# Patient Record
Sex: MALE | Race: WHITE | NOT HISPANIC OR LATINO | Employment: OTHER | RURAL
[De-identification: names, ages, dates, MRNs, and addresses within clinical notes are randomized per-mention and may not be internally consistent; named-entity substitution may affect disease eponyms.]

---

## 2020-09-03 ENCOUNTER — HISTORICAL (OUTPATIENT)
Dept: ADMINISTRATIVE | Facility: HOSPITAL | Age: 70
End: 2020-09-03

## 2020-09-03 LAB
ERYTHROCYTE [SEDIMENTATION RATE] IN BLOOD BY WESTERGREN METHOD: 6 MM/H
PREALB SERPL NEPH-MCNC: 20 MG/DL (ref 20–40)

## 2020-09-06 LAB
REPORT: 38
REPORT: NORMAL

## 2020-11-04 ENCOUNTER — HISTORICAL (OUTPATIENT)
Dept: ADMINISTRATIVE | Facility: HOSPITAL | Age: 70
End: 2020-11-04

## 2020-12-03 ENCOUNTER — HISTORICAL (OUTPATIENT)
Dept: ADMINISTRATIVE | Facility: HOSPITAL | Age: 70
End: 2020-12-03

## 2020-12-06 LAB
REPORT: 25
REPORT: 38                 38
REPORT: NORMAL

## 2021-02-04 ENCOUNTER — HISTORICAL (OUTPATIENT)
Dept: ADMINISTRATIVE | Facility: HOSPITAL | Age: 71
End: 2021-02-04

## 2021-02-06 LAB
REPORT: 38
REPORT: NORMAL

## 2021-03-02 LAB
A1C: 6.9
CHOLESTEROL, TOTAL: 120
HDLC SERPL-MCNC: 35 MG/DL
LDLC SERPL CALC-MCNC: 28 MG/DL
TRIGLYCERIDE (LIPID PAN): 286

## 2021-03-04 ENCOUNTER — HISTORICAL (OUTPATIENT)
Dept: ADMINISTRATIVE | Facility: HOSPITAL | Age: 71
End: 2021-03-04

## 2021-03-08 LAB
REPORT: NORMAL

## 2021-05-20 ENCOUNTER — OFFICE VISIT (OUTPATIENT)
Dept: WOUND CARE | Facility: HOSPITAL | Age: 71
End: 2021-05-20
Attending: NURSE PRACTITIONER
Payer: MEDICARE

## 2021-05-20 VITALS
HEIGHT: 75 IN | SYSTOLIC BLOOD PRESSURE: 146 MMHG | HEART RATE: 71 BPM | TEMPERATURE: 98 F | DIASTOLIC BLOOD PRESSURE: 79 MMHG | WEIGHT: 300 LBS | RESPIRATION RATE: 20 BRPM | BODY MASS INDEX: 37.3 KG/M2

## 2021-05-20 DIAGNOSIS — I25.10 ATHEROSCLEROSIS OF NATIVE CORONARY ARTERY WITHOUT ANGINA PECTORIS, UNSPECIFIED WHETHER NATIVE OR TRANSPLANTED HEART: Primary | ICD-10-CM

## 2021-05-20 DIAGNOSIS — L97.529 TYPE 2 DIABETES MELLITUS WITH LEFT DIABETIC FOOT ULCER: ICD-10-CM

## 2021-05-20 DIAGNOSIS — E11.621 TYPE 2 DIABETES MELLITUS WITH LEFT DIABETIC FOOT ULCER: ICD-10-CM

## 2021-05-20 DIAGNOSIS — E11.42 DIABETIC POLYNEUROPATHY ASSOCIATED WITH TYPE 2 DIABETES MELLITUS: ICD-10-CM

## 2021-05-20 PROBLEM — E11.9 DIABETES MELLITUS WITHOUT COMPLICATION: Status: ACTIVE | Noted: 2021-05-20

## 2021-05-20 PROBLEM — J44.89 OBSTRUCTIVE CHRONIC BRONCHITIS WITHOUT EXACERBATION: Status: ACTIVE | Noted: 2021-05-20

## 2021-05-20 PROBLEM — I10 ESSENTIAL HYPERTENSION, BENIGN: Status: ACTIVE | Noted: 2021-05-20

## 2021-05-20 PROCEDURE — 99213 OFFICE O/P EST LOW 20 MIN: CPT | Performed by: NURSE PRACTITIONER

## 2021-05-20 PROCEDURE — 11042 DBRDMT SUBQ TIS 1ST 20SQCM/<: CPT

## 2021-05-20 PROCEDURE — 11042 DBRDMT SUBQ TIS 1ST 20SQCM/<: CPT | Performed by: NURSE PRACTITIONER

## 2021-05-20 RX ORDER — HYDROCHLOROTHIAZIDE 12.5 MG/1
12.5 CAPSULE ORAL DAILY
COMMUNITY
End: 2021-10-04

## 2021-05-20 RX ORDER — ALBUTEROL SULFATE 0.63 MG/3ML
0.63 SOLUTION RESPIRATORY (INHALATION) EVERY 6 HOURS PRN
COMMUNITY

## 2021-05-20 RX ORDER — NAPROXEN SODIUM 220 MG/1
81 TABLET, FILM COATED ORAL DAILY
COMMUNITY

## 2021-05-20 RX ORDER — LORATADINE 10 MG/1
10 TABLET ORAL DAILY
COMMUNITY
End: 2022-05-26

## 2021-05-20 RX ORDER — HYDROCODONE BITARTRATE AND ACETAMINOPHEN 10; 325 MG/1; MG/1
1 TABLET ORAL EVERY 8 HOURS
COMMUNITY
End: 2022-01-20 | Stop reason: SDUPTHER

## 2021-05-25 ENCOUNTER — OFFICE VISIT (OUTPATIENT)
Dept: PAIN MEDICINE | Facility: CLINIC | Age: 71
End: 2021-05-25
Payer: MEDICARE

## 2021-05-25 VITALS
HEART RATE: 78 BPM | BODY MASS INDEX: 38.05 KG/M2 | HEIGHT: 75 IN | DIASTOLIC BLOOD PRESSURE: 73 MMHG | RESPIRATION RATE: 18 BRPM | WEIGHT: 306 LBS | SYSTOLIC BLOOD PRESSURE: 146 MMHG

## 2021-05-25 DIAGNOSIS — M47.817 LUMBOSACRAL SPONDYLOSIS WITHOUT MYELOPATHY: Chronic | ICD-10-CM

## 2021-05-25 DIAGNOSIS — G62.9 NEUROPATHY: Chronic | ICD-10-CM

## 2021-05-25 DIAGNOSIS — Z79.899 ENCOUNTER FOR LONG-TERM (CURRENT) USE OF OTHER MEDICATIONS: ICD-10-CM

## 2021-05-25 DIAGNOSIS — M79.672 FOOT PAIN, LEFT: Primary | Chronic | ICD-10-CM

## 2021-05-25 LAB
CTP QC/QA: YES
POC (AMP) AMPHETAMINE: NEGATIVE
POC (BAR) BARBITURATES: NEGATIVE
POC (BUP) BUPRENORPHINE: NEGATIVE
POC (BZO) BENZODIAZEPINES: NEGATIVE
POC (COC) COCAINE: NEGATIVE
POC (MDMA) METHYLENEDIOXYMETHAMPHETAMINE 3,4: NEGATIVE
POC (MET) METHAMPHETAMINE: NEGATIVE
POC (MOP) OPIATES: ABNORMAL
POC (MTD) METHADONE: NEGATIVE
POC (OXY) OXYCODONE: NEGATIVE
POC (PCP) PHENCYCLIDINE: NEGATIVE
POC (TCA) TRICYCLIC ANTIDEPRESSANTS: NEGATIVE
POC TEMPERATURE (URINE): 92
POC THC: NEGATIVE

## 2021-05-25 PROCEDURE — 80305 DRUG TEST PRSMV DIR OPT OBS: CPT | Mod: PBBFAC | Performed by: PHYSICIAN ASSISTANT

## 2021-05-25 PROCEDURE — 99214 PR OFFICE/OUTPT VISIT, EST, LEVL IV, 30-39 MIN: ICD-10-PCS | Mod: S$PBB,,, | Performed by: PHYSICIAN ASSISTANT

## 2021-05-25 PROCEDURE — 99214 OFFICE O/P EST MOD 30 MIN: CPT | Mod: PBBFAC | Performed by: PHYSICIAN ASSISTANT

## 2021-05-25 PROCEDURE — 99214 OFFICE O/P EST MOD 30 MIN: CPT | Mod: S$PBB,,, | Performed by: PHYSICIAN ASSISTANT

## 2021-05-25 RX ORDER — HYDROCODONE BITARTRATE AND ACETAMINOPHEN 10; 325 MG/1; MG/1
1 TABLET ORAL EVERY 8 HOURS
Qty: 90 TABLET | Refills: 0 | Status: ON HOLD | OUTPATIENT
Start: 2021-06-26 | End: 2021-06-10 | Stop reason: HOSPADM

## 2021-05-25 RX ORDER — HYDROCODONE BITARTRATE AND ACETAMINOPHEN 10; 325 MG/1; MG/1
1 TABLET ORAL EVERY 8 HOURS
Qty: 90 TABLET | Refills: 0 | Status: ON HOLD | OUTPATIENT
Start: 2021-05-29 | End: 2021-06-10 | Stop reason: HOSPADM

## 2021-05-25 RX ORDER — HYDROCODONE BITARTRATE AND ACETAMINOPHEN 10; 325 MG/1; MG/1
1 TABLET ORAL EVERY 8 HOURS
Qty: 90 TABLET | Refills: 0 | Status: ON HOLD | OUTPATIENT
Start: 2021-07-28 | End: 2021-06-10 | Stop reason: HOSPADM

## 2021-05-27 ENCOUNTER — CLINICAL SUPPORT (OUTPATIENT)
Dept: FAMILY MEDICINE | Facility: CLINIC | Age: 71
End: 2021-05-27
Payer: MEDICARE

## 2021-05-27 ENCOUNTER — CLINICAL SUPPORT (OUTPATIENT)
Dept: WOUND CARE | Facility: HOSPITAL | Age: 71
End: 2021-05-27
Attending: NURSE PRACTITIONER
Payer: MEDICARE

## 2021-05-27 VITALS
SYSTOLIC BLOOD PRESSURE: 145 MMHG | RESPIRATION RATE: 20 BRPM | DIASTOLIC BLOOD PRESSURE: 89 MMHG | TEMPERATURE: 98 F | HEART RATE: 84 BPM

## 2021-05-27 DIAGNOSIS — E11.42 DIABETIC POLYNEUROPATHY ASSOCIATED WITH TYPE 2 DIABETES MELLITUS: ICD-10-CM

## 2021-05-27 DIAGNOSIS — E11.621 TYPE 2 DIABETES MELLITUS WITH LEFT DIABETIC FOOT ULCER: Primary | ICD-10-CM

## 2021-05-27 DIAGNOSIS — L97.529 TYPE 2 DIABETES MELLITUS WITH LEFT DIABETIC FOOT ULCER: Primary | ICD-10-CM

## 2021-05-27 DIAGNOSIS — S91.332A PUNCTURE WOUND OF LEFT FOOT, INITIAL ENCOUNTER: ICD-10-CM

## 2021-05-27 DIAGNOSIS — Z23 IMMUNIZATION DUE: Primary | ICD-10-CM

## 2021-05-27 PROCEDURE — 90471 TDAP VACCINE GREATER THAN OR EQUAL TO 7YO IM: ICD-10-PCS | Mod: ,,, | Performed by: NURSE PRACTITIONER

## 2021-05-27 PROCEDURE — 11042 DBRDMT SUBQ TIS 1ST 20SQCM/<: CPT

## 2021-05-27 PROCEDURE — 90715 TDAP VACCINE GREATER THAN OR EQUAL TO 7YO IM: ICD-10-PCS | Mod: ,,, | Performed by: NURSE PRACTITIONER

## 2021-05-27 PROCEDURE — 90715 TDAP VACCINE 7 YRS/> IM: CPT | Mod: ,,, | Performed by: NURSE PRACTITIONER

## 2021-05-27 PROCEDURE — 90471 IMMUNIZATION ADMIN: CPT | Mod: ,,, | Performed by: NURSE PRACTITIONER

## 2021-05-28 PROBLEM — E11.42 DIABETIC POLYNEUROPATHY ASSOCIATED WITH TYPE 2 DIABETES MELLITUS: Status: ACTIVE | Noted: 2021-05-28

## 2021-05-28 RX ORDER — MEMANTINE HYDROCHLORIDE 10 MG/1
10 TABLET ORAL 2 TIMES DAILY
COMMUNITY
Start: 2021-05-12 | End: 2021-09-08

## 2021-05-28 RX ORDER — INSULIN DETEMIR 100 [IU]/ML
INJECTION, SOLUTION SUBCUTANEOUS
Status: ON HOLD | COMMUNITY
Start: 2021-05-19 | End: 2021-06-09

## 2021-05-28 RX ORDER — SIMVASTATIN 40 MG/1
40 TABLET, FILM COATED ORAL NIGHTLY
Status: ON HOLD | COMMUNITY
Start: 2021-05-12 | End: 2021-06-09

## 2021-05-28 RX ORDER — GEMFIBROZIL 600 MG/1
600 TABLET, FILM COATED ORAL 2 TIMES DAILY
COMMUNITY
Start: 2021-05-12 | End: 2021-07-07

## 2021-05-28 RX ORDER — ISOSORBIDE MONONITRATE 30 MG/1
30 TABLET, EXTENDED RELEASE ORAL DAILY
COMMUNITY
Start: 2021-05-12 | End: 2021-08-20

## 2021-05-28 RX ORDER — ALBUTEROL SULFATE 90 UG/1
AEROSOL, METERED RESPIRATORY (INHALATION)
COMMUNITY
Start: 2021-05-12 | End: 2021-10-29

## 2021-05-28 RX ORDER — TIZANIDINE 4 MG/1
TABLET ORAL
COMMUNITY
Start: 2021-05-12 | End: 2021-08-09

## 2021-05-28 RX ORDER — GABAPENTIN 600 MG/1
600 TABLET ORAL 2 TIMES DAILY
COMMUNITY
Start: 2021-05-19 | End: 2021-08-20

## 2021-05-28 RX ORDER — LISINOPRIL 5 MG/1
5 TABLET ORAL DAILY
COMMUNITY
Start: 2021-05-12 | End: 2021-08-20

## 2021-05-28 RX ORDER — INSULIN ASPART 100 [IU]/ML
INJECTION, SUSPENSION SUBCUTANEOUS
COMMUNITY
Start: 2021-05-19 | End: 2021-06-16

## 2021-05-28 RX ORDER — EMPAGLIFLOZIN, METFORMIN HYDROCHLORIDE 12.5; 1 MG/1; MG/1
2 TABLET, EXTENDED RELEASE ORAL EVERY MORNING
COMMUNITY
Start: 2021-05-12 | End: 2021-08-09

## 2021-05-28 RX ORDER — CHLORPHENIRAMINE MALEATE AND PHENYLEPHRINE HYDROCHLORIDE 4; 10 MG/1; MG/1
1 TABLET, COATED ORAL 2 TIMES DAILY PRN
COMMUNITY
Start: 2021-05-12 | End: 2021-09-08

## 2021-05-28 RX ORDER — OMEPRAZOLE 20 MG/1
20 CAPSULE, DELAYED RELEASE ORAL DAILY
COMMUNITY
Start: 2021-05-12 | End: 2021-07-07

## 2021-06-01 ENCOUNTER — OFFICE VISIT (OUTPATIENT)
Dept: FAMILY MEDICINE | Facility: CLINIC | Age: 71
End: 2021-06-01
Payer: MEDICARE

## 2021-06-01 VITALS
TEMPERATURE: 99 F | WEIGHT: 302.38 LBS | HEIGHT: 73 IN | BODY MASS INDEX: 40.08 KG/M2 | SYSTOLIC BLOOD PRESSURE: 130 MMHG | RESPIRATION RATE: 18 BRPM | DIASTOLIC BLOOD PRESSURE: 78 MMHG | OXYGEN SATURATION: 93 % | HEART RATE: 88 BPM

## 2021-06-01 DIAGNOSIS — L03.116 CELLULITIS OF LEFT FOOT: ICD-10-CM

## 2021-06-01 DIAGNOSIS — L03.119 CELLULITIS OF LOWER LEG: Primary | ICD-10-CM

## 2021-06-01 LAB
BASOPHILS # BLD AUTO: 0.02 K/UL (ref 0–0.2)
BASOPHILS NFR BLD AUTO: 0.2 % (ref 0–1)
EOSINOPHIL # BLD AUTO: 0.05 K/UL (ref 0–0.5)
EOSINOPHIL NFR BLD AUTO: 0.4 % (ref 1–4)
ERYTHROCYTE [DISTWIDTH] IN BLOOD BY AUTOMATED COUNT: 14.4 % (ref 11.5–14.5)
HCT VFR BLD AUTO: 53.6 % (ref 40–54)
HGB BLD-MCNC: 17.1 G/DL (ref 13.5–18)
LYMPHOCYTES # BLD AUTO: 1.46 K/UL (ref 1–4.8)
LYMPHOCYTES NFR BLD AUTO: 11.5 % (ref 27–41)
MCH RBC QN AUTO: 31.3 PG (ref 27–31)
MCHC RBC AUTO-ENTMCNC: 31.9 G/DL (ref 32–36)
MCV RBC AUTO: 98 FL (ref 80–96)
MONOCYTES # BLD AUTO: 1.44 K/UL (ref 0–0.8)
MONOCYTES NFR BLD AUTO: 11.3 % (ref 2–6)
MPC BLD CALC-MCNC: 10.2 FL (ref 9.4–12.4)
NEUTROPHILS # BLD AUTO: 9.74 K/UL (ref 1.8–7.7)
NEUTROPHILS NFR BLD AUTO: 76.6 % (ref 53–65)
PLATELET # BLD AUTO: 219 K/UL (ref 150–400)
RBC # BLD AUTO: 5.47 M/UL (ref 4.6–6.2)
WBC # BLD AUTO: 12.71 K/UL (ref 4.5–11)

## 2021-06-01 PROCEDURE — 99214 OFFICE O/P EST MOD 30 MIN: CPT | Mod: 25,,, | Performed by: NURSE PRACTITIONER

## 2021-06-01 PROCEDURE — 99214 PR OFFICE/OUTPT VISIT, EST, LEVL IV, 30-39 MIN: ICD-10-PCS | Mod: 25,,, | Performed by: NURSE PRACTITIONER

## 2021-06-01 PROCEDURE — 85025 COMPLETE CBC W/AUTO DIFF WBC: CPT | Mod: QW | Performed by: NURSE PRACTITIONER

## 2021-06-01 PROCEDURE — 96372 PR INJECTION,THERAP/PROPH/DIAG2ST, IM OR SUBCUT: ICD-10-PCS | Mod: ,,, | Performed by: NURSE PRACTITIONER

## 2021-06-01 PROCEDURE — 96372 THER/PROPH/DIAG INJ SC/IM: CPT | Mod: ,,, | Performed by: NURSE PRACTITIONER

## 2021-06-01 RX ORDER — CLINDAMYCIN PHOSPHATE 150 MG/ML
600 INJECTION, SOLUTION INTRAVENOUS
Status: COMPLETED | OUTPATIENT
Start: 2021-06-01 | End: 2021-06-01

## 2021-06-01 RX ORDER — LEVOFLOXACIN 500 MG/1
500 TABLET, FILM COATED ORAL DAILY
Qty: 10 TABLET | Refills: 0 | Status: SHIPPED | OUTPATIENT
Start: 2021-06-01 | End: 2021-11-08 | Stop reason: ALTCHOICE

## 2021-06-01 RX ADMIN — CLINDAMYCIN PHOSPHATE 600 MG: 150 INJECTION, SOLUTION INTRAVENOUS at 09:06

## 2021-06-02 ENCOUNTER — OFFICE VISIT (OUTPATIENT)
Dept: FAMILY MEDICINE | Facility: CLINIC | Age: 71
End: 2021-06-02
Payer: MEDICARE

## 2021-06-02 VITALS
HEIGHT: 73 IN | SYSTOLIC BLOOD PRESSURE: 140 MMHG | TEMPERATURE: 98 F | OXYGEN SATURATION: 96 % | BODY MASS INDEX: 40 KG/M2 | WEIGHT: 301.81 LBS | RESPIRATION RATE: 18 BRPM | DIASTOLIC BLOOD PRESSURE: 72 MMHG | HEART RATE: 91 BPM

## 2021-06-02 DIAGNOSIS — L03.116 CELLULITIS OF LEFT FOOT: Primary | ICD-10-CM

## 2021-06-02 DIAGNOSIS — L03.119 CELLULITIS OF LOWER LEG: ICD-10-CM

## 2021-06-02 LAB
BASOPHILS # BLD AUTO: 0.3 K/UL (ref 0–0.2)
BASOPHILS NFR BLD AUTO: 0.3 % (ref 0–1)
EOSINOPHIL # BLD AUTO: 0.05 K/UL (ref 0–0.5)
EOSINOPHIL NFR BLD AUTO: 0.4 % (ref 1–4)
ERYTHROCYTE [DISTWIDTH] IN BLOOD BY AUTOMATED COUNT: 14.4 % (ref 11.5–14.5)
HCT VFR BLD AUTO: 52.2 % (ref 40–54)
HGB BLD-MCNC: 16.7 G/DL (ref 13.5–18)
LYMPHOCYTES # BLD AUTO: 1.47 K/UL (ref 1–4.8)
LYMPHOCYTES NFR BLD AUTO: 12.6 % (ref 27–41)
MCH RBC QN AUTO: 31.4 PG (ref 27–31)
MCHC RBC AUTO-ENTMCNC: 32 G/DL (ref 32–36)
MCV RBC AUTO: 98.1 FL (ref 80–96)
MONOCYTES # BLD AUTO: 0.88 K/UL (ref 0–0.8)
MONOCYTES NFR BLD AUTO: 7.6 % (ref 2–6)
MPC BLD CALC-MCNC: 10.3 FL (ref 9.4–12.4)
NEUTROPHILS # BLD AUTO: 9.2 K/UL (ref 1.8–7.7)
NEUTROPHILS NFR BLD AUTO: 79.1 % (ref 53–65)
PLATELET # BLD AUTO: 220 K/UL (ref 150–400)
RBC # BLD AUTO: 5.32 M/UL (ref 4.6–6.2)
WBC # BLD AUTO: 11.63 K/UL (ref 4.5–11)

## 2021-06-02 PROCEDURE — 85025 COMPLETE CBC W/AUTO DIFF WBC: CPT | Mod: QW | Performed by: NURSE PRACTITIONER

## 2021-06-02 PROCEDURE — 99214 OFFICE O/P EST MOD 30 MIN: CPT | Mod: 25,,, | Performed by: NURSE PRACTITIONER

## 2021-06-02 PROCEDURE — 96372 PR INJECTION,THERAP/PROPH/DIAG2ST, IM OR SUBCUT: ICD-10-PCS | Mod: ,,, | Performed by: NURSE PRACTITIONER

## 2021-06-02 PROCEDURE — 87186 SC STD MICRODIL/AGAR DIL: CPT | Mod: 91,,, | Performed by: CLINICAL MEDICAL LABORATORY

## 2021-06-02 PROCEDURE — 87077 CULTURE AEROBIC IDENTIFY: CPT | Mod: ,,, | Performed by: CLINICAL MEDICAL LABORATORY

## 2021-06-02 PROCEDURE — 87186 SC STD MICRODIL/AGAR DIL: CPT | Mod: ,,, | Performed by: CLINICAL MEDICAL LABORATORY

## 2021-06-02 PROCEDURE — 87077 CULTURE, WOUND: ICD-10-PCS | Mod: ,,, | Performed by: CLINICAL MEDICAL LABORATORY

## 2021-06-02 PROCEDURE — 87186 CULTURE, WOUND: ICD-10-PCS | Mod: 91,,, | Performed by: CLINICAL MEDICAL LABORATORY

## 2021-06-02 PROCEDURE — 87070 CULTURE, WOUND: ICD-10-PCS | Mod: ,,, | Performed by: CLINICAL MEDICAL LABORATORY

## 2021-06-02 PROCEDURE — 96372 THER/PROPH/DIAG INJ SC/IM: CPT | Mod: ,,, | Performed by: NURSE PRACTITIONER

## 2021-06-02 PROCEDURE — 99214 PR OFFICE/OUTPT VISIT, EST, LEVL IV, 30-39 MIN: ICD-10-PCS | Mod: 25,,, | Performed by: NURSE PRACTITIONER

## 2021-06-02 PROCEDURE — 87070 CULTURE OTHR SPECIMN AEROBIC: CPT | Mod: ,,, | Performed by: CLINICAL MEDICAL LABORATORY

## 2021-06-02 RX ORDER — DOXYCYCLINE 100 MG/1
100 CAPSULE ORAL EVERY 12 HOURS
Qty: 20 CAPSULE | Refills: 0 | Status: ON HOLD | OUTPATIENT
Start: 2021-06-02 | End: 2021-06-10 | Stop reason: HOSPADM

## 2021-06-02 RX ORDER — CEFTRIAXONE 1 G/1
1 INJECTION, POWDER, FOR SOLUTION INTRAMUSCULAR; INTRAVENOUS
Status: COMPLETED | OUTPATIENT
Start: 2021-06-02 | End: 2021-06-02

## 2021-06-02 RX ADMIN — CEFTRIAXONE 1 G: 1 INJECTION, POWDER, FOR SOLUTION INTRAMUSCULAR; INTRAVENOUS at 02:06

## 2021-06-03 ENCOUNTER — CLINICAL SUPPORT (OUTPATIENT)
Dept: WOUND CARE | Facility: HOSPITAL | Age: 71
End: 2021-06-03
Attending: NURSE PRACTITIONER
Payer: MEDICARE

## 2021-06-03 ENCOUNTER — HOSPITAL ENCOUNTER (OUTPATIENT)
Dept: RADIOLOGY | Facility: HOSPITAL | Age: 71
Discharge: HOME | End: 2021-06-03
Attending: NURSE PRACTITIONER
Payer: MEDICARE

## 2021-06-03 VITALS
RESPIRATION RATE: 20 BRPM | TEMPERATURE: 98 F | SYSTOLIC BLOOD PRESSURE: 130 MMHG | DIASTOLIC BLOOD PRESSURE: 82 MMHG | HEART RATE: 80 BPM

## 2021-06-03 DIAGNOSIS — S91.332A PUNCTURE WOUND OF LEFT FOOT, INITIAL ENCOUNTER: ICD-10-CM

## 2021-06-03 DIAGNOSIS — L97.529 TYPE 2 DIABETES MELLITUS WITH LEFT DIABETIC FOOT ULCER: Primary | ICD-10-CM

## 2021-06-03 DIAGNOSIS — E11.621 TYPE 2 DIABETES MELLITUS WITH LEFT DIABETIC FOOT ULCER: Primary | ICD-10-CM

## 2021-06-03 PROCEDURE — 11042 DBRDMT SUBQ TIS 1ST 20SQCM/<: CPT

## 2021-06-03 PROCEDURE — 99213 OFFICE O/P EST LOW 20 MIN: CPT | Performed by: NURSE PRACTITIONER

## 2021-06-03 PROCEDURE — 73630 X-RAY EXAM OF FOOT: CPT | Mod: TC,LT

## 2021-06-03 PROCEDURE — 11042 DBRDMT SUBQ TIS 1ST 20SQCM/<: CPT | Performed by: NURSE PRACTITIONER

## 2021-06-07 ENCOUNTER — HOSPITAL ENCOUNTER (INPATIENT)
Facility: HOSPITAL | Age: 71
LOS: 3 days | Discharge: HOME | DRG: 603 | End: 2021-06-10
Attending: SURGERY | Admitting: SURGERY
Payer: MEDICARE

## 2021-06-07 ENCOUNTER — OFFICE VISIT (OUTPATIENT)
Dept: FAMILY MEDICINE | Facility: CLINIC | Age: 71
End: 2021-06-07
Payer: MEDICARE

## 2021-06-07 ENCOUNTER — OFFICE VISIT (OUTPATIENT)
Dept: SURGERY | Facility: CLINIC | Age: 71
DRG: 603 | End: 2021-06-07
Attending: SURGERY
Payer: MEDICARE

## 2021-06-07 VITALS
OXYGEN SATURATION: 95 % | WEIGHT: 296.63 LBS | SYSTOLIC BLOOD PRESSURE: 138 MMHG | TEMPERATURE: 98 F | BODY MASS INDEX: 39.31 KG/M2 | RESPIRATION RATE: 18 BRPM | HEIGHT: 73 IN | DIASTOLIC BLOOD PRESSURE: 82 MMHG | HEART RATE: 84 BPM

## 2021-06-07 DIAGNOSIS — L97.529 TYPE 2 DIABETES MELLITUS WITH LEFT DIABETIC FOOT ULCER: Primary | ICD-10-CM

## 2021-06-07 DIAGNOSIS — J44.89 OBSTRUCTIVE CHRONIC BRONCHITIS WITHOUT EXACERBATION: ICD-10-CM

## 2021-06-07 DIAGNOSIS — L03.116 CELLULITIS OF LEFT FOOT: ICD-10-CM

## 2021-06-07 DIAGNOSIS — L97.529 TYPE 2 DIABETES MELLITUS WITH LEFT DIABETIC FOOT ULCER: ICD-10-CM

## 2021-06-07 DIAGNOSIS — L03.119 CELLULITIS OF LOWER LEG: ICD-10-CM

## 2021-06-07 DIAGNOSIS — E11.621 TYPE 2 DIABETES MELLITUS WITH LEFT DIABETIC FOOT ULCER: ICD-10-CM

## 2021-06-07 DIAGNOSIS — E11.621 TYPE 2 DIABETES MELLITUS WITH LEFT DIABETIC FOOT ULCER: Primary | ICD-10-CM

## 2021-06-07 DIAGNOSIS — L03.116 CELLULITIS OF LEFT FOOT: Primary | ICD-10-CM

## 2021-06-07 LAB
ANION GAP SERPL CALCULATED.3IONS-SCNC: 15 MMOL/L (ref 7–16)
BASOPHILS # BLD AUTO: 0.02 K/UL (ref 0–0.2)
BASOPHILS # BLD AUTO: 0.04 K/UL (ref 0–0.2)
BASOPHILS NFR BLD AUTO: 0.2 % (ref 0–1)
BASOPHILS NFR BLD AUTO: 0.4 % (ref 0–1)
BUN SERPL-MCNC: 14 MG/DL (ref 7–18)
BUN SERPL-MCNC: 14 MG/DL (ref 7–18)
BUN/CREAT SERPL: 13 (ref 6–20)
BUN/CREAT SERPL: 13 (ref 6–20)
CALCIUM SERPL-MCNC: 9 MG/DL (ref 8.5–10.1)
CHLORIDE SERPL-SCNC: 106 MMOL/L (ref 98–107)
CO2 SERPL-SCNC: 30 MMOL/L (ref 21–32)
CREAT SERPL-MCNC: 1.07 MG/DL (ref 0.7–1.3)
CREAT SERPL-MCNC: 1.08 MG/DL (ref 0.7–1.3)
DIFFERENTIAL METHOD BLD: ABNORMAL
DIFFERENTIAL METHOD BLD: ABNORMAL
EOSINOPHIL # BLD AUTO: 0.11 K/UL (ref 0–0.5)
EOSINOPHIL # BLD AUTO: 0.13 K/UL (ref 0–0.5)
EOSINOPHIL NFR BLD AUTO: 1.1 % (ref 1–4)
EOSINOPHIL NFR BLD AUTO: 1.4 % (ref 1–4)
ERYTHROCYTE [DISTWIDTH] IN BLOOD BY AUTOMATED COUNT: 13.6 % (ref 11.5–14.5)
ERYTHROCYTE [DISTWIDTH] IN BLOOD BY AUTOMATED COUNT: 14.4 % (ref 11.5–14.5)
GLUCOSE SERPL-MCNC: 157 MG/DL (ref 74–106)
GLUCOSE SERPL-MCNC: 185 MG/DL (ref 70–105)
HCT VFR BLD AUTO: 53.8 % (ref 40–54)
HCT VFR BLD AUTO: 54.1 % (ref 40–54)
HGB BLD-MCNC: 16.8 G/DL (ref 13.5–18)
HGB BLD-MCNC: 17 G/DL (ref 13.5–18)
IMM GRANULOCYTES # BLD AUTO: 0.05 K/UL (ref 0–0.04)
IMM GRANULOCYTES NFR BLD: 0.5 % (ref 0–0.4)
LYMPHOCYTES # BLD AUTO: 1.77 K/UL (ref 1–4.8)
LYMPHOCYTES # BLD AUTO: 1.79 K/UL (ref 1–4.8)
LYMPHOCYTES NFR BLD AUTO: 17.7 % (ref 27–41)
LYMPHOCYTES NFR BLD AUTO: 18.8 % (ref 27–41)
MCH RBC QN AUTO: 30.8 PG (ref 27–31)
MCH RBC QN AUTO: 30.9 PG (ref 27–31)
MCHC RBC AUTO-ENTMCNC: 31.2 G/DL (ref 32–36)
MCHC RBC AUTO-ENTMCNC: 31.4 G/DL (ref 32–36)
MCV RBC AUTO: 98.4 FL (ref 80–96)
MCV RBC AUTO: 98.7 FL (ref 80–96)
MICROORGANISM SPEC CULT: ABNORMAL
MICROORGANISM SPEC CULT: ABNORMAL
MONOCYTES # BLD AUTO: 0.7 K/UL (ref 0–0.8)
MONOCYTES # BLD AUTO: 0.74 K/UL (ref 0–0.8)
MONOCYTES NFR BLD AUTO: 6.9 % (ref 2–6)
MONOCYTES NFR BLD AUTO: 7.9 % (ref 2–6)
MPC BLD CALC-MCNC: 9.4 FL (ref 9.4–12.4)
MPC BLD CALC-MCNC: 9.8 FL (ref 9.4–12.4)
NEUTROPHILS # BLD AUTO: 6.76 K/UL (ref 1.8–7.7)
NEUTROPHILS # BLD AUTO: 7.42 K/UL (ref 1.8–7.7)
NEUTROPHILS NFR BLD AUTO: 71.7 % (ref 53–65)
NEUTROPHILS NFR BLD AUTO: 73.4 % (ref 53–65)
NRBC # BLD AUTO: 0 X10E3/UL
NRBC, AUTO (.00): 0 %
PLATELET # BLD AUTO: 259 K/UL (ref 150–400)
PLATELET # BLD AUTO: 274 K/UL (ref 150–400)
POTASSIUM SERPL-SCNC: 4.7 MMOL/L (ref 3.5–5.1)
RBC # BLD AUTO: 5.45 M/UL (ref 4.6–6.2)
RBC # BLD AUTO: 5.5 M/UL (ref 4.6–6.2)
SODIUM SERPL-SCNC: 146 MMOL/L (ref 136–145)
WBC # BLD AUTO: 10.11 K/UL (ref 4.5–11)
WBC # BLD AUTO: 9.42 K/UL (ref 4.5–11)

## 2021-06-07 PROCEDURE — 25000003 PHARM REV CODE 250: Performed by: SURGERY

## 2021-06-07 PROCEDURE — 11000001 HC ACUTE MED/SURG PRIVATE ROOM

## 2021-06-07 PROCEDURE — 94761 N-INVAS EAR/PLS OXIMETRY MLT: CPT

## 2021-06-07 PROCEDURE — 84520 ASSAY OF UREA NITROGEN: CPT | Performed by: SURGERY

## 2021-06-07 PROCEDURE — 85025 COMPLETE CBC W/AUTO DIFF WBC: CPT | Performed by: SURGERY

## 2021-06-07 PROCEDURE — 99214 PR OFFICE/OUTPT VISIT, EST, LEVL IV, 30-39 MIN: ICD-10-PCS | Mod: ,,, | Performed by: NURSE PRACTITIONER

## 2021-06-07 PROCEDURE — 36415 COLL VENOUS BLD VENIPUNCTURE: CPT | Performed by: SURGERY

## 2021-06-07 PROCEDURE — 99204 OFFICE O/P NEW MOD 45 MIN: CPT | Mod: S$PBB,,, | Performed by: SURGERY

## 2021-06-07 PROCEDURE — 82962 GLUCOSE BLOOD TEST: CPT

## 2021-06-07 PROCEDURE — 99214 OFFICE O/P EST MOD 30 MIN: CPT | Mod: ,,, | Performed by: NURSE PRACTITIONER

## 2021-06-07 PROCEDURE — 99204 PR OFFICE/OUTPT VISIT, NEW, LEVL IV, 45-59 MIN: ICD-10-PCS | Mod: S$PBB,,, | Performed by: SURGERY

## 2021-06-07 PROCEDURE — 82565 ASSAY OF CREATININE: CPT | Performed by: SURGERY

## 2021-06-07 PROCEDURE — 85025 COMPLETE CBC W/AUTO DIFF WBC: CPT | Mod: QW | Performed by: NURSE PRACTITIONER

## 2021-06-07 PROCEDURE — 63600175 PHARM REV CODE 636 W HCPCS: Performed by: SURGERY

## 2021-06-07 PROCEDURE — 99214 OFFICE O/P EST MOD 30 MIN: CPT | Mod: PBBFAC,25 | Performed by: SURGERY

## 2021-06-07 RX ORDER — INSULIN ASPART 100 [IU]/ML
1-10 INJECTION, SOLUTION INTRAVENOUS; SUBCUTANEOUS
Status: CANCELLED | OUTPATIENT
Start: 2021-06-07

## 2021-06-07 RX ORDER — ONDANSETRON 4 MG/1
8 TABLET, ORALLY DISINTEGRATING ORAL EVERY 8 HOURS PRN
Status: DISCONTINUED | OUTPATIENT
Start: 2021-06-07 | End: 2021-06-10 | Stop reason: HOSPADM

## 2021-06-07 RX ORDER — ACETAMINOPHEN 325 MG/1
650 TABLET ORAL EVERY 8 HOURS PRN
Status: CANCELLED | OUTPATIENT
Start: 2021-06-07

## 2021-06-07 RX ORDER — TALC
6 POWDER (GRAM) TOPICAL NIGHTLY PRN
Status: CANCELLED | OUTPATIENT
Start: 2021-06-07

## 2021-06-07 RX ORDER — IBUPROFEN 200 MG
24 TABLET ORAL
Status: CANCELLED | OUTPATIENT
Start: 2021-06-07

## 2021-06-07 RX ORDER — ONDANSETRON 4 MG/1
8 TABLET, ORALLY DISINTEGRATING ORAL EVERY 8 HOURS PRN
Status: CANCELLED | OUTPATIENT
Start: 2021-06-07

## 2021-06-07 RX ORDER — IBUPROFEN 200 MG
16 TABLET ORAL
Status: DISCONTINUED | OUTPATIENT
Start: 2021-06-07 | End: 2021-06-10 | Stop reason: HOSPADM

## 2021-06-07 RX ORDER — LIDOCAINE HYDROCHLORIDE 10 MG/ML
1 INJECTION, SOLUTION EPIDURAL; INFILTRATION; INTRACAUDAL; PERINEURAL ONCE
Status: DISCONTINUED | OUTPATIENT
Start: 2021-06-08 | End: 2021-06-10 | Stop reason: HOSPADM

## 2021-06-07 RX ORDER — GLUCAGON 1 MG
1 KIT INJECTION
Status: DISCONTINUED | OUTPATIENT
Start: 2021-06-07 | End: 2021-06-10 | Stop reason: HOSPADM

## 2021-06-07 RX ORDER — LIDOCAINE HYDROCHLORIDE 10 MG/ML
1 INJECTION, SOLUTION EPIDURAL; INFILTRATION; INTRACAUDAL; PERINEURAL ONCE
Status: CANCELLED | OUTPATIENT
Start: 2021-06-07 | End: 2021-06-07

## 2021-06-07 RX ORDER — SODIUM CHLORIDE 0.9 % (FLUSH) 0.9 %
10 SYRINGE (ML) INJECTION
Status: DISCONTINUED | OUTPATIENT
Start: 2021-06-07 | End: 2021-06-07

## 2021-06-07 RX ORDER — SODIUM CHLORIDE 0.9 % (FLUSH) 0.9 %
10 SYRINGE (ML) INJECTION
Status: DISCONTINUED | OUTPATIENT
Start: 2021-06-07 | End: 2021-06-10 | Stop reason: HOSPADM

## 2021-06-07 RX ORDER — INSULIN ASPART 100 [IU]/ML
1-10 INJECTION, SOLUTION INTRAVENOUS; SUBCUTANEOUS
Status: DISCONTINUED | OUTPATIENT
Start: 2021-06-07 | End: 2021-06-10 | Stop reason: HOSPADM

## 2021-06-07 RX ORDER — IBUPROFEN 200 MG
16 TABLET ORAL
Status: CANCELLED | OUTPATIENT
Start: 2021-06-07

## 2021-06-07 RX ORDER — TALC
6 POWDER (GRAM) TOPICAL NIGHTLY PRN
Status: DISCONTINUED | OUTPATIENT
Start: 2021-06-07 | End: 2021-06-10 | Stop reason: HOSPADM

## 2021-06-07 RX ORDER — GLUCAGON 1 MG
1 KIT INJECTION
Status: CANCELLED | OUTPATIENT
Start: 2021-06-07

## 2021-06-07 RX ORDER — IBUPROFEN 200 MG
24 TABLET ORAL
Status: DISCONTINUED | OUTPATIENT
Start: 2021-06-07 | End: 2021-06-10 | Stop reason: HOSPADM

## 2021-06-07 RX ORDER — ACETAMINOPHEN 325 MG/1
650 TABLET ORAL EVERY 8 HOURS PRN
Status: DISCONTINUED | OUTPATIENT
Start: 2021-06-07 | End: 2021-06-10 | Stop reason: HOSPADM

## 2021-06-07 RX ORDER — SODIUM CHLORIDE 0.9 % (FLUSH) 0.9 %
10 SYRINGE (ML) INJECTION
Status: CANCELLED | OUTPATIENT
Start: 2021-06-07

## 2021-06-07 RX ADMIN — VANCOMYCIN HYDROCHLORIDE 2500 MG: 5 INJECTION, POWDER, LYOPHILIZED, FOR SOLUTION INTRAVENOUS at 08:06

## 2021-06-07 RX ADMIN — INSULIN ASPART 1 UNITS: 100 INJECTION, SOLUTION INTRAVENOUS; SUBCUTANEOUS at 08:06

## 2021-06-08 LAB
GLUCOSE SERPL-MCNC: 126 MG/DL (ref 70–105)
GLUCOSE SERPL-MCNC: 181 MG/DL (ref 70–105)
GLUCOSE SERPL-MCNC: 186 MG/DL (ref 70–105)
GLUCOSE SERPL-MCNC: 272 MG/DL (ref 70–105)

## 2021-06-08 PROCEDURE — 82962 GLUCOSE BLOOD TEST: CPT

## 2021-06-08 PROCEDURE — 25000003 PHARM REV CODE 250: Performed by: SURGERY

## 2021-06-08 PROCEDURE — 99233 PR SUBSEQUENT HOSPITAL CARE,LEVL III: ICD-10-PCS | Mod: ,,, | Performed by: SURGERY

## 2021-06-08 PROCEDURE — 99233 SBSQ HOSP IP/OBS HIGH 50: CPT | Mod: ,,, | Performed by: SURGERY

## 2021-06-08 PROCEDURE — 94761 N-INVAS EAR/PLS OXIMETRY MLT: CPT

## 2021-06-08 PROCEDURE — 63600175 PHARM REV CODE 636 W HCPCS: Performed by: SURGERY

## 2021-06-08 PROCEDURE — 11000001 HC ACUTE MED/SURG PRIVATE ROOM

## 2021-06-08 RX ORDER — MEMANTINE HYDROCHLORIDE 10 MG/1
10 TABLET ORAL 2 TIMES DAILY
Status: DISCONTINUED | OUTPATIENT
Start: 2021-06-08 | End: 2021-06-10 | Stop reason: HOSPADM

## 2021-06-08 RX ORDER — LISINOPRIL 5 MG/1
5 TABLET ORAL DAILY
Status: DISCONTINUED | OUTPATIENT
Start: 2021-06-09 | End: 2021-06-10 | Stop reason: HOSPADM

## 2021-06-08 RX ORDER — ALBUTEROL SULFATE 0.63 MG/3ML
0.63 SOLUTION RESPIRATORY (INHALATION) EVERY 6 HOURS PRN
Status: DISCONTINUED | OUTPATIENT
Start: 2021-06-08 | End: 2021-06-10 | Stop reason: HOSPADM

## 2021-06-08 RX ORDER — HYDROCHLOROTHIAZIDE 12.5 MG/1
12.5 TABLET ORAL DAILY
Status: DISCONTINUED | OUTPATIENT
Start: 2021-06-09 | End: 2021-06-10 | Stop reason: HOSPADM

## 2021-06-08 RX ORDER — CIPROFLOXACIN 2 MG/ML
400 INJECTION, SOLUTION INTRAVENOUS
Status: DISCONTINUED | OUTPATIENT
Start: 2021-06-08 | End: 2021-06-10 | Stop reason: HOSPADM

## 2021-06-08 RX ORDER — IPRATROPIUM BROMIDE AND ALBUTEROL SULFATE 2.5; .5 MG/3ML; MG/3ML
3 SOLUTION RESPIRATORY (INHALATION) EVERY 6 HOURS PRN
Status: DISCONTINUED | OUTPATIENT
Start: 2021-06-08 | End: 2021-06-10 | Stop reason: HOSPADM

## 2021-06-08 RX ORDER — GABAPENTIN 300 MG/1
300 CAPSULE ORAL 2 TIMES DAILY
Status: DISCONTINUED | OUTPATIENT
Start: 2021-06-08 | End: 2021-06-10 | Stop reason: HOSPADM

## 2021-06-08 RX ADMIN — CIPROFLOXACIN 400 MG: 2 INJECTION, SOLUTION INTRAVENOUS at 01:06

## 2021-06-08 RX ADMIN — MELATONIN 6 MG: at 12:06

## 2021-06-08 RX ADMIN — INSULIN ASPART 3 UNITS: 100 INJECTION, SOLUTION INTRAVENOUS; SUBCUTANEOUS at 09:06

## 2021-06-08 RX ADMIN — MELATONIN 6 MG: at 09:06

## 2021-06-08 RX ADMIN — MEMANTINE 10 MG: 10 TABLET ORAL at 09:06

## 2021-06-08 RX ADMIN — INSULIN ASPART 2 UNITS: 100 INJECTION, SOLUTION INTRAVENOUS; SUBCUTANEOUS at 06:06

## 2021-06-08 RX ADMIN — GABAPENTIN 300 MG: 300 CAPSULE ORAL at 09:06

## 2021-06-09 LAB
GLUCOSE SERPL-MCNC: 164 MG/DL (ref 70–105)
GLUCOSE SERPL-MCNC: 231 MG/DL (ref 70–105)
GLUCOSE SERPL-MCNC: 251 MG/DL (ref 70–105)
GLUCOSE SERPL-MCNC: 313 MG/DL (ref 70–105)
VANCOMYCIN TROUGH SERPL-MCNC: 2.9 ΜG/ML (ref 10–20)

## 2021-06-09 PROCEDURE — 82962 GLUCOSE BLOOD TEST: CPT

## 2021-06-09 PROCEDURE — 11000001 HC ACUTE MED/SURG PRIVATE ROOM

## 2021-06-09 PROCEDURE — 63600175 PHARM REV CODE 636 W HCPCS: Performed by: SURGERY

## 2021-06-09 PROCEDURE — 25000003 PHARM REV CODE 250: Performed by: SURGERY

## 2021-06-09 PROCEDURE — 25000003 PHARM REV CODE 250: Performed by: NURSE PRACTITIONER

## 2021-06-09 PROCEDURE — 80202 ASSAY OF VANCOMYCIN: CPT | Performed by: SURGERY

## 2021-06-09 RX ORDER — DOCUSATE SODIUM 100 MG/1
100 CAPSULE, LIQUID FILLED ORAL DAILY
Status: DISCONTINUED | OUTPATIENT
Start: 2021-06-09 | End: 2021-06-10 | Stop reason: HOSPADM

## 2021-06-09 RX ADMIN — INSULIN ASPART 4 UNITS: 100 INJECTION, SOLUTION INTRAVENOUS; SUBCUTANEOUS at 10:06

## 2021-06-09 RX ADMIN — LISINOPRIL 5 MG: 5 TABLET ORAL at 08:06

## 2021-06-09 RX ADMIN — MEMANTINE 10 MG: 10 TABLET ORAL at 08:06

## 2021-06-09 RX ADMIN — HYDROCHLOROTHIAZIDE 12.5 MG: 12.5 TABLET ORAL at 08:06

## 2021-06-09 RX ADMIN — INSULIN ASPART 4 UNITS: 100 INJECTION, SOLUTION INTRAVENOUS; SUBCUTANEOUS at 12:06

## 2021-06-09 RX ADMIN — GABAPENTIN 300 MG: 300 CAPSULE ORAL at 08:06

## 2021-06-09 RX ADMIN — CIPROFLOXACIN 400 MG: 2 INJECTION, SOLUTION INTRAVENOUS at 12:06

## 2021-06-09 RX ADMIN — MEMANTINE 10 MG: 10 TABLET ORAL at 09:06

## 2021-06-09 RX ADMIN — GABAPENTIN 300 MG: 300 CAPSULE ORAL at 09:06

## 2021-06-09 RX ADMIN — INSULIN ASPART 6 UNITS: 100 INJECTION, SOLUTION INTRAVENOUS; SUBCUTANEOUS at 05:06

## 2021-06-09 RX ADMIN — DOCUSATE SODIUM 100 MG: 100 CAPSULE, LIQUID FILLED ORAL at 01:06

## 2021-06-09 RX ADMIN — INSULIN ASPART 2 UNITS: 100 INJECTION, SOLUTION INTRAVENOUS; SUBCUTANEOUS at 06:06

## 2021-06-09 RX ADMIN — MELATONIN 6 MG: at 09:06

## 2021-06-10 VITALS
TEMPERATURE: 98 F | HEIGHT: 73 IN | WEIGHT: 296.5 LBS | BODY MASS INDEX: 39.3 KG/M2 | HEART RATE: 80 BPM | SYSTOLIC BLOOD PRESSURE: 147 MMHG | OXYGEN SATURATION: 94 % | RESPIRATION RATE: 18 BRPM | DIASTOLIC BLOOD PRESSURE: 76 MMHG

## 2021-06-10 LAB — GLUCOSE SERPL-MCNC: 168 MG/DL (ref 70–105)

## 2021-06-10 PROCEDURE — 25000003 PHARM REV CODE 250: Performed by: SURGERY

## 2021-06-10 PROCEDURE — 63600175 PHARM REV CODE 636 W HCPCS: Performed by: SURGERY

## 2021-06-10 PROCEDURE — 25000003 PHARM REV CODE 250: Performed by: NURSE PRACTITIONER

## 2021-06-10 PROCEDURE — 82962 GLUCOSE BLOOD TEST: CPT

## 2021-06-10 RX ORDER — SULFAMETHOXAZOLE AND TRIMETHOPRIM 800; 160 MG/1; MG/1
1 TABLET ORAL 2 TIMES DAILY
Qty: 10 TABLET | Refills: 0 | Status: SHIPPED | OUTPATIENT
Start: 2021-06-10 | End: 2021-07-30

## 2021-06-10 RX ADMIN — CIPROFLOXACIN 400 MG: 2 INJECTION, SOLUTION INTRAVENOUS at 01:06

## 2021-06-10 RX ADMIN — DOCUSATE SODIUM 100 MG: 100 CAPSULE, LIQUID FILLED ORAL at 09:06

## 2021-06-10 RX ADMIN — HYDROCHLOROTHIAZIDE 12.5 MG: 12.5 TABLET ORAL at 09:06

## 2021-06-10 RX ADMIN — GABAPENTIN 300 MG: 300 CAPSULE ORAL at 09:06

## 2021-06-10 RX ADMIN — INSULIN ASPART 2 UNITS: 100 INJECTION, SOLUTION INTRAVENOUS; SUBCUTANEOUS at 06:06

## 2021-06-10 RX ADMIN — MEMANTINE 10 MG: 10 TABLET ORAL at 09:06

## 2021-06-10 RX ADMIN — LISINOPRIL 5 MG: 5 TABLET ORAL at 09:06

## 2021-06-15 ENCOUNTER — OFFICE VISIT (OUTPATIENT)
Dept: FAMILY MEDICINE | Facility: CLINIC | Age: 71
End: 2021-06-15
Payer: MEDICARE

## 2021-06-15 VITALS
HEART RATE: 93 BPM | WEIGHT: 290.38 LBS | SYSTOLIC BLOOD PRESSURE: 118 MMHG | HEIGHT: 73 IN | TEMPERATURE: 98 F | DIASTOLIC BLOOD PRESSURE: 70 MMHG | OXYGEN SATURATION: 98 % | BODY MASS INDEX: 38.48 KG/M2 | RESPIRATION RATE: 18 BRPM

## 2021-06-15 DIAGNOSIS — L03.116 CELLULITIS OF LEFT LOWER LEG: ICD-10-CM

## 2021-06-15 DIAGNOSIS — L03.116 CELLULITIS OF LEFT FOOT: Primary | ICD-10-CM

## 2021-06-15 PROCEDURE — 99214 PR OFFICE/OUTPT VISIT, EST, LEVL IV, 30-39 MIN: ICD-10-PCS | Mod: ,,, | Performed by: NURSE PRACTITIONER

## 2021-06-15 PROCEDURE — 99214 OFFICE O/P EST MOD 30 MIN: CPT | Mod: ,,, | Performed by: NURSE PRACTITIONER

## 2021-06-15 RX ORDER — SULFAMETHOXAZOLE AND TRIMETHOPRIM 800; 160 MG/1; MG/1
1 TABLET ORAL 2 TIMES DAILY
Qty: 20 TABLET | Refills: 0 | Status: SHIPPED | OUTPATIENT
Start: 2021-06-15 | End: 2021-07-30

## 2021-06-17 ENCOUNTER — CLINICAL SUPPORT (OUTPATIENT)
Dept: WOUND CARE | Facility: HOSPITAL | Age: 71
End: 2021-06-17
Attending: NURSE PRACTITIONER
Payer: MEDICARE

## 2021-06-17 VITALS
DIASTOLIC BLOOD PRESSURE: 84 MMHG | TEMPERATURE: 98 F | RESPIRATION RATE: 19 BRPM | HEART RATE: 89 BPM | SYSTOLIC BLOOD PRESSURE: 145 MMHG

## 2021-06-17 DIAGNOSIS — L03.116 CELLULITIS OF LEFT FOOT: Chronic | ICD-10-CM

## 2021-06-17 DIAGNOSIS — E11.621 TYPE 2 DIABETES MELLITUS WITH LEFT DIABETIC FOOT ULCER: Primary | ICD-10-CM

## 2021-06-17 DIAGNOSIS — L97.529 TYPE 2 DIABETES MELLITUS WITH LEFT DIABETIC FOOT ULCER: Primary | ICD-10-CM

## 2021-06-17 PROCEDURE — 11042 DBRDMT SUBQ TIS 1ST 20SQCM/<: CPT | Performed by: NURSE PRACTITIONER

## 2021-06-17 PROCEDURE — 99213 OFFICE O/P EST LOW 20 MIN: CPT | Performed by: NURSE PRACTITIONER

## 2021-06-17 PROCEDURE — 11042 DBRDMT SUBQ TIS 1ST 20SQCM/<: CPT

## 2021-06-24 ENCOUNTER — CLINICAL SUPPORT (OUTPATIENT)
Dept: WOUND CARE | Facility: HOSPITAL | Age: 71
End: 2021-06-24
Attending: NURSE PRACTITIONER
Payer: MEDICARE

## 2021-06-24 VITALS
SYSTOLIC BLOOD PRESSURE: 145 MMHG | TEMPERATURE: 98 F | RESPIRATION RATE: 19 BRPM | DIASTOLIC BLOOD PRESSURE: 87 MMHG | HEART RATE: 79 BPM

## 2021-06-24 DIAGNOSIS — L97.811 NON-PRESSURE CHRONIC ULCER OF OTHER PART OF RIGHT LOWER LEG LIMITED TO BREAKDOWN OF SKIN: ICD-10-CM

## 2021-06-24 DIAGNOSIS — L97.529 TYPE 2 DIABETES MELLITUS WITH LEFT DIABETIC FOOT ULCER: Primary | ICD-10-CM

## 2021-06-24 DIAGNOSIS — E11.621 TYPE 2 DIABETES MELLITUS WITH LEFT DIABETIC FOOT ULCER: Primary | ICD-10-CM

## 2021-06-24 DIAGNOSIS — L03.116 CELLULITIS OF LEFT FOOT: ICD-10-CM

## 2021-06-24 PROCEDURE — 97597 DBRDMT OPN WND 1ST 20 CM/<: CPT

## 2021-06-29 ENCOUNTER — OFFICE VISIT (OUTPATIENT)
Dept: FAMILY MEDICINE | Facility: CLINIC | Age: 71
End: 2021-06-29
Payer: MEDICARE

## 2021-06-29 VITALS
OXYGEN SATURATION: 94 % | RESPIRATION RATE: 18 BRPM | SYSTOLIC BLOOD PRESSURE: 114 MMHG | TEMPERATURE: 98 F | WEIGHT: 295.19 LBS | HEIGHT: 73 IN | DIASTOLIC BLOOD PRESSURE: 70 MMHG | HEART RATE: 84 BPM | BODY MASS INDEX: 39.12 KG/M2

## 2021-06-29 DIAGNOSIS — L97.521 ULCER OF LEFT FOOT, LIMITED TO BREAKDOWN OF SKIN: Primary | ICD-10-CM

## 2021-06-29 PROCEDURE — 99214 OFFICE O/P EST MOD 30 MIN: CPT | Mod: ,,, | Performed by: NURSE PRACTITIONER

## 2021-06-29 PROCEDURE — 99214 PR OFFICE/OUTPT VISIT, EST, LEVL IV, 30-39 MIN: ICD-10-PCS | Mod: ,,, | Performed by: NURSE PRACTITIONER

## 2021-06-29 RX ORDER — EXENATIDE 2 MG/.85ML
INJECTION, SUSPENSION, EXTENDED RELEASE SUBCUTANEOUS
COMMUNITY
Start: 2021-06-10 | End: 2021-08-09

## 2021-06-29 RX ORDER — CALCIUM CITRATE/VITAMIN D3 200MG-6.25
TABLET ORAL
COMMUNITY
Start: 2021-06-10 | End: 2021-11-03

## 2021-07-01 ENCOUNTER — CLINICAL SUPPORT (OUTPATIENT)
Dept: WOUND CARE | Facility: HOSPITAL | Age: 71
End: 2021-07-01
Attending: NURSE PRACTITIONER
Payer: MEDICARE

## 2021-07-01 VITALS
TEMPERATURE: 98 F | HEART RATE: 87 BPM | RESPIRATION RATE: 20 BRPM | SYSTOLIC BLOOD PRESSURE: 129 MMHG | DIASTOLIC BLOOD PRESSURE: 89 MMHG

## 2021-07-01 DIAGNOSIS — E11.621 TYPE 2 DIABETES MELLITUS WITH LEFT DIABETIC FOOT ULCER: Primary | ICD-10-CM

## 2021-07-01 DIAGNOSIS — L97.529 TYPE 2 DIABETES MELLITUS WITH LEFT DIABETIC FOOT ULCER: Primary | ICD-10-CM

## 2021-07-01 PROCEDURE — 99212 OFFICE O/P EST SF 10 MIN: CPT

## 2021-07-01 PROCEDURE — 99212 OFFICE O/P EST SF 10 MIN: CPT | Performed by: NURSE PRACTITIONER

## 2021-07-30 ENCOUNTER — DOCUMENTATION ONLY (OUTPATIENT)
Dept: FAMILY MEDICINE | Facility: CLINIC | Age: 71
End: 2021-07-30

## 2021-07-30 ENCOUNTER — OFFICE VISIT (OUTPATIENT)
Dept: FAMILY MEDICINE | Facility: CLINIC | Age: 71
End: 2021-07-30
Payer: MEDICARE

## 2021-07-30 VITALS
WEIGHT: 292 LBS | DIASTOLIC BLOOD PRESSURE: 74 MMHG | TEMPERATURE: 98 F | RESPIRATION RATE: 18 BRPM | HEIGHT: 73 IN | BODY MASS INDEX: 38.7 KG/M2 | HEART RATE: 74 BPM | OXYGEN SATURATION: 95 % | SYSTOLIC BLOOD PRESSURE: 116 MMHG

## 2021-07-30 DIAGNOSIS — E11.40 TYPE 2 DIABETES MELLITUS WITH DIABETIC NEUROPATHY, WITH LONG-TERM CURRENT USE OF INSULIN: ICD-10-CM

## 2021-07-30 DIAGNOSIS — Z00.00 ENCOUNTER FOR SUBSEQUENT ANNUAL WELLNESS VISIT IN MEDICARE PATIENT: Primary | ICD-10-CM

## 2021-07-30 DIAGNOSIS — Z79.4 TYPE 2 DIABETES MELLITUS WITH DIABETIC NEUROPATHY, WITH LONG-TERM CURRENT USE OF INSULIN: ICD-10-CM

## 2021-07-30 DIAGNOSIS — L89.899 PRESSURE INJURY OF SKIN OF LEFT FOOT, UNSPECIFIED INJURY STAGE: ICD-10-CM

## 2021-07-30 DIAGNOSIS — Z12.5 ENCOUNTER FOR SCREENING FOR MALIGNANT NEOPLASM OF PROSTATE: ICD-10-CM

## 2021-07-30 PROCEDURE — 87653 CULTURE, WOUND: ICD-10-PCS | Mod: ,,, | Performed by: CLINICAL MEDICAL LABORATORY

## 2021-07-30 PROCEDURE — G0444 PR DEPRESSION SCREENING: ICD-10-PCS | Mod: ,,, | Performed by: NURSE PRACTITIONER

## 2021-07-30 PROCEDURE — 87653 STREP B DNA AMP PROBE: CPT | Mod: ,,, | Performed by: CLINICAL MEDICAL LABORATORY

## 2021-07-30 PROCEDURE — 3048F LDL-C <100 MG/DL: CPT | Mod: ,,, | Performed by: NURSE PRACTITIONER

## 2021-07-30 PROCEDURE — G0439 PPPS, SUBSEQ VISIT: HCPCS | Mod: ,,, | Performed by: NURSE PRACTITIONER

## 2021-07-30 PROCEDURE — 87186 CULTURE, WOUND: ICD-10-PCS | Mod: ,,, | Performed by: CLINICAL MEDICAL LABORATORY

## 2021-07-30 PROCEDURE — 87077 CULTURE, WOUND: ICD-10-PCS | Mod: ,,, | Performed by: CLINICAL MEDICAL LABORATORY

## 2021-07-30 PROCEDURE — G0444 DEPRESSION SCREEN ANNUAL: HCPCS | Mod: ,,, | Performed by: NURSE PRACTITIONER

## 2021-07-30 PROCEDURE — 1035F SMOKELESS TOBACCO USER: CPT | Mod: ,,, | Performed by: NURSE PRACTITIONER

## 2021-07-30 PROCEDURE — 87186 SC STD MICRODIL/AGAR DIL: CPT | Mod: ,,, | Performed by: CLINICAL MEDICAL LABORATORY

## 2021-07-30 PROCEDURE — G0439 PR MEDICARE ANNUAL WELLNESS SUBSEQUENT VISIT: ICD-10-PCS | Mod: ,,, | Performed by: NURSE PRACTITIONER

## 2021-07-30 PROCEDURE — 87077 CULTURE AEROBIC IDENTIFY: CPT | Mod: ,,, | Performed by: CLINICAL MEDICAL LABORATORY

## 2021-07-30 PROCEDURE — 3048F PR MOST RECENT LDL-C < 100 MG/DL: ICD-10-PCS | Mod: ,,, | Performed by: NURSE PRACTITIONER

## 2021-07-30 PROCEDURE — 1035F PR CURRENT SMOKELESS TOBACCO USER: ICD-10-PCS | Mod: ,,, | Performed by: NURSE PRACTITIONER

## 2021-07-30 RX ORDER — SULFAMETHOXAZOLE AND TRIMETHOPRIM 800; 160 MG/1; MG/1
1 TABLET ORAL 2 TIMES DAILY
Qty: 20 TABLET | Refills: 0 | Status: SHIPPED | OUTPATIENT
Start: 2021-07-30 | End: 2021-11-08 | Stop reason: ALTCHOICE

## 2021-07-30 RX ORDER — SULFAMETHOXAZOLE AND TRIMETHOPRIM 800; 160 MG/1; MG/1
1 TABLET ORAL 2 TIMES DAILY
Qty: 20 TABLET | Refills: 0 | Status: CANCELLED | OUTPATIENT
Start: 2021-07-30

## 2021-08-01 LAB — MICROORGANISM SPEC CULT: ABNORMAL

## 2021-08-02 ENCOUNTER — OFFICE VISIT (OUTPATIENT)
Dept: FAMILY MEDICINE | Facility: CLINIC | Age: 71
End: 2021-08-02
Payer: MEDICARE

## 2021-08-02 VITALS
OXYGEN SATURATION: 93 % | RESPIRATION RATE: 20 BRPM | DIASTOLIC BLOOD PRESSURE: 70 MMHG | BODY MASS INDEX: 38.39 KG/M2 | HEART RATE: 89 BPM | SYSTOLIC BLOOD PRESSURE: 124 MMHG | WEIGHT: 291 LBS | TEMPERATURE: 98 F

## 2021-08-02 DIAGNOSIS — Z22.322 MRSA (METHICILLIN RESISTANT STAPH AUREUS) CULTURE POSITIVE: ICD-10-CM

## 2021-08-02 DIAGNOSIS — L89.892 PRESSURE INJURY OF LEFT FOOT, STAGE 2: Primary | ICD-10-CM

## 2021-08-02 PROCEDURE — 99214 OFFICE O/P EST MOD 30 MIN: CPT | Mod: ,,, | Performed by: NURSE PRACTITIONER

## 2021-08-02 PROCEDURE — 99214 PR OFFICE/OUTPT VISIT, EST, LEVL IV, 30-39 MIN: ICD-10-PCS | Mod: ,,, | Performed by: NURSE PRACTITIONER

## 2021-08-05 ENCOUNTER — CLINICAL SUPPORT (OUTPATIENT)
Dept: WOUND CARE | Facility: HOSPITAL | Age: 71
End: 2021-08-05
Attending: NURSE PRACTITIONER
Payer: MEDICARE

## 2021-08-05 VITALS
DIASTOLIC BLOOD PRESSURE: 71 MMHG | TEMPERATURE: 97 F | SYSTOLIC BLOOD PRESSURE: 115 MMHG | RESPIRATION RATE: 18 BRPM | HEART RATE: 73 BPM

## 2021-08-05 DIAGNOSIS — E11.621 TYPE 2 DIABETES MELLITUS WITH LEFT DIABETIC FOOT ULCER: Primary | ICD-10-CM

## 2021-08-05 DIAGNOSIS — L97.529 TYPE 2 DIABETES MELLITUS WITH LEFT DIABETIC FOOT ULCER: Primary | ICD-10-CM

## 2021-08-05 PROCEDURE — 11042 DBRDMT SUBQ TIS 1ST 20SQCM/<: CPT

## 2021-08-05 PROCEDURE — 11042 DBRDMT SUBQ TIS 1ST 20SQCM/<: CPT | Performed by: NURSE PRACTITIONER

## 2021-08-05 PROCEDURE — 99213 OFFICE O/P EST LOW 20 MIN: CPT | Mod: 25

## 2021-08-09 ENCOUNTER — OFFICE VISIT (OUTPATIENT)
Dept: FAMILY MEDICINE | Facility: CLINIC | Age: 71
End: 2021-08-09
Payer: MEDICARE

## 2021-08-09 VITALS
TEMPERATURE: 98 F | BODY MASS INDEX: 39.13 KG/M2 | OXYGEN SATURATION: 90 % | WEIGHT: 296.63 LBS | HEART RATE: 78 BPM | DIASTOLIC BLOOD PRESSURE: 72 MMHG | SYSTOLIC BLOOD PRESSURE: 126 MMHG | RESPIRATION RATE: 20 BRPM

## 2021-08-09 DIAGNOSIS — L89.892 PRESSURE INJURY OF LEFT FOOT, STAGE 2: Primary | ICD-10-CM

## 2021-08-09 PROCEDURE — 99214 OFFICE O/P EST MOD 30 MIN: CPT | Mod: ,,, | Performed by: NURSE PRACTITIONER

## 2021-08-09 PROCEDURE — 99214 PR OFFICE/OUTPT VISIT, EST, LEVL IV, 30-39 MIN: ICD-10-PCS | Mod: ,,, | Performed by: NURSE PRACTITIONER

## 2021-08-12 ENCOUNTER — CLINICAL SUPPORT (OUTPATIENT)
Dept: WOUND CARE | Facility: HOSPITAL | Age: 71
End: 2021-08-12
Attending: NURSE PRACTITIONER
Payer: MEDICARE

## 2021-08-12 VITALS
SYSTOLIC BLOOD PRESSURE: 122 MMHG | TEMPERATURE: 97 F | RESPIRATION RATE: 18 BRPM | DIASTOLIC BLOOD PRESSURE: 74 MMHG | HEART RATE: 72 BPM

## 2021-08-12 DIAGNOSIS — L97.529 TYPE 2 DIABETES MELLITUS WITH LEFT DIABETIC FOOT ULCER: Primary | ICD-10-CM

## 2021-08-12 DIAGNOSIS — E11.621 TYPE 2 DIABETES MELLITUS WITH LEFT DIABETIC FOOT ULCER: Primary | ICD-10-CM

## 2021-08-12 PROCEDURE — 11042 DBRDMT SUBQ TIS 1ST 20SQCM/<: CPT

## 2021-08-12 PROCEDURE — 11042 DBRDMT SUBQ TIS 1ST 20SQCM/<: CPT | Performed by: NURSE PRACTITIONER

## 2021-08-16 ENCOUNTER — OFFICE VISIT (OUTPATIENT)
Dept: FAMILY MEDICINE | Facility: CLINIC | Age: 71
End: 2021-08-16
Payer: MEDICARE

## 2021-08-16 VITALS
RESPIRATION RATE: 18 BRPM | DIASTOLIC BLOOD PRESSURE: 72 MMHG | SYSTOLIC BLOOD PRESSURE: 138 MMHG | HEART RATE: 83 BPM | OXYGEN SATURATION: 96 % | TEMPERATURE: 99 F

## 2021-08-16 DIAGNOSIS — R05.9 COUGH: ICD-10-CM

## 2021-08-16 DIAGNOSIS — Z20.822 EXPOSURE TO COVID-19 VIRUS: Primary | ICD-10-CM

## 2021-08-16 DIAGNOSIS — J01.00 ACUTE MAXILLARY SINUSITIS, RECURRENCE NOT SPECIFIED: ICD-10-CM

## 2021-08-16 LAB
CTP QC/QA: YES
SARS-COV-2 AG RESP QL IA.RAPID: NEGATIVE

## 2021-08-16 PROCEDURE — 87426 SARSCOV CORONAVIRUS AG IA: CPT | Mod: RHCUB | Performed by: NURSE PRACTITIONER

## 2021-08-16 PROCEDURE — 87635 SARS-COV-2 (COVID-19) QUALITATIVE PCR: ICD-10-PCS | Mod: ,,, | Performed by: CLINICAL MEDICAL LABORATORY

## 2021-08-16 PROCEDURE — 99214 PR OFFICE/OUTPT VISIT, EST, LEVL IV, 30-39 MIN: ICD-10-PCS | Mod: ,,, | Performed by: NURSE PRACTITIONER

## 2021-08-16 PROCEDURE — 87635 SARS-COV-2 COVID-19 AMP PRB: CPT | Mod: ,,, | Performed by: CLINICAL MEDICAL LABORATORY

## 2021-08-16 PROCEDURE — 99214 OFFICE O/P EST MOD 30 MIN: CPT | Mod: ,,, | Performed by: NURSE PRACTITIONER

## 2021-08-16 RX ORDER — AZITHROMYCIN 250 MG/1
TABLET, FILM COATED ORAL
Qty: 6 TABLET | Refills: 0 | Status: SHIPPED | OUTPATIENT
Start: 2021-08-16 | End: 2021-11-08 | Stop reason: ALTCHOICE

## 2021-08-17 LAB — SARS-COV-2 RNA RESP QL NAA+PROBE: NEGATIVE

## 2021-08-19 ENCOUNTER — CLINICAL SUPPORT (OUTPATIENT)
Dept: WOUND CARE | Facility: HOSPITAL | Age: 71
End: 2021-08-19
Attending: NURSE PRACTITIONER
Payer: MEDICARE

## 2021-08-19 VITALS
DIASTOLIC BLOOD PRESSURE: 85 MMHG | HEART RATE: 91 BPM | TEMPERATURE: 98 F | RESPIRATION RATE: 20 BRPM | SYSTOLIC BLOOD PRESSURE: 135 MMHG

## 2021-08-19 DIAGNOSIS — E11.621 TYPE 2 DIABETES MELLITUS WITH LEFT DIABETIC FOOT ULCER: Primary | ICD-10-CM

## 2021-08-19 DIAGNOSIS — L97.529 TYPE 2 DIABETES MELLITUS WITH LEFT DIABETIC FOOT ULCER: Primary | ICD-10-CM

## 2021-08-19 PROCEDURE — 11042 DBRDMT SUBQ TIS 1ST 20SQCM/<: CPT

## 2021-08-19 PROCEDURE — 11042 DBRDMT SUBQ TIS 1ST 20SQCM/<: CPT | Performed by: NURSE PRACTITIONER

## 2021-08-25 ENCOUNTER — OFFICE VISIT (OUTPATIENT)
Dept: FAMILY MEDICINE | Facility: CLINIC | Age: 71
End: 2021-08-25
Payer: MEDICARE

## 2021-08-25 VITALS
OXYGEN SATURATION: 90 % | SYSTOLIC BLOOD PRESSURE: 140 MMHG | TEMPERATURE: 99 F | RESPIRATION RATE: 20 BRPM | DIASTOLIC BLOOD PRESSURE: 70 MMHG | WEIGHT: 296 LBS | HEIGHT: 75 IN | HEART RATE: 96 BPM | BODY MASS INDEX: 36.8 KG/M2

## 2021-08-25 DIAGNOSIS — R50.9 COUGH WITH FEVER: ICD-10-CM

## 2021-08-25 DIAGNOSIS — R05.9 COUGH WITH FEVER: ICD-10-CM

## 2021-08-25 DIAGNOSIS — J01.00 ACUTE MAXILLARY SINUSITIS, RECURRENCE NOT SPECIFIED: Primary | ICD-10-CM

## 2021-08-25 DIAGNOSIS — Z20.822 EXPOSURE TO COVID-19 VIRUS: ICD-10-CM

## 2021-08-25 DIAGNOSIS — R09.81 NASAL CONGESTION: ICD-10-CM

## 2021-08-25 LAB
CTP QC/QA: YES
SARS-COV-2 AG RESP QL IA.RAPID: NEGATIVE

## 2021-08-25 PROCEDURE — 99213 OFFICE O/P EST LOW 20 MIN: CPT | Mod: 25,,, | Performed by: NURSE PRACTITIONER

## 2021-08-25 PROCEDURE — 96372 THER/PROPH/DIAG INJ SC/IM: CPT | Mod: ,,, | Performed by: NURSE PRACTITIONER

## 2021-08-25 PROCEDURE — 99213 PR OFFICE/OUTPT VISIT, EST, LEVL III, 20-29 MIN: ICD-10-PCS | Mod: 25,,, | Performed by: NURSE PRACTITIONER

## 2021-08-25 PROCEDURE — 96372 PR INJECTION,THERAP/PROPH/DIAG2ST, IM OR SUBCUT: ICD-10-PCS | Mod: ,,, | Performed by: NURSE PRACTITIONER

## 2021-08-25 PROCEDURE — 87426 SARSCOV CORONAVIRUS AG IA: CPT | Mod: RHCUB | Performed by: NURSE PRACTITIONER

## 2021-08-25 RX ORDER — CEFTRIAXONE 1 G/1
1 INJECTION, POWDER, FOR SOLUTION INTRAMUSCULAR; INTRAVENOUS
Status: COMPLETED | OUTPATIENT
Start: 2021-08-25 | End: 2021-08-25

## 2021-08-25 RX ORDER — DEXAMETHASONE SODIUM PHOSPHATE 4 MG/ML
4 INJECTION, SOLUTION INTRA-ARTICULAR; INTRALESIONAL; INTRAMUSCULAR; INTRAVENOUS; SOFT TISSUE
Status: COMPLETED | OUTPATIENT
Start: 2021-08-25 | End: 2021-08-25

## 2021-08-25 RX ORDER — IPRATROPIUM BROMIDE 42 UG/1
2 SPRAY, METERED NASAL 3 TIMES DAILY
Qty: 15 ML | Refills: 2 | Status: SHIPPED | OUTPATIENT
Start: 2021-08-25

## 2021-08-25 RX ADMIN — CEFTRIAXONE 1 G: 1 INJECTION, POWDER, FOR SOLUTION INTRAMUSCULAR; INTRAVENOUS at 02:08

## 2021-08-25 RX ADMIN — DEXAMETHASONE SODIUM PHOSPHATE 4 MG: 4 INJECTION, SOLUTION INTRA-ARTICULAR; INTRALESIONAL; INTRAMUSCULAR; INTRAVENOUS; SOFT TISSUE at 02:08

## 2021-08-26 ENCOUNTER — CLINICAL SUPPORT (OUTPATIENT)
Dept: WOUND CARE | Facility: HOSPITAL | Age: 71
End: 2021-08-26
Attending: NURSE PRACTITIONER
Payer: MEDICARE

## 2021-08-26 VITALS
HEART RATE: 90 BPM | TEMPERATURE: 98 F | SYSTOLIC BLOOD PRESSURE: 145 MMHG | DIASTOLIC BLOOD PRESSURE: 87 MMHG | RESPIRATION RATE: 20 BRPM

## 2021-08-26 DIAGNOSIS — E11.621 TYPE 2 DIABETES MELLITUS WITH LEFT DIABETIC FOOT ULCER: Primary | ICD-10-CM

## 2021-08-26 DIAGNOSIS — L97.529 TYPE 2 DIABETES MELLITUS WITH LEFT DIABETIC FOOT ULCER: Primary | ICD-10-CM

## 2021-08-26 PROCEDURE — 11042 DBRDMT SUBQ TIS 1ST 20SQCM/<: CPT

## 2021-08-26 PROCEDURE — 11042 DBRDMT SUBQ TIS 1ST 20SQCM/<: CPT | Performed by: NURSE PRACTITIONER

## 2021-09-01 ENCOUNTER — LAB REQUISITION (OUTPATIENT)
Dept: LAB | Facility: HOSPITAL | Age: 71
End: 2021-09-01
Attending: FAMILY MEDICINE
Payer: MEDICARE

## 2021-09-01 DIAGNOSIS — E11.621 TYPE 2 DIABETES MELLITUS WITH FOOT ULCER (CODE): ICD-10-CM

## 2021-09-01 LAB
EST. AVERAGE GLUCOSE BLD GHB EST-MCNC: 157 MG/DL
HBA1C MFR BLD HPLC: 7.3 % (ref 4.5–6.6)

## 2021-09-01 PROCEDURE — 83036 HEMOGLOBIN GLYCOSYLATED A1C: CPT | Performed by: FAMILY MEDICINE

## 2021-09-02 ENCOUNTER — CLINICAL SUPPORT (OUTPATIENT)
Dept: WOUND CARE | Facility: HOSPITAL | Age: 71
End: 2021-09-02
Attending: NURSE PRACTITIONER
Payer: MEDICARE

## 2021-09-02 VITALS — RESPIRATION RATE: 18 BRPM | SYSTOLIC BLOOD PRESSURE: 150 MMHG | DIASTOLIC BLOOD PRESSURE: 87 MMHG | HEART RATE: 76 BPM

## 2021-09-02 DIAGNOSIS — L97.529 TYPE 2 DIABETES MELLITUS WITH LEFT DIABETIC FOOT ULCER: Primary | ICD-10-CM

## 2021-09-02 DIAGNOSIS — E11.621 TYPE 2 DIABETES MELLITUS WITH LEFT DIABETIC FOOT ULCER: Primary | ICD-10-CM

## 2021-09-02 PROCEDURE — 11042 DBRDMT SUBQ TIS 1ST 20SQCM/<: CPT | Performed by: NURSE PRACTITIONER

## 2021-09-02 PROCEDURE — 11042 DBRDMT SUBQ TIS 1ST 20SQCM/<: CPT

## 2021-09-08 ENCOUNTER — OFFICE VISIT (OUTPATIENT)
Dept: FAMILY MEDICINE | Facility: CLINIC | Age: 71
End: 2021-09-08
Payer: MEDICARE

## 2021-09-08 VITALS
RESPIRATION RATE: 18 BRPM | TEMPERATURE: 99 F | HEIGHT: 75 IN | BODY MASS INDEX: 35.56 KG/M2 | SYSTOLIC BLOOD PRESSURE: 142 MMHG | OXYGEN SATURATION: 98 % | WEIGHT: 286 LBS | DIASTOLIC BLOOD PRESSURE: 82 MMHG | HEART RATE: 82 BPM

## 2021-09-08 DIAGNOSIS — E11.621 TYPE 2 DIABETES MELLITUS WITH FOOT ULCER, WITHOUT LONG-TERM CURRENT USE OF INSULIN: Primary | ICD-10-CM

## 2021-09-08 DIAGNOSIS — J01.00 ACUTE NON-RECURRENT MAXILLARY SINUSITIS: ICD-10-CM

## 2021-09-08 DIAGNOSIS — L97.509 TYPE 2 DIABETES MELLITUS WITH FOOT ULCER, WITHOUT LONG-TERM CURRENT USE OF INSULIN: Primary | ICD-10-CM

## 2021-09-08 PROCEDURE — 96372 THER/PROPH/DIAG INJ SC/IM: CPT | Mod: ,,, | Performed by: NURSE PRACTITIONER

## 2021-09-08 PROCEDURE — 96372 PR INJECTION,THERAP/PROPH/DIAG2ST, IM OR SUBCUT: ICD-10-PCS | Mod: ,,, | Performed by: NURSE PRACTITIONER

## 2021-09-08 PROCEDURE — 99214 PR OFFICE/OUTPT VISIT, EST, LEVL IV, 30-39 MIN: ICD-10-PCS | Mod: 25,,, | Performed by: NURSE PRACTITIONER

## 2021-09-08 PROCEDURE — 99214 OFFICE O/P EST MOD 30 MIN: CPT | Mod: 25,,, | Performed by: NURSE PRACTITIONER

## 2021-09-08 RX ORDER — CETIRIZINE HYDROCHLORIDE 10 MG/1
10 TABLET ORAL DAILY
Qty: 30 TABLET | Refills: 0 | Status: SHIPPED | OUTPATIENT
Start: 2021-09-08 | End: 2021-11-22

## 2021-09-08 RX ORDER — AMOXICILLIN 500 MG/1
500 TABLET, FILM COATED ORAL EVERY 12 HOURS
Qty: 20 TABLET | Refills: 0 | Status: SHIPPED | OUTPATIENT
Start: 2021-09-08 | End: 2021-09-18

## 2021-09-08 RX ORDER — CEFTRIAXONE 1 G/1
1 INJECTION, POWDER, FOR SOLUTION INTRAMUSCULAR; INTRAVENOUS
Status: COMPLETED | OUTPATIENT
Start: 2021-09-08 | End: 2021-09-08

## 2021-09-08 RX ADMIN — CEFTRIAXONE 1 G: 1 INJECTION, POWDER, FOR SOLUTION INTRAMUSCULAR; INTRAVENOUS at 09:09

## 2021-09-09 ENCOUNTER — CLINICAL SUPPORT (OUTPATIENT)
Dept: WOUND CARE | Facility: HOSPITAL | Age: 71
End: 2021-09-09
Attending: NURSE PRACTITIONER
Payer: MEDICARE

## 2021-09-09 VITALS
SYSTOLIC BLOOD PRESSURE: 119 MMHG | HEART RATE: 87 BPM | DIASTOLIC BLOOD PRESSURE: 74 MMHG | RESPIRATION RATE: 18 BRPM | TEMPERATURE: 97 F

## 2021-09-09 DIAGNOSIS — E11.621 TYPE 2 DIABETES MELLITUS WITH LEFT DIABETIC FOOT ULCER: Primary | ICD-10-CM

## 2021-09-09 DIAGNOSIS — L97.529 TYPE 2 DIABETES MELLITUS WITH LEFT DIABETIC FOOT ULCER: Primary | ICD-10-CM

## 2021-09-09 PROCEDURE — 11042 DBRDMT SUBQ TIS 1ST 20SQCM/<: CPT | Performed by: NURSE PRACTITIONER

## 2021-09-09 PROCEDURE — 11042 DBRDMT SUBQ TIS 1ST 20SQCM/<: CPT

## 2021-09-16 ENCOUNTER — CLINICAL SUPPORT (OUTPATIENT)
Dept: WOUND CARE | Facility: HOSPITAL | Age: 71
End: 2021-09-16
Attending: NURSE PRACTITIONER
Payer: MEDICARE

## 2021-09-16 VITALS
HEART RATE: 80 BPM | TEMPERATURE: 98 F | DIASTOLIC BLOOD PRESSURE: 72 MMHG | RESPIRATION RATE: 20 BRPM | SYSTOLIC BLOOD PRESSURE: 128 MMHG

## 2021-09-16 DIAGNOSIS — L97.529 TYPE 2 DIABETES MELLITUS WITH LEFT DIABETIC FOOT ULCER: Primary | ICD-10-CM

## 2021-09-16 DIAGNOSIS — E11.621 TYPE 2 DIABETES MELLITUS WITH LEFT DIABETIC FOOT ULCER: Primary | ICD-10-CM

## 2021-09-16 PROCEDURE — 11042 DBRDMT SUBQ TIS 1ST 20SQCM/<: CPT

## 2021-09-16 PROCEDURE — 11042 DBRDMT SUBQ TIS 1ST 20SQCM/<: CPT | Performed by: NURSE PRACTITIONER

## 2021-09-23 ENCOUNTER — CLINICAL SUPPORT (OUTPATIENT)
Dept: WOUND CARE | Facility: HOSPITAL | Age: 71
End: 2021-09-23
Attending: NURSE PRACTITIONER
Payer: MEDICARE

## 2021-09-23 VITALS
HEART RATE: 70 BPM | RESPIRATION RATE: 20 BRPM | SYSTOLIC BLOOD PRESSURE: 129 MMHG | TEMPERATURE: 100 F | DIASTOLIC BLOOD PRESSURE: 80 MMHG

## 2021-09-23 DIAGNOSIS — L97.529 TYPE 2 DIABETES MELLITUS WITH LEFT DIABETIC FOOT ULCER: Primary | ICD-10-CM

## 2021-09-23 DIAGNOSIS — E11.621 TYPE 2 DIABETES MELLITUS WITH LEFT DIABETIC FOOT ULCER: Primary | ICD-10-CM

## 2021-09-23 PROCEDURE — 11042 DBRDMT SUBQ TIS 1ST 20SQCM/<: CPT

## 2021-09-23 PROCEDURE — 11042 DBRDMT SUBQ TIS 1ST 20SQCM/<: CPT | Performed by: NURSE PRACTITIONER

## 2021-09-30 ENCOUNTER — CLINICAL SUPPORT (OUTPATIENT)
Dept: WOUND CARE | Facility: HOSPITAL | Age: 71
End: 2021-09-30
Attending: NURSE PRACTITIONER
Payer: MEDICARE

## 2021-09-30 VITALS — DIASTOLIC BLOOD PRESSURE: 81 MMHG | SYSTOLIC BLOOD PRESSURE: 135 MMHG | HEART RATE: 67 BPM | TEMPERATURE: 98 F

## 2021-09-30 DIAGNOSIS — L97.529 TYPE 2 DIABETES MELLITUS WITH LEFT DIABETIC FOOT ULCER: Primary | ICD-10-CM

## 2021-09-30 DIAGNOSIS — E11.621 TYPE 2 DIABETES MELLITUS WITH LEFT DIABETIC FOOT ULCER: Primary | ICD-10-CM

## 2021-09-30 PROCEDURE — 11042 DBRDMT SUBQ TIS 1ST 20SQCM/<: CPT

## 2021-09-30 PROCEDURE — 11042 DBRDMT SUBQ TIS 1ST 20SQCM/<: CPT | Performed by: NURSE PRACTITIONER

## 2021-10-07 ENCOUNTER — OFFICE VISIT (OUTPATIENT)
Dept: FAMILY MEDICINE | Facility: CLINIC | Age: 71
End: 2021-10-07
Payer: MEDICARE

## 2021-10-07 ENCOUNTER — CLINICAL SUPPORT (OUTPATIENT)
Dept: WOUND CARE | Facility: HOSPITAL | Age: 71
End: 2021-10-07
Attending: NURSE PRACTITIONER
Payer: MEDICARE

## 2021-10-07 VITALS
WEIGHT: 280 LBS | HEIGHT: 75 IN | BODY MASS INDEX: 34.82 KG/M2 | HEART RATE: 68 BPM | RESPIRATION RATE: 18 BRPM | SYSTOLIC BLOOD PRESSURE: 142 MMHG | TEMPERATURE: 99 F | DIASTOLIC BLOOD PRESSURE: 80 MMHG | OXYGEN SATURATION: 96 %

## 2021-10-07 VITALS
TEMPERATURE: 98 F | DIASTOLIC BLOOD PRESSURE: 88 MMHG | RESPIRATION RATE: 20 BRPM | HEART RATE: 80 BPM | SYSTOLIC BLOOD PRESSURE: 130 MMHG

## 2021-10-07 DIAGNOSIS — Z79.4 TYPE 2 DIABETES MELLITUS WITH FOOT ULCER, WITH LONG-TERM CURRENT USE OF INSULIN: Primary | ICD-10-CM

## 2021-10-07 DIAGNOSIS — E11.621 TYPE 2 DIABETES MELLITUS WITH FOOT ULCER, WITH LONG-TERM CURRENT USE OF INSULIN: Primary | ICD-10-CM

## 2021-10-07 DIAGNOSIS — L97.529 TYPE 2 DIABETES MELLITUS WITH LEFT DIABETIC FOOT ULCER: Primary | ICD-10-CM

## 2021-10-07 DIAGNOSIS — L97.521 ULCER OF LEFT FOOT, LIMITED TO BREAKDOWN OF SKIN: ICD-10-CM

## 2021-10-07 DIAGNOSIS — L97.509 TYPE 2 DIABETES MELLITUS WITH FOOT ULCER, WITH LONG-TERM CURRENT USE OF INSULIN: Primary | ICD-10-CM

## 2021-10-07 DIAGNOSIS — E11.621 TYPE 2 DIABETES MELLITUS WITH LEFT DIABETIC FOOT ULCER: Primary | ICD-10-CM

## 2021-10-07 LAB — GLUCOSE SERPL-MCNC: 86 MG/DL (ref 70–110)

## 2021-10-07 PROCEDURE — 99214 PR OFFICE/OUTPT VISIT, EST, LEVL IV, 30-39 MIN: ICD-10-PCS | Mod: ,,, | Performed by: NURSE PRACTITIONER

## 2021-10-07 PROCEDURE — 11042 DBRDMT SUBQ TIS 1ST 20SQCM/<: CPT | Performed by: NURSE PRACTITIONER

## 2021-10-07 PROCEDURE — 11042 DBRDMT SUBQ TIS 1ST 20SQCM/<: CPT

## 2021-10-07 PROCEDURE — 99214 OFFICE O/P EST MOD 30 MIN: CPT | Mod: ,,, | Performed by: NURSE PRACTITIONER

## 2021-10-07 PROCEDURE — 82962 GLUCOSE BLOOD TEST: CPT | Mod: RHCUB | Performed by: NURSE PRACTITIONER

## 2021-10-14 ENCOUNTER — CLINICAL SUPPORT (OUTPATIENT)
Dept: WOUND CARE | Facility: HOSPITAL | Age: 71
End: 2021-10-14
Attending: NURSE PRACTITIONER
Payer: MEDICARE

## 2021-10-14 VITALS
TEMPERATURE: 98 F | SYSTOLIC BLOOD PRESSURE: 135 MMHG | RESPIRATION RATE: 15 BRPM | DIASTOLIC BLOOD PRESSURE: 69 MMHG | HEART RATE: 76 BPM

## 2021-10-14 DIAGNOSIS — L97.529 TYPE 2 DIABETES MELLITUS WITH LEFT DIABETIC FOOT ULCER: Primary | ICD-10-CM

## 2021-10-14 DIAGNOSIS — E11.621 TYPE 2 DIABETES MELLITUS WITH LEFT DIABETIC FOOT ULCER: Primary | ICD-10-CM

## 2021-10-14 PROCEDURE — 11042 DBRDMT SUBQ TIS 1ST 20SQCM/<: CPT | Performed by: NURSE PRACTITIONER

## 2021-10-14 PROCEDURE — 11042 DBRDMT SUBQ TIS 1ST 20SQCM/<: CPT

## 2021-10-21 ENCOUNTER — CLINICAL SUPPORT (OUTPATIENT)
Dept: WOUND CARE | Facility: HOSPITAL | Age: 71
End: 2021-10-21
Attending: NURSE PRACTITIONER
Payer: MEDICARE

## 2021-10-21 VITALS
SYSTOLIC BLOOD PRESSURE: 150 MMHG | DIASTOLIC BLOOD PRESSURE: 89 MMHG | TEMPERATURE: 98 F | RESPIRATION RATE: 17 BRPM | HEART RATE: 89 BPM

## 2021-10-21 DIAGNOSIS — L97.529 TYPE 2 DIABETES MELLITUS WITH LEFT DIABETIC FOOT ULCER: Primary | ICD-10-CM

## 2021-10-21 DIAGNOSIS — E11.621 TYPE 2 DIABETES MELLITUS WITH LEFT DIABETIC FOOT ULCER: Primary | ICD-10-CM

## 2021-10-21 PROCEDURE — 11042 DBRDMT SUBQ TIS 1ST 20SQCM/<: CPT | Performed by: NURSE PRACTITIONER

## 2021-10-21 PROCEDURE — 11042 DBRDMT SUBQ TIS 1ST 20SQCM/<: CPT

## 2021-10-25 ENCOUNTER — OFFICE VISIT (OUTPATIENT)
Dept: PAIN MEDICINE | Facility: CLINIC | Age: 71
End: 2021-10-25
Payer: MEDICARE

## 2021-10-25 VITALS
HEIGHT: 75 IN | WEIGHT: 296 LBS | SYSTOLIC BLOOD PRESSURE: 147 MMHG | BODY MASS INDEX: 36.8 KG/M2 | HEART RATE: 97 BPM | DIASTOLIC BLOOD PRESSURE: 87 MMHG | RESPIRATION RATE: 20 BRPM

## 2021-10-25 DIAGNOSIS — G62.9 NEUROPATHY: Chronic | ICD-10-CM

## 2021-10-25 DIAGNOSIS — M79.672 FOOT PAIN, LEFT: Chronic | ICD-10-CM

## 2021-10-25 DIAGNOSIS — M47.817 LUMBOSACRAL SPONDYLOSIS WITHOUT MYELOPATHY: Chronic | ICD-10-CM

## 2021-10-25 DIAGNOSIS — Z79.899 ENCOUNTER FOR LONG-TERM (CURRENT) USE OF OTHER MEDICATIONS: Primary | ICD-10-CM

## 2021-10-25 LAB

## 2021-10-25 PROCEDURE — 99214 PR OFFICE/OUTPT VISIT, EST, LEVL IV, 30-39 MIN: ICD-10-PCS | Mod: S$PBB,,, | Performed by: PAIN MEDICINE

## 2021-10-25 PROCEDURE — 99214 OFFICE O/P EST MOD 30 MIN: CPT | Mod: S$PBB,,, | Performed by: PAIN MEDICINE

## 2021-10-25 PROCEDURE — 99215 OFFICE O/P EST HI 40 MIN: CPT | Mod: PBBFAC | Performed by: PAIN MEDICINE

## 2021-10-25 PROCEDURE — 80305 DRUG TEST PRSMV DIR OPT OBS: CPT | Mod: PBBFAC | Performed by: PAIN MEDICINE

## 2021-10-25 RX ORDER — HYDROCODONE BITARTRATE AND ACETAMINOPHEN 10; 325 MG/1; MG/1
1 TABLET ORAL EVERY 12 HOURS PRN
Qty: 60 TABLET | Refills: 0 | Status: SHIPPED | OUTPATIENT
Start: 2021-12-23 | End: 2021-11-22

## 2021-10-25 RX ORDER — HYDROCODONE BITARTRATE AND ACETAMINOPHEN 10; 325 MG/1; MG/1
1 TABLET ORAL EVERY 12 HOURS PRN
Qty: 60 TABLET | Refills: 0 | Status: SHIPPED | OUTPATIENT
Start: 2021-10-25 | End: 2021-11-22

## 2021-10-25 RX ORDER — HYDROCODONE BITARTRATE AND ACETAMINOPHEN 10; 325 MG/1; MG/1
1 TABLET ORAL EVERY 12 HOURS PRN
Qty: 60 TABLET | Refills: 0 | Status: SHIPPED | OUTPATIENT
Start: 2021-11-24 | End: 2021-11-22

## 2021-10-25 RX ORDER — FLUTICASONE PROPIONATE 50 MCG
SPRAY, SUSPENSION (ML) NASAL
Qty: 16 G | Refills: 0 | Status: SHIPPED | OUTPATIENT
Start: 2021-10-25 | End: 2022-01-20 | Stop reason: SDUPTHER

## 2021-10-28 ENCOUNTER — CLINICAL SUPPORT (OUTPATIENT)
Dept: WOUND CARE | Facility: HOSPITAL | Age: 71
End: 2021-10-28
Attending: NURSE PRACTITIONER
Payer: MEDICARE

## 2021-10-28 VITALS
HEART RATE: 84 BPM | DIASTOLIC BLOOD PRESSURE: 74 MMHG | SYSTOLIC BLOOD PRESSURE: 130 MMHG | RESPIRATION RATE: 17 BRPM | TEMPERATURE: 98 F

## 2021-10-28 DIAGNOSIS — L97.529 TYPE 2 DIABETES MELLITUS WITH LEFT DIABETIC FOOT ULCER: Primary | ICD-10-CM

## 2021-10-28 DIAGNOSIS — E11.621 TYPE 2 DIABETES MELLITUS WITH LEFT DIABETIC FOOT ULCER: Primary | ICD-10-CM

## 2021-10-28 PROCEDURE — 11042 DBRDMT SUBQ TIS 1ST 20SQCM/<: CPT | Performed by: NURSE PRACTITIONER

## 2021-10-28 PROCEDURE — 11042 DBRDMT SUBQ TIS 1ST 20SQCM/<: CPT

## 2021-11-04 ENCOUNTER — CLINICAL SUPPORT (OUTPATIENT)
Dept: WOUND CARE | Facility: HOSPITAL | Age: 71
End: 2021-11-04
Attending: NURSE PRACTITIONER
Payer: MEDICARE

## 2021-11-04 VITALS
HEART RATE: 97 BPM | TEMPERATURE: 98 F | DIASTOLIC BLOOD PRESSURE: 79 MMHG | RESPIRATION RATE: 17 BRPM | SYSTOLIC BLOOD PRESSURE: 143 MMHG

## 2021-11-04 DIAGNOSIS — L97.529 TYPE 2 DIABETES MELLITUS WITH LEFT DIABETIC FOOT ULCER: Primary | ICD-10-CM

## 2021-11-04 DIAGNOSIS — E11.621 TYPE 2 DIABETES MELLITUS WITH LEFT DIABETIC FOOT ULCER: Primary | ICD-10-CM

## 2021-11-04 PROCEDURE — 11042 DBRDMT SUBQ TIS 1ST 20SQCM/<: CPT

## 2021-11-04 PROCEDURE — 99499 UNLISTED E&M SERVICE: CPT | Performed by: NURSE PRACTITIONER

## 2021-11-04 PROCEDURE — 11042 DBRDMT SUBQ TIS 1ST 20SQCM/<: CPT | Performed by: NURSE PRACTITIONER

## 2021-11-08 ENCOUNTER — OFFICE VISIT (OUTPATIENT)
Dept: FAMILY MEDICINE | Facility: CLINIC | Age: 71
End: 2021-11-08
Payer: MEDICARE

## 2021-11-08 VITALS
SYSTOLIC BLOOD PRESSURE: 136 MMHG | DIASTOLIC BLOOD PRESSURE: 80 MMHG | HEART RATE: 84 BPM | BODY MASS INDEX: 37.52 KG/M2 | OXYGEN SATURATION: 94 % | RESPIRATION RATE: 20 BRPM | WEIGHT: 300.19 LBS | TEMPERATURE: 98 F

## 2021-11-08 DIAGNOSIS — E11.621 TYPE 2 DIABETES MELLITUS WITH LEFT DIABETIC FOOT ULCER: ICD-10-CM

## 2021-11-08 DIAGNOSIS — J32.0 CHRONIC MAXILLARY SINUSITIS: ICD-10-CM

## 2021-11-08 DIAGNOSIS — L97.529 TYPE 2 DIABETES MELLITUS WITH LEFT DIABETIC FOOT ULCER: ICD-10-CM

## 2021-11-08 DIAGNOSIS — L97.521 ULCER OF LEFT FOOT, LIMITED TO BREAKDOWN OF SKIN: Primary | ICD-10-CM

## 2021-11-08 DIAGNOSIS — R73.9 HYPERGLYCEMIA: ICD-10-CM

## 2021-11-08 PROCEDURE — 99214 PR OFFICE/OUTPT VISIT, EST, LEVL IV, 30-39 MIN: ICD-10-PCS | Mod: ,,, | Performed by: NURSE PRACTITIONER

## 2021-11-08 PROCEDURE — 99214 OFFICE O/P EST MOD 30 MIN: CPT | Mod: ,,, | Performed by: NURSE PRACTITIONER

## 2021-11-08 RX ORDER — DEXBROMPHENIRAMINE MALEATE AND PHENYLEPHRINE HYDROCHLORIDE 2; 10 MG/1; MG/1
1 TABLET ORAL EVERY 8 HOURS PRN
Qty: 32 TABLET | Refills: 0 | Status: SHIPPED | OUTPATIENT
Start: 2021-11-08 | End: 2021-11-18

## 2021-11-08 RX ORDER — DOXYCYCLINE 100 MG/1
100 CAPSULE ORAL 2 TIMES DAILY
Qty: 20 CAPSULE | Refills: 0 | Status: SHIPPED | OUTPATIENT
Start: 2021-11-08 | End: 2021-12-20 | Stop reason: ALTCHOICE

## 2021-11-11 ENCOUNTER — CLINICAL SUPPORT (OUTPATIENT)
Dept: WOUND CARE | Facility: HOSPITAL | Age: 71
End: 2021-11-11
Attending: NURSE PRACTITIONER
Payer: MEDICARE

## 2021-11-11 VITALS — TEMPERATURE: 97 F | SYSTOLIC BLOOD PRESSURE: 140 MMHG | HEART RATE: 73 BPM | DIASTOLIC BLOOD PRESSURE: 81 MMHG

## 2021-11-11 DIAGNOSIS — L97.529 TYPE 2 DIABETES MELLITUS WITH LEFT DIABETIC FOOT ULCER: Primary | ICD-10-CM

## 2021-11-11 DIAGNOSIS — E11.621 TYPE 2 DIABETES MELLITUS WITH LEFT DIABETIC FOOT ULCER: Primary | ICD-10-CM

## 2021-11-11 PROCEDURE — 11042 DBRDMT SUBQ TIS 1ST 20SQCM/<: CPT | Performed by: NURSE PRACTITIONER

## 2021-11-11 PROCEDURE — 11042 DBRDMT SUBQ TIS 1ST 20SQCM/<: CPT

## 2021-11-15 PROBLEM — J01.00 ACUTE MAXILLARY SINUSITIS: Status: RESOLVED | Noted: 2021-08-16 | Resolved: 2021-11-15

## 2021-11-18 ENCOUNTER — CLINICAL SUPPORT (OUTPATIENT)
Dept: WOUND CARE | Facility: HOSPITAL | Age: 71
End: 2021-11-18
Attending: NURSE PRACTITIONER
Payer: MEDICARE

## 2021-11-18 VITALS
SYSTOLIC BLOOD PRESSURE: 139 MMHG | TEMPERATURE: 97 F | HEART RATE: 79 BPM | DIASTOLIC BLOOD PRESSURE: 83 MMHG | RESPIRATION RATE: 17 BRPM

## 2021-11-18 DIAGNOSIS — E11.621 TYPE 2 DIABETES MELLITUS WITH LEFT DIABETIC FOOT ULCER: Primary | ICD-10-CM

## 2021-11-18 DIAGNOSIS — L97.529 TYPE 2 DIABETES MELLITUS WITH LEFT DIABETIC FOOT ULCER: Primary | ICD-10-CM

## 2021-11-18 PROCEDURE — 11042 DBRDMT SUBQ TIS 1ST 20SQCM/<: CPT

## 2021-11-18 PROCEDURE — 87070 CULTURE OTHR SPECIMN AEROBIC: CPT

## 2021-11-18 PROCEDURE — 11042 DBRDMT SUBQ TIS 1ST 20SQCM/<: CPT | Performed by: NURSE PRACTITIONER

## 2021-11-20 LAB
BACTERIA TISS AEROBE CULT: ABNORMAL
BACTERIA TISS AEROBE CULT: ABNORMAL

## 2021-11-22 ENCOUNTER — OFFICE VISIT (OUTPATIENT)
Dept: FAMILY MEDICINE | Facility: CLINIC | Age: 71
End: 2021-11-22
Payer: MEDICARE

## 2021-11-22 VITALS
RESPIRATION RATE: 18 BRPM | WEIGHT: 307.5 LBS | OXYGEN SATURATION: 96 % | DIASTOLIC BLOOD PRESSURE: 60 MMHG | SYSTOLIC BLOOD PRESSURE: 130 MMHG | TEMPERATURE: 99 F | HEART RATE: 86 BPM | BODY MASS INDEX: 38.23 KG/M2 | HEIGHT: 75 IN

## 2021-11-22 DIAGNOSIS — E11.621 TYPE 2 DIABETES MELLITUS WITH LEFT DIABETIC FOOT ULCER: Primary | ICD-10-CM

## 2021-11-22 DIAGNOSIS — L97.529 TYPE 2 DIABETES MELLITUS WITH LEFT DIABETIC FOOT ULCER: Primary | ICD-10-CM

## 2021-11-22 LAB — GLUCOSE SERPL-MCNC: 169 MG/DL (ref 70–110)

## 2021-11-22 PROCEDURE — 99214 PR OFFICE/OUTPT VISIT, EST, LEVL IV, 30-39 MIN: ICD-10-PCS | Mod: ,,, | Performed by: NURSE PRACTITIONER

## 2021-11-22 PROCEDURE — 99214 OFFICE O/P EST MOD 30 MIN: CPT | Mod: ,,, | Performed by: NURSE PRACTITIONER

## 2021-11-22 PROCEDURE — 82962 GLUCOSE BLOOD TEST: CPT | Mod: RHCUB | Performed by: NURSE PRACTITIONER

## 2021-11-22 RX ORDER — ORAL SEMAGLUTIDE 3 MG/1
3 TABLET ORAL DAILY
COMMUNITY
End: 2021-12-20

## 2021-11-22 RX ORDER — CIPROFLOXACIN 500 MG/1
500 TABLET ORAL 2 TIMES DAILY
Qty: 14 TABLET | Refills: 0 | Status: SHIPPED | OUTPATIENT
Start: 2021-11-22 | End: 2021-11-22

## 2021-11-23 DIAGNOSIS — R11.0 NAUSEA: Primary | ICD-10-CM

## 2021-11-23 RX ORDER — ONDANSETRON 4 MG/1
4 TABLET, FILM COATED ORAL EVERY 6 HOURS PRN
COMMUNITY
End: 2021-11-23 | Stop reason: SDUPTHER

## 2021-11-23 RX ORDER — ONDANSETRON 4 MG/1
4 TABLET, FILM COATED ORAL EVERY 6 HOURS PRN
Qty: 30 TABLET | Refills: 0 | Status: SHIPPED | OUTPATIENT
Start: 2021-11-23 | End: 2022-08-11

## 2021-12-02 ENCOUNTER — CLINICAL SUPPORT (OUTPATIENT)
Dept: WOUND CARE | Facility: HOSPITAL | Age: 71
End: 2021-12-02
Attending: NURSE PRACTITIONER
Payer: MEDICARE

## 2021-12-02 VITALS
DIASTOLIC BLOOD PRESSURE: 77 MMHG | HEART RATE: 78 BPM | TEMPERATURE: 97 F | SYSTOLIC BLOOD PRESSURE: 125 MMHG | RESPIRATION RATE: 20 BRPM

## 2021-12-02 DIAGNOSIS — L97.529 TYPE 2 DIABETES MELLITUS WITH LEFT DIABETIC FOOT ULCER: Primary | ICD-10-CM

## 2021-12-02 DIAGNOSIS — E11.621 TYPE 2 DIABETES MELLITUS WITH LEFT DIABETIC FOOT ULCER: Primary | ICD-10-CM

## 2021-12-02 PROCEDURE — 11042 DBRDMT SUBQ TIS 1ST 20SQCM/<: CPT

## 2021-12-02 PROCEDURE — 11042 DBRDMT SUBQ TIS 1ST 20SQCM/<: CPT | Performed by: NURSE PRACTITIONER

## 2021-12-06 RX ORDER — GEMFIBROZIL 600 MG/1
TABLET, FILM COATED ORAL
Qty: 180 TABLET | Refills: 1 | Status: SHIPPED | OUTPATIENT
Start: 2021-12-06 | End: 2022-05-25

## 2021-12-09 ENCOUNTER — CLINICAL SUPPORT (OUTPATIENT)
Dept: WOUND CARE | Facility: HOSPITAL | Age: 71
End: 2021-12-09
Attending: NURSE PRACTITIONER
Payer: MEDICARE

## 2021-12-09 VITALS
RESPIRATION RATE: 20 BRPM | HEART RATE: 77 BPM | TEMPERATURE: 97 F | DIASTOLIC BLOOD PRESSURE: 74 MMHG | SYSTOLIC BLOOD PRESSURE: 134 MMHG

## 2021-12-09 DIAGNOSIS — E11.621 TYPE 2 DIABETES MELLITUS WITH LEFT DIABETIC FOOT ULCER: Primary | ICD-10-CM

## 2021-12-09 DIAGNOSIS — L97.529 TYPE 2 DIABETES MELLITUS WITH LEFT DIABETIC FOOT ULCER: Primary | ICD-10-CM

## 2021-12-09 PROCEDURE — 11042 DBRDMT SUBQ TIS 1ST 20SQCM/<: CPT

## 2021-12-09 PROCEDURE — 11042 DBRDMT SUBQ TIS 1ST 20SQCM/<: CPT | Performed by: NURSE PRACTITIONER

## 2021-12-16 ENCOUNTER — CLINICAL SUPPORT (OUTPATIENT)
Dept: WOUND CARE | Facility: HOSPITAL | Age: 71
End: 2021-12-16
Attending: NURSE PRACTITIONER
Payer: MEDICARE

## 2021-12-16 VITALS
TEMPERATURE: 98 F | RESPIRATION RATE: 20 BRPM | HEART RATE: 79 BPM | SYSTOLIC BLOOD PRESSURE: 133 MMHG | DIASTOLIC BLOOD PRESSURE: 79 MMHG

## 2021-12-16 DIAGNOSIS — E11.621 TYPE 2 DIABETES MELLITUS WITH LEFT DIABETIC FOOT ULCER: Primary | ICD-10-CM

## 2021-12-16 DIAGNOSIS — L97.529 TYPE 2 DIABETES MELLITUS WITH LEFT DIABETIC FOOT ULCER: Primary | ICD-10-CM

## 2021-12-16 PROCEDURE — 11042 DBRDMT SUBQ TIS 1ST 20SQCM/<: CPT | Performed by: NURSE PRACTITIONER

## 2021-12-16 PROCEDURE — 11042 DBRDMT SUBQ TIS 1ST 20SQCM/<: CPT

## 2021-12-20 ENCOUNTER — OFFICE VISIT (OUTPATIENT)
Dept: FAMILY MEDICINE | Facility: CLINIC | Age: 71
End: 2021-12-20
Payer: MEDICARE

## 2021-12-20 VITALS
RESPIRATION RATE: 22 BRPM | OXYGEN SATURATION: 93 % | TEMPERATURE: 99 F | WEIGHT: 301 LBS | BODY MASS INDEX: 37.62 KG/M2 | SYSTOLIC BLOOD PRESSURE: 118 MMHG | HEART RATE: 84 BPM | DIASTOLIC BLOOD PRESSURE: 80 MMHG

## 2021-12-20 DIAGNOSIS — E11.621 TYPE 2 DIABETES MELLITUS WITH LEFT DIABETIC FOOT ULCER: Primary | ICD-10-CM

## 2021-12-20 DIAGNOSIS — L97.529 TYPE 2 DIABETES MELLITUS WITH LEFT DIABETIC FOOT ULCER: Primary | ICD-10-CM

## 2021-12-20 DIAGNOSIS — J32.0 CHRONIC MAXILLARY SINUSITIS: ICD-10-CM

## 2021-12-20 LAB — GLUCOSE SERPL-MCNC: 187 MG/DL (ref 70–110)

## 2021-12-20 PROCEDURE — 99214 PR OFFICE/OUTPT VISIT, EST, LEVL IV, 30-39 MIN: ICD-10-PCS | Mod: 25,,, | Performed by: NURSE PRACTITIONER

## 2021-12-20 PROCEDURE — 99214 OFFICE O/P EST MOD 30 MIN: CPT | Mod: 25,,, | Performed by: NURSE PRACTITIONER

## 2021-12-20 PROCEDURE — 96372 PR INJECTION,THERAP/PROPH/DIAG2ST, IM OR SUBCUT: ICD-10-PCS | Mod: ,,, | Performed by: NURSE PRACTITIONER

## 2021-12-20 PROCEDURE — 82962 GLUCOSE BLOOD TEST: CPT | Mod: RHCUB | Performed by: NURSE PRACTITIONER

## 2021-12-20 PROCEDURE — 96372 THER/PROPH/DIAG INJ SC/IM: CPT | Mod: ,,, | Performed by: NURSE PRACTITIONER

## 2021-12-20 RX ORDER — CEFTRIAXONE 1 G/1
1 INJECTION, POWDER, FOR SOLUTION INTRAMUSCULAR; INTRAVENOUS
Status: COMPLETED | OUTPATIENT
Start: 2021-12-20 | End: 2021-12-20

## 2021-12-20 RX ORDER — CIPROFLOXACIN 500 MG/1
500 TABLET ORAL 2 TIMES DAILY
COMMUNITY
Start: 2021-11-22 | End: 2021-12-20 | Stop reason: ALTCHOICE

## 2021-12-20 RX ORDER — ORAL SEMAGLUTIDE 3 MG/1
3 TABLET ORAL DAILY
Qty: 90 TABLET | Refills: 0 | Status: CANCELLED | OUTPATIENT
Start: 2021-12-20

## 2021-12-20 RX ORDER — CHLORPHENIRAMINE MALEATE AND PHENYLEPHRINE HYDROCHLORIDE 4; 10 MG/1; MG/1
1 TABLET, COATED ORAL EVERY 6 HOURS PRN
Qty: 30 TABLET | Refills: 0 | Status: SHIPPED | OUTPATIENT
Start: 2021-12-20 | End: 2021-12-30

## 2021-12-20 RX ORDER — DOXYCYCLINE 100 MG/1
100 CAPSULE ORAL EVERY 12 HOURS
Qty: 20 CAPSULE | Refills: 0 | Status: SHIPPED | OUTPATIENT
Start: 2021-12-20 | End: 2022-01-21 | Stop reason: ALTCHOICE

## 2021-12-20 RX ADMIN — CEFTRIAXONE 1 G: 1 INJECTION, POWDER, FOR SOLUTION INTRAMUSCULAR; INTRAVENOUS at 09:12

## 2021-12-23 ENCOUNTER — CLINICAL SUPPORT (OUTPATIENT)
Dept: WOUND CARE | Facility: HOSPITAL | Age: 71
End: 2021-12-23
Attending: NURSE PRACTITIONER
Payer: MEDICARE

## 2021-12-23 VITALS
DIASTOLIC BLOOD PRESSURE: 79 MMHG | SYSTOLIC BLOOD PRESSURE: 134 MMHG | HEART RATE: 83 BPM | RESPIRATION RATE: 18 BRPM | TEMPERATURE: 98 F

## 2021-12-23 DIAGNOSIS — E11.621 TYPE 2 DIABETES MELLITUS WITH LEFT DIABETIC FOOT ULCER: Primary | ICD-10-CM

## 2021-12-23 DIAGNOSIS — L97.529 TYPE 2 DIABETES MELLITUS WITH LEFT DIABETIC FOOT ULCER: Primary | ICD-10-CM

## 2021-12-23 PROCEDURE — 99213 OFFICE O/P EST LOW 20 MIN: CPT

## 2021-12-23 PROCEDURE — 99213 OFFICE O/P EST LOW 20 MIN: CPT | Performed by: NURSE PRACTITIONER

## 2021-12-29 LAB
LEFT EYE DM RETINOPATHY: POSITIVE
RIGHT EYE DM RETINOPATHY: NEGATIVE

## 2022-01-01 ENCOUNTER — CLINICAL SUPPORT (OUTPATIENT)
Dept: WOUND CARE | Facility: HOSPITAL | Age: 72
End: 2022-01-01
Attending: NURSE PRACTITIONER
Payer: MEDICARE

## 2022-01-01 VITALS
TEMPERATURE: 97 F | DIASTOLIC BLOOD PRESSURE: 75 MMHG | TEMPERATURE: 98 F | HEART RATE: 78 BPM | SYSTOLIC BLOOD PRESSURE: 125 MMHG | RESPIRATION RATE: 18 BRPM | RESPIRATION RATE: 18 BRPM | SYSTOLIC BLOOD PRESSURE: 120 MMHG | HEART RATE: 83 BPM | DIASTOLIC BLOOD PRESSURE: 77 MMHG

## 2022-01-01 DIAGNOSIS — L97.322 VENOUS STASIS ULCER OF LEFT ANKLE WITH FAT LAYER EXPOSED WITH VARICOSE VEINS: ICD-10-CM

## 2022-01-01 DIAGNOSIS — S90.821A BLISTER OF RIGHT HEEL, INITIAL ENCOUNTER: ICD-10-CM

## 2022-01-01 DIAGNOSIS — L97.529 TYPE 2 DIABETES MELLITUS WITH LEFT DIABETIC FOOT ULCER: Primary | ICD-10-CM

## 2022-01-01 DIAGNOSIS — E11.621 TYPE 2 DIABETES MELLITUS WITH LEFT DIABETIC FOOT ULCER: Primary | ICD-10-CM

## 2022-01-01 DIAGNOSIS — E11.621 TYPE 2 DIABETES MELLITUS WITH FOOT ULCER, WITH LONG-TERM CURRENT USE OF INSULIN: ICD-10-CM

## 2022-01-01 DIAGNOSIS — L97.811 NON-PRESSURE CHRONIC ULCER OF OTHER PART OF RIGHT LOWER LEG LIMITED TO BREAKDOWN OF SKIN: ICD-10-CM

## 2022-01-01 DIAGNOSIS — Z79.4 TYPE 2 DIABETES MELLITUS WITH FOOT ULCER, WITH LONG-TERM CURRENT USE OF INSULIN: ICD-10-CM

## 2022-01-01 DIAGNOSIS — I87.332 IDIOPATHIC CHRONIC VENOUS HYPERTENSION OF LEFT LOWER EXTREMITY WITH ULCER AND INFLAMMATION: ICD-10-CM

## 2022-01-01 DIAGNOSIS — L97.509 TYPE 2 DIABETES MELLITUS WITH FOOT ULCER, WITH LONG-TERM CURRENT USE OF INSULIN: ICD-10-CM

## 2022-01-01 DIAGNOSIS — I83.023 VENOUS STASIS ULCER OF LEFT ANKLE WITH FAT LAYER EXPOSED WITH VARICOSE VEINS: ICD-10-CM

## 2022-01-01 PROCEDURE — 87077 CULTURE AEROBIC IDENTIFY: CPT | Mod: ,,, | Performed by: CLINICAL MEDICAL LABORATORY

## 2022-01-01 PROCEDURE — 11042 DBRDMT SUBQ TIS 1ST 20SQCM/<: CPT | Performed by: NURSE PRACTITIONER

## 2022-01-01 PROCEDURE — 87186 SC STD MICRODIL/AGAR DIL: CPT | Mod: ,,, | Performed by: CLINICAL MEDICAL LABORATORY

## 2022-01-01 PROCEDURE — 87070 CULTURE OTHR SPECIMN AEROBIC: CPT

## 2022-01-01 PROCEDURE — 11042 DBRDMT SUBQ TIS 1ST 20SQCM/<: CPT

## 2022-01-01 PROCEDURE — 87186 CULTURE, TISSUE: ICD-10-PCS | Mod: XU,,, | Performed by: CLINICAL MEDICAL LABORATORY

## 2022-01-01 PROCEDURE — 87070 CULTURE OTHR SPECIMN AEROBIC: CPT | Mod: 91,,, | Performed by: CLINICAL MEDICAL LABORATORY

## 2022-01-01 PROCEDURE — 87176 CULTURE, TISSUE: ICD-10-PCS | Mod: ,,, | Performed by: CLINICAL MEDICAL LABORATORY

## 2022-01-01 PROCEDURE — 87070 CULTURE, TISSUE: ICD-10-PCS | Mod: 91,,, | Performed by: CLINICAL MEDICAL LABORATORY

## 2022-01-01 PROCEDURE — 87176 TISSUE HOMOGENIZATION CULTR: CPT | Mod: ,,, | Performed by: CLINICAL MEDICAL LABORATORY

## 2022-01-01 PROCEDURE — 87077 CULTURE, TISSUE: ICD-10-PCS | Mod: ,,, | Performed by: CLINICAL MEDICAL LABORATORY

## 2022-01-01 RX ORDER — CIPROFLOXACIN 500 MG/1
500 TABLET ORAL 2 TIMES DAILY
Qty: 14 TABLET | Refills: 0 | Status: SHIPPED | OUTPATIENT
Start: 2022-01-01 | End: 2023-01-01

## 2022-01-06 ENCOUNTER — CLINICAL SUPPORT (OUTPATIENT)
Dept: WOUND CARE | Facility: HOSPITAL | Age: 72
End: 2022-01-06
Attending: NURSE PRACTITIONER
Payer: MEDICARE

## 2022-01-06 VITALS
RESPIRATION RATE: 20 BRPM | TEMPERATURE: 97 F | SYSTOLIC BLOOD PRESSURE: 132 MMHG | DIASTOLIC BLOOD PRESSURE: 75 MMHG | HEART RATE: 89 BPM

## 2022-01-06 DIAGNOSIS — E11.621 TYPE 2 DIABETES MELLITUS WITH LEFT DIABETIC FOOT ULCER: Primary | ICD-10-CM

## 2022-01-06 DIAGNOSIS — L97.529 TYPE 2 DIABETES MELLITUS WITH LEFT DIABETIC FOOT ULCER: Primary | ICD-10-CM

## 2022-01-06 PROCEDURE — 11042 DBRDMT SUBQ TIS 1ST 20SQCM/<: CPT

## 2022-01-06 PROCEDURE — 11042 DBRDMT SUBQ TIS 1ST 20SQCM/<: CPT | Performed by: NURSE PRACTITIONER

## 2022-01-06 NOTE — PROGRESS NOTES
WOUND CARE, [facility]  [address]  [place] [address]     PATIENT NAME: René Moscoso  : 1950  DATE: 22  MRN: [id number]      Billing Provider: WOUND CARE, [facility]  Level of Service:   Patient PCP Information     Provider PCP Type    Olga Monterroso NP General          Reason for Visit / Chief Complaint: No chief complaint on file.       Update PCP  Update Chief Complaint         History of Present Illness / Problem Focused Workflow     René Moscoso presents to the clinic for follow up on wound    Location: Wound left fifth metatarsal head and blister left medial ankle  Severity: Insensate  Duration: 5th metatarsal head > 1 yr; left ankle 3 weeks  Context: uncertain  Modifying Factors: Hx of DM not well controlled and HTN  Associated Signs and Symptom: Small amt of serous drainage.        Review of Systems     Review of Systems   Constitutional: Negative.  Negative for chills and fever.   Respiratory: Negative for shortness of breath.    Cardiovascular: Negative for chest pain.   Gastrointestinal: Negative for nausea and vomiting.   Integumentary:  Positive for wound (left foot).   Neurological: Negative for weakness and headaches.        Medical / Social / Family History     Past Medical History:   Diagnosis Date    Arthritis     Chronic pain 2021    COPD (chronic obstructive pulmonary disease)     Diabetes type 2, controlled     Hyperlipidemia     Hypertension     Neuropathy     Pressure ulcer of left foot 2020       Past Surgical History:   Procedure Laterality Date    CARDIAC CATHETERIZATION      ESOPHAGOGASTRODUODENOSCOPY  10/22/2014    FLEXIBLE SIGMOIDOSCOPY  10/23/2014    INJECTION OF FACET JOINT Bilateral 2014    Bilateral L3-S1 Facet Injection - 14 and 3/18/14    SINUS SURGERY         Social History    reports that he quit smoking about 10 years ago. His smoking use included cigarettes. He started smoking about 31 years  ago. He has a 40.00 pack-year smoking history. His smokeless tobacco use includes snuff. He reports previous alcohol use. He reports current drug use. Drug: Hydrocodone.    Family History  's family history includes Arthritis in his mother; COPD in his brother; Cancer in his father, mother, and sister; Diabetes in his mother; Heart disease in his mother; Hypertension in his mother.    Medications and Allergies     Medications  No outpatient medications have been marked as taking for the 1/6/22 encounter (Clinical Support) with WOUND CARE, [facility].       Allergies  Review of patient's allergies indicates:  No Known Allergies    Physical Examination   There were no vitals filed for this visit.   Physical Exam  Constitutional:       General: He is not in acute distress.     Appearance: Normal appearance.   Cardiovascular:      Rate and Rhythm: Normal rate and regular rhythm.      Pulses:           Dorsalis pedis pulses are 3+ on the left side.        Posterior tibial pulses are 3+ on the left side.   Pulmonary:      Effort: Pulmonary effort is normal.      Breath sounds: Normal breath sounds.   Musculoskeletal:      Right lower leg: No edema.      Left lower leg: No edema.        Feet:    Feet:      Left foot:      Skin integrity: Callus present. No erythema or warmth.      Comments: See wound docs note for assessment, pictures and measurements.   Skin:     General: Skin is warm and dry.      Capillary Refill: Capillary refill takes 2 to 3 seconds.      Coloration: Skin is not pale.      Findings: Wound present.   Neurological:      Sensory: Sensory deficit present.          Assessment and Plan (including Health Maintenance)      Problem List  Smart Sets  Document Outside HM   :    Plan: See wound docs for treatment and plan. HH to continue to follow.          Health Maintenance Due   Topic Date Due    Hepatitis C Screening  Never done    Diabetes Urine Screening  Never done    Eye Exam  Never done    Shingles  Vaccine (1 of 2) Never done    Colorectal Cancer Screening  10/03/2017    Abdominal Aortic Aneurysm Screening  Never done    COVID-19 Vaccine (3 - Booster) 08/10/2021       Problem List Items Addressed This Visit        Endocrine    Type 2 diabetes mellitus with left diabetic foot ulcer - Primary        Type 2 diabetes mellitus with left diabetic foot ulcer       Health Maintenance Topics with due status: Not Due       Topic Last Completion Date    Pneumococcal Vaccines (Age 65+) 12/27/2018    Lipid Panel 03/02/2021    TETANUS VACCINE 05/27/2021    Foot Exam 08/12/2021    Hemoglobin A1c 09/01/2021           Future Appointments   Date Time Provider Department Center   1/20/2022  9:00 AM [facility] [place]   1/25/2022  9:00 AM EVARISTO [facility]   8/1/2022  9:30 AM MERYL NURSEJAMIE [facility]        Follow up in about 1 week (around 1/13/2022).     Signature:  HALIE Collado    Date of encounter: 1/6/22

## 2022-01-13 ENCOUNTER — CLINICAL SUPPORT (OUTPATIENT)
Dept: WOUND CARE | Facility: HOSPITAL | Age: 72
End: 2022-01-13
Attending: NURSE PRACTITIONER
Payer: MEDICARE

## 2022-01-13 VITALS
RESPIRATION RATE: 18 BRPM | DIASTOLIC BLOOD PRESSURE: 81 MMHG | HEART RATE: 91 BPM | SYSTOLIC BLOOD PRESSURE: 135 MMHG | TEMPERATURE: 97 F

## 2022-01-13 DIAGNOSIS — E11.621 TYPE 2 DIABETES MELLITUS WITH LEFT DIABETIC FOOT ULCER: Primary | ICD-10-CM

## 2022-01-13 DIAGNOSIS — L97.529 TYPE 2 DIABETES MELLITUS WITH LEFT DIABETIC FOOT ULCER: Primary | ICD-10-CM

## 2022-01-13 PROCEDURE — 11042 DBRDMT SUBQ TIS 1ST 20SQCM/<: CPT

## 2022-01-13 PROCEDURE — 11042 DBRDMT SUBQ TIS 1ST 20SQCM/<: CPT | Performed by: NURSE PRACTITIONER

## 2022-01-13 PROCEDURE — 87070 CULTURE OTHR SPECIMN AEROBIC: CPT

## 2022-01-13 NOTE — PROGRESS NOTES
WOUND CARE, [facility]  [address]  [place] [address]     PATIENT NAME: René Moscoso  : 1950  DATE: 22  MRN: [id number]      Billing Provider: WOUND CARE, [facility]  Level of Service:   Patient PCP Information     Provider PCP Type    Olga Monterroso NP General          Reason for Visit / Chief Complaint: No chief complaint on file.       Update PCP  Update Chief Complaint         History of Present Illness / Problem Focused Workflow     René Moscoso presents to the clinic for follow up on wound    Location: Wound left fifth metatarsal head and blister left medial ankle  Severity: Insensate  Duration: 5th metatarsal head > 1 yr; left ankle 3 weeks  Context: uncertain  Modifying Factors: Hx of DM not well controlled and HTN  Associated Signs and Symptom: Small amt of serous drainage.        Review of Systems     Review of Systems   Constitutional: Negative.  Negative for chills and fever.   Respiratory: Negative for shortness of breath.    Cardiovascular: Negative for chest pain.   Gastrointestinal: Negative for nausea and vomiting.   Integumentary:  Positive for wound (left foot).   Neurological: Negative for weakness and headaches.        Medical / Social / Family History     Past Medical History:   Diagnosis Date    Arthritis     Chronic pain 2021    COPD (chronic obstructive pulmonary disease)     Diabetes type 2, controlled     Hyperlipidemia     Hypertension     Neuropathy     Pressure ulcer of left foot 2020       Past Surgical History:   Procedure Laterality Date    CARDIAC CATHETERIZATION      ESOPHAGOGASTRODUODENOSCOPY  10/22/2014    FLEXIBLE SIGMOIDOSCOPY  10/23/2014    INJECTION OF FACET JOINT Bilateral 2014    Bilateral L3-S1 Facet Injection - 14 and 3/18/14    SINUS SURGERY         Social History    reports that he quit smoking about 10 years ago. His smoking use included cigarettes. He started smoking about 31 years  ago. He has a 40.00 pack-year smoking history. His smokeless tobacco use includes snuff. He reports previous alcohol use. He reports current drug use. Drug: Hydrocodone.    Family History  's family history includes Arthritis in his mother; COPD in his brother; Cancer in his father, mother, and sister; Diabetes in his mother; Heart disease in his mother; Hypertension in his mother.    Medications and Allergies     Medications  No outpatient medications have been marked as taking for the 1/13/22 encounter (Clinical Support) with WOUND CARE, [facility].       Allergies  Review of patient's allergies indicates:  No Known Allergies    Physical Examination   There were no vitals filed for this visit.   Physical Exam  Constitutional:       General: He is not in acute distress.     Appearance: Normal appearance.   Cardiovascular:      Rate and Rhythm: Normal rate and regular rhythm.      Pulses:           Dorsalis pedis pulses are 3+ on the left side.        Posterior tibial pulses are 3+ on the left side.   Pulmonary:      Effort: Pulmonary effort is normal.      Breath sounds: Normal breath sounds.   Musculoskeletal:      Right lower leg: No edema.      Left lower leg: No edema.        Feet:    Feet:      Left foot:      Skin integrity: Callus present. No erythema or warmth.      Comments: See wound docs note for assessment, pictures and measurements.   Skin:     General: Skin is warm and dry.      Capillary Refill: Capillary refill takes 2 to 3 seconds.      Coloration: Skin is not pale.      Findings: Wound present.   Neurological:      Sensory: Sensory deficit present.          Assessment and Plan (including Health Maintenance)      Problem List  Smart Sets  Document Outside HM   :    Plan: See wound docs for treatment and plan. HH to continue to follow.  Tissue culture obtained        Health Maintenance Due   Topic Date Due    Hepatitis C Screening  Never done    Diabetes Urine Screening  Never done    Eye Exam   Never done    Shingles Vaccine (1 of 2) Never done    Colorectal Cancer Screening  10/03/2017    Abdominal Aortic Aneurysm Screening  Never done    COVID-19 Vaccine (3 - Booster) 08/10/2021       Problem List Items Addressed This Visit        Endocrine    Type 2 diabetes mellitus with left diabetic foot ulcer - Primary    Relevant Orders    Culture, Tissue        Type 2 diabetes mellitus with left diabetic foot ulcer  -     Culture, Tissue       Health Maintenance Topics with due status: Not Due       Topic Last Completion Date    Pneumococcal Vaccines (Age 65+) 12/27/2018    Lipid Panel 03/02/2021    TETANUS VACCINE 05/27/2021    Foot Exam 08/12/2021    Hemoglobin A1c 09/01/2021           Future Appointments   Date Time Provider Department Center   1/20/2022  9:00 AM Olga Monterroso NP [facility] [place]   1/25/2022  9:00 AM EVARISTO [facility]   8/1/2022  9:30 AM AWCIELO NURSEJAMIE [facility] MIKAELA [place]        Follow up in about 1 week (around 1/20/2022).     Signature:  HALIE Collado    Date of encounter: 1/13/22

## 2022-01-17 LAB
BACTERIA TISS AEROBE CULT: ABNORMAL

## 2022-01-17 RX ORDER — SULFAMETHOXAZOLE AND TRIMETHOPRIM 800; 160 MG/1; MG/1
1 TABLET ORAL 2 TIMES DAILY
Qty: 20 TABLET | Refills: 0 | Status: SHIPPED | OUTPATIENT
Start: 2022-01-17 | End: 2022-03-16

## 2022-01-20 ENCOUNTER — OFFICE VISIT (OUTPATIENT)
Dept: FAMILY MEDICINE | Facility: CLINIC | Age: 72
End: 2022-01-20
Payer: MEDICARE

## 2022-01-20 ENCOUNTER — CLINICAL SUPPORT (OUTPATIENT)
Dept: WOUND CARE | Facility: HOSPITAL | Age: 72
End: 2022-01-20
Attending: NURSE PRACTITIONER
Payer: MEDICARE

## 2022-01-20 VITALS
WEIGHT: 296.38 LBS | DIASTOLIC BLOOD PRESSURE: 86 MMHG | TEMPERATURE: 99 F | SYSTOLIC BLOOD PRESSURE: 128 MMHG | OXYGEN SATURATION: 95 % | RESPIRATION RATE: 20 BRPM | HEART RATE: 84 BPM | BODY MASS INDEX: 37.05 KG/M2

## 2022-01-20 VITALS
RESPIRATION RATE: 20 BRPM | DIASTOLIC BLOOD PRESSURE: 90 MMHG | HEART RATE: 95 BPM | TEMPERATURE: 97 F | SYSTOLIC BLOOD PRESSURE: 143 MMHG

## 2022-01-20 DIAGNOSIS — J32.0 CHRONIC MAXILLARY SINUSITIS: ICD-10-CM

## 2022-01-20 DIAGNOSIS — I10 ESSENTIAL HYPERTENSION, BENIGN: ICD-10-CM

## 2022-01-20 DIAGNOSIS — L97.529 TYPE 2 DIABETES MELLITUS WITH LEFT DIABETIC FOOT ULCER: Primary | ICD-10-CM

## 2022-01-20 DIAGNOSIS — J44.89 OBSTRUCTIVE CHRONIC BRONCHITIS WITHOUT EXACERBATION: ICD-10-CM

## 2022-01-20 DIAGNOSIS — E66.01 SEVERE OBESITY (BMI 35.0-39.9) WITH COMORBIDITY: ICD-10-CM

## 2022-01-20 DIAGNOSIS — E11.621 TYPE 2 DIABETES MELLITUS WITH LEFT DIABETIC FOOT ULCER: Primary | ICD-10-CM

## 2022-01-20 PROBLEM — E11.42 DIABETIC POLYNEUROPATHY ASSOCIATED WITH TYPE 2 DIABETES MELLITUS: Chronic | Status: ACTIVE | Noted: 2021-05-28

## 2022-01-20 LAB
ALBUMIN SERPL BCP-MCNC: 3.5 G/DL (ref 3.5–5)
ALBUMIN/GLOB SERPL: 0.9 {RATIO}
ALP SERPL-CCNC: 91 U/L (ref 45–115)
ALT SERPL W P-5'-P-CCNC: 30 U/L (ref 16–61)
ANION GAP SERPL CALCULATED.3IONS-SCNC: 8 MMOL/L (ref 7–16)
AST SERPL W P-5'-P-CCNC: 24 U/L (ref 15–37)
BILIRUB SERPL-MCNC: 0.8 MG/DL (ref 0–1.2)
BUN SERPL-MCNC: 14 MG/DL (ref 7–18)
BUN/CREAT SERPL: 12 (ref 6–20)
CALCIUM SERPL-MCNC: 8.8 MG/DL (ref 8.5–10.1)
CHLORIDE SERPL-SCNC: 99 MMOL/L (ref 98–107)
CHOLEST SERPL-MCNC: 157 MG/DL (ref 0–200)
CHOLEST/HDLC SERPL: 4.5 {RATIO}
CK SERPL-CCNC: 176 U/L (ref 39–308)
CO2 SERPL-SCNC: 30 MMOL/L (ref 21–32)
CREAT SERPL-MCNC: 1.2 MG/DL (ref 0.7–1.3)
CREAT UR-MCNC: 57 MG/DL (ref 39–259)
EST. AVERAGE GLUCOSE BLD GHB EST-MCNC: 167 MG/DL
GLOBULIN SER-MCNC: 3.9 G/DL (ref 2–4)
GLUCOSE SERPL-MCNC: 246 MG/DL (ref 74–106)
GLUCOSE SERPL-MCNC: 264 MG/DL (ref 70–110)
HBA1C MFR BLD HPLC: 7.6 % (ref 4.5–6.6)
HDLC SERPL-MCNC: 35 MG/DL (ref 40–60)
LDLC SERPL CALC-MCNC: 50 MG/DL
LDLC/HDLC SERPL: 1.4 {RATIO}
MICROALBUMIN UR-MCNC: 2.3 MG/DL (ref 0–2.8)
MICROALBUMIN/CREAT RATIO PNL UR: 40.4 MG/G (ref 0–30)
NONHDLC SERPL-MCNC: 122 MG/DL
POTASSIUM SERPL-SCNC: 4.3 MMOL/L (ref 3.5–5.1)
PROT SERPL-MCNC: 7.4 G/DL (ref 6.4–8.2)
SODIUM SERPL-SCNC: 133 MMOL/L (ref 136–145)
TRIGL SERPL-MCNC: 362 MG/DL (ref 35–150)
VLDLC SERPL-MCNC: 72 MG/DL

## 2022-01-20 PROCEDURE — 82962 POCT GLUCOSE, HAND-HELD DEVICE: ICD-10-PCS | Mod: QW,,, | Performed by: NURSE PRACTITIONER

## 2022-01-20 PROCEDURE — 3074F PR MOST RECENT SYSTOLIC BLOOD PRESSURE < 130 MM HG: ICD-10-PCS | Mod: ,,, | Performed by: NURSE PRACTITIONER

## 2022-01-20 PROCEDURE — 3079F PR MOST RECENT DIASTOLIC BLOOD PRESSURE 80-89 MM HG: ICD-10-PCS | Mod: ,,, | Performed by: NURSE PRACTITIONER

## 2022-01-20 PROCEDURE — 1125F AMNT PAIN NOTED PAIN PRSNT: CPT | Mod: ,,, | Performed by: NURSE PRACTITIONER

## 2022-01-20 PROCEDURE — 80061 LIPID PANEL: ICD-10-PCS | Mod: ,,, | Performed by: CLINICAL MEDICAL LABORATORY

## 2022-01-20 PROCEDURE — 99214 OFFICE O/P EST MOD 30 MIN: CPT | Mod: ,,, | Performed by: NURSE PRACTITIONER

## 2022-01-20 PROCEDURE — 3051F PR MOST RECENT HEMOGLOBIN A1C LEVEL 7.0 - < 8.0%: ICD-10-PCS | Mod: ,,, | Performed by: NURSE PRACTITIONER

## 2022-01-20 PROCEDURE — 3051F HG A1C>EQUAL 7.0%<8.0%: CPT | Mod: CPTII | Performed by: NURSE PRACTITIONER

## 2022-01-20 PROCEDURE — 80053 COMPREHENSIVE METABOLIC PANEL: ICD-10-PCS | Mod: ,,, | Performed by: CLINICAL MEDICAL LABORATORY

## 2022-01-20 PROCEDURE — 3288F FALL RISK ASSESSMENT DOCD: CPT | Mod: ,,, | Performed by: NURSE PRACTITIONER

## 2022-01-20 PROCEDURE — 3066F PR DOCUMENTATION OF TREATMENT FOR NEPHROPATHY: ICD-10-PCS | Mod: ,,, | Performed by: NURSE PRACTITIONER

## 2022-01-20 PROCEDURE — 82570 MICROALBUMIN / CREATININE RATIO URINE: ICD-10-PCS | Mod: ,,, | Performed by: CLINICAL MEDICAL LABORATORY

## 2022-01-20 PROCEDURE — 3074F SYST BP LT 130 MM HG: CPT | Mod: ,,, | Performed by: NURSE PRACTITIONER

## 2022-01-20 PROCEDURE — 3060F POS MICROALBUMINURIA REV: CPT | Mod: ,,, | Performed by: NURSE PRACTITIONER

## 2022-01-20 PROCEDURE — 1101F PR PT FALLS ASSESS DOC 0-1 FALLS W/OUT INJ PAST YR: ICD-10-PCS | Mod: ,,, | Performed by: NURSE PRACTITIONER

## 2022-01-20 PROCEDURE — 80053 COMPREHEN METABOLIC PANEL: CPT | Mod: ,,, | Performed by: CLINICAL MEDICAL LABORATORY

## 2022-01-20 PROCEDURE — 3051F HG A1C>EQUAL 7.0%<8.0%: CPT | Mod: ,,, | Performed by: NURSE PRACTITIONER

## 2022-01-20 PROCEDURE — 82550 ASSAY OF CK (CPK): CPT | Mod: ,,, | Performed by: CLINICAL MEDICAL LABORATORY

## 2022-01-20 PROCEDURE — 3008F BODY MASS INDEX DOCD: CPT | Mod: ,,, | Performed by: NURSE PRACTITIONER

## 2022-01-20 PROCEDURE — 99214 PR OFFICE/OUTPT VISIT, EST, LEVL IV, 30-39 MIN: ICD-10-PCS | Mod: ,,, | Performed by: NURSE PRACTITIONER

## 2022-01-20 PROCEDURE — 3288F PR FALLS RISK ASSESSMENT DOCUMENTED: ICD-10-PCS | Mod: ,,, | Performed by: NURSE PRACTITIONER

## 2022-01-20 PROCEDURE — 1159F PR MEDICATION LIST DOCUMENTED IN MEDICAL RECORD: ICD-10-PCS | Mod: ,,, | Performed by: NURSE PRACTITIONER

## 2022-01-20 PROCEDURE — 82570 ASSAY OF URINE CREATININE: CPT | Mod: ,,, | Performed by: CLINICAL MEDICAL LABORATORY

## 2022-01-20 PROCEDURE — 1159F MED LIST DOCD IN RCRD: CPT | Mod: ,,, | Performed by: NURSE PRACTITIONER

## 2022-01-20 PROCEDURE — 83036 HEMOGLOBIN A1C: ICD-10-PCS | Mod: ,,, | Performed by: CLINICAL MEDICAL LABORATORY

## 2022-01-20 PROCEDURE — 82550 CK: ICD-10-PCS | Mod: ,,, | Performed by: CLINICAL MEDICAL LABORATORY

## 2022-01-20 PROCEDURE — 82043 MICROALBUMIN / CREATININE RATIO URINE: ICD-10-PCS | Mod: ,,, | Performed by: CLINICAL MEDICAL LABORATORY

## 2022-01-20 PROCEDURE — 83036 HEMOGLOBIN GLYCOSYLATED A1C: CPT | Mod: ,,, | Performed by: CLINICAL MEDICAL LABORATORY

## 2022-01-20 PROCEDURE — 82043 UR ALBUMIN QUANTITATIVE: CPT | Mod: ,,, | Performed by: CLINICAL MEDICAL LABORATORY

## 2022-01-20 PROCEDURE — 82962 GLUCOSE BLOOD TEST: CPT | Mod: QW,,, | Performed by: NURSE PRACTITIONER

## 2022-01-20 PROCEDURE — 99213 OFFICE O/P EST LOW 20 MIN: CPT

## 2022-01-20 PROCEDURE — 80061 LIPID PANEL: CPT | Mod: ,,, | Performed by: CLINICAL MEDICAL LABORATORY

## 2022-01-20 PROCEDURE — 3079F DIAST BP 80-89 MM HG: CPT | Mod: ,,, | Performed by: NURSE PRACTITIONER

## 2022-01-20 PROCEDURE — 3060F PR POS MICROALBUMINURIA RESULT DOCUMENTED/REVIEW: ICD-10-PCS | Mod: ,,, | Performed by: NURSE PRACTITIONER

## 2022-01-20 PROCEDURE — 3066F NEPHROPATHY DOC TX: CPT | Mod: ,,, | Performed by: NURSE PRACTITIONER

## 2022-01-20 PROCEDURE — 99213 OFFICE O/P EST LOW 20 MIN: CPT | Performed by: NURSE PRACTITIONER

## 2022-01-20 PROCEDURE — 1125F PR PAIN SEVERITY QUANTIFIED, PAIN PRESENT: ICD-10-PCS | Mod: ,,, | Performed by: NURSE PRACTITIONER

## 2022-01-20 PROCEDURE — 1101F PT FALLS ASSESS-DOCD LE1/YR: CPT | Mod: ,,, | Performed by: NURSE PRACTITIONER

## 2022-01-20 PROCEDURE — 3008F PR BODY MASS INDEX (BMI) DOCUMENTED: ICD-10-PCS | Mod: ,,, | Performed by: NURSE PRACTITIONER

## 2022-01-20 RX ORDER — FLUTICASONE PROPIONATE 50 MCG
SPRAY, SUSPENSION (ML) NASAL
Qty: 16 G | Refills: 3 | Status: SHIPPED | OUTPATIENT
Start: 2022-01-20 | End: 2022-05-26

## 2022-01-20 RX ORDER — ORAL SEMAGLUTIDE 14 MG/1
14 TABLET ORAL DAILY
Qty: 30 TABLET | Refills: 5 | Status: SHIPPED | OUTPATIENT
Start: 2022-01-20 | End: 2022-02-15

## 2022-01-20 NOTE — PATIENT INSTRUCTIONS
"Patient Education       Diabetes and Diet   The Basics   Written by the doctors and editors at [facility]   Why is diet important in diabetes? -- Diet is important because it is part of diabetes treatment. Many people need to change what they eat and how much they eat to help treat their diabetes. It is important for people to treat their diabetes so that they:  · Keep their blood sugar at or near a normal level  · Prevent long-term problems, such as heart or kidney problems, that can happen in people with diabetes  Changing your diet can also help treat obesity, high blood pressure, and high cholesterol. These conditions can affect people with diabetes and can lead to future problems, such as heart attacks or strokes.  Who will work with me to change my diet? -- Your doctor or nurse will work with you to make a food plan to change your diet. They might also recommend that you work with a "dietitian." A dietitian is an expert on food and eating.  Do I need to eat at the same times every day? -- When and how often you should eat depends, in part, on the diabetes medicines you take. For example:  · People who take about the same amount of insulin at the same time each day (called a "fixed regimen") should eat meals at the same times. This is also true for people who take pills that increase insulin levels, such as sulfonylureas. Eating meals at the same time every day helps prevent low blood sugar.  · People who adjust the dose and timing of their insulin each day (called a "flexible regimen") do not always have to eat meals at the same time. That's because they can time their insulin dose for before they plan to eat, and also adjust the dose for how much they plan to eat.  · People who take medicines that don't usually cause low blood sugar, such as metformin, don't have to eat meals at the same time every day.  What do I need to think about when planning what to eat? -- Our bodies break down the food we eat into small " "pieces called carbohydrates, proteins, and fats.  When planning what to eat, people with diabetes need to think about:  · Carbohydrates (or "carbs") - Carbohydrates, which are sugars that our bodies use for energy, can raise a person's blood sugar level. Your doctor, nurse, or dietitian will tell you how many carbohydrates you should eat at each meal or snack. Foods that have carbohydrates include:  ? Bread, pasta, and rice  ? Vegetables and fruits  ? Dairy foods  ? Foods and drinks with added sugar  It is best to get your carbohydrates from fruits, vegetables, whole grains, and low-fat milk. It is best to avoid drinks with added sugar, like soda, juices, and sports drinks.   · Protein - Your doctor, nurse, or dietitian will tell you how much protein you should eat each day. It is best to eat lean meats, fish, eggs, beans, peas, soy products, nuts, and seeds.  · Fats - The type of fat you eat is more important than the amount of fat. "Saturated" and "trans" fats can increase your risk for heart problems, like a heart attack.  ? Foods that have saturated fats include meat, butter, cheese, and ice cream.  ? Foods that have trans fats include processed food with "partially hydrogenated oils" on the ingredient list. This may include fried foods, store bought cookies, muffins, pies, and cakes.  "Monounsaturated" and "polyunsaturated" fats are better for you. Foods with these types of fat include fish, avocado, olive oil, and nuts.  · Calories - People need to eat a certain amount of calories each day to keep their weight the same. People who are overweight and want to lose weight need to eat fewer calories each day.  · Fiber - Eating foods with a lot of fiber can help control a person's blood sugar level. Foods that have a lot of fiber include apples, green beans, peas, beans, lentils, nuts, oatmeal, and whole grains.  · Salt - People who have high blood pressure should not eat foods that contain a lot of salt (also " called sodium). People with high blood pressure should also eat healthy foods, such as fruits, vegetables, and low-fat dairy foods.  · Alcohol - Having more than 1 drink (for women) or 2 drinks (for men) a day can raise blood sugar levels. Also, drinks that have fruit juice or soda in them can raise blood sugar levels.  What can I do if I need to lose weight? -- If you need to lose weight, you can:  · Exercise - Try to get at least 30 minutes of physical activity a day, most days of the week. Even gentle exercise, like walking, is good for your health. Some people with diabetes need to change their medicine dose before they exercise. They might also need to check their blood sugar levels before and after exercising.  · Eat fewer calories - Your doctor, nurse, or dietitian can tell you how many calories you should eat each day in order to lose weight.  If you are worried about your weight, size, or shape, talk with your doctor, nurse, or dietitian. They can help you make changes to improve your health.  Can I eat the same foods as my family? -- Yes. You do not need to eat special foods if you have diabetes. You and your family can eat the same foods. Changing your diet is mostly about eating healthy foods and not eating too much.  What are the other parts of diabetes treatment? -- Besides changing your diet, the other parts of diabetes treatment are:  · Exercise  · Medicines  Some people with diabetes need to learn how to match their diet and exercise with their medicine dose. For example, people who use insulin might need to choose the dose of insulin they give themselves. To choose their dose, they need to think about:  · What they plan to eat at the next meal  · How much exercise they plan to do  · What their blood sugar level is  If the diet and exercise do not match the medicine dose, a person's blood sugar level can get too low or too high. Blood sugar levels that are too low or too high can cause  problems.  All topics are updated as new evidence becomes available and our peer review process is complete.  This topic retrieved from [employer] on: Sep 21, 2021.  Topic 12475 Version 7.0  Release: 29.4.2 - C29.263  © 2021 UpToDate, Inc. and/or its affiliates. All rights reserved.  Consumer Information Use and Disclaimer   This information is not specific medical advice and does not replace information you receive from your health care provider. This is only a brief summary of general information. It does NOT include all information about conditions, illnesses, injuries, tests, procedures, treatments, therapies, discharge instructions or life-style choices that may apply to you. You must talk with your health care provider for complete information about your health and treatment options. This information should not be used to decide whether or not to accept your health care provider's advice, instructions or recommendations. Only your health care provider has the knowledge and training to provide advice that is right for you. The use of this information is governed by the [employer] End User License Agreement, available at [email or web address] use of [employer] content is governed by the [employer] Terms of Use. ©2021 UpToDate, Inc. All rights reserved.  Copyright   © 2021 UpToDate, Inc. and/or its affiliates. All rights reserved.

## 2022-01-20 NOTE — PROGRESS NOTES
Olga Monterroso NP   [facility]  [address]  [facility]     PATIENT NAME: René Moscoso  : 1950  DATE: 22  MRN: [id number]      Billing Provider: Olga Monterroso NP  Level of Service:   Patient PCP Information     Provider PCP Type    Olga Monterroso NP General          Reason for Visit / Chief Complaint: Follow-up and Diabetes (Was seen for diabetic wound care @ 8 this am for wound to left foot)       Update PCP  Update Chief Complaint         History of Present Illness / Problem Focused Workflow     René Moscoso presents to the clinic   Here for dm eval. Also stated he cant get rid of sinus infection, blood [id number] , 264 after eating biscuit and not taking shot tis am    Review of Systems     Review of Systems   Constitutional: Negative for chills, fatigue and fever.   HENT: Positive for rhinorrhea. Negative for nasal congestion, ear pain, facial swelling, hearing loss, mouth dryness, mouth sores, postnasal drip, sinus pressure/congestion and goiter.    Eyes: Negative for discharge and itching.   Respiratory: Negative for cough, shortness of breath and wheezing.    Cardiovascular: Negative for chest pain and leg swelling.   Gastrointestinal: Negative for abdominal pain, change in bowel habit and change in bowel habit.   Genitourinary: Negative for difficulty urinating, dysuria, enuresis, frequency, hematuria and urgency.   Neurological: Negative for dizziness, vertigo, syncope, weakness and headaches.   Psychiatric/Behavioral: Negative for decreased concentration.   All other systems reviewed and are negative.       Medical / Social / Family History     Past Medical History:   Diagnosis Date    Arthritis     Chronic pain 2021    COPD (chronic obstructive pulmonary disease)     Diabetes type 2, controlled     Hyperlipidemia     Hypertension     Neuropathy     Pressure ulcer of left foot 2020       Past Surgical History:   Procedure Laterality Date    CARDIAC  CATHETERIZATION      ESOPHAGOGASTRODUODENOSCOPY  10/22/2014    FLEXIBLE SIGMOIDOSCOPY  10/23/2014    INJECTION OF FACET JOINT Bilateral 07/01/2014    Bilateral L3-S1 Facet Injection - 7/1/14 and 3/18/14    SINUS SURGERY         Social History    reports that he quit smoking about 11 years ago. His smoking use included cigarettes. He started smoking about 31 years ago. He has a 40.00 pack-year smoking history. His smokeless tobacco use includes snuff. He reports previous alcohol use. He reports current drug use. Drug: Hydrocodone.    Family History  's family history includes Arthritis in his mother; COPD in his brother; Cancer in his father, mother, and sister; Diabetes in his mother; Heart disease in his mother; Hypertension in his mother.    Medications and Allergies     Medications  Outpatient Medications Marked as Taking for the 1/20/22 encounter (Office Visit) with Olga Monterroso NP   Medication Sig Dispense Refill    albuterol (ACCUNEB) 0.63 mg/3 mL Nebu Take 0.63 mg by nebulization every 6 (six) hours as needed. Rescue      aspirin 81 MG Chew Take 81 mg by mouth once daily.      gabapentin (NEURONTIN) 600 MG tablet TAKE ONE TABLET BY MOUTH TWICE DAILY 180 tablet 1    gemfibroziL (LOPID) 600 MG tablet TAKE ONE TABLET BY MOUTH TWICE DAILY 180 tablet 1    hydroCHLOROthiazide (MICROZIDE) 12.5 mg capsule TAKE ONE CAPSULE BY MOUTH EVERY DAY 90 capsule 1    HYDROcodone-acetaminophen (NORCO)  mg per tablet Take 1 tablet by mouth every 8 (eight) hours.       ipratropium (ATROVENT) 42 mcg (0.06 %) nasal spray 2 sprays by Nasal route 3 (three) times daily. 15 mL 2    ipratropium/albuterol sulfate (COMBIVENT INHL) Inhale into the lungs.      isosorbide mononitrate (IMDUR) 30 MG 24 hr tablet TAKE ONE TABLET BY MOUTH ONCE DAILY 90 tablet 1    LEVEMIR FLEXTOUCH U-100 INSULN 100 unit/mL (3 mL) InPn pen INJECT 92 units SUBCUTANEOUSLY AT BEDTIME 30 mL 5    linaGLIPtin (TRADJENTA) 5 mg Tab tablet  Take 5 mg by mouth once daily.      lisinopriL (PRINIVIL,ZESTRIL) 5 MG tablet TAKE ONE TABLET BY MOUTH DAILY 90 tablet 1    loratadine (CLARITIN) 10 mg tablet Take 10 mg by mouth once daily.      memantine (NAMENDA) 10 MG Tab TAKE ONE TABLET BY MOUTH TWICE DAILY 180 tablet 1    NOVOLOG MIX 70-30FLEXPEN U-100 100 unit/mL (70-30) InPn pen INJECT 90 UNITS SUBCUTANEOUSLY IN THE MORNING AND 88 UNITS SUBCUTANEOUSLY IN THE EVENING 30 mL 11    omeprazole (PRILOSEC) 20 MG capsule TAKE ONE CAPSULE BY MOUTH EVERY DAY 28 capsule 1    ondansetron (ZOFRAN) 4 MG tablet Take 1 tablet (4 mg total) by mouth every 6 (six) hours as needed for Nausea. 30 tablet 0    simvastatin (ZOCOR) 40 MG tablet TAKE ONE TABLET BY MOUTH AT BEDTIME 90 tablet 0    sulfamethoxazole-trimethoprim 800-160mg (BACTRIM DS) 800-160 mg Tab Take 1 tablet by mouth 2 (two) times daily. 20 tablet 0    SYNJARDY XR 12.5-1,000 mg TBph TAKE TWO TABLETS BY MOUTH EVERY MORNING 180 tablet 1    tiZANidine (ZANAFLEX) 4 MG tablet TAKE 1 OR 2 TABLETS BY MOUTH AT BEDTIME AS NEEDED 90 tablet 1    TRUE METRIX GLUCOSE TEST STRIP Strp USE TO USE TO CHECK BLOOD SUGAR BLOOD GLUCOSE TWICE DAILY 50 each 11    VENTOLIN HFA 90 mcg/actuation inhaler INHALE TWO PUFFS BY MOUTH TWICE DAILY AS NEEDED 18 g 5    [DISCONTINUED] fluticasone propionate (FLONASE) 50 mcg/actuation nasal spray instill ONE SPRAY in each nostril daily 16 g 0    [DISCONTINUED] semaglutide (RYBELSUS) 7 mg tablet Take 1 tablet (7 mg total) by mouth once daily. 30 tablet 0       Allergies  Review of patient's allergies indicates:  No Known Allergies    Physical Examination     Vitals:    01/20/22 0905   BP: 128/86   Pulse: 84   Resp: 20   Temp: 98.5 °F (36.9 °C)   SpO2: 95%   Weight: 134.4 kg (296 lb 6.4 oz)      Physical Exam  Vitals and nursing note reviewed.   Constitutional:       Appearance: Normal appearance.   HENT:      Head: Normocephalic.      Right Ear: Tympanic membrane, ear canal and external  ear normal.      Left Ear: Tympanic membrane, ear canal and external ear normal.      Nose: Nose normal.      Mouth/Throat:      Mouth: Mucous membranes are moist.      Pharynx: Oropharynx is clear.   Eyes:      Extraocular Movements: Extraocular movements intact.      Conjunctiva/sclera: Conjunctivae normal.      Pupils: Pupils are equal, round, and reactive to light.   Cardiovascular:      Rate and Rhythm: Normal rate and regular rhythm.      Pulses: Normal pulses.      Heart sounds: Normal heart sounds.   Pulmonary:      Effort: Pulmonary effort is normal.      Breath sounds: Normal breath sounds.   Abdominal:      General: Bowel sounds are normal.      Palpations: Abdomen is soft.   Musculoskeletal:         General: Normal range of motion.   Skin:     General: Skin is warm and dry.      Capillary Refill: Capillary refill takes less than 2 seconds.   Neurological:      General: No focal deficit present.      Mental Status: He is alert and oriented to person, place, and time.   Psychiatric:         Mood and Affect: Mood normal.         Behavior: Behavior normal.          Assessment and Plan (including Health Maintenance)      Problem List  Smart Perfecto Mobile  Document Outside HM   :    Plan:     Type 2 diabetes mellitus with left diabetic foot ulcer  -     Hemoglobin A1C; Future; Expected date: 01/20/2022  -     Microalbumin/Creatinine Ratio, Urine; Future; Expected date: 01/20/2022  -     POCT Glucose, Hand-Held Device  -     semaglutide (RYBELSUS) 14 mg tablet; Take 1 tablet (14 mg total) by mouth once daily.  Dispense: 30 tablet; Refill: 5    Chronic maxillary sinusitis  -     fluticasone propionate (FLONASE) 50 mcg/actuation nasal spray; instill ONE SPRAY in each nostril daily  Dispense: 16 g; Refill: 3    Essential hypertension, benign  -     CK; Future; Expected date: 01/20/2022  -     Lipid Panel; Future; Expected date: 01/20/2022  -     Comprehensive Metabolic Panel; Future; Expected date: 01/20/2022    Severe  obesity (BMI 35.0-39.9) with comorbidity    Obstructive chronic bronchitis without exacerbation            Health Maintenance Due   Topic Date Due    Hepatitis C Screening  Never done    Eye Exam  Never done    Shingles Vaccine (1 of 2) Never done    Colorectal Cancer Screening  10/03/2017    Abdominal Aortic Aneurysm Screening  Never done    COVID-19 Vaccine (3 - Booster) 08/10/2021       Problem List Items Addressed This Visit        ENT    Chronic maxillary sinusitis    Relevant Medications    fluticasone propionate (FLONASE) 50 mcg/actuation nasal spray       Pulmonary    Obstructive chronic bronchitis without exacerbation       Cardiac/Vascular    Essential hypertension, benign    Relevant Orders    CK (Completed)    Lipid Panel (Completed)    Comprehensive Metabolic Panel (Completed)       Endocrine    Type 2 diabetes mellitus with left diabetic foot ulcer - Primary    Relevant Medications    semaglutide (RYBELSUS) 14 mg tablet    Other Relevant Orders    Hemoglobin A1C (Completed)    Microalbumin/Creatinine Ratio, Urine (Completed)    POCT Glucose, Hand-Held Device (Completed)    Severe obesity (BMI 35.0-39.9) with comorbidity            Health Maintenance Topics with due status: Not Due       Topic Last Completion Date    Pneumococcal Vaccines (Age 65+) 12/27/2018    TETANUS VACCINE 05/27/2021    Foot Exam 08/12/2021    Diabetes Urine Screening 01/20/2022    Lipid Panel 01/20/2022    Hemoglobin A1c 01/20/2022       Procedures     Future Appointments   Date Time Provider Department Center   1/25/2022  9:00 AM [facility]   2/22/2022 10:00 AM [place]ga Monterro[facility] [place] [facility]   8/1/2022  9:30 AM AWCIELO NURSE [facility]        Follow up in about 1 month (around 2/20/2022) for f\u.       Signature:  Olga Monterroso NP    Date of encounter: 1/20/22

## 2022-01-20 NOTE — PROGRESS NOTES
WOUND CARE, [facility]  [address]  [place] [address]     PATIENT NAME: René Moscoso  : 1950  DATE: 22  MRN: [id number]      Billing Provider: WOUND CARE, [facility]  Level of Service:   Patient PCP Information     Provider PCP Type    Olga Monterroso NP General          Reason for Visit / Chief Complaint: No chief complaint on file.       Update PCP  Update Chief Complaint         History of Present Illness / Problem Focused Workflow     René Moscoso presents to the clinic for follow up on wound    Location: Wound left fifth metatarsal head and blister left medial ankle  Severity: Insensate  Duration: 5th metatarsal head > 1 yr; left ankle 3 weeks  Context: uncertain  Modifying Factors: Hx of DM not well controlled and HTN  Associated Signs and Symptom: Small amt of serous drainage.        Review of Systems     Review of Systems   Constitutional: Negative.  Negative for chills and fever.   Respiratory: Negative for shortness of breath.    Cardiovascular: Negative for chest pain.   Gastrointestinal: Negative for nausea and vomiting.   Integumentary:  Positive for wound (left foot).   Neurological: Negative for weakness and headaches.        Medical / Social / Family History     Past Medical History:   Diagnosis Date    Arthritis     Chronic pain 2021    COPD (chronic obstructive pulmonary disease)     Diabetes type 2, controlled     Hyperlipidemia     Hypertension     Neuropathy     Pressure ulcer of left foot 2020       Past Surgical History:   Procedure Laterality Date    CARDIAC CATHETERIZATION      ESOPHAGOGASTRODUODENOSCOPY  10/22/2014    FLEXIBLE SIGMOIDOSCOPY  10/23/2014    INJECTION OF FACET JOINT Bilateral 2014    Bilateral L3-S1 Facet Injection - 14 and 3/18/14    SINUS SURGERY         Social History    reports that he quit smoking about 11 years ago. His smoking use included cigarettes. He started smoking about 31 years  ago. He has a 40.00 pack-year smoking history. His smokeless tobacco use includes snuff. He reports previous alcohol use. He reports current drug use. Drug: Hydrocodone.    Family History  's family history includes Arthritis in his mother; COPD in his brother; Cancer in his father, mother, and sister; Diabetes in his mother; Heart disease in his mother; Hypertension in his mother.    Medications and Allergies     Medications  No outpatient medications have been marked as taking for the 1/20/22 encounter (Clinical Support) with WOUND CARE, [facility].       Allergies  Review of patient's allergies indicates:  No Known Allergies    Physical Examination   There were no vitals filed for this visit.   Physical Exam  Constitutional:       General: He is not in acute distress.     Appearance: Normal appearance.   Cardiovascular:      Rate and Rhythm: Normal rate and regular rhythm.      Pulses:           Dorsalis pedis pulses are 3+ on the left side.        Posterior tibial pulses are 3+ on the left side.   Pulmonary:      Effort: Pulmonary effort is normal.      Breath sounds: Normal breath sounds.   Musculoskeletal:      Right lower leg: No edema.      Left lower leg: No edema.        Feet:    Feet:      Left foot:      Skin integrity: Callus present. No erythema or warmth.      Comments: See wound docs note for assessment, pictures and measurements. Improved today  Skin:     General: Skin is warm and dry.      Capillary Refill: Capillary refill takes 2 to 3 seconds.      Coloration: Skin is not pale.      Findings: Wound present.   Neurological:      Sensory: Sensory deficit present.          Assessment and Plan (including Health Maintenance)      Problem List  Smart Sets  Document Outside HM   :    Plan: See wound docs for treatment and plan. HH to continue to follow.  Complete antibiotics.        Health Maintenance Due   Topic Date Due    Hepatitis C Screening  Never done    Diabetes Urine Screening  Never done     Eye Exam  Never done    Shingles Vaccine (1 of 2) Never done    Colorectal Cancer Screening  10/03/2017    Abdominal Aortic Aneurysm Screening  Never done    COVID-19 Vaccine (3 - Booster) 08/10/2021       Problem List Items Addressed This Visit        Endocrine    Type 2 diabetes mellitus with left diabetic foot ulcer - Primary        Type 2 diabetes mellitus with left diabetic foot ulcer       Health Maintenance Topics with due status: Not Due       Topic Last Completion Date    Pneumococcal Vaccines (Age 65+) 12/27/2018    Lipid Panel 03/02/2021    TETANUS VACCINE 05/27/2021    Foot Exam 08/12/2021    Hemoglobin A1c 09/01/2021           Future Appointments   Date Time Provider Department Center   1/20/2022  8:30 AM [facility]    1/20/2022  9:00 AM [place] [facility] [place]   1/25/2022  9:00 AM EVARISTO [facility]   8/1/2022  9:30 AM AWV NURSE, [facility] [place]        Follow up in about 1 week (around 1/27/2022).     Signature:  HALIE Collado    Date of encounter: 1/20/22

## 2022-01-21 NOTE — PROGRESS NOTES
Stress to adhere to diet, due to lipids elevated, a1c elevated, meds as ordered, increased Rebelsus 14mg daily, increase salt on food due to na being sl low, recheck a1v in 3 monhts and all other in 6 monhts

## 2022-01-25 ENCOUNTER — OFFICE VISIT (OUTPATIENT)
Dept: PAIN MEDICINE | Facility: CLINIC | Age: 72
End: 2022-01-25
Payer: MEDICARE

## 2022-01-25 VITALS
RESPIRATION RATE: 17 BRPM | BODY MASS INDEX: 36.93 KG/M2 | SYSTOLIC BLOOD PRESSURE: 146 MMHG | HEART RATE: 89 BPM | WEIGHT: 297 LBS | HEIGHT: 75 IN | DIASTOLIC BLOOD PRESSURE: 75 MMHG

## 2022-01-25 DIAGNOSIS — E11.42 DIABETIC POLYNEUROPATHY ASSOCIATED WITH TYPE 2 DIABETES MELLITUS: Chronic | ICD-10-CM

## 2022-01-25 DIAGNOSIS — M79.672 FOOT PAIN, LEFT: Chronic | ICD-10-CM

## 2022-01-25 DIAGNOSIS — Z79.899 ENCOUNTER FOR LONG-TERM (CURRENT) USE OF OTHER MEDICATIONS: ICD-10-CM

## 2022-01-25 DIAGNOSIS — M47.817 LUMBOSACRAL SPONDYLOSIS WITHOUT MYELOPATHY: Primary | Chronic | ICD-10-CM

## 2022-01-25 LAB
CTP QC/QA: YES
POC (AMP) AMPHETAMINE: NEGATIVE
POC (BAR) BARBITURATES: NEGATIVE
POC (BUP) BUPRENORPHINE: NEGATIVE
POC (BZO) BENZODIAZEPINES: NEGATIVE
POC (COC) COCAINE: NEGATIVE
POC (MDMA) METHYLENEDIOXYMETHAMPHETAMINE 3,4: NEGATIVE
POC (MET) METHAMPHETAMINE: NEGATIVE
POC (MOP) OPIATES: ABNORMAL
POC (MTD) METHADONE: NEGATIVE
POC (OXY) OXYCODONE: NEGATIVE
POC (PCP) PHENCYCLIDINE: NEGATIVE
POC (TCA) TRICYCLIC ANTIDEPRESSANTS: NEGATIVE
POC TEMPERATURE (URINE): 94
POC THC: NEGATIVE

## 2022-01-25 PROCEDURE — 3078F PR MOST RECENT DIASTOLIC BLOOD PRESSURE < 80 MM HG: ICD-10-PCS | Mod: CPTII,,, | Performed by: PHYSICIAN ASSISTANT

## 2022-01-25 PROCEDURE — 3288F PR FALLS RISK ASSESSMENT DOCUMENTED: ICD-10-PCS | Mod: CPTII,,, | Performed by: PHYSICIAN ASSISTANT

## 2022-01-25 PROCEDURE — 3060F POS MICROALBUMINURIA REV: CPT | Mod: CPTII,,, | Performed by: PHYSICIAN ASSISTANT

## 2022-01-25 PROCEDURE — 99214 OFFICE O/P EST MOD 30 MIN: CPT | Mod: S$PBB,,, | Performed by: PHYSICIAN ASSISTANT

## 2022-01-25 PROCEDURE — 3066F NEPHROPATHY DOC TX: CPT | Mod: CPTII,,, | Performed by: PHYSICIAN ASSISTANT

## 2022-01-25 PROCEDURE — 1159F PR MEDICATION LIST DOCUMENTED IN MEDICAL RECORD: ICD-10-PCS | Mod: CPTII,,, | Performed by: PHYSICIAN ASSISTANT

## 2022-01-25 PROCEDURE — 3051F HG A1C>EQUAL 7.0%<8.0%: CPT | Mod: CPTII,,, | Performed by: PHYSICIAN ASSISTANT

## 2022-01-25 PROCEDURE — 99215 OFFICE O/P EST HI 40 MIN: CPT | Mod: PBBFAC | Performed by: PHYSICIAN ASSISTANT

## 2022-01-25 PROCEDURE — 1125F AMNT PAIN NOTED PAIN PRSNT: CPT | Mod: CPTII,,, | Performed by: PHYSICIAN ASSISTANT

## 2022-01-25 PROCEDURE — 3066F PR DOCUMENTATION OF TREATMENT FOR NEPHROPATHY: ICD-10-PCS | Mod: CPTII,,, | Performed by: PHYSICIAN ASSISTANT

## 2022-01-25 PROCEDURE — 3077F PR MOST RECENT SYSTOLIC BLOOD PRESSURE >= 140 MM HG: ICD-10-PCS | Mod: CPTII,,, | Performed by: PHYSICIAN ASSISTANT

## 2022-01-25 PROCEDURE — 3008F PR BODY MASS INDEX (BMI) DOCUMENTED: ICD-10-PCS | Mod: CPTII,,, | Performed by: PHYSICIAN ASSISTANT

## 2022-01-25 PROCEDURE — 80305 DRUG TEST PRSMV DIR OPT OBS: CPT | Mod: PBBFAC | Performed by: PHYSICIAN ASSISTANT

## 2022-01-25 PROCEDURE — 3051F PR MOST RECENT HEMOGLOBIN A1C LEVEL 7.0 - < 8.0%: ICD-10-PCS | Mod: CPTII,,, | Performed by: PHYSICIAN ASSISTANT

## 2022-01-25 PROCEDURE — 3288F FALL RISK ASSESSMENT DOCD: CPT | Mod: CPTII,,, | Performed by: PHYSICIAN ASSISTANT

## 2022-01-25 PROCEDURE — 3078F DIAST BP <80 MM HG: CPT | Mod: CPTII,,, | Performed by: PHYSICIAN ASSISTANT

## 2022-01-25 PROCEDURE — 1101F PR PT FALLS ASSESS DOC 0-1 FALLS W/OUT INJ PAST YR: ICD-10-PCS | Mod: CPTII,,, | Performed by: PHYSICIAN ASSISTANT

## 2022-01-25 PROCEDURE — 3008F BODY MASS INDEX DOCD: CPT | Mod: CPTII,,, | Performed by: PHYSICIAN ASSISTANT

## 2022-01-25 PROCEDURE — 3077F SYST BP >= 140 MM HG: CPT | Mod: CPTII,,, | Performed by: PHYSICIAN ASSISTANT

## 2022-01-25 PROCEDURE — 1101F PT FALLS ASSESS-DOCD LE1/YR: CPT | Mod: CPTII,,, | Performed by: PHYSICIAN ASSISTANT

## 2022-01-25 PROCEDURE — 1159F MED LIST DOCD IN RCRD: CPT | Mod: CPTII,,, | Performed by: PHYSICIAN ASSISTANT

## 2022-01-25 PROCEDURE — 3060F PR POS MICROALBUMINURIA RESULT DOCUMENTED/REVIEW: ICD-10-PCS | Mod: CPTII,,, | Performed by: PHYSICIAN ASSISTANT

## 2022-01-25 PROCEDURE — 99214 PR OFFICE/OUTPT VISIT, EST, LEVL IV, 30-39 MIN: ICD-10-PCS | Mod: S$PBB,,, | Performed by: PHYSICIAN ASSISTANT

## 2022-01-25 PROCEDURE — 1125F PR PAIN SEVERITY QUANTIFIED, PAIN PRESENT: ICD-10-PCS | Mod: CPTII,,, | Performed by: PHYSICIAN ASSISTANT

## 2022-01-25 RX ORDER — HYDROCODONE BITARTRATE AND ACETAMINOPHEN 10; 325 MG/1; MG/1
1 TABLET ORAL EVERY 8 HOURS
Qty: 90 TABLET | Refills: 0 | Status: SHIPPED | OUTPATIENT
Start: 2022-04-05 | End: 2022-04-20 | Stop reason: SDUPTHER

## 2022-01-25 RX ORDER — HYDROCODONE BITARTRATE AND ACETAMINOPHEN 10; 325 MG/1; MG/1
1 TABLET ORAL EVERY 8 HOURS
Qty: 90 TABLET | Refills: 0 | Status: SHIPPED | OUTPATIENT
Start: 2022-02-04 | End: 2022-03-06

## 2022-01-25 RX ORDER — HYDROCODONE BITARTRATE AND ACETAMINOPHEN 10; 325 MG/1; MG/1
1 TABLET ORAL EVERY 8 HOURS
Qty: 90 TABLET | Refills: 0 | Status: SHIPPED | OUTPATIENT
Start: 2022-03-04 | End: 2022-04-03

## 2022-01-25 NOTE — PATIENT INSTRUCTIONS
Patient Education       Spondylolysis   About this topic   The spine is made up of bones called vertebrae. These bones are lined up on top of each other. The spinal bones have a large solid part called the body. There is also a xiomy arch that goes around your spinal cord. Spondylolysis is a tiny crack, or stress fracture, in this xiomy arch. This problem almost always happens in the lower back or the lumbar spine. This arch can crack on one or both sides. If it happens on both sides and it is not treated, it can lead to a more serious problem.     What are the causes?   · Too much stress on the bones in the spine. This often comes from playing sports like gymnastics, football, or weight-lifting.  · Having thin bones. This may be from birth.  · Trauma  · Degenerative problem  · Being overweight  What can make this more likely to happen?   · More common in children and teenagers  · Growth spurts  · Using poor techniques or improper equipment for sports or exercise  What are the main signs?   · Low back pain that is worse with activity and better with rest  · Pain in the buttocks, back of the thighs that may shoot down the leg  · Muscle spasms or tightness in the back or back of the thigh  · Some people have no signs  How does the doctor diagnose this health problem?   The doctor will do an exam and feel around your back. The doctor may have you move and push and pull on your legs to test your motion and strength. Your doctor may have you raise one leg straight up to check if the muscles in the back of your thigh are tight. Your doctor may check the feeling and reflexes in your legs to check for nerve problems. The doctor may order:  · X-ray  · CT or MRI scan  How does the doctor treat this health problem?   · Rest  · Back brace  · Exercises for strengthening and stretching  · Physical therapy (PT) for treatments to lessen pain and for instruction in exercises to help the problem  · Weight loss program if you are  overweight  · Surgery may be needed if pain does not get better or if other problems happen  What drugs may be needed?   The doctor may order drugs to:  · Help with pain and swelling  The doctor may give you a shot to help with pain and swelling. Talk with your doctor about the risks of this shot.   What problems could happen?   · A spinal bone could slip forward onto the spinal bone below. This is spondylolisthesis.  · Chronic back pain  · Nerve damage  · Need for surgery  What can be done to prevent this health problem?   · Stay active and work out to keep your muscles strong and flexible. Keep your back and belly muscles strong and your hamstring muscles flexible.  · Warm up slowly and stretch before you exercise. Use good training techniques and form for sports. Have an expert look at your technique.  · Wear the right equipment when playing sports.  · Use good ways to train, such as slowly adding to how many exercises you do.  · Take breaks often when doing things that use repeat movements.  · Do not exercise or play sports when you are tired or in pain.  · Use extra care in sports with a lot of back bending such as gymnastics, dancing, football, and wrestling.  · Eat a healthy diet to keep your muscles and bones healthy. Eat a diet rich in calcium and vitamin D to keep your bones strong.  · Keep a healthy weight so there is not extra stress on your joints.  Helpful tips   · If you have back pain, do not ignore it. Go to the doctor. The earlier this problem is treated, the better the results.  · Try swimming and biking to stay in shape. These activities put less stress on your back.  Where can I learn more?   [employer]ids[employer]  https://kidshealth.org/en/parents/spondylolysis.html#kha_12   Last Reviewed Date   2020-03-16  Consumer Information Use and Disclaimer   This information is not specific medical advice and does not replace information you receive from your health care provider. This is only a brief summary of  general information. It does NOT include all information about conditions, illnesses, injuries, tests, procedures, treatments, therapies, discharge instructions or life-style choices that may apply to you. You must talk with your health care provider for complete information about your health and treatment options. This information should not be used to decide whether or not to accept your health care providers advice, instructions or recommendations. Only your health care provider has the knowledge and training to provide advice that is right for you.  Copyright   Copyright © 2021 [employer] and its affiliates and/or licensors. All rights reserved.

## 2022-01-27 ENCOUNTER — CLINICAL SUPPORT (OUTPATIENT)
Dept: WOUND CARE | Facility: HOSPITAL | Age: 72
End: 2022-01-27
Attending: NURSE PRACTITIONER
Payer: MEDICARE

## 2022-01-27 VITALS
HEART RATE: 86 BPM | DIASTOLIC BLOOD PRESSURE: 80 MMHG | TEMPERATURE: 97 F | RESPIRATION RATE: 20 BRPM | SYSTOLIC BLOOD PRESSURE: 130 MMHG

## 2022-01-27 DIAGNOSIS — L97.529 TYPE 2 DIABETES MELLITUS WITH LEFT DIABETIC FOOT ULCER: Primary | ICD-10-CM

## 2022-01-27 DIAGNOSIS — E11.621 TYPE 2 DIABETES MELLITUS WITH LEFT DIABETIC FOOT ULCER: Primary | ICD-10-CM

## 2022-01-27 PROCEDURE — 3051F HG A1C>EQUAL 7.0%<8.0%: CPT | Mod: CPTII | Performed by: NURSE PRACTITIONER

## 2022-01-27 PROCEDURE — 99213 OFFICE O/P EST LOW 20 MIN: CPT | Performed by: NURSE PRACTITIONER

## 2022-01-27 PROCEDURE — 99213 OFFICE O/P EST LOW 20 MIN: CPT

## 2022-01-27 NOTE — PROGRESS NOTES
WOUND CARE, [facility]  [address]  [place] [address]     PATIENT NAME: René Moscoso  : 1950  DATE: 22  MRN: [id number]      Billing Provider: WOUND CARE, [facility]  Level of Service:   Patient PCP Information     Provider PCP Type    Olga Monterroso NP General          Reason for Visit / Chief Complaint: No chief complaint on file.       Update PCP  Update Chief Complaint         History of Present Illness / Problem Focused Workflow     René Moscoso presents to the clinic for follow up on wound    Location: Wound left fifth metatarsal head and blister left medial ankle  Severity: Insensate  Duration: 5th metatarsal head > 1 yr; left ankle 3 weeks  Context: uncertain  Modifying Factors: Hx of DM not well controlled and HTN  Associated Signs and Symptom: Small amt of serous drainage.        Review of Systems     Review of Systems   Constitutional: Negative.  Negative for chills and fever.   Respiratory: Negative for shortness of breath.    Cardiovascular: Negative for chest pain.   Gastrointestinal: Negative for nausea and vomiting.   Integumentary:  Positive for wound (left foot).   Neurological: Negative for weakness and headaches.        Medical / Social / Family History     Past Medical History:   Diagnosis Date    Arthritis     Chronic pain 2021    COPD (chronic obstructive pulmonary disease)     Diabetes type 2, controlled     Hyperlipidemia     Hypertension     Neuropathy     Pressure ulcer of left foot 2020       Past Surgical History:   Procedure Laterality Date    CARDIAC CATHETERIZATION      ESOPHAGOGASTRODUODENOSCOPY  10/22/2014    FLEXIBLE SIGMOIDOSCOPY  10/23/2014    INJECTION OF FACET JOINT Bilateral 2014    Bilateral L3-S1 Facet Injection - 14 and 3/18/14    SINUS SURGERY         Social History    reports that he quit smoking about 11 years ago. His smoking use included cigarettes. He started smoking about 31 years  ago. He has a 40.00 pack-year smoking history. His smokeless tobacco use includes snuff. He reports previous alcohol use. He reports current drug use. Drug: Hydrocodone.    Family History  's family history includes Arthritis in his mother; COPD in his brother; Cancer in his father, mother, and sister; Diabetes in his mother; Heart disease in his mother; Hypertension in his mother.    Medications and Allergies     Medications  No outpatient medications have been marked as taking for the 1/27/22 encounter (Clinical Support) with WOUND CARE, [facility].       Allergies  Review of patient's allergies indicates:  No Known Allergies    Physical Examination   There were no vitals filed for this visit.   Physical Exam  Constitutional:       General: He is not in acute distress.     Appearance: Normal appearance.   Cardiovascular:      Rate and Rhythm: Normal rate and regular rhythm.      Pulses:           Dorsalis pedis pulses are 3+ on the left side.        Posterior tibial pulses are 3+ on the left side.   Pulmonary:      Effort: Pulmonary effort is normal.      Breath sounds: Normal breath sounds.   Musculoskeletal:      Right lower leg: No edema.      Left lower leg: No edema.        Feet:    Feet:      Left foot:      Skin integrity: Callus present. No erythema or warmth.      Comments: See wound docs note for assessment, pictures and measurements. Improved today  Skin:     General: Skin is warm and dry.      Capillary Refill: Capillary refill takes 2 to 3 seconds.      Coloration: Skin is not pale.      Findings: Wound present.   Neurological:      Sensory: Sensory deficit present.          Assessment and Plan (including Health Maintenance)      Problem List  Smart Sets  Document Outside HM   :    Plan: See wound docs for treatment and plan. HH to continue to follow.          Health Maintenance Due   Topic Date Due    Hepatitis C Screening  Never done    Eye Exam  Never done    Shingles Vaccine (1 of 2) Never  done    Colorectal Cancer Screening  10/03/2017    Abdominal Aortic Aneurysm Screening  Never done    COVID-19 Vaccine (3 - Booster) 08/10/2021       Problem List Items Addressed This Visit        Endocrine    Type 2 diabetes mellitus with left diabetic foot ulcer - Primary        Type 2 diabetes mellitus with left diabetic foot ulcer       Health Maintenance Topics with due status: Not Due       Topic Last Completion Date    Pneumococcal Vaccines (Age 65+) 12/27/2018    TETANUS VACCINE 05/27/2021    Foot Exam 08/12/2021    Diabetes Urine Screening 01/20/2022    Lipid Panel 01/20/2022    Hemoglobin A1c 01/20/2022           Future Appointments   Date Time Provider Department Center   1/27/2022  9:00 AM [facility]    2/22/2022 10:00 AM Olga Monterroso NP [facility]   4/20/2022  9:00 AM EVARISTO Roberts [facility]   8/1/2022  9:30 AM AWV NURSE, [facility]ILY [facility]IC[facility]        Follow up in about 2 weeks (around 2/10/2022).     Signature:  HALIE Collado    Date of encounter: 1/27/22

## 2022-02-03 NOTE — PROGRESS NOTES
Disclaimer:  This note has been generated using voice recognition software.  There may be type of graft focal areas that have been missed during a proof reading      Subjective:      Patient ID: René Moscoso is a 71 y.o. male.    Chief Complaint: Low-back Pain      Pain  This is a chronic problem. The current episode started more than 1 year ago. The problem occurs daily. The problem has been waxing and waning. Associated symptoms include arthralgias. Pertinent negatives include no change in bowel habit, chest pain, chills, coughing, diaphoresis, fever, neck pain, rash, sore throat, vertigo or vomiting.     Review of Systems   Constitutional: Negative for activity change, appetite change, chills, diaphoresis, fever and unexpected weight change.   HENT: Negative for drooling, ear discharge, ear pain, facial swelling, nosebleeds, sore throat, trouble swallowing, voice change and goiter.    Eyes: Negative for photophobia, pain, discharge, redness and visual disturbance.   Respiratory: Negative for apnea, cough, choking, chest tightness, shortness of breath, wheezing and stridor.    Cardiovascular: Negative for chest pain, palpitations and leg swelling.   Gastrointestinal: Negative for abdominal distention, change in bowel habit, diarrhea, rectal pain, vomiting, fecal incontinence and change in bowel habit.   Endocrine: Negative for cold intolerance, heat intolerance, polydipsia, polyphagia and polyuria.   Genitourinary: Negative for bladder incontinence, dysuria, flank pain, frequency and hot flashes.   Musculoskeletal: Positive for arthralgias, back pain and leg pain. Negative for neck pain and neck stiffness.   Integumentary:  Negative for color change, pallor and rash.   Allergic/Immunologic: Negative for immunocompromised state.   Neurological: Negative for dizziness, vertigo, seizures, syncope, facial asymmetry, speech difficulty, light-headedness, disturbances in coordination, memory loss and coordination  "difficulties.   Hematological: Negative for adenopathy. Does not bruise/bleed easily.   Psychiatric/Behavioral: Negative for agitation, behavioral problems, confusion, decreased concentration, dysphoric mood, hallucinations, self-injury and suicidal ideas. The patient is not nervous/anxious and is not hyperactive.             Objective:  Vitals:    01/25/22 0836 01/25/22 0837   BP: (!) 146/75    Pulse: 89    Resp: 17    Weight: 134.7 kg (297 lb)    Height: 6' 3" (1.905 m)    PainSc:   4   4         Physical Exam  Vitals and nursing note reviewed. Exam conducted with a chaperone present.   Constitutional:       General: He is awake. He is not in acute distress.     Appearance: Normal appearance. He is not toxic-appearing or diaphoretic.   HENT:      Head: Normocephalic and atraumatic.      Nose: Nose normal.      Mouth/Throat:      Mouth: Mucous membranes are moist.      Pharynx: Oropharynx is clear.   Eyes:      Conjunctiva/sclera: Conjunctivae normal.      Pupils: Pupils are equal, round, and reactive to light.   Cardiovascular:      Rate and Rhythm: Normal rate.   Pulmonary:      Effort: Pulmonary effort is normal. No respiratory distress.   Abdominal:      Palpations: Abdomen is soft.      Tenderness: There is no guarding.   Musculoskeletal:         General: No signs of injury. Normal range of motion.      Cervical back: Normal range of motion and neck supple. No rigidity.   Skin:     General: Skin is warm and dry.      Coloration: Skin is not jaundiced or pale.   Neurological:      General: No focal deficit present.      Mental Status: He is alert and oriented to person, place, and time. Mental status is at baseline.      Cranial Nerves: Cranial nerves are intact. No cranial nerve deficit (II-XII).   Psychiatric:         Mood and Affect: Mood normal.         Behavior: Behavior normal. Behavior is cooperative.         Thought Content: Thought content normal.           X-Ray Chest PA And Lateral  Narrative: " EXAMINATION:  XR CHEST PA AND LATERAL    CLINICAL HISTORY:  .  Cough    TECHNIQUE:  Chest x-ray PA and lateral    COMPARISON:  November 21, 2017 chest x-ray    FINDINGS:  The cardiac silhouette is upper normal in size.  There is no mediastinal mass.  There is no pulmonary vascular engorgement.  There is stable mild chronic elevation right hemidiaphragm.    There is no pleural effusion.    The lungs are unchanged in appearance.  There is no acute infiltrate.  There is some minimal interstitial coarsening which may be related to some chronic interstitial scarring.    There is mild thoracic spondylosis.  Impression: No acute cardiopulmonary process compared to the previous study.  Mild chronic lung changes as before    Electronically signed by: Sebastian Azul  Date:    08/25/2021  Time:    14:55       Office Visit on 01/20/2022   Component Date Value Ref Range Status    POC Glucose 01/20/2022 264* 70 - 110 MG/DL Final    Hemoglobin A1C 01/20/2022 7.6* 4.5 - 6.6 % Final    Estimated Average Glucose 01/20/2022 167  mg/dL Final    Creatinine, Urine 01/20/2022 57  39 - 259 mg/dL Final    Microalbumin 01/20/2022 2.3  0.0 - 2.8 mg/dL Final    Microalbumin/Creatinine Ratio 01/20/2022 40.4* 0.0 - 30.0 mg/g Final    CK 01/20/2022 176  39 - 308 U/L Final    Triglycerides 01/20/2022 362* 35 - 150 mg/dL Final    Cholesterol 01/20/2022 157  0 - 200 mg/dL Final    HDL Cholesterol 01/20/2022 35* 40 - 60 mg/dL Final    Cholesterol/HDL Ratio (Risk Factor) 01/20/2022 4.5   Final    Non-HDL 01/20/2022 122  mg/dL Final    LDL Calculated 01/20/2022 50  mg/dL Final    LDL/HDL 01/20/2022 1.4   Final    VLDL 01/20/2022 72  mg/dL Final    Sodium 01/20/2022 133* 136 - 145 mmol/L Final    Potassium 01/20/2022 4.3  3.5 - 5.1 mmol/L Final    Chloride 01/20/2022 99  98 - 107 mmol/L Final    CO2 01/20/2022 30  21 - 32 mmol/L Final    Anion Gap 01/20/2022 8  7 - 16 mmol/L Final    Glucose 01/20/2022 246* 74 - 106 mg/dL Final     BUN 01/20/2022 14  7 - 18 mg/dL Final    Creatinine 01/20/2022 1.20  0.70 - 1.30 mg/dL Final    BUN/Creatinine Ratio 01/20/2022 12  6 - 20 Final    Calcium 01/20/2022 8.8  8.5 - 10.1 mg/dL Final    Total Protein 01/20/2022 7.4  6.4 - 8.2 g/dL Final    Albumin 01/20/2022 3.5  3.5 - 5.0 g/dL Final    Globulin 01/20/2022 3.9  2.0 - 4.0 g/dL Final    A/G Ratio 01/20/2022 0.9   Final    Bilirubin, Total 01/20/2022 0.8  0.0 - 1.2 mg/dL Final    Alk Phos 01/20/2022 91  45 - 115 U/L Final    ALT 01/20/2022 30  16 - 61 U/L Final    AST 01/20/2022 24  15 - 37 U/L Final    eGFR 01/20/2022 64  >=60 mL/min/1.73m² Final   Clinical Support on 01/13/2022   Component Date Value Ref Range Status    Culture, Tissue 01/13/2022 Heavy Growth Citrobacter farmeri*  Final    Culture, Tissue 01/13/2022 Heavy Growth Staphylococcus aureus*  Final    Culture, Tissue 01/13/2022 Heavy Growth Klebsiella oxytoca*  Final   Office Visit on 12/20/2021   Component Date Value Ref Range Status    POC Glucose 12/20/2021 187* 70 - 110 MG/DL Final   Office Visit on 11/22/2021   Component Date Value Ref Range Status    POC Glucose 11/22/2021 169* 70 - 110 MG/DL Final   Clinical Support on 11/18/2021   Component Date Value Ref Range Status    Culture, Tissue 11/18/2021 Light Growth Staphylococcus aureus*  Final    Culture, Tissue 11/18/2021 Heavy Growth Streptococcus agalactiae (Group B)*  Final   Office Visit on 10/25/2021   Component Date Value Ref Range Status    POC Amphetamines 10/25/2021 Negative  Negative, Inconclusive Final    POC Barbiturates 10/25/2021 Negative  Negative, Inconclusive Final    POC Benzodiazepines 10/25/2021 Negative  Negative, Inconclusive Final    POC Cocaine 10/25/2021 Negative  Negative, Inconclusive Final    POC THC 10/25/2021 Negative  Negative, Inconclusive Final    POC Methadone 10/25/2021 Negative  Negative, Inconclusive Final    POC Methamphetamine 10/25/2021 Negative  Negative, Inconclusive  Final    POC Opiates 10/25/2021 Negative  Negative, Inconclusive Final    POC Oxycodone 10/25/2021 Negative  Negative, Inconclusive Final    POC Phencyclidine 10/25/2021 Negative  Negative, Inconclusive Final    POC Methylenedioxymethamphetamine * 10/25/2021 Negative  Negative, Inconclusive Final    POC Tricyclic Antidepressants 10/25/2021 Negative  Negative, Inconclusive Final    POC Buprenorphine 10/25/2021 Negative   Final     Acceptable 10/25/2021 Yes   Final    POC Temperature (Urine) 10/25/2021 94   Final   Office Visit on 10/07/2021   Component Date Value Ref Range Status    POC Glucose 10/07/2021 86  70 - 110 MG/DL Final   Lab Requisition on 09/01/2021   Component Date Value Ref Range Status    Hemoglobin A1C 09/01/2021 7.3* 4.5 - 6.6 % Final    Estimated Average Glucose 09/01/2021 157  mg/dL Final   Office Visit on 08/25/2021   Component Date Value Ref Range Status    SARS Coronavirus 2 Antigen 08/25/2021 Negative  Negative Final     Acceptable 08/25/2021 Yes   Final   Office Visit on 08/16/2021   Component Date Value Ref Range Status    SARS Coronavirus 2 Antigen 08/16/2021 Negative  Negative Final     Acceptable 08/16/2021 Yes   Final    SARS CoV-2 PCR 08/16/2021 Negative  Negative, Invalid Final   There may be more visits with results that are not included.           Assessment:      1. Lumbosacral spondylosis without myelopathy    2. Diabetic polyneuropathy associated with type 2 diabetes mellitus    3. Foot pain, left    4. Encounter for long-term (current) use of other medications          Orders Placed This Encounter   Procedures    POCT Urine Drug Screen Presump     Interpretive Information:     Negative:  No drug detected at the cut off level.   Positive:  This result represents presumptive positive for the   tested drug, other substances may yield a positive response other   than the analyte of interest. This result should be utilized  for   diagnostic purpose only. Confirmation testing will be performed upon physician request only.            A's of Opioid Risk Assessment  Activity:Patient can perform ADL.   Analgesia:Patients pain is partially controlled by current medication. Patient has tried OTC medications such as Tylenol and Ibuprofen with out relief.   Adverse Effects: Patient denies constipation or sedation.  Aberrant Behavior:  reviewed with no aberrant drug seeking/taking behavior.  Overdose reversal drug naloxone discussed    Drug screen reviewed      Requested Prescriptions     Signed Prescriptions Disp Refills    HYDROcodone-acetaminophen (NORCO)  mg per tablet 90 tablet 0     Sig: Take 1 tablet by mouth every 8 (eight) hours.    HYDROcodone-acetaminophen (NORCO)  mg per tablet 90 tablet 0     Sig: Take 1 tablet by mouth every 8 (eight) hours.    HYDROcodone-acetaminophen (NORCO)  mg per tablet 90 tablet 0     Sig: Take 1 tablet by mouth every 8 (eight) hours.         Plan:    Pharmacy closed on weekends    Continues following wound management chronic left lower extremity diabetic ulcers     He states he would like to resume his 3 Norco per day    He states twice a day is not   Continue current medication     Follow-up 3 months controlling his chronic neuropathy/lower extremity pain due to chronic diabetic nonhealing ulcers    Reviewed  reviewed drug screen consistent    Resume [place] 10 1 p.o. q.8 hours    Continue home exercise program as directed    Follow-up 3 months sooner if needed     Dr. Asif, October 2022    Bring original prescription medication bottles/container/box with labels to each visit   - - -

## 2022-02-10 ENCOUNTER — TELEPHONE (OUTPATIENT)
Dept: FAMILY MEDICINE | Facility: CLINIC | Age: 72
End: 2022-02-10
Payer: MEDICARE

## 2022-02-10 ENCOUNTER — CLINICAL SUPPORT (OUTPATIENT)
Dept: WOUND CARE | Facility: HOSPITAL | Age: 72
End: 2022-02-10
Attending: NURSE PRACTITIONER
Payer: MEDICARE

## 2022-02-10 VITALS
TEMPERATURE: 98 F | RESPIRATION RATE: 20 BRPM | SYSTOLIC BLOOD PRESSURE: 149 MMHG | DIASTOLIC BLOOD PRESSURE: 84 MMHG | HEART RATE: 76 BPM

## 2022-02-10 DIAGNOSIS — E11.621 TYPE 2 DIABETES MELLITUS WITH LEFT DIABETIC FOOT ULCER: Primary | ICD-10-CM

## 2022-02-10 DIAGNOSIS — L97.529 TYPE 2 DIABETES MELLITUS WITH LEFT DIABETIC FOOT ULCER: Primary | ICD-10-CM

## 2022-02-10 PROCEDURE — 99214 OFFICE O/P EST MOD 30 MIN: CPT

## 2022-02-10 PROCEDURE — 99213 OFFICE O/P EST LOW 20 MIN: CPT | Performed by: NURSE PRACTITIONER

## 2022-02-10 PROCEDURE — 3051F HG A1C>EQUAL 7.0%<8.0%: CPT | Mod: CPTII | Performed by: NURSE PRACTITIONER

## 2022-02-10 PROCEDURE — 99213 OFFICE O/P EST LOW 20 MIN: CPT

## 2022-02-10 NOTE — PROGRESS NOTES
WOUND CARE, [facility]  [address]  [place] [address]     PATIENT NAME: René Moscoso  : 1950  DATE: 2/10/22  MRN: [id number]      Billing Provider: WOUND CARE, [facility]  Level of Service:   Patient PCP Information     Provider PCP Type    Olga Monterroso NP General          Reason for Visit / Chief Complaint: No chief complaint on file.       Update PCP  Update Chief Complaint         History of Present Illness / Problem Focused Workflow     René Moscoso presents to the clinic for follow up on wound    Location: Wound left fifth metatarsal head and blister left medial ankle  Severity: Insensate  Duration: 5th metatarsal head > 1 yr; left ankle 3 weeks  Context: uncertain  Modifying Factors: Hx of DM not well controlled and HTN  Associated Signs and Symptom: Small amt of serous drainage.        Review of Systems     Review of Systems   Constitutional: Negative.  Negative for chills and fever.   Respiratory: Negative for shortness of breath.    Cardiovascular: Negative for chest pain.   Gastrointestinal: Negative for nausea and vomiting.   Integumentary:  Positive for wound (left foot).   Neurological: Negative for weakness and headaches.        Medical / Social / Family History     Past Medical History:   Diagnosis Date    Arthritis     Chronic pain 2021    COPD (chronic obstructive pulmonary disease)     Diabetes type 2, controlled     Hyperlipidemia     Hypertension     Neuropathy     Pressure ulcer of left foot 2020       Past Surgical History:   Procedure Laterality Date    CARDIAC CATHETERIZATION      ESOPHAGOGASTRODUODENOSCOPY  10/22/2014    FLEXIBLE SIGMOIDOSCOPY  10/23/2014    INJECTION OF FACET JOINT Bilateral 2014    Bilateral L3-S1 Facet Injection - 14 and 3/18/14    SINUS SURGERY         Social History    reports that he quit smoking about 11 years ago. His smoking use included cigarettes. He started smoking about 31 years  ago. He has a 40.00 pack-year smoking history. His smokeless tobacco use includes snuff. He reports previous alcohol use. He reports current drug use. Drug: Hydrocodone.    Family History  's family history includes Arthritis in his mother; COPD in his brother; Cancer in his father, mother, and sister; Diabetes in his mother; Heart disease in his mother; Hypertension in his mother.    Medications and Allergies     Medications  No outpatient medications have been marked as taking for the 2/10/22 encounter (Clinical Support) with WOUND CARE, [facility].       Allergies  Review of patient's allergies indicates:  No Known Allergies    Physical Examination   There were no vitals filed for this visit.   Physical Exam  Constitutional:       General: He is not in acute distress.     Appearance: Normal appearance.   Cardiovascular:      Rate and Rhythm: Normal rate and regular rhythm.      Pulses:           Dorsalis pedis pulses are 3+ on the left side.        Posterior tibial pulses are 3+ on the left side.   Pulmonary:      Effort: Pulmonary effort is normal.      Breath sounds: Normal breath sounds.   Musculoskeletal:      Right lower leg: No edema.      Left lower leg: No edema.        Feet:    Feet:      Left foot:      Skin integrity: Callus present. No erythema or warmth.      Comments: See wound docs note for assessment, pictures and measurements. Improved today  Skin:     General: Skin is warm and dry.      Capillary Refill: Capillary refill takes 2 to 3 seconds.      Coloration: Skin is not pale.      Findings: Wound present.   Neurological:      Sensory: Sensory deficit present.          Assessment and Plan (including Health Maintenance)      Problem List  Smart Sets  Document Outside HM   :    Plan: See wound docs for treatment and plan. HH to continue to follow.          Health Maintenance Due   Topic Date Due    Hepatitis C Screening  Never done    Eye Exam  Never done    Shingles Vaccine (1 of 2) Never  done    Colorectal Cancer Screening  10/03/2017    Abdominal Aortic Aneurysm Screening  Never done    COVID-19 Vaccine (3 - Booster) 08/10/2021       Problem List Items Addressed This Visit    None       There are no diagnoses linked to this encounter.   Health Maintenance Topics with due status: Not Due       Topic Last Completion Date    Pneumococcal Vaccines (Age 65+) 12/27/2018    TETANUS VACCINE 05/27/2021    Foot Exam 08/12/2021    Diabetes Urine Screening 01/20/2022    Lipid Panel 01/20/2022    Hemoglobin A1c 01/20/2022           Future Appointments   Date Time Provider Department Center   2/22/2022 10:00 AM Olga Monterroso NP [facility]   4/20/2022  9:00 AM EVARISTO Roberts [facility]   8/1/2022  9:30 AM AWCIELO NURSEJAMIE [facility] FAMILY MEDICINE [facility]        Follow up in about 2 weeks (around 2/24/2022).     Signature:  HALIE Collado    Date of encounter: 2/10/22

## 2022-02-15 RX ORDER — ORAL SEMAGLUTIDE 14 MG/1
14 TABLET ORAL DAILY
Qty: 30 TABLET | Refills: 0 | Status: SHIPPED | OUTPATIENT
Start: 2022-02-15 | End: 2022-02-24 | Stop reason: SDUPTHER

## 2022-02-24 ENCOUNTER — CLINICAL SUPPORT (OUTPATIENT)
Dept: WOUND CARE | Facility: HOSPITAL | Age: 72
End: 2022-02-24
Attending: NURSE PRACTITIONER
Payer: MEDICARE

## 2022-02-24 VITALS
DIASTOLIC BLOOD PRESSURE: 82 MMHG | RESPIRATION RATE: 20 BRPM | SYSTOLIC BLOOD PRESSURE: 142 MMHG | HEART RATE: 82 BPM | TEMPERATURE: 97 F

## 2022-02-24 DIAGNOSIS — E11.621 TYPE 2 DIABETES MELLITUS WITH LEFT DIABETIC FOOT ULCER: Primary | ICD-10-CM

## 2022-02-24 DIAGNOSIS — L97.529 TYPE 2 DIABETES MELLITUS WITH LEFT DIABETIC FOOT ULCER: Primary | ICD-10-CM

## 2022-02-24 PROCEDURE — 99213 OFFICE O/P EST LOW 20 MIN: CPT

## 2022-02-24 PROCEDURE — 99213 OFFICE O/P EST LOW 20 MIN: CPT | Performed by: NURSE PRACTITIONER

## 2022-02-24 PROCEDURE — 3051F HG A1C>EQUAL 7.0%<8.0%: CPT | Mod: CPTII | Performed by: NURSE PRACTITIONER

## 2022-02-24 RX ORDER — ORAL SEMAGLUTIDE 14 MG/1
14 TABLET ORAL DAILY
Qty: 90 TABLET | Refills: 1 | Status: SHIPPED | OUTPATIENT
Start: 2022-02-24 | End: 2023-01-01

## 2022-02-24 NOTE — PROGRESS NOTES
WOUND CARE, [facility]  [address]  [place] [address]     PATIENT NAME: René Moscoso  : 1950  DATE: 22  MRN: [id number]      Billing Provider: WOUND CARE, [facility]  Level of Service:   Patient PCP Information     Provider PCP Type    Olga Monterroso NP General          Reason for Visit / Chief Complaint: No chief complaint on file.       Update PCP  Update Chief Complaint         History of Present Illness / Problem Focused Workflow     René Moscoso presents to the clinic for follow up on wound    Location: Wound left fifth metatarsal head and blister left medial ankle  Severity: Insensate  Duration: 5th metatarsal head > 1 yr; left ankle 3 weeks  Context: uncertain  Modifying Factors: Hx of DM not well controlled and HTN  Associated Signs and Symptom: Small amt of serous drainage.        Review of Systems     Review of Systems   Constitutional: Negative.  Negative for chills and fever.   Respiratory: Negative for shortness of breath.    Cardiovascular: Negative for chest pain.   Gastrointestinal: Negative for nausea and vomiting.   Integumentary:  Positive for wound (left foot).   Neurological: Negative for weakness and headaches.        Medical / Social / Family History     Past Medical History:   Diagnosis Date    Arthritis     Chronic pain 2021    COPD (chronic obstructive pulmonary disease)     Diabetes type 2, controlled     Hyperlipidemia     Hypertension     Neuropathy     Pressure ulcer of left foot 2020       Past Surgical History:   Procedure Laterality Date    CARDIAC CATHETERIZATION      ESOPHAGOGASTRODUODENOSCOPY  10/22/2014    FLEXIBLE SIGMOIDOSCOPY  10/23/2014    INJECTION OF FACET JOINT Bilateral 2014    Bilateral L3-S1 Facet Injection - 14 and 3/18/14    SINUS SURGERY         Social History    reports that he quit smoking about 11 years ago. His smoking use included cigarettes. He started smoking about 31 years  ago. He has a 40.00 pack-year smoking history. His smokeless tobacco use includes snuff. He reports previous alcohol use. He reports current drug use. Drug: Hydrocodone.    Family History  's family history includes Arthritis in his mother; COPD in his brother; Cancer in his father, mother, and sister; Diabetes in his mother; Heart disease in his mother; Hypertension in his mother.    Medications and Allergies     Medications  No outpatient medications have been marked as taking for the 2/24/22 encounter (Clinical Support) with WOUND CARE, [facility].       Allergies  Review of patient's allergies indicates:  No Known Allergies    Physical Examination   There were no vitals filed for this visit.   Physical Exam  Constitutional:       General: He is not in acute distress.     Appearance: Normal appearance.   Cardiovascular:      Rate and Rhythm: Normal rate and regular rhythm.      Pulses:           Dorsalis pedis pulses are 3+ on the left side.        Posterior tibial pulses are 3+ on the left side.   Pulmonary:      Effort: Pulmonary effort is normal.      Breath sounds: Normal breath sounds.   Musculoskeletal:      Right lower leg: No edema.      Left lower leg: No edema.        Feet:    Feet:      Left foot:      Skin integrity: Callus present. No erythema or warmth.      Comments: See wound docs note for assessment, pictures and measurements. Improved today  #1 wound healed  Skin:     General: Skin is warm and dry.      Capillary Refill: Capillary refill takes 2 to 3 seconds.      Coloration: Skin is not pale.      Findings: Wound present.   Neurological:      Sensory: Sensory deficit present.          Assessment and Plan (including Health Maintenance)      Problem List  Smart Sets  Document Outside HM   :    Plan: See wound docs for treatment and plan. HH to continue to follow.          Health Maintenance Due   Topic Date Due    Hepatitis C Screening  Never done    Eye Exam  Never done    Shingles Vaccine  (1 of 2) Never done    Colorectal Cancer Screening  10/03/2017    Abdominal Aortic Aneurysm Screening  Never done    Pneumococcal Vaccines (Age 65+) (2 of 2 - PPSV23) 10/20/2020    COVID-19 Vaccine (3 - Booster) 07/10/2021       Problem List Items Addressed This Visit        Endocrine    Type 2 diabetes mellitus with left diabetic foot ulcer - Primary        Type 2 diabetes mellitus with left diabetic foot ulcer  Comments:  x 2; plantar surface healed       Health Maintenance Topics with due status: Not Due       Topic Last Completion Date    TETANUS VACCINE 05/27/2021    Foot Exam 08/12/2021    Diabetes Urine Screening 01/20/2022    Lipid Panel 01/20/2022    Hemoglobin A1c 01/20/2022           Future Appointments   Date Time Provider Department Center   3/9/2022 12:45 PM Olga Monterroso NP [facility]   4/20/2022  9:00 AM EVARISTO Roberts PNTRE [facility]   8/1/2022  9:30 AM AWCIELO NURSEJAMIE [facility]IC[facility]        Follow up in about 2 weeks (around 3/10/2022).     Signature:  HALIE Collado    Date of encounter: 2/24/22

## 2022-03-06 ENCOUNTER — HOSPITAL ENCOUNTER (EMERGENCY)
Facility: HOSPITAL | Age: 72
Discharge: HOME | End: 2022-03-06
Attending: FAMILY MEDICINE
Payer: MEDICARE

## 2022-03-06 VITALS
SYSTOLIC BLOOD PRESSURE: 125 MMHG | TEMPERATURE: 98 F | BODY MASS INDEX: 35.81 KG/M2 | RESPIRATION RATE: 20 BRPM | OXYGEN SATURATION: 92 % | WEIGHT: 288 LBS | HEART RATE: 80 BPM | HEIGHT: 75 IN | DIASTOLIC BLOOD PRESSURE: 71 MMHG

## 2022-03-06 DIAGNOSIS — E11.621 DIABETIC ULCER OF LEFT HEEL ASSOCIATED WITH TYPE 2 DIABETES MELLITUS, LIMITED TO BREAKDOWN OF SKIN: Primary | ICD-10-CM

## 2022-03-06 DIAGNOSIS — L97.421 DIABETIC ULCER OF LEFT HEEL ASSOCIATED WITH TYPE 2 DIABETES MELLITUS, LIMITED TO BREAKDOWN OF SKIN: Primary | ICD-10-CM

## 2022-03-06 PROCEDURE — 25000003 PHARM REV CODE 250: Performed by: FAMILY MEDICINE

## 2022-03-06 PROCEDURE — 99283 EMERGENCY DEPT VISIT LOW MDM: CPT

## 2022-03-06 PROCEDURE — 99283 EMERGENCY DEPT VISIT LOW MDM: CPT | Performed by: FAMILY MEDICINE

## 2022-03-06 RX ORDER — AMOXICILLIN AND CLAVULANATE POTASSIUM 875; 125 MG/1; MG/1
1 TABLET, FILM COATED ORAL 2 TIMES DAILY
Qty: 14 TABLET | Refills: 0 | Status: SHIPPED | OUTPATIENT
Start: 2022-03-06 | End: 2022-03-09

## 2022-03-06 RX ORDER — AMOXICILLIN AND CLAVULANATE POTASSIUM 875; 125 MG/1; MG/1
1 TABLET, FILM COATED ORAL 2 TIMES DAILY
Qty: 14 TABLET | Refills: 0 | Status: SHIPPED | OUTPATIENT
Start: 2022-03-06 | End: 2022-03-06 | Stop reason: CLARIF

## 2022-03-06 RX ADMIN — BACITRACIN ZINC, NEOMYCIN, POLYMYXIN B 1 EACH: 400; 3.5; 5 OINTMENT TOPICAL at 04:03

## 2022-03-06 NOTE — ED PROVIDER NOTES
Encounter Date: 3/6/2022       History     Chief Complaint   Patient presents with    Blister     Ruptured blister to LLE x1 week.Worsening redness noted today.     Patient presents to the ER with chief complaint of a diabetic foot ulcer and redness of his leg.  Patient has had this ulcer for at least 1 week.  Today had he developed redness that extended up his leg.  This prompted him to seek medical attention.  On presentation patient is afebrile and in no acute distress.  No other issues reported.        Review of patient's allergies indicates:  No Known Allergies  Past Medical History:   Diagnosis Date    Arthritis     Chronic pain 2021    COPD (chronic obstructive pulmonary disease)     Diabetes type 2, controlled     Hyperlipidemia     Hypertension     Neuropathy     Pressure ulcer of left foot 2020     Past Surgical History:   Procedure Laterality Date    CARDIAC CATHETERIZATION      ESOPHAGOGASTRODUODENOSCOPY  10/22/2014    FLEXIBLE SIGMOIDOSCOPY  10/23/2014    INJECTION OF FACET JOINT Bilateral 2014    Bilateral L3-S1 Facet Injection - 14 and 3/18/14    SINUS SURGERY       Family History   Problem Relation Age of Onset    Diabetes Mother     Heart disease Mother     Hypertension Mother     Arthritis Mother     Cancer Mother     Cancer Father     Cancer Sister     COPD Brother      Social History     Tobacco Use    Smoking status: Former Smoker     Packs/day: 2.00     Years: 20.00     Pack years: 40.00     Types: Cigarettes     Start date:      Quit date: 2011     Years since quittin.1    Smokeless tobacco: Current User     Types: Snuff   Substance Use Topics    Alcohol use: Not Currently    Drug use: Yes     Types: Hydrocodone     Review of Systems   Constitutional: Negative for fever.   HENT: Negative for sore throat.    Respiratory: Negative for shortness of breath.    Cardiovascular: Negative for chest pain.   Gastrointestinal: Negative for  nausea.   Genitourinary: Negative for dysuria.   Musculoskeletal: Negative for back pain.   Skin: Positive for wound. Negative for rash.   Neurological: Negative for weakness.   Hematological: Does not bruise/bleed easily.   All other systems reviewed and are negative.      Physical Exam     Initial Vitals [03/06/22 1538]   BP Pulse Resp Temp SpO2   (!) 143/77 89 20 97.6 °F (36.4 °C) 95 %      MAP       --         Physical Exam    Nursing note and vitals reviewed.  Constitutional: He appears well-developed and well-nourished. He is not diaphoretic. No distress.   HENT:   Head: Normocephalic and atraumatic.   Musculoskeletal:         General: Tenderness and edema present.     Neurological: He is alert and oriented to person, place, and time. GCS score is 15. GCS eye subscore is 4. GCS verbal subscore is 5. GCS motor subscore is 6.   Skin: Skin is warm and dry. No rash and no abscess noted. There is erythema. No pallor.   Ruptured blister noted on the medial aspect of the patient's left ankle.  Cellulitis extends up the medial and posterior aspects of the patient's left leg.   Psychiatric: He has a normal mood and affect. His behavior is normal. Judgment and thought content normal.         Medical Screening Exam   See Full Note    ED Course   Procedures  Labs Reviewed - No data to display       Imaging Results    None          Medications - No data to display  Medical Decision Making:   ED Management:  Patient has a diabetic foot ulcer with some surrounding cellulitis.  I do not believe that he needs to be admitted for IV antibiotics at this time.  I have reviewed his chart and noted that the last antibiotic he was on was Bactrim.  I decided to place him on Augmentin for 7 days.  I have instructed him to follow-up with his primary care provider if no improvement in the next 2-3 days and otherwise to follow-up with wound care at his regular appointment on Thursday.  This plan was discussed with the patient.  All  questions were answered and all issues were addressed.  The patient verbalized understanding of and agreement with the plan.  Patient discharged home in stable medical condition.                   Clinical Impression:   Final diagnoses:  [E11.621, L97.421] Diabetic ulcer of left heel associated with type 2 diabetes mellitus, limited to breakdown of skin (Primary)          ED Disposition Condition    Discharge Good        ED Prescriptions     Medication Sig Dispense Start Date End Date Auth. Provider    amoxicillin-clavulanate 875-125mg (AUGMENTIN) 875-125 mg per tablet  (Status: Discontinued) Take 1 tablet by mouth 2 (two) times daily. 14 tablet 3/6/2022 3/6/2022 Evaristo Fleming,     amoxicillin-clavulanate 875-125mg (AUGMENTIN) 875-125 mg per tablet Take 1 tablet by mouth 2 (two) times daily. 14 tablet 3/6/2022  Evaristo Fleming DO        Follow-up Information     Follow up With Specialties Details Why Contact Info    Olga Monterroso NP Family Medicine In 2 days If symptoms worsen [address]  [facility] [place] [id number] [phone]             Evaristo Fleming DO  03/06/22 5443

## 2022-03-07 ENCOUNTER — TELEPHONE (OUTPATIENT)
Dept: EMERGENCY MEDICINE | Facility: HOSPITAL | Age: 72
End: 2022-03-07
Payer: MEDICARE

## 2022-03-09 ENCOUNTER — OFFICE VISIT (OUTPATIENT)
Dept: FAMILY MEDICINE | Facility: CLINIC | Age: 72
End: 2022-03-09
Payer: MEDICARE

## 2022-03-09 VITALS
HEART RATE: 93 BPM | RESPIRATION RATE: 20 BRPM | HEIGHT: 75 IN | DIASTOLIC BLOOD PRESSURE: 80 MMHG | SYSTOLIC BLOOD PRESSURE: 132 MMHG | BODY MASS INDEX: 36.57 KG/M2 | TEMPERATURE: 98 F | OXYGEN SATURATION: 96 % | WEIGHT: 294.13 LBS

## 2022-03-09 DIAGNOSIS — E11.42 DIABETIC POLYNEUROPATHY ASSOCIATED WITH TYPE 2 DIABETES MELLITUS: Primary | ICD-10-CM

## 2022-03-09 DIAGNOSIS — L03.116 CELLULITIS OF LEFT LOWER LEG: ICD-10-CM

## 2022-03-09 LAB — GLUCOSE SERPL-MCNC: 209 MG/DL (ref 70–110)

## 2022-03-09 PROCEDURE — 1159F MED LIST DOCD IN RCRD: CPT | Mod: ,,, | Performed by: NURSE PRACTITIONER

## 2022-03-09 PROCEDURE — 3079F DIAST BP 80-89 MM HG: CPT | Mod: ,,, | Performed by: NURSE PRACTITIONER

## 2022-03-09 PROCEDURE — 3075F SYST BP GE 130 - 139MM HG: CPT | Mod: ,,, | Performed by: NURSE PRACTITIONER

## 2022-03-09 PROCEDURE — 3079F PR MOST RECENT DIASTOLIC BLOOD PRESSURE 80-89 MM HG: ICD-10-PCS | Mod: ,,, | Performed by: NURSE PRACTITIONER

## 2022-03-09 PROCEDURE — 3288F PR FALLS RISK ASSESSMENT DOCUMENTED: ICD-10-PCS | Mod: ,,, | Performed by: NURSE PRACTITIONER

## 2022-03-09 PROCEDURE — 3060F PR POS MICROALBUMINURIA RESULT DOCUMENTED/REVIEW: ICD-10-PCS | Mod: ,,, | Performed by: NURSE PRACTITIONER

## 2022-03-09 PROCEDURE — 1101F PR PT FALLS ASSESS DOC 0-1 FALLS W/OUT INJ PAST YR: ICD-10-PCS | Mod: ,,, | Performed by: NURSE PRACTITIONER

## 2022-03-09 PROCEDURE — 3051F HG A1C>EQUAL 7.0%<8.0%: CPT | Mod: ,,, | Performed by: NURSE PRACTITIONER

## 2022-03-09 PROCEDURE — 1126F AMNT PAIN NOTED NONE PRSNT: CPT | Mod: ,,, | Performed by: NURSE PRACTITIONER

## 2022-03-09 PROCEDURE — 3288F FALL RISK ASSESSMENT DOCD: CPT | Mod: ,,, | Performed by: NURSE PRACTITIONER

## 2022-03-09 PROCEDURE — 3060F POS MICROALBUMINURIA REV: CPT | Mod: ,,, | Performed by: NURSE PRACTITIONER

## 2022-03-09 PROCEDURE — 99214 PR OFFICE/OUTPT VISIT, EST, LEVL IV, 30-39 MIN: ICD-10-PCS | Mod: 25,,, | Performed by: NURSE PRACTITIONER

## 2022-03-09 PROCEDURE — 3066F PR DOCUMENTATION OF TREATMENT FOR NEPHROPATHY: ICD-10-PCS | Mod: ,,, | Performed by: NURSE PRACTITIONER

## 2022-03-09 PROCEDURE — 82962 GLUCOSE BLOOD TEST: CPT | Mod: QW,,, | Performed by: NURSE PRACTITIONER

## 2022-03-09 PROCEDURE — 3008F PR BODY MASS INDEX (BMI) DOCUMENTED: ICD-10-PCS | Mod: ,,, | Performed by: NURSE PRACTITIONER

## 2022-03-09 PROCEDURE — 1159F PR MEDICATION LIST DOCUMENTED IN MEDICAL RECORD: ICD-10-PCS | Mod: ,,, | Performed by: NURSE PRACTITIONER

## 2022-03-09 PROCEDURE — 4010F PR ACE/ARB THEARPY RXD/TAKEN: ICD-10-PCS | Mod: ,,, | Performed by: NURSE PRACTITIONER

## 2022-03-09 PROCEDURE — 96372 THER/PROPH/DIAG INJ SC/IM: CPT | Mod: ,,, | Performed by: NURSE PRACTITIONER

## 2022-03-09 PROCEDURE — 3051F PR MOST RECENT HEMOGLOBIN A1C LEVEL 7.0 - < 8.0%: ICD-10-PCS | Mod: ,,, | Performed by: NURSE PRACTITIONER

## 2022-03-09 PROCEDURE — 99214 OFFICE O/P EST MOD 30 MIN: CPT | Mod: 25,,, | Performed by: NURSE PRACTITIONER

## 2022-03-09 PROCEDURE — 3008F BODY MASS INDEX DOCD: CPT | Mod: ,,, | Performed by: NURSE PRACTITIONER

## 2022-03-09 PROCEDURE — 1126F PR PAIN SEVERITY QUANTIFIED, NO PAIN PRESENT: ICD-10-PCS | Mod: ,,, | Performed by: NURSE PRACTITIONER

## 2022-03-09 PROCEDURE — 96372 PR INJECTION,THERAP/PROPH/DIAG2ST, IM OR SUBCUT: ICD-10-PCS | Mod: ,,, | Performed by: NURSE PRACTITIONER

## 2022-03-09 PROCEDURE — 3066F NEPHROPATHY DOC TX: CPT | Mod: ,,, | Performed by: NURSE PRACTITIONER

## 2022-03-09 PROCEDURE — 1101F PT FALLS ASSESS-DOCD LE1/YR: CPT | Mod: ,,, | Performed by: NURSE PRACTITIONER

## 2022-03-09 PROCEDURE — 82962 POCT GLUCOSE, HAND-HELD DEVICE: ICD-10-PCS | Mod: QW,,, | Performed by: NURSE PRACTITIONER

## 2022-03-09 PROCEDURE — 3075F PR MOST RECENT SYSTOLIC BLOOD PRESS GE 130-139MM HG: ICD-10-PCS | Mod: ,,, | Performed by: NURSE PRACTITIONER

## 2022-03-09 PROCEDURE — 4010F ACE/ARB THERAPY RXD/TAKEN: CPT | Mod: ,,, | Performed by: NURSE PRACTITIONER

## 2022-03-09 RX ORDER — BACITRACIN 500 [USP'U]/G
OINTMENT TOPICAL 2 TIMES DAILY
Qty: 30 G | Refills: 0 | Status: SHIPPED | OUTPATIENT
Start: 2022-03-09 | End: 2022-09-01

## 2022-03-09 RX ORDER — CLINDAMYCIN PHOSPHATE 150 MG/ML
600 INJECTION, SOLUTION INTRAVENOUS
Status: COMPLETED | OUTPATIENT
Start: 2022-03-09 | End: 2022-03-09

## 2022-03-09 RX ORDER — LEVOFLOXACIN 500 MG/1
500 TABLET, FILM COATED ORAL DAILY
Qty: 10 TABLET | Refills: 0 | Status: SHIPPED | OUTPATIENT
Start: 2022-03-09 | End: 2022-06-09

## 2022-03-09 RX ORDER — DEXTROSE 4 G
TABLET,CHEWABLE ORAL
COMMUNITY
Start: 2022-02-22

## 2022-03-09 RX ADMIN — CLINDAMYCIN PHOSPHATE 600 MG: 150 INJECTION, SOLUTION INTRAVENOUS at 02:03

## 2022-03-09 NOTE — PATIENT INSTRUCTIONS
keep matt with judith choi tomorrow at wound clinic, stop augmenting and start levaquin, also given clindamycin 600mg im today, reutrn to clinic as scheduled, do no t remove dressing until sees Mel tomorrow

## 2022-03-09 NOTE — PROGRESS NOTES
Olga Monterroso NP   [facility]  [address]  [place]     PATIENT NAME: René Moscoso  : 1950  DATE: 3/9/22  MRN: [id number]      Billing Provider: Olga Monterroso NP  Level of Service:   Patient PCP Information     Provider PCP Type    Olga Monterroso NP General          Reason for Visit / Chief Complaint: Follow-up, Diabetes (Pt here f/u diabetes.  States fasting blood glucose was 143 this morning), and Foot Pain (Pt states he got burned on the  left ankle and foot  a couple of weeks ago.  Has been taking augmentin 875-125 mg  x 4 days)       Update PCP  Update Chief Complaint         History of Present Illness / Problem Focused Workflow     René Moscoso presents to the clinic   Pt here for eval of dm, staed fbs was 143 this am, goot pain, thinks he got burned on left ankl and foot a couple of weeks ag. Has been taking augmentin. Left lower leg is erythematous from foot to just below knee    Review of Systems     Review of Systems   Constitutional: Negative for chills, fatigue and fever.   HENT: Negative for nasal congestion, ear pain, facial swelling, hearing loss, mouth dryness, mouth sores, postnasal drip, rhinorrhea, sinus pressure/congestion and goiter.    Eyes: Negative for discharge and itching.   Respiratory: Negative for cough, shortness of breath and wheezing.    Cardiovascular: Negative for chest pain and leg swelling.   Gastrointestinal: Negative for abdominal pain, change in bowel habit and change in bowel habit.   Genitourinary: Negative for difficulty urinating, dysuria, enuresis, frequency, hematuria and urgency.   Integumentary:         Mod amt erytyhema left lower leg from just below knee to foot, large blister type lesion on medial aspect of ankle, open and sm amt bright red blood noted, area matt 5 cm   Neurological: Negative for dizziness, vertigo, syncope, weakness and headaches.   Psychiatric/Behavioral: Negative for decreased concentration.        Medical / Social /  Family History     Past Medical History:   Diagnosis Date    Arthritis     Chronic pain 02/25/2021    COPD (chronic obstructive pulmonary disease)     Diabetes type 2, controlled     Hyperlipidemia     Hypertension     Neuropathy     Pressure ulcer of left foot 09/03/2020       Past Surgical History:   Procedure Laterality Date    CARDIAC CATHETERIZATION      ESOPHAGOGASTRODUODENOSCOPY  10/22/2014    FLEXIBLE SIGMOIDOSCOPY  10/23/2014    INJECTION OF FACET JOINT Bilateral 07/01/2014    Bilateral L3-S1 Facet Injection - 7/1/14 and 3/18/14    SINUS SURGERY         Social History    reports that he quit smoking about 11 years ago. His smoking use included cigarettes. He started smoking about 31 years ago. He has a 40.00 pack-year smoking history. His smokeless tobacco use includes snuff. He reports previous alcohol use. He reports current drug use. Drug: Hydrocodone.    Family History  's family history includes Arthritis in his mother; COPD in his brother; Cancer in his father, mother, and sister; Diabetes in his mother; Heart disease in his mother; Hypertension in his mother.    Medications and Allergies     Medications  Outpatient Medications Marked as Taking for the 3/9/22 encounter (Office Visit) with Olga Monterroso NP   Medication Sig Dispense Refill    albuterol (ACCUNEB) 0.63 mg/3 mL Nebu Take 0.63 mg by nebulization every 6 (six) hours as needed. Rescue      aspirin 81 MG Chew Take 81 mg by mouth once daily.      blood-glucose meter Misc USE TO CHECK BLOOD SUGAR      fluticasone propionate (FLONASE) 50 mcg/actuation nasal spray instill ONE SPRAY in each nostril daily 16 g 3    gabapentin (NEURONTIN) 600 MG tablet TAKE ONE TABLET BY MOUTH TWICE DAILY 180 tablet 1    gemfibroziL (LOPID) 600 MG tablet TAKE ONE TABLET BY MOUTH TWICE DAILY 180 tablet 1    hydroCHLOROthiazide (MICROZIDE) 12.5 mg capsule TAKE ONE CAPSULE BY MOUTH EVERY DAY 90 capsule 1    HYDROcodone-acetaminophen (NORCO)   mg per tablet Take 1 tablet by mouth every 8 (eight) hours. 90 tablet 0    [START ON 4/5/2022] HYDROcodone-acetaminophen (NORCO)  mg per tablet Take 1 tablet by mouth every 8 (eight) hours. 90 tablet 0    ipratropium (ATROVENT) 42 mcg (0.06 %) nasal spray 2 sprays by Nasal route 3 (three) times daily. 15 mL 2    ipratropium/albuterol sulfate (COMBIVENT INHL) Inhale into the lungs.      isosorbide mononitrate (IMDUR) 30 MG 24 hr tablet TAKE ONE TABLET BY MOUTH ONCE DAILY 90 tablet 1    LEVEMIR FLEXTOUCH U-100 INSULN 100 unit/mL (3 mL) InPn pen INJECT 92 units SUBCUTANEOUSLY AT BEDTIME 30 mL 5    linaGLIPtin (TRADJENTA) 5 mg Tab tablet Take 5 mg by mouth once daily.      lisinopriL (PRINIVIL,ZESTRIL) 5 MG tablet TAKE ONE TABLET BY MOUTH DAILY 90 tablet 1    loratadine (CLARITIN) 10 mg tablet Take 10 mg by mouth once daily.      memantine (NAMENDA) 10 MG Tab TAKE ONE TABLET BY MOUTH TWICE DAILY 180 tablet 1    NOVOLOG MIX 70-30FLEXPEN U-100 100 unit/mL (70-30) InPn pen INJECT 90 UNITS SUBCUTANEOUSLY IN THE MORNING AND 88 UNITS SUBCUTANEOUSLY IN THE EVENING (Patient taking differently: Inject 94 Units into the skin every morning. Take 94 units every morning and 90 units every evening) 30 mL 11    omeprazole (PRILOSEC) 20 MG capsule TAKE ONE CAPSULE BY MOUTH EVERY DAY 28 capsule 1    ondansetron (ZOFRAN) 4 MG tablet Take 1 tablet (4 mg total) by mouth every 6 (six) hours as needed for Nausea. 30 tablet 0    semaglutide (RYBELSUS) 14 mg tablet Take 1 tablet (14 mg total) by mouth once daily. 90 tablet 1    simvastatin (ZOCOR) 40 MG tablet TAKE ONE TABLET BY MOUTH AT BEDTIME 90 tablet 1    SYNJARDY XR 12.5-1,000 mg TBph TAKE TWO TABLETS BY MOUTH EVERY MORNING 180 tablet 1    tiZANidine (ZANAFLEX) 4 MG tablet TAKE 1 OR 2 TABLETS BY MOUTH AT BEDTIME AS NEEDED 56 tablet 1    TRUE METRIX GLUCOSE TEST STRIP Strp USE TO USE TO CHECK BLOOD SUGAR BLOOD GLUCOSE TWICE DAILY 50 each 11    VENTOLIN A  "90 mcg/actuation inhaler INHALE TWO PUFFS BY MOUTH TWICE DAILY AS NEEDED 18 g 5    [DISCONTINUED] amoxicillin-clavulanate 875-125mg (AUGMENTIN) 875-125 mg per tablet Take 1 tablet by mouth 2 (two) times daily. 14 tablet 0     Current Facility-Administered Medications for the 3/9/22 encounter (Office Visit) with Olga Monterroso NP   Medication Dose Route Frequency Provider Last Rate Last Admin    clindamycin injection 600 mg  600 mg Intramuscular 1 time in Clinic/HOD Olga Monterroso NP           Allergies  Review of patient's allergies indicates:  No Known Allergies    Physical Examination     Vitals:    03/09/22 1251   BP: 132/80   BP Location: Left arm   Patient Position: Sitting   BP Method: Medium (Manual)   Pulse: 93   Resp: 20   Temp: 98.3 °F (36.8 °C)   TempSrc: Oral   SpO2: 96%   Weight: 133.4 kg (294 lb 2 oz)   Height: 6' 3" (1.905 m)      Physical Exam  Vitals reviewed.   Constitutional:       Appearance: Normal appearance.   HENT:      Head: Normocephalic.      Right Ear: Tympanic membrane, ear canal and external ear normal.      Left Ear: Tympanic membrane, ear canal and external ear normal.      Nose: Nose normal.      Mouth/Throat:      Mouth: Mucous membranes are moist.      Pharynx: Oropharynx is clear.   Eyes:      Extraocular Movements: Extraocular movements intact.      Conjunctiva/sclera: Conjunctivae normal.      Pupils: Pupils are equal, round, and reactive to light.   Cardiovascular:      Rate and Rhythm: Normal rate and regular rhythm.      Pulses: Normal pulses.      Heart sounds: Normal heart sounds.   Pulmonary:      Effort: Pulmonary effort is normal.      Breath sounds: Normal breath sounds.   Abdominal:      General: Bowel sounds are normal.      Palpations: Abdomen is soft.   Musculoskeletal:         General: Normal range of motion.   Skin:     General: Skin is warm and dry.      Capillary Refill: Capillary refill takes less than 2 seconds.      Comments: Mod amt erytyhema left lower " leg from just below knee to foot, large blister type lesion on medial aspect of ankle, open and sm amt bright red blood noted, area matt 5 cm    Area cleaned with betadine , vaseline dressing and bulky dressing applied   Neurological:      General: No focal deficit present.      Mental Status: He is alert and oriented to person, place, and time.   Psychiatric:         Mood and Affect: Mood normal.         Behavior: Behavior normal.          Assessment and Plan (including Health Maintenance)      Problem List  Smart Sets  Document Outside HM   :    Plan: keep matt with judith choi tomorrow at wound clinic, stop augmenting and start levaquin, also given clindamycin 600mg im today, reutrn to clinic as scheduled, do no t remove dressing until sees Mel tomorrow    Diabetic polyneuropathy associated with type 2 diabetes mellitus  -     POCT Glucose, Hand-Held Device    Cellulitis of left lower leg  -     levoFLOXacin (LEVAQUIN) 500 MG tablet; Take 1 tablet (500 mg total) by mouth once daily.  Dispense: 10 tablet; Refill: 0  -     clindamycin injection 600 mg    Other orders  -     bacitracin 500 unit/gram ointment; Apply topically 2 (two) times daily.  Dispense: 30 g; Refill: 0            Health Maintenance Due   Topic Date Due    Hepatitis C Screening  Never done    Eye Exam  Never done    Shingles Vaccine (1 of 2) Never done    Colorectal Cancer Screening  10/03/2017    Abdominal Aortic Aneurysm Screening  Never done    Pneumococcal Vaccines (Age 65+) (2 of 2 - PPSV23) 10/20/2020    COVID-19 Vaccine (3 - Booster) 07/10/2021       Problem List Items Addressed This Visit        Neuro    Diabetic polyneuropathy associated with type 2 diabetes mellitus - Primary (Chronic)    Relevant Orders    POCT Glucose, Hand-Held Device (Completed)       ID    Cellulitis of left lower leg    Relevant Medications    levoFLOXacin (LEVAQUIN) 500 MG tablet    clindamycin injection 600 mg (Start on 3/9/2022  2:15 PM)             Health Maintenance Topics with due status: Not Due       Topic Last Completion Date    TETANUS VACCINE 05/27/2021    Foot Exam 08/12/2021    Diabetes Urine Screening 01/20/2022    Lipid Panel 01/20/2022    Hemoglobin A1c 01/20/2022       Procedures     Future Appointments   Date Time Provider Department Center   3/10/2022  8:30 AM [facility]    4/20/2022  9:00 AM Ricardo Robles, PA RASCC PNTRE [facility]   8/1/2022  9:30 AM AWV NURSE, [facility] FAMILY MEDICINE [facility] [place]        Follow up in about 1 week (around 3/16/2022) for f\u.       Signature:  Olga Monterroso NP    Date of encounter: 3/9/22

## 2022-03-10 ENCOUNTER — CLINICAL SUPPORT (OUTPATIENT)
Dept: WOUND CARE | Facility: HOSPITAL | Age: 72
End: 2022-03-10
Attending: NURSE PRACTITIONER
Payer: MEDICARE

## 2022-03-10 DIAGNOSIS — L97.529 TYPE 2 DIABETES MELLITUS WITH LEFT DIABETIC FOOT ULCER: ICD-10-CM

## 2022-03-10 DIAGNOSIS — E11.621 TYPE 2 DIABETES MELLITUS WITH LEFT DIABETIC FOOT ULCER: ICD-10-CM

## 2022-03-10 DIAGNOSIS — T25.312A FULL THICKNESS BURN OF LEFT ANKLE, INITIAL ENCOUNTER: Primary | ICD-10-CM

## 2022-03-10 PROCEDURE — 99213 OFFICE O/P EST LOW 20 MIN: CPT

## 2022-03-10 PROCEDURE — 99213 OFFICE O/P EST LOW 20 MIN: CPT | Performed by: NURSE PRACTITIONER

## 2022-03-10 PROCEDURE — 3051F HG A1C>EQUAL 7.0%<8.0%: CPT | Mod: CPTII | Performed by: NURSE PRACTITIONER

## 2022-03-10 NOTE — PROGRESS NOTES
WOUND CARE, [facility]  [address]  [place] [address]     PATIENT NAME: René Moscoso  : 1950  DATE: 3/10/22  MRN: [id number]      Billing Provider: WOUND CARE, [facility]  Level of Service:   Patient PCP Information     Provider PCP Type    Olga Monterroso NP General          Reason for Visit / Chief Complaint: No chief complaint on file.       Update PCP  Update Chief Complaint         History of Present Illness / Problem Focused Workflow     René Moscoso presents to the clinic for follow up on wound    Location: Wound  blister left medial ankle  Severity: Insensate  Duration: 5th metatarsal head > 1 yr ( healed); left ankle 3 weeks  Context: uncertain  Modifying Factors: Hx of DM not well controlled and HTN  Associated Signs and Symptom: Small amt of serous drainage.        Review of Systems     Review of Systems   Constitutional: Negative.  Negative for chills and fever.   Respiratory: Negative for shortness of breath.    Cardiovascular: Negative for chest pain.   Gastrointestinal: Negative for nausea and vomiting.   Integumentary:  Positive for wound (left foot).   Neurological: Negative for weakness and headaches.        Medical / Social / Family History     Past Medical History:   Diagnosis Date    Arthritis     Chronic pain 2021    COPD (chronic obstructive pulmonary disease)     Diabetes type 2, controlled     Hyperlipidemia     Hypertension     Neuropathy     Pressure ulcer of left foot 2020       Past Surgical History:   Procedure Laterality Date    CARDIAC CATHETERIZATION      ESOPHAGOGASTRODUODENOSCOPY  10/22/2014    FLEXIBLE SIGMOIDOSCOPY  10/23/2014    INJECTION OF FACET JOINT Bilateral 2014    Bilateral L3-S1 Facet Injection - 14 and 3/18/14    SINUS SURGERY         Social History    reports that he quit smoking about 11 years ago. His smoking use included cigarettes. He started smoking about 31 years ago. He has a 40.00  pack-year smoking history. His smokeless tobacco use includes snuff. He reports previous alcohol use. He reports current drug use. Drug: Hydrocodone.    Family History  's family history includes Arthritis in his mother; COPD in his brother; Cancer in his father, mother, and sister; Diabetes in his mother; Heart disease in his mother; Hypertension in his mother.    Medications and Allergies     Medications  No outpatient medications have been marked as taking for the 3/10/22 encounter (Clinical Support) with WOUND CARE, [facility].     Current Facility-Administered Medications for the 3/10/22 encounter (Clinical Support) with WOUND CARE, [facility]   Medication Dose Route Frequency Provider Last Rate Last Admin    [COMPLETED] clindamycin injection 600 mg  600 mg Intramuscular 1 time in Clinic/HOD Olga Monterroso NP   600 mg at 03/09/22 1417       Allergies  Review of patient's allergies indicates:  No Known Allergies    Physical Examination   There were no vitals filed for this visit.   Physical Exam  Constitutional:       General: He is not in acute distress.     Appearance: Normal appearance.   Cardiovascular:      Rate and Rhythm: Normal rate and regular rhythm.      Pulses:           Dorsalis pedis pulses are 3+ on the left side.        Posterior tibial pulses are 3+ on the left side.   Pulmonary:      Effort: Pulmonary effort is normal.      Breath sounds: Normal breath sounds.   Musculoskeletal:      Right lower leg: No edema.      Left lower leg: No edema.        Feet:    Feet:      Left foot:      Skin integrity: Callus present. No erythema or warmth.      Comments: See wound docs note for assessment, pictures and measurements.   # 1 wound healed  Skin:     General: Skin is warm and dry.      Capillary Refill: Capillary refill takes 2 to 3 seconds.      Coloration: Skin is not pale.      Findings: Wound present.   Neurological:      Sensory: Sensory deficit present.          Assessment and Plan (including  Health Maintenance)      Problem List  Smart Sets  Document Outside HM   :    Plan: See wound docs for treatment and plan. HH to continue to follow.          Health Maintenance Due   Topic Date Due    Hepatitis C Screening  Never done    Eye Exam  Never done    Shingles Vaccine (1 of 2) Never done    Colorectal Cancer Screening  10/03/2017    Abdominal Aortic Aneurysm Screening  Never done    Pneumococcal Vaccines (Age 65+) (2 of 2 - PPSV23) 10/20/2020    COVID-19 Vaccine (3 - Booster) 07/10/2021       Problem List Items Addressed This Visit    None       There are no diagnoses linked to this encounter.   Health Maintenance Topics with due status: Not Due       Topic Last Completion Date    TETANUS VACCINE 05/27/2021    Foot Exam 08/12/2021    Diabetes Urine Screening 01/20/2022    Lipid Panel 01/20/2022    Hemoglobin A1c 01/20/2022           Future Appointments   Date Time Provider Department Center   3/15/2022 11:30 AM Olga Monterroso NP [facility]   4/20/2022  9:00 AM [facility]   8/1/2022  9:30 AM AWV NURSEJAMIE [facility]        Follow up in about 1 week (around 3/17/2022).     Signature:  HALIE Collado    Date of encounter: 3/10/22

## 2022-03-15 ENCOUNTER — OFFICE VISIT (OUTPATIENT)
Dept: FAMILY MEDICINE | Facility: CLINIC | Age: 72
End: 2022-03-15
Payer: MEDICARE

## 2022-03-15 VITALS
RESPIRATION RATE: 18 BRPM | TEMPERATURE: 98 F | HEART RATE: 82 BPM | SYSTOLIC BLOOD PRESSURE: 132 MMHG | WEIGHT: 300 LBS | DIASTOLIC BLOOD PRESSURE: 82 MMHG | BODY MASS INDEX: 37.3 KG/M2 | HEIGHT: 75 IN | OXYGEN SATURATION: 95 %

## 2022-03-15 DIAGNOSIS — L03.116 CELLULITIS OF LEFT LOWER LEG: Primary | ICD-10-CM

## 2022-03-15 DIAGNOSIS — T25.222S PARTIAL THICKNESS BURN OF LEFT FOOT, SEQUELA: ICD-10-CM

## 2022-03-15 DIAGNOSIS — L03.116 CELLULITIS OF LEFT FOOT: Chronic | ICD-10-CM

## 2022-03-15 PROCEDURE — 3051F PR MOST RECENT HEMOGLOBIN A1C LEVEL 7.0 - < 8.0%: ICD-10-PCS | Mod: ,,, | Performed by: NURSE PRACTITIONER

## 2022-03-15 PROCEDURE — 3288F PR FALLS RISK ASSESSMENT DOCUMENTED: ICD-10-PCS | Mod: ,,, | Performed by: NURSE PRACTITIONER

## 2022-03-15 PROCEDURE — 1126F AMNT PAIN NOTED NONE PRSNT: CPT | Mod: ,,, | Performed by: NURSE PRACTITIONER

## 2022-03-15 PROCEDURE — 3079F PR MOST RECENT DIASTOLIC BLOOD PRESSURE 80-89 MM HG: ICD-10-PCS | Mod: ,,, | Performed by: NURSE PRACTITIONER

## 2022-03-15 PROCEDURE — 3079F DIAST BP 80-89 MM HG: CPT | Mod: ,,, | Performed by: NURSE PRACTITIONER

## 2022-03-15 PROCEDURE — 3288F FALL RISK ASSESSMENT DOCD: CPT | Mod: ,,, | Performed by: NURSE PRACTITIONER

## 2022-03-15 PROCEDURE — 4010F PR ACE/ARB THEARPY RXD/TAKEN: ICD-10-PCS | Mod: ,,, | Performed by: NURSE PRACTITIONER

## 2022-03-15 PROCEDURE — 1159F MED LIST DOCD IN RCRD: CPT | Mod: ,,, | Performed by: NURSE PRACTITIONER

## 2022-03-15 PROCEDURE — 4010F ACE/ARB THERAPY RXD/TAKEN: CPT | Mod: ,,, | Performed by: NURSE PRACTITIONER

## 2022-03-15 PROCEDURE — 1101F PT FALLS ASSESS-DOCD LE1/YR: CPT | Mod: ,,, | Performed by: NURSE PRACTITIONER

## 2022-03-15 PROCEDURE — 1159F PR MEDICATION LIST DOCUMENTED IN MEDICAL RECORD: ICD-10-PCS | Mod: ,,, | Performed by: NURSE PRACTITIONER

## 2022-03-15 PROCEDURE — 3075F SYST BP GE 130 - 139MM HG: CPT | Mod: ,,, | Performed by: NURSE PRACTITIONER

## 2022-03-15 PROCEDURE — 3060F POS MICROALBUMINURIA REV: CPT | Mod: ,,, | Performed by: NURSE PRACTITIONER

## 2022-03-15 PROCEDURE — 1126F PR PAIN SEVERITY QUANTIFIED, NO PAIN PRESENT: ICD-10-PCS | Mod: ,,, | Performed by: NURSE PRACTITIONER

## 2022-03-15 PROCEDURE — 99214 PR OFFICE/OUTPT VISIT, EST, LEVL IV, 30-39 MIN: ICD-10-PCS | Mod: ,,, | Performed by: NURSE PRACTITIONER

## 2022-03-15 PROCEDURE — 3051F HG A1C>EQUAL 7.0%<8.0%: CPT | Mod: ,,, | Performed by: NURSE PRACTITIONER

## 2022-03-15 PROCEDURE — 3008F PR BODY MASS INDEX (BMI) DOCUMENTED: ICD-10-PCS | Mod: ,,, | Performed by: NURSE PRACTITIONER

## 2022-03-15 PROCEDURE — 3066F NEPHROPATHY DOC TX: CPT | Mod: ,,, | Performed by: NURSE PRACTITIONER

## 2022-03-15 PROCEDURE — 99214 OFFICE O/P EST MOD 30 MIN: CPT | Mod: ,,, | Performed by: NURSE PRACTITIONER

## 2022-03-15 PROCEDURE — 3008F BODY MASS INDEX DOCD: CPT | Mod: ,,, | Performed by: NURSE PRACTITIONER

## 2022-03-15 PROCEDURE — 3060F PR POS MICROALBUMINURIA RESULT DOCUMENTED/REVIEW: ICD-10-PCS | Mod: ,,, | Performed by: NURSE PRACTITIONER

## 2022-03-15 PROCEDURE — 1101F PR PT FALLS ASSESS DOC 0-1 FALLS W/OUT INJ PAST YR: ICD-10-PCS | Mod: ,,, | Performed by: NURSE PRACTITIONER

## 2022-03-15 PROCEDURE — 3066F PR DOCUMENTATION OF TREATMENT FOR NEPHROPATHY: ICD-10-PCS | Mod: ,,, | Performed by: NURSE PRACTITIONER

## 2022-03-15 PROCEDURE — 3075F PR MOST RECENT SYSTOLIC BLOOD PRESS GE 130-139MM HG: ICD-10-PCS | Mod: ,,, | Performed by: NURSE PRACTITIONER

## 2022-03-16 PROBLEM — T25.222A PARTIAL THICKNESS BURN OF LEFT FOOT: Status: ACTIVE | Noted: 2022-03-16

## 2022-03-16 NOTE — PROGRESS NOTES
Olga Monterroso NP   [facility]  [address]  [facility]     PATIENT NAME: René Moscoso  : 1950  DATE: 3/15/22  MRN: [id number]      Billing Provider: Olga Monterroso NP  Level of Service:   Patient PCP Information     Provider PCP Type    Olga Monterroso NP General          Reason for Visit / Chief Complaint: Follow-up and Wound Check (FOLLOW UP ON WOUND)       Update PCP  Update Chief Complaint         History of Present Illness / Problem Focused Workflow     René Moscoso presents to the clinic here for eval of wound on left ankle medial aspect, with cellulitis      Review of Systems     Review of Systems   Constitutional: Negative for chills, fatigue and fever.   HENT: Negative for nasal congestion, ear pain, facial swelling, hearing loss, mouth dryness, mouth sores, postnasal drip, rhinorrhea, sinus pressure/congestion and goiter.    Eyes: Negative for discharge and itching.   Respiratory: Negative for cough, shortness of breath and wheezing.    Cardiovascular: Negative for chest pain and leg swelling.   Gastrointestinal: Negative for abdominal pain, change in bowel habit and change in bowel habit.   Genitourinary: Negative for difficulty urinating, dysuria, enuresis, frequency, hematuria and urgency.   Integumentary:         Blister/cellulitis left lower leg, blister medial aspect left ankle   Neurological: Negative for dizziness, vertigo, syncope, weakness and headaches.   Psychiatric/Behavioral: Negative for decreased concentration.        Medical / Social / Family History     Past Medical History:   Diagnosis Date    Arthritis     Chronic pain 2021    COPD (chronic obstructive pulmonary disease)     Diabetes type 2, controlled     Hyperlipidemia     Hypertension     Neuropathy     Pressure ulcer of left foot 2020       Past Surgical History:   Procedure Laterality Date    CARDIAC CATHETERIZATION      ESOPHAGOGASTRODUODENOSCOPY  10/22/2014    FLEXIBLE SIGMOIDOSCOPY   10/23/2014    INJECTION OF FACET JOINT Bilateral 07/01/2014    Bilateral L3-S1 Facet Injection - 7/1/14 and 3/18/14    SINUS SURGERY         Social History    reports that he quit smoking about 11 years ago. His smoking use included cigarettes. He started smoking about 31 years ago. He has a 40.00 pack-year smoking history. His smokeless tobacco use includes snuff. He reports previous alcohol use. He reports current drug use. Drug: Hydrocodone.    Family History  's family history includes Arthritis in his mother; COPD in his brother; Cancer in his father, mother, and sister; Diabetes in his mother; Heart disease in his mother; Hypertension in his mother.    Medications and Allergies     Medications  Outpatient Medications Marked as Taking for the 3/15/22 encounter (Office Visit) with Olga Monterroso NP   Medication Sig Dispense Refill    albuterol (ACCUNEB) 0.63 mg/3 mL Nebu Take 0.63 mg by nebulization every 6 (six) hours as needed. Rescue      aspirin 81 MG Chew Take 81 mg by mouth once daily.      bacitracin 500 unit/gram ointment Apply topically 2 (two) times daily. 30 g 0    blood-glucose meter Misc USE TO CHECK BLOOD SUGAR      fluticasone propionate (FLONASE) 50 mcg/actuation nasal spray instill ONE SPRAY in each nostril daily 16 g 3    gabapentin (NEURONTIN) 600 MG tablet TAKE ONE TABLET BY MOUTH TWICE DAILY 180 tablet 1    gemfibroziL (LOPID) 600 MG tablet TAKE ONE TABLET BY MOUTH TWICE DAILY 180 tablet 1    hydroCHLOROthiazide (MICROZIDE) 12.5 mg capsule TAKE ONE CAPSULE BY MOUTH EVERY DAY 90 capsule 1    HYDROcodone-acetaminophen (NORCO)  mg per tablet Take 1 tablet by mouth every 8 (eight) hours. 90 tablet 0    ipratropium (ATROVENT) 42 mcg (0.06 %) nasal spray 2 sprays by Nasal route 3 (three) times daily. 15 mL 2    ipratropium/albuterol sulfate (COMBIVENT INHL) Inhale into the lungs.      isosorbide mononitrate (IMDUR) 30 MG 24 hr tablet TAKE ONE TABLET BY MOUTH ONCE DAILY  90 tablet 1    LEVEMIR FLEXTOUCH U-100 INSULN 100 unit/mL (3 mL) InPn pen INJECT 92 units SUBCUTANEOUSLY AT BEDTIME 30 mL 5    levoFLOXacin (LEVAQUIN) 500 MG tablet Take 1 tablet (500 mg total) by mouth once daily. 10 tablet 0    linaGLIPtin (TRADJENTA) 5 mg Tab tablet Take 5 mg by mouth once daily.      lisinopriL (PRINIVIL,ZESTRIL) 5 MG tablet TAKE ONE TABLET BY MOUTH DAILY 90 tablet 1    loratadine (CLARITIN) 10 mg tablet Take 10 mg by mouth once daily.      memantine (NAMENDA) 10 MG Tab TAKE ONE TABLET BY MOUTH TWICE DAILY 180 tablet 1    NOVOLOG MIX 70-30FLEXPEN U-100 100 unit/mL (70-30) InPn pen INJECT 90 UNITS SUBCUTANEOUSLY IN THE MORNING AND 88 UNITS SUBCUTANEOUSLY IN THE EVENING (Patient taking differently: Inject 94 Units into the skin every morning. Take 94 units every morning and 90 units every evening) 30 mL 11    omeprazole (PRILOSEC) 20 MG capsule TAKE ONE CAPSULE BY MOUTH EVERY DAY 28 capsule 1    ondansetron (ZOFRAN) 4 MG tablet Take 1 tablet (4 mg total) by mouth every 6 (six) hours as needed for Nausea. 30 tablet 0    semaglutide (RYBELSUS) 14 mg tablet Take 1 tablet (14 mg total) by mouth once daily. 90 tablet 1    simvastatin (ZOCOR) 40 MG tablet TAKE ONE TABLET BY MOUTH AT BEDTIME 90 tablet 1    SYNJARDY XR 12.5-1,000 mg TBph TAKE TWO TABLETS BY MOUTH EVERY MORNING 180 tablet 1    tiZANidine (ZANAFLEX) 4 MG tablet TAKE 1 OR 2 TABLETS BY MOUTH AT BEDTIME AS NEEDED 56 tablet 1    TRUE METRIX GLUCOSE TEST STRIP Strp USE TO USE TO CHECK BLOOD SUGAR BLOOD GLUCOSE TWICE DAILY 50 each 11    VENTOLIN HFA 90 mcg/actuation inhaler INHALE TWO PUFFS BY MOUTH TWICE DAILY AS NEEDED 18 g 5       Allergies  Review of patient's allergies indicates:  No Known Allergies    Physical Examination     Vitals:    03/15/22 1113   BP: 132/82   BP Location: Left arm   Patient Position: Sitting   BP Method: Medium (Manual)   Pulse: 82   Resp: 18   Temp: 97.9 °F (36.6 °C)   TempSrc: Oral   SpO2: 95%  "  Weight: 136.1 kg (300 lb)   Height: 6' 3" (1.905 m)      Physical Exam  Constitutional:       Appearance: Normal appearance.   HENT:      Head: Normocephalic.      Right Ear: Tympanic membrane, ear canal and external ear normal.      Left Ear: Tympanic membrane, ear canal and external ear normal.      Nose: Nose normal.      Mouth/Throat:      Mouth: Mucous membranes are moist.      Pharynx: Oropharynx is clear.   Eyes:      Extraocular Movements: Extraocular movements intact.      Conjunctiva/sclera: Conjunctivae normal.      Pupils: Pupils are equal, round, and reactive to light.   Cardiovascular:      Rate and Rhythm: Normal rate and regular rhythm.      Pulses: Normal pulses.      Heart sounds: Normal heart sounds.   Pulmonary:      Effort: Pulmonary effort is normal.      Breath sounds: Normal breath sounds.   Abdominal:      General: Bowel sounds are normal.      Palpations: Abdomen is soft.   Musculoskeletal:         General: Normal range of motion.   Skin:     General: Skin is warm and dry.      Capillary Refill: Capillary refill takes less than 2 seconds.      Comments: Burn/blister on medial aspect left ankle improved, less cellulitis noted, no signs of infection   Neurological:      General: No focal deficit present.      Mental Status: He is alert and oriented to person, place, and time.   Psychiatric:         Mood and Affect: Mood normal.         Behavior: Behavior normal.          Assessment and Plan (including Health Maintenance)      Problem List  Smart Sets  Document Outside HM   :    Plan: cont current plan of care for wound, observe for signs of infection, such as increased redness or discharge, return to clininc as scheduled, keep all matt with wound care    There are no diagnoses linked to this encounter.        Health Maintenance Due   Topic Date Due    Hepatitis C Screening  Never done    Eye Exam  Never done    Shingles Vaccine (1 of 2) Never done    Colorectal Cancer Screening  " 10/03/2017    Abdominal Aortic Aneurysm Screening  Never done    Pneumococcal Vaccines (Age 65+) (2 of 2 - PPSV23) 10/20/2020    COVID-19 Vaccine (3 - Booster) 07/10/2021       Problem List Items Addressed This Visit    None           Health Maintenance Topics with due status: Not Due       Topic Last Completion Date    TETANUS VACCINE 05/27/2021    Foot Exam 08/12/2021    Diabetes Urine Screening 01/20/2022    Lipid Panel 01/20/2022    Hemoglobin A1c 01/20/2022       Procedures     Future Appointments   Date Time Provider Department Center   3/17/2022  8:30 AM [facility]   4/5/2022  4:00 PM Olga Monterroso NP [facility] MIKAELA [place]   4/20/2022  9:00 [facility]   8/1/2022  9:30 AM AWV NURSE, [facility] [place]KA[facility]        Follow up in about 3 weeks (around 4/5/2022).       Signature:  Olag Monterroso NP    Date of encounter: 3/15/22

## 2022-03-17 ENCOUNTER — CLINICAL SUPPORT (OUTPATIENT)
Dept: WOUND CARE | Facility: HOSPITAL | Age: 72
End: 2022-03-17
Attending: NURSE PRACTITIONER
Payer: MEDICARE

## 2022-03-17 VITALS
SYSTOLIC BLOOD PRESSURE: 147 MMHG | HEART RATE: 95 BPM | TEMPERATURE: 98 F | RESPIRATION RATE: 19 BRPM | DIASTOLIC BLOOD PRESSURE: 92 MMHG

## 2022-03-17 DIAGNOSIS — T25.312A FULL THICKNESS BURN OF LEFT ANKLE, INITIAL ENCOUNTER: Primary | ICD-10-CM

## 2022-03-17 PROCEDURE — 11042 DBRDMT SUBQ TIS 1ST 20SQCM/<: CPT

## 2022-03-17 PROCEDURE — 11042 DBRDMT SUBQ TIS 1ST 20SQCM/<: CPT | Performed by: NURSE PRACTITIONER
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
## 2022-03-24 ENCOUNTER — CLINICAL SUPPORT (OUTPATIENT)
Dept: WOUND CARE | Facility: HOSPITAL | Age: 72
End: 2022-03-24
Attending: NURSE PRACTITIONER
Payer: MEDICARE

## 2022-03-24 VITALS
DIASTOLIC BLOOD PRESSURE: 86 MMHG | SYSTOLIC BLOOD PRESSURE: 150 MMHG | RESPIRATION RATE: 19 BRPM | HEART RATE: 86 BPM | TEMPERATURE: 97 F

## 2022-03-24 DIAGNOSIS — T25.312D FULL THICKNESS BURN OF LEFT ANKLE, SUBSEQUENT ENCOUNTER: Primary | ICD-10-CM

## 2022-03-24 PROCEDURE — 11042 DBRDMT SUBQ TIS 1ST 20SQCM/<: CPT | Performed by: NURSE PRACTITIONER

## 2022-03-24 PROCEDURE — 16020 DRESS/DEBRID P-THICK BURN S: CPT | Performed by: NURSE PRACTITIONER

## 2022-03-24 PROCEDURE — 97597 DBRDMT OPN WND 1ST 20 CM/<: CPT

## 2022-03-24 PROCEDURE — 16020 DRESS/DEBRID P-THICK BURN S: CPT
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
## 2022-03-31 ENCOUNTER — CLINICAL SUPPORT (OUTPATIENT)
Dept: WOUND CARE | Facility: HOSPITAL | Age: 72
End: 2022-03-31
Attending: NURSE PRACTITIONER
Payer: MEDICARE

## 2022-03-31 VITALS
TEMPERATURE: 98 F | SYSTOLIC BLOOD PRESSURE: 136 MMHG | DIASTOLIC BLOOD PRESSURE: 85 MMHG | RESPIRATION RATE: 20 BRPM | HEART RATE: 72 BPM

## 2022-03-31 DIAGNOSIS — T25.312D FULL THICKNESS BURN OF LEFT ANKLE, SUBSEQUENT ENCOUNTER: Primary | ICD-10-CM

## 2022-03-31 PROCEDURE — 11042 DBRDMT SUBQ TIS 1ST 20SQCM/<: CPT

## 2022-03-31 PROCEDURE — 16020 DRESS/DEBRID P-THICK BURN S: CPT | Performed by: NURSE PRACTITIONER

## 2022-03-31 PROCEDURE — 16020 DRESS/DEBRID P-THICK BURN S: CPT

## 2022-03-31 PROCEDURE — 11042 DBRDMT SUBQ TIS 1ST 20SQCM/<: CPT | Performed by: NURSE PRACTITIONER
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
## 2022-04-07 ENCOUNTER — CLINICAL SUPPORT (OUTPATIENT)
Dept: WOUND CARE | Facility: HOSPITAL | Age: 72
End: 2022-04-07
Attending: NURSE PRACTITIONER
Payer: MEDICARE

## 2022-04-07 VITALS
HEART RATE: 83 BPM | TEMPERATURE: 98 F | RESPIRATION RATE: 18 BRPM | SYSTOLIC BLOOD PRESSURE: 120 MMHG | DIASTOLIC BLOOD PRESSURE: 76 MMHG

## 2022-04-07 DIAGNOSIS — T25.312D FULL THICKNESS BURN OF LEFT ANKLE, SUBSEQUENT ENCOUNTER: Primary | ICD-10-CM

## 2022-04-07 PROCEDURE — 99213 OFFICE O/P EST LOW 20 MIN: CPT

## 2022-04-07 PROCEDURE — 99213 OFFICE O/P EST LOW 20 MIN: CPT | Performed by: NURSE PRACTITIONER
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
## 2022-04-08 ENCOUNTER — TELEPHONE (OUTPATIENT)
Dept: FAMILY MEDICINE | Facility: CLINIC | Age: 72
End: 2022-04-08
Payer: MEDICARE

## 2022-04-08 RX ORDER — PROMETHAZINE HYDROCHLORIDE 25 MG/1
25 TABLET ORAL EVERY 6 HOURS PRN
Qty: 20 TABLET | Refills: 0 | Status: SHIPPED | OUTPATIENT
Start: 2022-04-08 | End: 2022-08-11

## 2022-04-08 NOTE — TELEPHONE ENCOUNTER
Emi with [facility] called and reported pt is having N/V/D since last night and has a temp of 100.7. Pt also has a heart rate of 118 and BP of 108/60. Reported to HALIE Vora and she instructed patient to increase fluids and take phenergan as needed.

## 2022-04-14 ENCOUNTER — CLINICAL SUPPORT (OUTPATIENT)
Dept: WOUND CARE | Facility: HOSPITAL | Age: 72
End: 2022-04-14
Attending: NURSE PRACTITIONER
Payer: MEDICARE

## 2022-04-14 VITALS
RESPIRATION RATE: 18 BRPM | DIASTOLIC BLOOD PRESSURE: 84 MMHG | HEART RATE: 89 BPM | SYSTOLIC BLOOD PRESSURE: 150 MMHG | TEMPERATURE: 97 F

## 2022-04-14 DIAGNOSIS — T25.312D FULL THICKNESS BURN OF LEFT ANKLE, SUBSEQUENT ENCOUNTER: Primary | ICD-10-CM

## 2022-04-14 PROCEDURE — 99213 OFFICE O/P EST LOW 20 MIN: CPT

## 2022-04-14 PROCEDURE — 11042 DBRDMT SUBQ TIS 1ST 20SQCM/<: CPT

## 2022-04-14 PROCEDURE — 99213 OFFICE O/P EST LOW 20 MIN: CPT | Performed by: NURSE PRACTITIONER
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
## 2022-04-20 ENCOUNTER — OFFICE VISIT (OUTPATIENT)
Dept: PAIN MEDICINE | Facility: CLINIC | Age: 72
End: 2022-04-20
Payer: MEDICARE

## 2022-04-20 VITALS
RESPIRATION RATE: 17 BRPM | SYSTOLIC BLOOD PRESSURE: 139 MMHG | HEIGHT: 75 IN | HEART RATE: 79 BPM | WEIGHT: 300 LBS | DIASTOLIC BLOOD PRESSURE: 84 MMHG | BODY MASS INDEX: 37.3 KG/M2

## 2022-04-20 DIAGNOSIS — E11.42 DIABETIC POLYNEUROPATHY ASSOCIATED WITH TYPE 2 DIABETES MELLITUS: Chronic | ICD-10-CM

## 2022-04-20 DIAGNOSIS — M79.672 FOOT PAIN, LEFT: Chronic | ICD-10-CM

## 2022-04-20 DIAGNOSIS — Z79.899 ENCOUNTER FOR LONG-TERM (CURRENT) USE OF OTHER MEDICATIONS: ICD-10-CM

## 2022-04-20 DIAGNOSIS — M47.817 LUMBOSACRAL SPONDYLOSIS WITHOUT MYELOPATHY: Primary | Chronic | ICD-10-CM

## 2022-04-20 PROCEDURE — 3288F FALL RISK ASSESSMENT DOCD: CPT | Mod: CPTII,,, | Performed by: PHYSICIAN ASSISTANT

## 2022-04-20 PROCEDURE — 99215 OFFICE O/P EST HI 40 MIN: CPT | Mod: PBBFAC | Performed by: PHYSICIAN ASSISTANT

## 2022-04-20 PROCEDURE — 1101F PT FALLS ASSESS-DOCD LE1/YR: CPT | Mod: CPTII,,, | Performed by: PHYSICIAN ASSISTANT

## 2022-04-20 PROCEDURE — 1101F PR PT FALLS ASSESS DOC 0-1 FALLS W/OUT INJ PAST YR: ICD-10-PCS | Mod: CPTII,,, | Performed by: PHYSICIAN ASSISTANT

## 2022-04-20 PROCEDURE — 1159F MED LIST DOCD IN RCRD: CPT | Mod: CPTII,,, | Performed by: PHYSICIAN ASSISTANT

## 2022-04-20 PROCEDURE — 3060F PR POS MICROALBUMINURIA RESULT DOCUMENTED/REVIEW: ICD-10-PCS | Mod: CPTII,,, | Performed by: PHYSICIAN ASSISTANT

## 2022-04-20 PROCEDURE — 3075F PR MOST RECENT SYSTOLIC BLOOD PRESS GE 130-139MM HG: ICD-10-PCS | Mod: CPTII,,, | Performed by: PHYSICIAN ASSISTANT

## 2022-04-20 PROCEDURE — 3079F DIAST BP 80-89 MM HG: CPT | Mod: CPTII,,, | Performed by: PHYSICIAN ASSISTANT

## 2022-04-20 PROCEDURE — 3066F NEPHROPATHY DOC TX: CPT | Mod: CPTII,,, | Performed by: PHYSICIAN ASSISTANT

## 2022-04-20 PROCEDURE — 3008F PR BODY MASS INDEX (BMI) DOCUMENTED: ICD-10-PCS | Mod: CPTII,,, | Performed by: PHYSICIAN ASSISTANT

## 2022-04-20 PROCEDURE — 3008F BODY MASS INDEX DOCD: CPT | Mod: CPTII,,, | Performed by: PHYSICIAN ASSISTANT

## 2022-04-20 PROCEDURE — 4010F PR ACE/ARB THEARPY RXD/TAKEN: ICD-10-PCS | Mod: CPTII,,, | Performed by: PHYSICIAN ASSISTANT

## 2022-04-20 PROCEDURE — 3066F PR DOCUMENTATION OF TREATMENT FOR NEPHROPATHY: ICD-10-PCS | Mod: CPTII,,, | Performed by: PHYSICIAN ASSISTANT

## 2022-04-20 PROCEDURE — 1125F AMNT PAIN NOTED PAIN PRSNT: CPT | Mod: CPTII,,, | Performed by: PHYSICIAN ASSISTANT

## 2022-04-20 PROCEDURE — 3075F SYST BP GE 130 - 139MM HG: CPT | Mod: CPTII,,, | Performed by: PHYSICIAN ASSISTANT

## 2022-04-20 PROCEDURE — 99214 OFFICE O/P EST MOD 30 MIN: CPT | Mod: S$PBB,,, | Performed by: PHYSICIAN ASSISTANT

## 2022-04-20 PROCEDURE — 1125F PR PAIN SEVERITY QUANTIFIED, PAIN PRESENT: ICD-10-PCS | Mod: CPTII,,, | Performed by: PHYSICIAN ASSISTANT

## 2022-04-20 PROCEDURE — 3051F HG A1C>EQUAL 7.0%<8.0%: CPT | Mod: CPTII,,, | Performed by: PHYSICIAN ASSISTANT

## 2022-04-20 PROCEDURE — 4010F ACE/ARB THERAPY RXD/TAKEN: CPT | Mod: CPTII,,, | Performed by: PHYSICIAN ASSISTANT

## 2022-04-20 PROCEDURE — 3051F PR MOST RECENT HEMOGLOBIN A1C LEVEL 7.0 - < 8.0%: ICD-10-PCS | Mod: CPTII,,, | Performed by: PHYSICIAN ASSISTANT

## 2022-04-20 PROCEDURE — 3288F PR FALLS RISK ASSESSMENT DOCUMENTED: ICD-10-PCS | Mod: CPTII,,, | Performed by: PHYSICIAN ASSISTANT

## 2022-04-20 PROCEDURE — 99214 PR OFFICE/OUTPT VISIT, EST, LEVL IV, 30-39 MIN: ICD-10-PCS | Mod: S$PBB,,, | Performed by: PHYSICIAN ASSISTANT

## 2022-04-20 PROCEDURE — 3060F POS MICROALBUMINURIA REV: CPT | Mod: CPTII,,, | Performed by: PHYSICIAN ASSISTANT

## 2022-04-20 PROCEDURE — 3079F PR MOST RECENT DIASTOLIC BLOOD PRESSURE 80-89 MM HG: ICD-10-PCS | Mod: CPTII,,, | Performed by: PHYSICIAN ASSISTANT

## 2022-04-20 PROCEDURE — 1159F PR MEDICATION LIST DOCUMENTED IN MEDICAL RECORD: ICD-10-PCS | Mod: CPTII,,, | Performed by: PHYSICIAN ASSISTANT

## 2022-04-20 PROCEDURE — 80305 DRUG TEST PRSMV DIR OPT OBS: CPT | Mod: PBBFAC | Performed by: PHYSICIAN ASSISTANT

## 2022-04-20 RX ORDER — HYDROCODONE BITARTRATE AND ACETAMINOPHEN 10; 325 MG/1; MG/1
1 TABLET ORAL EVERY 8 HOURS
Qty: 90 TABLET | Refills: 0 | Status: SHIPPED | OUTPATIENT
Start: 2022-06-03 | End: 2022-07-03

## 2022-04-20 RX ORDER — HYDROCODONE BITARTRATE AND ACETAMINOPHEN 10; 325 MG/1; MG/1
1 TABLET ORAL EVERY 8 HOURS
Qty: 90 TABLET | Refills: 0 | Status: SHIPPED | OUTPATIENT
Start: 2022-05-05 | End: 2022-06-04

## 2022-04-20 RX ORDER — HYDROCODONE BITARTRATE AND ACETAMINOPHEN 10; 325 MG/1; MG/1
1 TABLET ORAL EVERY 8 HOURS
Qty: 90 TABLET | Refills: 0 | Status: SHIPPED | OUTPATIENT
Start: 2022-07-01 | End: 2022-08-01 | Stop reason: SDUPTHER

## 2022-04-20 NOTE — PROGRESS NOTES
Disclaimer:  This note has been generated using voice recognition software.  There may be type of graft focal areas that have been missed during a proof reading      Subjective:      Patient ID: René Moscoso is a 71 y.o. male.    Chief Complaint: Low-back Pain      Low-back Pain  This is a chronic problem. The current episode started more than 1 year ago. The problem occurs daily. The problem is unchanged. Associated symptoms include leg pain. Pertinent negatives include no bladder incontinence, chest pain, dysuria or fever.   Pain  This is a chronic problem. The current episode started more than 1 year ago. The problem occurs daily. The problem has been waxing and waning. Associated symptoms include arthralgias. Pertinent negatives include no change in bowel habit, chest pain, chills, coughing, diaphoresis, fever, neck pain, rash, sore throat, vertigo or vomiting.     Review of Systems   Constitutional: Negative for activity change, appetite change, chills, diaphoresis and fever.   HENT: Negative for drooling, ear discharge, ear pain, facial swelling, nosebleeds, sore throat, trouble swallowing, voice change and goiter.    Eyes: Negative for photophobia, pain, discharge, redness and visual disturbance.   Respiratory: Negative for apnea, cough, choking, chest tightness, shortness of breath, wheezing and stridor.    Cardiovascular: Negative for chest pain, palpitations and leg swelling.   Gastrointestinal: Negative for abdominal distention, change in bowel habit, diarrhea, rectal pain, vomiting, fecal incontinence and change in bowel habit.   Endocrine: Negative for cold intolerance, heat intolerance, polydipsia, polyphagia and polyuria.   Genitourinary: Negative for bladder incontinence, dysuria, flank pain and frequency.   Musculoskeletal: Positive for arthralgias, back pain and leg pain. Negative for neck pain and neck stiffness.   Integumentary:  Negative for color change, pallor and rash.   Neurological:  "Negative for dizziness, vertigo, seizures, syncope, facial asymmetry, speech difficulty, light-headedness, disturbances in coordination, memory loss and coordination difficulties.   Hematological: Negative for adenopathy. Does not bruise/bleed easily.   Psychiatric/Behavioral: Negative for agitation, behavioral problems, confusion, decreased concentration, dysphoric mood, hallucinations, self-injury and suicidal ideas. The patient is not nervous/anxious and is not hyperactive.             Objective:  Vitals:    04/20/22 0842   BP: 139/84   Pulse: 79   Resp: 17   Weight: 136.1 kg (300 lb)   Height: 6' 3" (1.905 m)   PainSc:   4         Physical Exam  Vitals and nursing note reviewed. Exam conducted with a chaperone present.   Constitutional:       General: He is awake. He is not in acute distress.     Appearance: Normal appearance. He is not diaphoretic.   HENT:      Head: Normocephalic and atraumatic.      Nose: Nose normal.      Mouth/Throat:      Mouth: Mucous membranes are moist.      Pharynx: Oropharynx is clear.   Eyes:      Conjunctiva/sclera: Conjunctivae normal.      Pupils: Pupils are equal, round, and reactive to light.   Cardiovascular:      Rate and Rhythm: Normal rate.   Pulmonary:      Effort: Pulmonary effort is normal. No respiratory distress.   Abdominal:      Palpations: Abdomen is soft.      Tenderness: There is no guarding.   Musculoskeletal:         General: Normal range of motion.      Cervical back: Normal range of motion and neck supple. No rigidity.      Thoracic back: Tenderness present.      Lumbar back: Tenderness present.   Skin:     General: Skin is warm and dry.      Coloration: Skin is not jaundiced or pale.   Neurological:      General: No focal deficit present.      Mental Status: He is alert and oriented to person, place, and time. Mental status is at baseline.      Cranial Nerves: Cranial nerves are intact. No cranial nerve deficit (II-XII).   Psychiatric:         Mood and Affect: " Mood normal.         Behavior: Behavior normal. Behavior is cooperative.         Thought Content: Thought content normal.           X-Ray Chest PA And Lateral  Narrative: EXAMINATION:  XR CHEST PA AND LATERAL    CLINICAL HISTORY:  .  Cough    TECHNIQUE:  Chest x-ray PA and lateral    COMPARISON:  November 21, 2017 chest x-ray    FINDINGS:  The cardiac silhouette is upper normal in size.  There is no mediastinal mass.  There is no pulmonary vascular engorgement.  There is stable mild chronic elevation right hemidiaphragm.    There is no pleural effusion.    The lungs are unchanged in appearance.  There is no acute infiltrate.  There is some minimal interstitial coarsening which may be related to some chronic interstitial scarring.    There is mild thoracic spondylosis.  Impression: No acute cardiopulmonary process compared to the previous study.  Mild chronic lung changes as before    Electronically signed by: Sebastian Azul  Date:    08/25/2021  Time:    14:55       Office Visit on 03/09/2022   Component Date Value Ref Range Status    POC Glucose 03/09/2022 209 (A) 70 - 110 MG/DL Final   Office Visit on 01/25/2022   Component Date Value Ref Range Status    POC Amphetamines 01/25/2022 Negative  Negative, Inconclusive Final    POC Barbiturates 01/25/2022 Negative  Negative, Inconclusive Final    POC Benzodiazepines 01/25/2022 Negative  Negative, Inconclusive Final    POC Cocaine 01/25/2022 Negative  Negative, Inconclusive Final    POC THC 01/25/2022 Negative  Negative, Inconclusive Final    POC Methadone 01/25/2022 Negative  Negative, Inconclusive Final    POC Methamphetamine 01/25/2022 Negative  Negative, Inconclusive Final    POC Opiates 01/25/2022 Presumptive Positive (A) Negative, Inconclusive Final    POC Oxycodone 01/25/2022 Negative  Negative, Inconclusive Final    POC Phencyclidine 01/25/2022 Negative  Negative, Inconclusive Final    POC Methylenedioxymethamphetamine * 01/25/2022 Negative   Negative, Inconclusive Final    POC Tricyclic Antidepressants 01/25/2022 Negative  Negative, Inconclusive Final    POC Buprenorphine 01/25/2022 Negative   Final     Acceptable 01/25/2022 Yes   Final    POC Temperature (Urine) 01/25/2022 94   Final   Office Visit on 01/20/2022   Component Date Value Ref Range Status    POC Glucose 01/20/2022 264 (A) 70 - 110 MG/DL Final    Hemoglobin A1C 01/20/2022 7.6 (A) 4.5 - 6.6 % Final    Estimated Average Glucose 01/20/2022 167  mg/dL Final    Creatinine, Urine 01/20/2022 57  39 - 259 mg/dL Final    Microalbumin 01/20/2022 2.3  0.0 - 2.8 mg/dL Final    Microalbumin/Creatinine Ratio 01/20/2022 40.4 (A) 0.0 - 30.0 mg/g Final    CK 01/20/2022 176  39 - 308 U/L Final    Triglycerides 01/20/2022 362 (A) 35 - 150 mg/dL Final    Cholesterol 01/20/2022 157  0 - 200 mg/dL Final    HDL Cholesterol 01/20/2022 35 (A) 40 - 60 mg/dL Final    Cholesterol/HDL Ratio (Risk Factor) 01/20/2022 4.5   Final    Non-HDL 01/20/2022 122  mg/dL Final    LDL Calculated 01/20/2022 50  mg/dL Final    LDL/HDL 01/20/2022 1.4   Final    VLDL 01/20/2022 72  mg/dL Final    Sodium 01/20/2022 133 (A) 136 - 145 mmol/L Final    Potassium 01/20/2022 4.3  3.5 - 5.1 mmol/L Final    Chloride 01/20/2022 99  98 - 107 mmol/L Final    CO2 01/20/2022 30  21 - 32 mmol/L Final    Anion Gap 01/20/2022 8  7 - 16 mmol/L Final    Glucose 01/20/2022 246 (A) 74 - 106 mg/dL Final    BUN 01/20/2022 14  7 - 18 mg/dL Final    Creatinine 01/20/2022 1.20  0.70 - 1.30 mg/dL Final    BUN/Creatinine Ratio 01/20/2022 12  6 - 20 Final    Calcium 01/20/2022 8.8  8.5 - 10.1 mg/dL Final    Total Protein 01/20/2022 7.4  6.4 - 8.2 g/dL Final    Albumin 01/20/2022 3.5  3.5 - 5.0 g/dL Final    Globulin 01/20/2022 3.9  2.0 - 4.0 g/dL Final    A/G Ratio 01/20/2022 0.9   Final    Bilirubin, Total 01/20/2022 0.8  0.0 - 1.2 mg/dL Final    Alk Phos 01/20/2022 91  45 - 115 U/L Final    ALT 01/20/2022 30  16  - 61 U/L Final    AST 01/20/2022 24  15 - 37 U/L Final    eGFR 01/20/2022 64  >=60 mL/min/1.73m² Final   Clinical Support on 01/13/2022   Component Date Value Ref Range Status    Culture, Tissue 01/13/2022 Heavy Growth Citrobacter farmeri (A)  Final    Culture, Tissue 01/13/2022 Heavy Growth Staphylococcus aureus (A)  Final    Culture, Tissue 01/13/2022 Heavy Growth Klebsiella oxytoca (A)  Final   Office Visit on 12/20/2021   Component Date Value Ref Range Status    POC Glucose 12/20/2021 187 (A) 70 - 110 MG/DL Final   Office Visit on 11/22/2021   Component Date Value Ref Range Status    POC Glucose 11/22/2021 169 (A) 70 - 110 MG/DL Final   Clinical Support on 11/18/2021   Component Date Value Ref Range Status    Culture, Tissue 11/18/2021 Light Growth Staphylococcus aureus (A)  Final    Culture, Tissue 11/18/2021 Heavy Growth Streptococcus agalactiae (Group B) (A)  Final   Office Visit on 10/25/2021   Component Date Value Ref Range Status    POC Amphetamines 10/25/2021 Negative  Negative, Inconclusive Final    POC Barbiturates 10/25/2021 Negative  Negative, Inconclusive Final    POC Benzodiazepines 10/25/2021 Negative  Negative, Inconclusive Final    POC Cocaine 10/25/2021 Negative  Negative, Inconclusive Final    POC THC 10/25/2021 Negative  Negative, Inconclusive Final    POC Methadone 10/25/2021 Negative  Negative, Inconclusive Final    POC Methamphetamine 10/25/2021 Negative  Negative, Inconclusive Final    POC Opiates 10/25/2021 Negative  Negative, Inconclusive Final    POC Oxycodone 10/25/2021 Negative  Negative, Inconclusive Final    POC Phencyclidine 10/25/2021 Negative  Negative, Inconclusive Final    POC Methylenedioxymethamphetamine * 10/25/2021 Negative  Negative, Inconclusive Final    POC Tricyclic Antidepressants 10/25/2021 Negative  Negative, Inconclusive Final    POC Buprenorphine 10/25/2021 Negative   Final     Acceptable 10/25/2021 Yes   Final    POC  Temperature (Urine) 10/25/2021 94   Final           Assessment:      1. Lumbosacral spondylosis without myelopathy    2. Encounter for long-term (current) use of other medications    3. Diabetic polyneuropathy associated with type 2 diabetes mellitus    4. Foot pain, left          Orders Placed This Encounter   Procedures    POCT Urine Drug Screen Presump     Interpretive Information:     Negative:  No drug detected at the cut off level.   Positive:  This result represents presumptive positive for the   tested drug, other substances may yield a positive response other   than the analyte of interest. This result should be utilized for   diagnostic purpose only. Confirmation testing will be performed upon physician request only.            A's of Opioid Risk Assessment  Activity:Patient can perform ADL.   Analgesia:Patients pain is partially controlled by current medication. Patient has tried OTC medications such as Tylenol and Ibuprofen with out relief.   Adverse Effects: Patient denies constipation or sedation.  Aberrant Behavior:  reviewed with no aberrant drug seeking/taking behavior.  Overdose reversal drug naloxone discussed    Drug screen reviewed      Requested Prescriptions     Signed Prescriptions Disp Refills    HYDROcodone-acetaminophen (NORCO)  mg per tablet 90 tablet 0     Sig: Take 1 tablet by mouth every 8 (eight) hours.    HYDROcodone-acetaminophen (NORCO)  mg per tablet 90 tablet 0     Sig: Take 1 tablet by mouth every 8 (eight) hours.    HYDROcodone-acetaminophen (NORCO)  mg per tablet 90 tablet 0     Sig: Take 1 tablet by mouth every 8 (eight) hours.         Plan:    Pharmacy closed on weekends    In July he may pick his prescription of July 1st his pharmacy will be close July 4th    Will use July 4th as refill date next visit    Continues following wound management chronic left lower extremity diabetic ulcers     He states current medications helping control his  discomfort    He would like to continue conservative management    Continue home exercise program as directed    Follow-up 3 months sooner if needed     Dr. Asif, October 2022    Bring original prescription medication bottles/container/box with labels to each visit

## 2022-04-21 ENCOUNTER — CLINICAL SUPPORT (OUTPATIENT)
Dept: WOUND CARE | Facility: HOSPITAL | Age: 72
End: 2022-04-21
Attending: NURSE PRACTITIONER
Payer: MEDICARE

## 2022-04-21 VITALS
SYSTOLIC BLOOD PRESSURE: 140 MMHG | RESPIRATION RATE: 18 BRPM | DIASTOLIC BLOOD PRESSURE: 80 MMHG | HEART RATE: 78 BPM | TEMPERATURE: 97 F

## 2022-04-21 DIAGNOSIS — T25.312D FULL THICKNESS BURN OF LEFT ANKLE, SUBSEQUENT ENCOUNTER: Primary | ICD-10-CM

## 2022-04-21 PROCEDURE — 99213 OFFICE O/P EST LOW 20 MIN: CPT | Performed by: NURSE PRACTITIONER

## 2022-04-21 PROCEDURE — 99213 OFFICE O/P EST LOW 20 MIN: CPT

## 2022-04-21 PROCEDURE — 99214 OFFICE O/P EST MOD 30 MIN: CPT
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
## 2022-04-28 ENCOUNTER — CLINICAL SUPPORT (OUTPATIENT)
Dept: WOUND CARE | Facility: HOSPITAL | Age: 72
End: 2022-04-28
Attending: NURSE PRACTITIONER
Payer: MEDICARE

## 2022-04-28 VITALS
DIASTOLIC BLOOD PRESSURE: 79 MMHG | HEART RATE: 81 BPM | SYSTOLIC BLOOD PRESSURE: 127 MMHG | RESPIRATION RATE: 20 BRPM | TEMPERATURE: 97 F

## 2022-04-28 DIAGNOSIS — T25.312D FULL THICKNESS BURN OF LEFT ANKLE, SUBSEQUENT ENCOUNTER: Primary | ICD-10-CM

## 2022-04-28 PROCEDURE — 99213 OFFICE O/P EST LOW 20 MIN: CPT

## 2022-04-28 PROCEDURE — 99213 OFFICE O/P EST LOW 20 MIN: CPT | Performed by: NURSE PRACTITIONER
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
## 2022-05-12 ENCOUNTER — CLINICAL SUPPORT (OUTPATIENT)
Dept: WOUND CARE | Facility: HOSPITAL | Age: 72
End: 2022-05-12
Attending: NURSE PRACTITIONER
Payer: MEDICARE

## 2022-05-12 VITALS — SYSTOLIC BLOOD PRESSURE: 134 MMHG | HEART RATE: 74 BPM | DIASTOLIC BLOOD PRESSURE: 80 MMHG | RESPIRATION RATE: 20 BRPM

## 2022-05-12 DIAGNOSIS — T25.312D FULL THICKNESS BURN OF LEFT ANKLE, SUBSEQUENT ENCOUNTER: Primary | ICD-10-CM

## 2022-05-12 PROCEDURE — 99213 OFFICE O/P EST LOW 20 MIN: CPT

## 2022-05-12 PROCEDURE — 99212 OFFICE O/P EST SF 10 MIN: CPT

## 2022-05-12 PROCEDURE — 99213 OFFICE O/P EST LOW 20 MIN: CPT | Performed by: NURSE PRACTITIONER
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
## 2022-05-18 ENCOUNTER — TELEPHONE (OUTPATIENT)
Dept: FAMILY MEDICINE | Facility: CLINIC | Age: 72
End: 2022-05-18
Payer: MEDICARE

## 2022-05-18 ENCOUNTER — PATIENT OUTREACH (OUTPATIENT)
Dept: FAMILY MEDICINE | Facility: CLINIC | Age: 72
End: 2022-05-18
Payer: MEDICARE

## 2022-05-18 NOTE — TELEPHONE ENCOUNTER
"AGNES sent over a log of FBS on 04/22. Olga reviewed on 04/25 and said "slowly improving". "Watch diet closely and take meds as ordered. Continue to monitor BS."    AGNES sent over a log of FBS again on 04/26. Olga reviewed on 04/29 and said to "increase Novolog to 94 units BID. Monitor BS-BID. Watch diet and exercise."     AGNES sent over another log of FBS on 05/05. Olga reviewed on 05/09 and said "must watch diet closer or will have to d/c patient."  "

## 2022-05-26 ENCOUNTER — OFFICE VISIT (OUTPATIENT)
Dept: FAMILY MEDICINE | Facility: CLINIC | Age: 72
End: 2022-05-26
Payer: MEDICARE

## 2022-05-26 VITALS
OXYGEN SATURATION: 96 % | BODY MASS INDEX: 37.94 KG/M2 | SYSTOLIC BLOOD PRESSURE: 134 MMHG | HEIGHT: 75 IN | RESPIRATION RATE: 18 BRPM | TEMPERATURE: 98 F | WEIGHT: 305.13 LBS | DIASTOLIC BLOOD PRESSURE: 70 MMHG | HEART RATE: 85 BPM

## 2022-05-26 DIAGNOSIS — E11.621 TYPE 2 DIABETES MELLITUS WITH LEFT DIABETIC FOOT ULCER: Primary | ICD-10-CM

## 2022-05-26 DIAGNOSIS — L97.529 TYPE 2 DIABETES MELLITUS WITH LEFT DIABETIC FOOT ULCER: Primary | ICD-10-CM

## 2022-05-26 DIAGNOSIS — J32.0 CHRONIC MAXILLARY SINUSITIS: ICD-10-CM

## 2022-05-26 PROBLEM — L03.116 CELLULITIS OF LEFT LOWER LEG: Status: RESOLVED | Noted: 2021-06-15 | Resolved: 2022-05-26

## 2022-05-26 PROBLEM — L97.521 ULCER OF LEFT FOOT, LIMITED TO BREAKDOWN OF SKIN: Status: RESOLVED | Noted: 2021-06-29 | Resolved: 2022-05-26

## 2022-05-26 PROBLEM — L03.116 CELLULITIS OF LEFT FOOT: Chronic | Status: RESOLVED | Noted: 2021-06-07 | Resolved: 2022-05-26

## 2022-05-26 PROBLEM — L03.119 CELLULITIS OF LOWER LEG: Status: RESOLVED | Noted: 2021-06-07 | Resolved: 2022-05-26

## 2022-05-26 LAB — GLUCOSE SERPL-MCNC: 133 MG/DL (ref 70–110)

## 2022-05-26 PROCEDURE — 3288F FALL RISK ASSESSMENT DOCD: CPT | Mod: ,,, | Performed by: NURSE PRACTITIONER

## 2022-05-26 PROCEDURE — 3008F PR BODY MASS INDEX (BMI) DOCUMENTED: ICD-10-PCS | Mod: ,,, | Performed by: NURSE PRACTITIONER

## 2022-05-26 PROCEDURE — 3075F PR MOST RECENT SYSTOLIC BLOOD PRESS GE 130-139MM HG: ICD-10-PCS | Mod: ,,, | Performed by: NURSE PRACTITIONER

## 2022-05-26 PROCEDURE — 4010F PR ACE/ARB THEARPY RXD/TAKEN: ICD-10-PCS | Mod: ,,, | Performed by: NURSE PRACTITIONER

## 2022-05-26 PROCEDURE — 3066F PR DOCUMENTATION OF TREATMENT FOR NEPHROPATHY: ICD-10-PCS | Mod: ,,, | Performed by: NURSE PRACTITIONER

## 2022-05-26 PROCEDURE — 99214 PR OFFICE/OUTPT VISIT, EST, LEVL IV, 30-39 MIN: ICD-10-PCS | Mod: ,,, | Performed by: NURSE PRACTITIONER

## 2022-05-26 PROCEDURE — 3051F PR MOST RECENT HEMOGLOBIN A1C LEVEL 7.0 - < 8.0%: ICD-10-PCS | Mod: ,,, | Performed by: NURSE PRACTITIONER

## 2022-05-26 PROCEDURE — 3288F PR FALLS RISK ASSESSMENT DOCUMENTED: ICD-10-PCS | Mod: ,,, | Performed by: NURSE PRACTITIONER

## 2022-05-26 PROCEDURE — 1126F PR PAIN SEVERITY QUANTIFIED, NO PAIN PRESENT: ICD-10-PCS | Mod: ,,, | Performed by: NURSE PRACTITIONER

## 2022-05-26 PROCEDURE — 3060F PR POS MICROALBUMINURIA RESULT DOCUMENTED/REVIEW: ICD-10-PCS | Mod: ,,, | Performed by: NURSE PRACTITIONER

## 2022-05-26 PROCEDURE — 4010F ACE/ARB THERAPY RXD/TAKEN: CPT | Mod: ,,, | Performed by: NURSE PRACTITIONER

## 2022-05-26 PROCEDURE — 3051F HG A1C>EQUAL 7.0%<8.0%: CPT | Mod: ,,, | Performed by: NURSE PRACTITIONER

## 2022-05-26 PROCEDURE — 3008F BODY MASS INDEX DOCD: CPT | Mod: ,,, | Performed by: NURSE PRACTITIONER

## 2022-05-26 PROCEDURE — 1101F PT FALLS ASSESS-DOCD LE1/YR: CPT | Mod: ,,, | Performed by: NURSE PRACTITIONER

## 2022-05-26 PROCEDURE — 3060F POS MICROALBUMINURIA REV: CPT | Mod: ,,, | Performed by: NURSE PRACTITIONER

## 2022-05-26 PROCEDURE — 99214 OFFICE O/P EST MOD 30 MIN: CPT | Mod: ,,, | Performed by: NURSE PRACTITIONER

## 2022-05-26 PROCEDURE — 3075F SYST BP GE 130 - 139MM HG: CPT | Mod: ,,, | Performed by: NURSE PRACTITIONER

## 2022-05-26 PROCEDURE — 3066F NEPHROPATHY DOC TX: CPT | Mod: ,,, | Performed by: NURSE PRACTITIONER

## 2022-05-26 PROCEDURE — 1101F PR PT FALLS ASSESS DOC 0-1 FALLS W/OUT INJ PAST YR: ICD-10-PCS | Mod: ,,, | Performed by: NURSE PRACTITIONER

## 2022-05-26 PROCEDURE — 82962 GLUCOSE BLOOD TEST: CPT | Mod: QW,,, | Performed by: NURSE PRACTITIONER

## 2022-05-26 PROCEDURE — 1159F MED LIST DOCD IN RCRD: CPT | Mod: ,,, | Performed by: NURSE PRACTITIONER

## 2022-05-26 PROCEDURE — 82962 POCT GLUCOSE, HAND-HELD DEVICE: ICD-10-PCS | Mod: QW,,, | Performed by: NURSE PRACTITIONER

## 2022-05-26 PROCEDURE — 3078F DIAST BP <80 MM HG: CPT | Mod: ,,, | Performed by: NURSE PRACTITIONER

## 2022-05-26 PROCEDURE — 1159F PR MEDICATION LIST DOCUMENTED IN MEDICAL RECORD: ICD-10-PCS | Mod: ,,, | Performed by: NURSE PRACTITIONER

## 2022-05-26 PROCEDURE — 3078F PR MOST RECENT DIASTOLIC BLOOD PRESSURE < 80 MM HG: ICD-10-PCS | Mod: ,,, | Performed by: NURSE PRACTITIONER

## 2022-05-26 PROCEDURE — 1126F AMNT PAIN NOTED NONE PRSNT: CPT | Mod: ,,, | Performed by: NURSE PRACTITIONER

## 2022-05-26 RX ORDER — FLUTICASONE PROPIONATE 50 MCG
1 SPRAY, SUSPENSION (ML) NASAL DAILY
Qty: 15.8 ML | Refills: 5 | Status: SHIPPED | OUTPATIENT
Start: 2022-05-26 | End: 2022-09-01

## 2022-05-26 RX ORDER — AZITHROMYCIN 250 MG/1
TABLET, FILM COATED ORAL
Qty: 6 TABLET | Refills: 0 | Status: SHIPPED | OUTPATIENT
Start: 2022-05-26 | End: 2022-05-31

## 2022-05-26 RX ORDER — CETIRIZINE HYDROCHLORIDE 10 MG/1
10 TABLET ORAL DAILY
Qty: 30 TABLET | Refills: 5 | Status: SHIPPED | OUTPATIENT
Start: 2022-05-26 | End: 2022-09-01

## 2022-05-26 NOTE — PROGRESS NOTES
Olga Monterroso NP   [facility]  [address]  [facility]     PATIENT NAME: René Moscoso  : 1950  DATE: 22  MRN: [id number]      Billing Provider: Olga Monterroso NP  Level of Service:   Patient PCP Information     Provider PCP Type    Olga Monterroso NP General          Reason for Visit / Chief Complaint: Follow-up, Diabetes, Hypertension, and Hyperlipidemia       Update PCP  Update Chief Complaint         History of Present Illness / Problem Focused Workflow     René Moscoso presents to the clinic DM, htn and hyperlipidemia eval  staed that his sinuses bother him when he is out in the garden    Review of Systems     Review of Systems   Constitutional: Negative for chills, fatigue and fever.   HENT: Positive for nasal congestion and sinus pressure/congestion. Negative for ear pain, facial swelling, hearing loss, mouth dryness, mouth sores, postnasal drip, rhinorrhea and goiter.    Eyes: Negative for discharge and itching.   Respiratory: Negative for cough, shortness of breath and wheezing.    Cardiovascular: Negative for chest pain and leg swelling.   Gastrointestinal: Negative for abdominal pain, change in bowel habit and change in bowel habit.   Genitourinary: Negative for difficulty urinating, dysuria, enuresis, frequency, hematuria and urgency.   Integumentary:         Wound on foot healed, left   Neurological: Negative for dizziness, vertigo, syncope, weakness and headaches.   Psychiatric/Behavioral: Negative for decreased concentration.        Medical / Social / Family History     Past Medical History:   Diagnosis Date    Arthritis     Chronic pain 2021    COPD (chronic obstructive pulmonary disease)     Diabetes type 2, controlled     Hyperlipidemia     Hypertension     Neuropathy     Pressure ulcer of left foot 2020       Past Surgical History:   Procedure Laterality Date    CARDIAC CATHETERIZATION      ESOPHAGOGASTRODUODENOSCOPY  10/22/2014    FLEXIBLE  SIGMOIDOSCOPY  10/23/2014    INJECTION OF FACET JOINT Bilateral 07/01/2014    Bilateral L3-S1 Facet Injection - 7/1/14 and 3/18/14    SINUS SURGERY         Social History    reports that he quit smoking about 11 years ago. His smoking use included cigarettes. He started smoking about 31 years ago. He has a 40.00 pack-year smoking history. His smokeless tobacco use includes snuff. He reports previous alcohol use. He reports current drug use. Drug: Hydrocodone.    Family History  's family history includes Arthritis in his mother; COPD in his brother; Cancer in his father, mother, and sister; Diabetes in his mother; Heart disease in his mother; Hypertension in his mother.    Medications and Allergies     Medications  Outpatient Medications Marked as Taking for the 5/26/22 encounter (Office Visit) with Olga Monterroso NP   Medication Sig Dispense Refill    albuterol (ACCUNEB) 0.63 mg/3 mL Nebu Take 0.63 mg by nebulization every 6 (six) hours as needed. Rescue      aspirin 81 MG Chew Take 81 mg by mouth once daily.      bacitracin 500 unit/gram ointment Apply topically 2 (two) times daily. 30 g 0    blood-glucose meter Misc USE TO CHECK BLOOD SUGAR      gabapentin (NEURONTIN) 600 MG tablet TAKE ONE TABLET BY MOUTH TWICE DAILY 180 tablet 1    gemfibroziL (LOPID) 600 MG tablet TAKE ONE TABLET BY MOUTH TWICE DAILY 180 tablet 1    hydroCHLOROthiazide (MICROZIDE) 12.5 mg capsule TAKE ONE CAPSULE BY MOUTH EVERY DAY 90 capsule 1    HYDROcodone-acetaminophen (NORCO)  mg per tablet Take 1 tablet by mouth every 8 (eight) hours. 90 tablet 0    insulin aspart protamine-insulin aspart (NOVOLOG MIX 70-30FLEXPEN U-100) 100 unit/mL (70-30) InPn pen Inject 94 Units into the skin every morning. Take 94 units every morning and 90 units every evening 6 each 5    ipratropium (ATROVENT) 42 mcg (0.06 %) nasal spray 2 sprays by Nasal route 3 (three) times daily. 15 mL 2    ipratropium/albuterol sulfate (COMBIVENT INHL)  Inhale into the lungs.      isosorbide mononitrate (IMDUR) 30 MG 24 hr tablet TAKE ONE TABLET BY MOUTH ONCE DAILY 90 tablet 1    LEVEMIR FLEXTOUCH U-100 INSULN 100 unit/mL (3 mL) InPn pen INJECT 92 units SUBCUTANEOUSLY AT BEDTIME 30 mL 5    linaGLIPtin (TRADJENTA) 5 mg Tab tablet Take 5 mg by mouth once daily.      lisinopriL (PRINIVIL,ZESTRIL) 5 MG tablet TAKE ONE TABLET BY MOUTH DAILY 90 tablet 1    memantine (NAMENDA) 10 MG Tab TAKE ONE TABLET BY MOUTH TWICE DAILY 180 tablet 1    omeprazole (PRILOSEC) 20 MG capsule TAKE ONE CAPSULE BY MOUTH EVERY DAY 28 capsule 1    ondansetron (ZOFRAN) 4 MG tablet Take 1 tablet (4 mg total) by mouth every 6 (six) hours as needed for Nausea. 30 tablet 0    promethazine (PHENERGAN) 25 MG tablet Take 1 tablet (25 mg total) by mouth every 6 (six) hours as needed for Nausea. 20 tablet 0    semaglutide (RYBELSUS) 14 mg tablet Take 1 tablet (14 mg total) by mouth once daily. 90 tablet 1    simvastatin (ZOCOR) 40 MG tablet TAKE ONE TABLET BY MOUTH AT BEDTIME 90 tablet 1    SYNJARDY XR 12.5-1,000 mg TBph TAKE TWO TABLETS BY MOUTH EVERY MORNING 180 tablet 1    tiZANidine (ZANAFLEX) 4 MG tablet TAKE 1 OR 2 TABLETS BY MOUTH AT BEDTIME AS NEEDED 56 tablet 1    TRUE METRIX GLUCOSE TEST STRIP Strp USE TO USE TO CHECK BLOOD SUGAR BLOOD GLUCOSE TWICE DAILY 50 each 11    VENTOLIN HFA 90 mcg/actuation inhaler INHALE TWO PUFFS BY MOUTH TWICE DAILY AS NEEDED 18 g 5    [DISCONTINUED] fluticasone propionate (FLONASE) 50 mcg/actuation nasal spray instill ONE SPRAY in each nostril daily 16 g 3    [DISCONTINUED] loratadine (CLARITIN) 10 mg tablet Take 10 mg by mouth once daily.         Allergies  Review of patient's allergies indicates:  No Known Allergies    Physical Examination     Vitals:    05/26/22 0857   BP: 134/70   BP Location: Left arm   Patient Position: Sitting   BP Method: Medium (Manual)   Pulse: 85   Resp: 18   Temp: 97.9 °F (36.6 °C)   TempSrc: Oral   SpO2: 96%   Weight:  "(!) 138.4 kg (305 lb 2 oz)   Height: 6' 3" (1.905 m)      Physical Exam  Constitutional:       Appearance: Normal appearance.   HENT:      Head: Normocephalic.      Right Ear: Tympanic membrane, ear canal and external ear normal.      Left Ear: Tympanic membrane, ear canal and external ear normal.      Nose: Congestion present.      Mouth/Throat:      Mouth: Mucous membranes are moist.   Eyes:      Extraocular Movements: Extraocular movements intact.      Conjunctiva/sclera: Conjunctivae normal.      Pupils: Pupils are equal, round, and reactive to light.   Neck:      Comments: Leland ant cervical nodes  Cardiovascular:      Rate and Rhythm: Normal rate and regular rhythm.      Pulses: Normal pulses.      Heart sounds: Normal heart sounds.   Pulmonary:      Effort: Pulmonary effort is normal.      Breath sounds: Normal breath sounds.   Musculoskeletal:         General: Normal range of motion.      Cervical back: Normal range of motion.   Lymphadenopathy:      Cervical: Cervical adenopathy present.   Skin:     General: Skin is warm and dry.      Comments: ulcer left foot, healed.   Neurological:      General: No focal deficit present.      Mental Status: He is alert and oriented to person, place, and time.   Psychiatric:         Behavior: Behavior normal.          Assessment and Plan (including Health Maintenance)      Problem List  Smart Sets  Document Outside HM   :    Plan: cont low carb diet, exercise daily, meds as ordered, return [facility] as needed, stressed to take zyrtec and flonase daily while working in garden due to allergies    Type 2 diabetes mellitus with left diabetic foot ulcer  -     Hemoglobin A1C; Future; Expected date: 05/26/2022  -     POCT Glucose, Hand-Held Device    Chronic maxillary sinusitis  -     cetirizine (ZYRTEC) 10 MG tablet; Take 1 tablet (10 mg total) by mouth once daily.  Dispense: 30 tablet; Refill: 5  -     fluticasone propionate (FLONASE) 50 mcg/actuation nasal spray; 1 spray (50 " mcg total) by Each Nostril route once daily.  Dispense: 15.8 mL; Refill: 5  -     azithromycin (Z-JUDY) 250 MG tablet; Take 2 tablets by mouth on day 1; Take 1 tablet by mouth on days 2-5  Dispense: 6 tablet; Refill: 0            Health Maintenance Due   Topic Date Due    Hepatitis C Screening  Never done    Sign Pain Contract  Never done    Shingles Vaccine (1 of 2) Never done    Colorectal Cancer Screening  10/03/2017    Abdominal Aortic Aneurysm Screening  Never done    Pneumococcal Vaccines (Age 65+) (2 - PPSV23 or PCV20) 10/20/2020    COVID-19 Vaccine (3 - Booster) 07/10/2021       Problem List Items Addressed This Visit        ENT    Chronic maxillary sinusitis    Relevant Medications    cetirizine (ZYRTEC) 10 MG tablet    fluticasone propionate (FLONASE) 50 mcg/actuation nasal spray    azithromycin (Z-JUDY) 250 MG tablet       Endocrine    Type 2 diabetes mellitus with left diabetic foot ulcer - Primary    Relevant Orders    Hemoglobin A1C    POCT Glucose, Hand-Held Device (Completed)            Health Maintenance Topics with due status: Not Due       Topic Last Completion Date    TETANUS VACCINE 05/27/2021    Foot Exam 08/12/2021    Eye Exam 12/29/2021    Diabetes Urine Screening 01/20/2022    Lipid Panel 01/20/2022    Hemoglobin A1c 01/20/2022       Procedures     Future Appointments   Date Time Provider Department Center   5/26/2022  9:45 AM [facility] [place]   6/22/2022  9:30 AM EVARISTO Roberts [facility]   7/28/2022  9:00 AM [facility]ARISTO Roberts [facility]   8/1/2022  9:30 AM AWCIELO NURSE, [facility] [place]   8/22/2022 10:15 AM Olga Monterroso NP [facility]        Follow up in about 3 months (around 8/26/2022) for f\u.       Signature:  Olga Monterroso NP    Date of encounter: 5/26/22

## 2022-06-09 ENCOUNTER — HOSPITAL ENCOUNTER (OUTPATIENT)
Dept: RADIOLOGY | Facility: HOSPITAL | Age: 72
Discharge: HOME | End: 2022-06-09
Attending: NURSE PRACTITIONER
Payer: MEDICARE

## 2022-06-09 ENCOUNTER — CLINICAL SUPPORT (OUTPATIENT)
Dept: WOUND CARE | Facility: HOSPITAL | Age: 72
End: 2022-06-09
Attending: NURSE PRACTITIONER
Payer: MEDICARE

## 2022-06-09 VITALS
TEMPERATURE: 97 F | HEART RATE: 75 BPM | RESPIRATION RATE: 20 BRPM | SYSTOLIC BLOOD PRESSURE: 144 MMHG | DIASTOLIC BLOOD PRESSURE: 80 MMHG

## 2022-06-09 DIAGNOSIS — L03.116 CELLULITIS OF LEFT FOOT: ICD-10-CM

## 2022-06-09 DIAGNOSIS — I83.023 VENOUS STASIS ULCER OF LEFT ANKLE WITH FAT LAYER EXPOSED WITH VARICOSE VEINS: Primary | ICD-10-CM

## 2022-06-09 DIAGNOSIS — L97.322 VENOUS STASIS ULCER OF LEFT ANKLE WITH FAT LAYER EXPOSED WITH VARICOSE VEINS: Primary | ICD-10-CM

## 2022-06-09 PROCEDURE — 93925 LOWER EXTREMITY STUDY: CPT | Mod: TC

## 2022-06-09 PROCEDURE — 97597 DBRDMT OPN WND 1ST 20 CM/<: CPT

## 2022-06-09 PROCEDURE — 97597 DBRDMT OPN WND 1ST 20 CM/<: CPT | Performed by: NURSE PRACTITIONER

## 2022-06-09 PROCEDURE — 87070 CULTURE OTHR SPECIMN AEROBIC: CPT

## 2022-06-09 PROCEDURE — 11042 DBRDMT SUBQ TIS 1ST 20SQCM/<: CPT | Performed by: NURSE PRACTITIONER

## 2022-06-09 RX ORDER — SULFAMETHOXAZOLE AND TRIMETHOPRIM 800; 160 MG/1; MG/1
1 TABLET ORAL 2 TIMES DAILY
Qty: 20 TABLET | Refills: 0 | Status: SHIPPED | OUTPATIENT
Start: 2022-06-09 | End: 2022-06-26 | Stop reason: SDUPTHER

## 2022-06-09 NOTE — PROGRESS NOTES
WOUND CARE, [facility]  [address]  [place] [address]     PATIENT NAME: René Moscoso  : 1950  DATE: 22  MRN: [id number]      Billing Provider: ALANNA MASON [facility]  Level of Service:   Patient PCP Information     Provider PCP Type    Olga Monterroso NP General          Reason for Visit / Chief Complaint: No chief complaint on file.       Update PCP  Update Chief Complaint         History of Present Illness / Problem Focused Workflow     René Moscoso presents to the clinic c/o redness and blisters left inner ankle after he had been out doors working a lot yesterday.   Hx of DM, HTN      Review of Systems     Review of Systems   Constitutional: Negative.  Negative for activity change, appetite change, chills and fever.   Eyes: Negative for visual disturbance.   Respiratory: Negative for cough, chest tightness and shortness of breath.    Cardiovascular: Negative for chest pain.   Gastrointestinal: Negative for abdominal pain, change in bowel habit, nausea, vomiting and change in bowel habit.   Endocrine: Negative for polydipsia, polyphagia and polyuria.   Integumentary:  Positive for wound (left ankle x 2).   Neurological: Negative for weakness and headaches.        Medical / Social / Family History     Past Medical History:   Diagnosis Date    Arthritis     Chronic pain 2021    COPD (chronic obstructive pulmonary disease)     Diabetes type 2, controlled     Hyperlipidemia     Hypertension     Neuropathy     Pressure ulcer of left foot 2020       Past Surgical History:   Procedure Laterality Date    CARDIAC CATHETERIZATION      ESOPHAGOGASTRODUODENOSCOPY  10/22/2014    FLEXIBLE SIGMOIDOSCOPY  10/23/2014    INJECTION OF FACET JOINT Bilateral 2014    Bilateral L3-S1 Facet Injection - 14 and 3/18/14    SINUS SURGERY         Social History    reports that he quit smoking about 11 years ago. His smoking use included cigarettes. He started  smoking about 31 years ago. He has a 40.00 pack-year smoking history. His smokeless tobacco use includes snuff. He reports previous alcohol use. He reports current drug use. Drug: Hydrocodone.    Family History  's family history includes Arthritis in his mother; COPD in his brother; Cancer in his father, mother, and sister; Diabetes in his mother; Heart disease in his mother; Hypertension in his mother.    Medications and Allergies     Medications  No outpatient medications have been marked as taking for the 6/9/22 encounter (Clinical Support) with WOUND CARE, [facility].       Allergies  Review of patient's allergies indicates:  No Known Allergies    Physical Examination   There were no vitals filed for this visit.   Physical Exam  Constitutional:       General: He is not in acute distress.     Appearance: Normal appearance.   Neck:      Thyroid: No thyromegaly.   Cardiovascular:      Rate and Rhythm: Normal rate and regular rhythm.      Pulses:           Dorsalis pedis pulses are 3+ on the left side.      Heart sounds: Normal heart sounds. No murmur heard.  Pulmonary:      Effort: Pulmonary effort is normal. No accessory muscle usage or respiratory distress.      Breath sounds: Normal breath sounds.   Abdominal:      Palpations: Abdomen is soft.   Musculoskeletal:         General: Normal range of motion.      Cervical back: Neck supple.        Feet:    Feet:      Left foot:      Skin integrity: Blister, skin breakdown and erythema present.   Skin:     General: Skin is warm and dry.      Capillary Refill: Capillary refill takes 2 to 3 seconds.      Coloration: Skin is not jaundiced or pale.   Neurological:      Mental Status: He is alert and oriented to person, place, and time.      Cranial Nerves: Cranial nerves are intact.      Gait: Gait is intact.          Assessment and Plan (including Health Maintenance)      Problem List  Smart Sets  Document Outside HM   :    Plan: See wound docs for treatment and plan.  Tissue culture obtained  Arterial dopplers with RYAN's ordered        Health Maintenance Due   Topic Date Due    Hepatitis C Screening  Never done    Sign Pain Contract  Never done    Shingles Vaccine (1 of 2) Never done    Colorectal Cancer Screening  10/03/2017    Abdominal Aortic Aneurysm Screening  Never done    Pneumococcal Vaccines (Age 65+) (2 - PPSV23 or PCV20) 10/20/2020    COVID-19 Vaccine (3 - Booster) 07/10/2021       Problem List Items Addressed This Visit    None     Visit Diagnoses     Venous stasis ulcer of left ankle with fat layer exposed with varicose veins    -  Primary    Cellulitis of left foot        Relevant Orders    US Lower Extrem Arteries Bilat with RYAN (xpd)        Venous stasis ulcer of left ankle with fat layer exposed with varicose veins    Cellulitis of left foot  -     US Lower Extrem Arteries Bilat with RYAN (xpd); Future; Expected date: 06/09/2022    Other orders  -     sulfamethoxazole-trimethoprim 800-160mg (BACTRIM DS) 800-160 mg Tab; Take 1 tablet by mouth 2 (two) times daily.  Dispense: 20 tablet; Refill: 0       Health Maintenance Topics with due status: Not Due       Topic Last Completion Date    TETANUS VACCINE 05/27/2021    Foot Exam 08/12/2021    Eye Exam 12/29/2021    Diabetes Urine Screening 01/20/2022    Lipid Panel 01/20/2022    Hemoglobin A1c 01/20/2022       Procedures     Future Appointments   Date Time Provider Department Center   6/9/2022  1:00 PM [address] [place]    6/22/2022  9:30 AM EVARISTO Roberts [facility]   7/28/2022  9:00 AM EVARISTO Roberts [facility]   8/1/2022  9:30 AM AWV NURSE, [facility] FAMILY MEDICINE [facility] [place]   8/22/2022 10:15 AM [facility] [place]        Follow up in about 1 week (around 6/16/2022).     Signature:  HALIE Collado    Date of encounter: 6/9/22

## 2022-06-11 LAB
BACTERIA TISS AEROBE CULT: ABNORMAL
BACTERIA TISS AEROBE CULT: ABNORMAL

## 2022-06-16 ENCOUNTER — CLINICAL SUPPORT (OUTPATIENT)
Dept: WOUND CARE | Facility: HOSPITAL | Age: 72
End: 2022-06-16
Attending: NURSE PRACTITIONER
Payer: MEDICARE

## 2022-06-16 VITALS
HEART RATE: 73 BPM | TEMPERATURE: 97 F | RESPIRATION RATE: 18 BRPM | DIASTOLIC BLOOD PRESSURE: 75 MMHG | SYSTOLIC BLOOD PRESSURE: 126 MMHG

## 2022-06-16 DIAGNOSIS — L97.322 VENOUS STASIS ULCER OF LEFT ANKLE WITH FAT LAYER EXPOSED WITH VARICOSE VEINS: Primary | ICD-10-CM

## 2022-06-16 DIAGNOSIS — I83.023 VENOUS STASIS ULCER OF LEFT ANKLE WITH FAT LAYER EXPOSED WITH VARICOSE VEINS: Primary | ICD-10-CM

## 2022-06-16 PROCEDURE — 11042 DBRDMT SUBQ TIS 1ST 20SQCM/<: CPT

## 2022-06-16 PROCEDURE — 11042 DBRDMT SUBQ TIS 1ST 20SQCM/<: CPT | Performed by: NURSE PRACTITIONER

## 2022-06-16 NOTE — PROGRESS NOTES
WOUND CARE, [facility]  [address]  [place] [address]     PATIENT NAME: René Moscoso  : 1950  DATE: 22  MRN: [id number]      Billing Provider: WOUND CARE, [facility]  Level of Service:   Patient PCP Information     Provider PCP Type    Olga Monterroso NP General          Reason for Visit / Chief Complaint: No chief complaint on file.       Update PCP  Update Chief Complaint         History of Present Illness / Problem Focused Workflow     René Moscoso presents to the clinic c/o redness and blisters left inner ankle after he had been out doors  Duration: 2022  Context: Edema.  Severity: 2/10   Hx of DM, HTN      Review of Systems     Review of Systems   Constitutional: Negative.  Negative for activity change, appetite change, chills and fever.   Eyes: Negative for visual disturbance.   Respiratory: Negative for cough, chest tightness and shortness of breath.    Cardiovascular: Negative for chest pain.   Gastrointestinal: Negative for abdominal pain, change in bowel habit, nausea, vomiting and change in bowel habit.   Endocrine: Negative for polydipsia, polyphagia and polyuria.   Integumentary:  Positive for wound (left ankle x 2).   Neurological: Negative for weakness and headaches.        Medical / Social / Family History     Past Medical History:   Diagnosis Date    Arthritis     Chronic pain 2021    COPD (chronic obstructive pulmonary disease)     Diabetes type 2, controlled     Hyperlipidemia     Hypertension     Neuropathy     Pressure ulcer of left foot 2020       Past Surgical History:   Procedure Laterality Date    CARDIAC CATHETERIZATION      ESOPHAGOGASTRODUODENOSCOPY  10/22/2014    FLEXIBLE SIGMOIDOSCOPY  10/23/2014    INJECTION OF FACET JOINT Bilateral 2014    Bilateral L3-S1 Facet Injection - 14 and 3/18/14    SINUS SURGERY         Social History    reports that he quit smoking about 11 years ago. His smoking use  included cigarettes. He started smoking about 31 years ago. He has a 40.00 pack-year smoking history. His smokeless tobacco use includes snuff. He reports previous alcohol use. He reports current drug use. Drug: Hydrocodone.    Family History  's family history includes Arthritis in his mother; COPD in his brother; Cancer in his father, mother, and sister; Diabetes in his mother; Heart disease in his mother; Hypertension in his mother.    Medications and Allergies     Medications  No outpatient medications have been marked as taking for the 6/16/22 encounter (Clinical Support) with WOUND CARE, [facility].       Allergies  Review of patient's allergies indicates:  No Known Allergies    Physical Examination   There were no vitals filed for this visit.   Physical Exam  Constitutional:       General: He is not in acute distress.     Appearance: Normal appearance.   Neck:      Thyroid: No thyromegaly.   Cardiovascular:      Rate and Rhythm: Normal rate and regular rhythm.      Pulses:           Dorsalis pedis pulses are 3+ on the left side.      Heart sounds: Normal heart sounds. No murmur heard.  Pulmonary:      Effort: Pulmonary effort is normal. No accessory muscle usage or respiratory distress.      Breath sounds: Normal breath sounds.   Abdominal:      Palpations: Abdomen is soft.   Musculoskeletal:         General: Normal range of motion.      Cervical back: Neck supple.        Feet:    Feet:      Left foot:      Skin integrity: Blister, skin breakdown and erythema present.   Skin:     General: Skin is warm and dry.      Capillary Refill: Capillary refill takes 2 to 3 seconds.      Coloration: Skin is not jaundiced or pale.   Neurological:      Mental Status: He is alert and oriented to person, place, and time.      Cranial Nerves: Cranial nerves are intact.      Gait: Gait is intact.          Assessment and Plan (including Health Maintenance)      Problem List  Smart Sets  Document Outside HM   :    Plan: See  wound docs for treatment and plan. Complete abx  EXAMINATION:  US ARTERIAL LOWER EXTREMITY BILAT WITH RYAN (XPD)     CLINICAL HISTORY:  Cellulitis of left lower limb     TECHNIQUE:  Grayscale and color flow images of both lower extremities were obtained. Blood pressures were also obtained at the upper arms and lower extremities bilaterally (where applicable).     COMPARISON:  2020     FINDINGS:  Right RYAN 1.0 and left RYAN 1.1.      Health Maintenance Due   Topic Date Due    Hepatitis C Screening  Never done    Sign Pain Contract  Never done    Shingles Vaccine (1 of 2) Never done    Colorectal Cancer Screening  10/03/2017    Abdominal Aortic Aneurysm Screening  Never done    Pneumococcal Vaccines (Age 65+) (2 - PPSV23 or PCV20) 10/20/2020    COVID-19 Vaccine (3 - Booster) 07/10/2021    Foot Exam  08/12/2022       Problem List Items Addressed This Visit    None     Visit Diagnoses     Venous stasis ulcer of left ankle with fat layer exposed with varicose veins    -  Primary        Venous stasis ulcer of left ankle with fat layer exposed with varicose veins       Health Maintenance Topics with due status: Not Due       Topic Last Completion Date    TETANUS VACCINE 05/27/2021    Eye Exam 12/29/2021    Diabetes Urine Screening 01/20/2022    Lipid Panel 01/20/2022    Hemoglobin A1c 01/20/2022       Procedures     Future Appointments   Date Time Provider Department Center   6/22/2022  9:30 AM EVARISTO Roberts [facility]   7/28/2022  9:00 [facility]   8/1/2022  9:30 AM MERYL [facility]   8/22/2022 10:15 AM Olga Monterroso NP [facility]        Follow up in about 1 week (around 6/23/2022).     Signature:  HALIE Collado    Date of encounter: 6/16/22       The patient is a 68y Female complaining of abdominal pain.

## 2022-06-20 ENCOUNTER — DOCUMENTATION ONLY (OUTPATIENT)
Dept: FAMILY MEDICINE | Facility: CLINIC | Age: 72
End: 2022-06-20
Payer: MEDICARE

## 2022-06-20 NOTE — PROGRESS NOTES
"Adriel  sent note for dos 06/07/2022     Blood Sugars    6/1 ,   6/2 ,   6/3 , PM 86  6/4 ,   6/5 , PM 97  6/6 ,   6/7       Patient is currently taking levemir 96 units daily, novolog mix 70/30 96 units BID, rybelsus 14mg po daily, and synjardy XR 12.5-1000 2 po daily.     Any changes?     06/08/2022 Olga states " Add Tradjenta 5mg 1 tablet po daily x 2 weeks and RTC. Please notify pt to  samples at clinic."    Adriel  sent over note for dos 06/14/2022    BS Report    6/8 ,   6/9 ,   6/10 ,   6/11 ,   6/12 ,   6/13 ,   6/14     Patient taking levemir 96 units daily, novolog 70/30 96 units BID, rybelsus 14mg po daily, synjardy XR 12.5-1000 2 tablets daily, tradjenta 5mg po daily.    Changes?    06/14/2022 Olga states " Watch diet. Send to endocrinology."    "

## 2022-06-23 ENCOUNTER — CLINICAL SUPPORT (OUTPATIENT)
Dept: WOUND CARE | Facility: HOSPITAL | Age: 72
End: 2022-06-23
Attending: NURSE PRACTITIONER
Payer: MEDICARE

## 2022-06-23 VITALS
TEMPERATURE: 98 F | SYSTOLIC BLOOD PRESSURE: 152 MMHG | DIASTOLIC BLOOD PRESSURE: 78 MMHG | RESPIRATION RATE: 20 BRPM | HEART RATE: 76 BPM

## 2022-06-23 DIAGNOSIS — I83.023 VENOUS STASIS ULCER OF LEFT ANKLE WITH FAT LAYER EXPOSED WITH VARICOSE VEINS: Primary | ICD-10-CM

## 2022-06-23 DIAGNOSIS — L97.322 VENOUS STASIS ULCER OF LEFT ANKLE WITH FAT LAYER EXPOSED WITH VARICOSE VEINS: Primary | ICD-10-CM

## 2022-06-23 PROCEDURE — 11042 DBRDMT SUBQ TIS 1ST 20SQCM/<: CPT

## 2022-06-23 PROCEDURE — 87070 CULTURE OTHR SPECIMN AEROBIC: CPT

## 2022-06-23 PROCEDURE — 11042 DBRDMT SUBQ TIS 1ST 20SQCM/<: CPT | Performed by: NURSE PRACTITIONER

## 2022-06-23 NOTE — PROGRESS NOTES
WOUND CARE, [facility]  [address]  [place] [address]     PATIENT NAME: René Moscoso  : 1950  DATE: 22  MRN: [id number]      Billing Provider: WOUND CARE, [facility]  Level of Service:   Patient PCP Information     Provider PCP Type    Olga Monterroso NP General          Reason for Visit / Chief Complaint: No chief complaint on file.       Update PCP  Update Chief Complaint         History of Present Illness / Problem Focused Workflow     René Moscoso presents to the clinic c/o redness and blisters left inner ankle after he had been out doors  Duration: 2022  Context: Edema.  Severity: 2/10   Hx of DM, HTN      Review of Systems     Review of Systems   Constitutional: Negative.  Negative for activity change, appetite change, chills and fever.   Eyes: Negative for visual disturbance.   Respiratory: Negative for cough, chest tightness and shortness of breath.    Cardiovascular: Negative for chest pain.   Gastrointestinal: Negative for abdominal pain, change in bowel habit, nausea, vomiting and change in bowel habit.   Endocrine: Negative for polydipsia, polyphagia and polyuria.   Integumentary:  Positive for wound (left ankle x 2).   Neurological: Negative for weakness and headaches.        Medical / Social / Family History     Past Medical History:   Diagnosis Date    Arthritis     Chronic pain 2021    COPD (chronic obstructive pulmonary disease)     Diabetes type 2, controlled     Hyperlipidemia     Hypertension     Neuropathy     Pressure ulcer of left foot 2020       Past Surgical History:   Procedure Laterality Date    CARDIAC CATHETERIZATION      ESOPHAGOGASTRODUODENOSCOPY  10/22/2014    FLEXIBLE SIGMOIDOSCOPY  10/23/2014    INJECTION OF FACET JOINT Bilateral 2014    Bilateral L3-S1 Facet Injection - 14 and 3/18/14    SINUS SURGERY         Social History    reports that he quit smoking about 11 years ago. His smoking use  included cigarettes. He started smoking about 31 years ago. He has a 40.00 pack-year smoking history. His smokeless tobacco use includes snuff. He reports previous alcohol use. He reports current drug use. Drug: Hydrocodone.    Family History  's family history includes Arthritis in his mother; COPD in his brother; Cancer in his father, mother, and sister; Diabetes in his mother; Heart disease in his mother; Hypertension in his mother.    Medications and Allergies     Medications  No outpatient medications have been marked as taking for the 6/23/22 encounter (Clinical Support) with WOUND CARE, [facility].       Allergies  Review of patient's allergies indicates:  No Known Allergies    Physical Examination   There were no vitals filed for this visit.   Physical Exam  Constitutional:       General: He is not in acute distress.     Appearance: Normal appearance.   Neck:      Thyroid: No thyromegaly.   Cardiovascular:      Rate and Rhythm: Normal rate and regular rhythm.      Pulses:           Dorsalis pedis pulses are 3+ on the left side.      Heart sounds: Normal heart sounds. No murmur heard.  Pulmonary:      Effort: Pulmonary effort is normal. No accessory muscle usage or respiratory distress.      Breath sounds: Normal breath sounds.   Abdominal:      Palpations: Abdomen is soft.   Musculoskeletal:         General: Normal range of motion.      Cervical back: Neck supple.        Feet:    Feet:      Left foot:      Skin integrity: Blister, skin breakdown and erythema present.   Skin:     General: Skin is warm and dry.      Capillary Refill: Capillary refill takes 2 to 3 seconds.      Coloration: Skin is not jaundiced or pale.   Neurological:      Mental Status: He is alert and oriented to person, place, and time.      Gait: Gait is intact.          Assessment and Plan (including Health Maintenance)      Problem List  Smart Sets  Document Outside HM   :    Plan: See wound docs for treatment and plan. Complete  abx  EXAMINATION:  US ARTERIAL LOWER EXTREMITY BILAT WITH RYAN (XPD)     CLINICAL HISTORY:  Cellulitis of left lower limb     TECHNIQUE:  Grayscale and color flow images of both lower extremities were obtained. Blood pressures were also obtained at the upper arms and lower extremities bilaterally (where applicable).     COMPARISON:  2020     FINDINGS:  Right RYAN 1.0 and left RYAN 1.1.      Health Maintenance Due   Topic Date Due    Hepatitis C Screening  Never done    Sign Pain Contract  Never done    Shingles Vaccine (1 of 2) Never done    Colorectal Cancer Screening  10/03/2017    Abdominal Aortic Aneurysm Screening  Never done    Pneumococcal Vaccines (Age 65+) (2 - PPSV23 or PCV20) 10/20/2020    COVID-19 Vaccine (3 - Booster) 07/10/2021    Foot Exam  08/12/2022       Problem List Items Addressed This Visit    None     Visit Diagnoses     Venous stasis ulcer of left ankle with fat layer exposed with varicose veins    -  Primary    Relevant Orders    Culture, Tissue        Venous stasis ulcer of left ankle with fat layer exposed with varicose veins  -     Culture, Tissue       Health Maintenance Topics with due status: Not Due       Topic Last Completion Date    TETANUS VACCINE 05/27/2021    Eye Exam 12/29/2021    Diabetes Urine Screening 01/20/2022    Lipid Panel 01/20/2022    Hemoglobin A1c 01/20/2022       Procedures     Future Appointments   Date Time Provider Department Center   7/28/2022  9:00 AM EVARISTO Roberts [facility]   8/1/2022  9:30 AM AWV NURSE [facility] [place]   8/22/2022 10:15 AM Olga Monterroso NP [facility]        Follow up in about 1 week (around 6/30/2022).     Signature:  HALIE Collado    Date of encounter: 6/23/22

## 2022-06-25 LAB — BACTERIA TISS AEROBE CULT: ABNORMAL

## 2022-06-26 RX ORDER — SULFAMETHOXAZOLE AND TRIMETHOPRIM 800; 160 MG/1; MG/1
1 TABLET ORAL 2 TIMES DAILY
Qty: 20 TABLET | Refills: 0 | Status: SHIPPED | OUTPATIENT
Start: 2022-06-26 | End: 2022-08-11

## 2022-06-30 ENCOUNTER — CLINICAL SUPPORT (OUTPATIENT)
Dept: WOUND CARE | Facility: HOSPITAL | Age: 72
End: 2022-06-30
Attending: NURSE PRACTITIONER
Payer: MEDICARE

## 2022-06-30 VITALS
DIASTOLIC BLOOD PRESSURE: 80 MMHG | HEART RATE: 80 BPM | SYSTOLIC BLOOD PRESSURE: 138 MMHG | RESPIRATION RATE: 20 BRPM | TEMPERATURE: 97 F

## 2022-06-30 DIAGNOSIS — I83.023 VENOUS STASIS ULCER OF LEFT ANKLE WITH FAT LAYER EXPOSED WITH VARICOSE VEINS: Primary | ICD-10-CM

## 2022-06-30 DIAGNOSIS — L97.322 VENOUS STASIS ULCER OF LEFT ANKLE WITH FAT LAYER EXPOSED WITH VARICOSE VEINS: Primary | ICD-10-CM

## 2022-06-30 PROCEDURE — 11042 DBRDMT SUBQ TIS 1ST 20SQCM/<: CPT

## 2022-06-30 PROCEDURE — 11042 DBRDMT SUBQ TIS 1ST 20SQCM/<: CPT | Performed by: NURSE PRACTITIONER

## 2022-06-30 NOTE — PROGRESS NOTES
WOUND CARE, [facility]  [address]  [place] [address]     PATIENT NAME: René Moscoso  : 1950  DATE: 22  MRN: [id number]      Billing Provider: WOUND CARE, [facility]  Level of Service:   Patient PCP Information     Provider PCP Type    Olga Monterroso NP General          Reason for Visit / Chief Complaint: No chief complaint on file.       Update PCP  Update Chief Complaint         History of Present Illness / Problem Focused Workflow     René Moscoso presents to the clinic c/o redness and blisters left inner ankle after he had been out doors  Duration: 2022  Context: Edema.  Severity: 2/10   Hx of DM, HTN      Review of Systems     Review of Systems   Constitutional: Negative.  Negative for activity change, appetite change, chills and fever.   Eyes: Negative for visual disturbance.   Respiratory: Negative for cough, chest tightness and shortness of breath.    Cardiovascular: Negative for chest pain.   Gastrointestinal: Negative for abdominal pain, change in bowel habit, nausea, vomiting and change in bowel habit.   Endocrine: Negative for polydipsia, polyphagia and polyuria.   Integumentary:  Positive for wound (left ankle x 2).   Neurological: Negative for weakness and headaches.        Medical / Social / Family History     Past Medical History:   Diagnosis Date    Arthritis     Chronic pain 2021    COPD (chronic obstructive pulmonary disease)     Diabetes type 2, controlled     Hyperlipidemia     Hypertension     Neuropathy     Pressure ulcer of left foot 2020       Past Surgical History:   Procedure Laterality Date    CARDIAC CATHETERIZATION      ESOPHAGOGASTRODUODENOSCOPY  10/22/2014    FLEXIBLE SIGMOIDOSCOPY  10/23/2014    INJECTION OF FACET JOINT Bilateral 2014    Bilateral L3-S1 Facet Injection - 14 and 3/18/14    SINUS SURGERY         Social History    reports that he quit smoking about 11 years ago. His smoking use  included cigarettes. He started smoking about 31 years ago. He has a 40.00 pack-year smoking history. His smokeless tobacco use includes snuff. He reports previous alcohol use. He reports current drug use. Drug: Hydrocodone.    Family History  's family history includes Arthritis in his mother; COPD in his brother; Cancer in his father, mother, and sister; Diabetes in his mother; Heart disease in his mother; Hypertension in his mother.    Medications and Allergies     Medications  No outpatient medications have been marked as taking for the 6/30/22 encounter (Clinical Support) with WOUND CARE, [facility].       Allergies  Review of patient's allergies indicates:  No Known Allergies    Physical Examination   There were no vitals filed for this visit.   Physical Exam  Constitutional:       General: He is not in acute distress.     Appearance: Normal appearance.   Neck:      Thyroid: No thyromegaly.   Cardiovascular:      Rate and Rhythm: Normal rate and regular rhythm.      Pulses:           Dorsalis pedis pulses are 3+ on the left side.      Heart sounds: Normal heart sounds. No murmur heard.  Pulmonary:      Effort: Pulmonary effort is normal. No accessory muscle usage or respiratory distress.      Breath sounds: Normal breath sounds.   Abdominal:      Palpations: Abdomen is soft.   Musculoskeletal:         General: Normal range of motion.      Cervical back: Neck supple.        Feet:    Feet:      Left foot:      Skin integrity: Blister, skin breakdown and erythema present.   Skin:     General: Skin is warm and dry.      Capillary Refill: Capillary refill takes 2 to 3 seconds.      Coloration: Skin is not jaundiced or pale.   Neurological:      Mental Status: He is alert and oriented to person, place, and time.      Gait: Gait is intact.          Assessment and Plan (including Health Maintenance)      Problem List  Smart Sets  Document Outside HM   :    Plan: See wound docs for treatment and plan. Complete  abx  EXAMINATION:  US ARTERIAL LOWER EXTREMITY BILAT WITH RYAN (XPD)     CLINICAL HISTORY:  Cellulitis of left lower limb     TECHNIQUE:  Grayscale and color flow images of both lower extremities were obtained. Blood pressures were also obtained at the upper arms and lower extremities bilaterally (where applicable).     COMPARISON:  2020     FINDINGS:  Right RYAN 1.0 and left RYAN 1.1.      Health Maintenance Due   Topic Date Due    Hepatitis C Screening  Never done    Sign Pain Contract  Never done    Shingles Vaccine (1 of 2) Never done    Colorectal Cancer Screening  10/03/2017    Abdominal Aortic Aneurysm Screening  Never done    Pneumococcal Vaccines (Age 65+) (2 - PPSV23 or PCV20) 10/20/2020    COVID-19 Vaccine (3 - Booster) 07/10/2021    Foot Exam  08/12/2022       Problem List Items Addressed This Visit    None     Visit Diagnoses     Venous stasis ulcer of left ankle with fat layer exposed with varicose veins    -  Primary        Venous stasis ulcer of left ankle with fat layer exposed with varicose veins       Health Maintenance Topics with due status: Not Due       Topic Last Completion Date    TETANUS VACCINE 05/27/2021    Eye Exam 12/29/2021    Diabetes Urine Screening 01/20/2022    Lipid Panel 01/20/2022    Hemoglobin A1c 01/20/2022       Procedures     Future Appointments   Date Time Provider Department Center   7/28/2022  9:00 AM EVARISTO Roberts [facility]   8/1/2022  9:30 AM AWV NURSEJAMIE [facility]   8/22/2022 10:15 AM Olga Monterroso NP [facility]        Follow up in about 1 week (around 7/7/2022).     Signature:  HALIE Collado    Date of encounter: 6/30/22

## 2022-07-07 ENCOUNTER — CLINICAL SUPPORT (OUTPATIENT)
Dept: WOUND CARE | Facility: HOSPITAL | Age: 72
End: 2022-07-07
Attending: NURSE PRACTITIONER
Payer: MEDICARE

## 2022-07-07 VITALS
HEART RATE: 80 BPM | SYSTOLIC BLOOD PRESSURE: 123 MMHG | RESPIRATION RATE: 20 BRPM | TEMPERATURE: 98 F | DIASTOLIC BLOOD PRESSURE: 67 MMHG

## 2022-07-07 DIAGNOSIS — L97.811 NON-PRESSURE CHRONIC ULCER OF OTHER PART OF RIGHT LOWER LEG LIMITED TO BREAKDOWN OF SKIN: ICD-10-CM

## 2022-07-07 DIAGNOSIS — L97.322 VENOUS STASIS ULCER OF LEFT ANKLE WITH FAT LAYER EXPOSED WITH VARICOSE VEINS: Primary | ICD-10-CM

## 2022-07-07 DIAGNOSIS — E11.621 TYPE 2 DIABETES MELLITUS WITH LEFT DIABETIC FOOT ULCER: ICD-10-CM

## 2022-07-07 DIAGNOSIS — L97.529 TYPE 2 DIABETES MELLITUS WITH LEFT DIABETIC FOOT ULCER: ICD-10-CM

## 2022-07-07 DIAGNOSIS — I83.023 VENOUS STASIS ULCER OF LEFT ANKLE WITH FAT LAYER EXPOSED WITH VARICOSE VEINS: Primary | ICD-10-CM

## 2022-07-07 PROCEDURE — 11042 DBRDMT SUBQ TIS 1ST 20SQCM/<: CPT

## 2022-07-07 NOTE — PROGRESS NOTES
Subjective:       Patient ID: René Moscoso is a 71 y.o. male.    Chief Complaint: Wound Care    This information was obtained from the patient  The following HPI elements were documented for the patient's wound:  Location: left ankle  Duration: onset 6/3/22  Context: venous  Modifying Factors: edema, hyperglycemia  Associated Signs and Symptoms: no pain currently reported  72 y/o WM seen today for wound to left ankle. He states that leg edema caused blistering which opened. He previously had wound on right lower extremity which has healed. He states blood glucose 180 this am    Review of Systems   Constitutional: Negative for chills, fatigue and fever.   Cardiovascular: Negative for chest pain.   Gastrointestinal: Negative for diarrhea and nausea.   Skin: Positive for wound.       Objective:      Physical Exam  Constitutional:       Appearance: Normal appearance. He is obese.   Eyes:      Conjunctiva/sclera: Conjunctivae normal.      Pupils: Pupils are equal, round, and reactive to light.   Pulmonary:      Effort: Pulmonary effort is normal.   Skin:     Comments: See wound description   Neurological:      Mental Status: He is alert.   Psychiatric:         Mood and Affect: Mood normal.         Behavior: Behavior normal.         Thought Content: Thought content normal.         Judgment: Judgment normal.         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 - for clinic only    Hand Hygiene/Smoking Cessation  Wash hands before and after wound care. Call the Wound Center at [phone] if you have signs or symptoms of infection, increased drainage, increasing odor, or unusual redness. After Wound Care hours, please notify your PCP or go to the ER.    Cleanser  Cleanse Wound with Normal Saline    Dressings  Apply dressing. - silvercel, border gauze, spandagrip size F, change qd and prn    Compression/Edema Control  Elevate leg(s) as much as possible. Avoid standing in one position for more than 10 minutes. Avoid settings with legs down. Do not cross legs when sitting.    Dietary  Supplement with daily multivitamin.  Vitamin C 500 mg. twice a day.    Follow-Up Appointments  Return Appointment 1 week - for wound care    Scribing Attestation  I attest, as the nurse, that I scribed these orders for the physician.  I, as the physician, have reviewed the orders scribed by the center RN's and agree.    Additional Orders:    Home Health  Home Health Care: If you have any questions or concerns please contact the wound center.  1. Home Health-Skilled Nurse for dressing change______x on clinic wk_______X on Non-Clinic Wk. - Home Health-Skilled Nurse for dressing change__2____x on clinic wk_____3

## 2022-07-14 ENCOUNTER — CLINICAL SUPPORT (OUTPATIENT)
Dept: WOUND CARE | Facility: HOSPITAL | Age: 72
End: 2022-07-14
Attending: NURSE PRACTITIONER
Payer: MEDICARE

## 2022-07-14 VITALS
SYSTOLIC BLOOD PRESSURE: 114 MMHG | DIASTOLIC BLOOD PRESSURE: 67 MMHG | RESPIRATION RATE: 17 BRPM | HEART RATE: 68 BPM | TEMPERATURE: 98 F

## 2022-07-14 DIAGNOSIS — L97.322 VENOUS STASIS ULCER OF LEFT ANKLE WITH FAT LAYER EXPOSED WITH VARICOSE VEINS: Primary | ICD-10-CM

## 2022-07-14 DIAGNOSIS — I83.023 VENOUS STASIS ULCER OF LEFT ANKLE WITH FAT LAYER EXPOSED WITH VARICOSE VEINS: Primary | ICD-10-CM

## 2022-07-14 PROBLEM — L89.892 PRESSURE INJURY OF LEFT FOOT, STAGE 2: Status: RESOLVED | Noted: 2021-08-02 | Resolved: 2022-07-14

## 2022-07-14 PROBLEM — E11.9 DIABETES MELLITUS WITHOUT COMPLICATION: Status: RESOLVED | Noted: 2021-05-20 | Resolved: 2022-07-14

## 2022-07-14 PROBLEM — Z20.822 EXPOSURE TO COVID-19 VIRUS: Status: RESOLVED | Noted: 2021-08-16 | Resolved: 2022-07-14

## 2022-07-14 PROBLEM — J44.89 OBSTRUCTIVE CHRONIC BRONCHITIS WITHOUT EXACERBATION: Status: RESOLVED | Noted: 2021-05-20 | Resolved: 2022-07-14

## 2022-07-14 PROBLEM — T25.222A PARTIAL THICKNESS BURN OF LEFT FOOT: Status: RESOLVED | Noted: 2022-03-16 | Resolved: 2022-07-14

## 2022-07-14 PROCEDURE — 99213 OFFICE O/P EST LOW 20 MIN: CPT | Performed by: NURSE PRACTITIONER

## 2022-07-14 PROCEDURE — 99213 OFFICE O/P EST LOW 20 MIN: CPT

## 2022-07-14 NOTE — PROGRESS NOTES
WOUND CARE, [facility]  [address]  [place] [address]     PATIENT NAME: René Moscoso  : 1950  DATE: 22  MRN: [id number]      Billing Provider: WOUND CARE, [facility]  Level of Service:   Patient PCP Information     Provider PCP Type    Olga Monterroso NP General          Reason for Visit / Chief Complaint: No chief complaint on file.       Update PCP  Update Chief Complaint         History of Present Illness / Problem Focused Workflow     René Moscoso presents to the clinic c/o redness and blisters left inner ankle after he had been out doors  Duration: 2022  Context: Edema.  Severity: 2/10   Hx of DM, HTN      Review of Systems     Review of Systems   Constitutional: Negative.  Negative for activity change, appetite change, chills and fever.   Eyes: Negative for visual disturbance.   Respiratory: Negative for cough, chest tightness and shortness of breath.    Cardiovascular: Negative for chest pain.   Gastrointestinal: Negative for abdominal pain, change in bowel habit, nausea, vomiting and change in bowel habit.   Endocrine: Negative for polydipsia, polyphagia and polyuria.   Integumentary:  Positive for wound (left ankle x 2).   Neurological: Negative for weakness and headaches.        Medical / Social / Family History     Past Medical History:   Diagnosis Date    Arthritis     Chronic pain 2021    COPD (chronic obstructive pulmonary disease)     Diabetes type 2, controlled     Hyperlipidemia     Neuropathy        Past Surgical History:   Procedure Laterality Date    CARDIAC CATHETERIZATION      ESOPHAGOGASTRODUODENOSCOPY  10/22/2014    FLEXIBLE SIGMOIDOSCOPY  10/23/2014    INJECTION OF FACET JOINT Bilateral 2014    Bilateral L3-S1 Facet Injection - 14 and 3/18/14    SINUS SURGERY         Social History    reports that he quit smoking about 11 years ago. His smoking use included cigarettes. He started smoking about 31 years ago. He  has a 40.00 pack-year smoking history. His smokeless tobacco use includes snuff. He reports previous alcohol use. He reports current drug use. Drug: Hydrocodone.    Family History  's family history includes Arthritis in his mother; COPD in his brother; Cancer in his father, mother, and sister; Diabetes in his mother; Heart disease in his mother; Hypertension in his mother.    Medications and Allergies     Medications  No outpatient medications have been marked as taking for the 7/14/22 encounter (Clinical Support) with WOUND CARE, [facility].       Allergies  Review of patient's allergies indicates:  No Known Allergies    Physical Examination   There were no vitals filed for this visit.   Physical Exam  Constitutional:       General: He is not in acute distress.     Appearance: Normal appearance.   Neck:      Thyroid: No thyromegaly.   Cardiovascular:      Rate and Rhythm: Normal rate and regular rhythm.      Pulses:           Dorsalis pedis pulses are 3+ on the left side.      Heart sounds: Normal heart sounds. No murmur heard.  Pulmonary:      Effort: Pulmonary effort is normal. No accessory muscle usage or respiratory distress.      Breath sounds: Normal breath sounds.   Abdominal:      Palpations: Abdomen is soft.   Musculoskeletal:         General: Normal range of motion.      Cervical back: Neck supple.        Feet:    Feet:      Left foot:      Skin integrity: Blister, skin breakdown and erythema present.   Skin:     General: Skin is warm and dry.      Capillary Refill: Capillary refill takes 2 to 3 seconds.      Coloration: Skin is not jaundiced or pale.   Neurological:      Mental Status: He is alert and oriented to person, place, and time.      Gait: Gait is intact.          Assessment and Plan (including Health Maintenance)      Problem List  Smart Sets  Document Outside HM   :    Plan: See wound docs for treatment and plan. Avoid adhesive to skin, wrap dressing with coban  EXAMINATION:  US ARTERIAL  LOWER EXTREMITY BILAT WITH RYAN (XPD)     CLINICAL HISTORY:  Cellulitis of left lower limb     TECHNIQUE:  Grayscale and color flow images of both lower extremities were obtained. Blood pressures were also obtained at the upper arms and lower extremities bilaterally (where applicable).     COMPARISON:  2020     FINDINGS:  Right RYAN 1.0 and left RYAN 1.1.      Health Maintenance Due   Topic Date Due    Hepatitis C Screening  Never done    Sign Pain Contract  Never done    Shingles Vaccine (1 of 2) Never done    Colorectal Cancer Screening  10/03/2017    Abdominal Aortic Aneurysm Screening  Never done    Pneumococcal Vaccines (Age 65+) (3 - PPSV23 or PCV20) 10/20/2020    COVID-19 Vaccine (3 - Booster) 07/10/2021    Hemoglobin A1c  07/20/2022    Foot Exam  08/12/2022       Problem List Items Addressed This Visit    None     Visit Diagnoses     Venous stasis ulcer of left ankle with fat layer exposed with varicose veins    -  Primary        Venous stasis ulcer of left ankle with fat layer exposed with varicose veins       Health Maintenance Topics with due status: Not Due       Topic Last Completion Date    TETANUS VACCINE 05/27/2021    Influenza Vaccine 10/04/2021    Eye Exam 12/29/2021    Diabetes Urine Screening 01/20/2022    Lipid Panel 01/20/2022       Procedures     Future Appointments   Date Time Provider Department Center   7/28/2022  9:00 AM EVARISTO Roberts RAS[facility]AREED [facility]   8/1/2022  9:30 AM AWCIELO NURSEJAMIE [facility]   8/22/2022 10:15 AM Olga Monterroso NP [facility]        Follow up in about 1 week (around 7/21/2022).     Signature:  HALIE Collado    Date of encounter: 7/14/22

## 2022-07-19 RX ORDER — AZITHROMYCIN 250 MG/1
TABLET, FILM COATED ORAL
Qty: 6 TABLET | Refills: 0 | Status: SHIPPED | OUTPATIENT
Start: 2022-07-19 | End: 2022-07-20 | Stop reason: SDUPTHER

## 2022-07-19 RX ORDER — CHLORPHENIRAMINE MALEATE AND PHENYLEPHRINE HYDROCHLORIDE 4; 10 MG/1; MG/1
1 TABLET, COATED ORAL EVERY 6 HOURS PRN
Qty: 30 TABLET | Refills: 0 | Status: SHIPPED | OUTPATIENT
Start: 2022-07-19 | End: 2022-07-29

## 2022-07-20 ENCOUNTER — OFFICE VISIT (OUTPATIENT)
Dept: FAMILY MEDICINE | Facility: CLINIC | Age: 72
End: 2022-07-20
Payer: MEDICARE

## 2022-07-20 VITALS
TEMPERATURE: 98 F | HEIGHT: 75 IN | WEIGHT: 294.5 LBS | HEART RATE: 87 BPM | RESPIRATION RATE: 20 BRPM | DIASTOLIC BLOOD PRESSURE: 70 MMHG | SYSTOLIC BLOOD PRESSURE: 134 MMHG | BODY MASS INDEX: 36.62 KG/M2 | OXYGEN SATURATION: 98 %

## 2022-07-20 DIAGNOSIS — R05.9 COUGH: ICD-10-CM

## 2022-07-20 DIAGNOSIS — Z20.822 EXPOSURE TO COVID-19 VIRUS: ICD-10-CM

## 2022-07-20 DIAGNOSIS — U07.1 COVID: Primary | ICD-10-CM

## 2022-07-20 DIAGNOSIS — U07.1 COVID: ICD-10-CM

## 2022-07-20 PROCEDURE — 3075F PR MOST RECENT SYSTOLIC BLOOD PRESS GE 130-139MM HG: ICD-10-PCS | Mod: ,,, | Performed by: NURSE PRACTITIONER

## 2022-07-20 PROCEDURE — 1101F PT FALLS ASSESS-DOCD LE1/YR: CPT | Mod: ,,, | Performed by: NURSE PRACTITIONER

## 2022-07-20 PROCEDURE — 3051F HG A1C>EQUAL 7.0%<8.0%: CPT | Mod: ,,, | Performed by: NURSE PRACTITIONER

## 2022-07-20 PROCEDURE — 3066F PR DOCUMENTATION OF TREATMENT FOR NEPHROPATHY: ICD-10-PCS | Mod: ,,, | Performed by: NURSE PRACTITIONER

## 2022-07-20 PROCEDURE — 3008F PR BODY MASS INDEX (BMI) DOCUMENTED: ICD-10-PCS | Mod: ,,, | Performed by: NURSE PRACTITIONER

## 2022-07-20 PROCEDURE — 1126F AMNT PAIN NOTED NONE PRSNT: CPT | Mod: ,,, | Performed by: NURSE PRACTITIONER

## 2022-07-20 PROCEDURE — 3066F NEPHROPATHY DOC TX: CPT | Mod: ,,, | Performed by: NURSE PRACTITIONER

## 2022-07-20 PROCEDURE — 3060F POS MICROALBUMINURIA REV: CPT | Mod: ,,, | Performed by: NURSE PRACTITIONER

## 2022-07-20 PROCEDURE — 3051F PR MOST RECENT HEMOGLOBIN A1C LEVEL 7.0 - < 8.0%: ICD-10-PCS | Mod: ,,, | Performed by: NURSE PRACTITIONER

## 2022-07-20 PROCEDURE — 99214 OFFICE O/P EST MOD 30 MIN: CPT | Mod: ,,, | Performed by: NURSE PRACTITIONER

## 2022-07-20 PROCEDURE — 3075F SYST BP GE 130 - 139MM HG: CPT | Mod: ,,, | Performed by: NURSE PRACTITIONER

## 2022-07-20 PROCEDURE — 4010F ACE/ARB THERAPY RXD/TAKEN: CPT | Mod: ,,, | Performed by: NURSE PRACTITIONER

## 2022-07-20 PROCEDURE — 99214 PR OFFICE/OUTPT VISIT, EST, LEVL IV, 30-39 MIN: ICD-10-PCS | Mod: ,,, | Performed by: NURSE PRACTITIONER

## 2022-07-20 PROCEDURE — 4010F PR ACE/ARB THEARPY RXD/TAKEN: ICD-10-PCS | Mod: ,,, | Performed by: NURSE PRACTITIONER

## 2022-07-20 PROCEDURE — 3288F PR FALLS RISK ASSESSMENT DOCUMENTED: ICD-10-PCS | Mod: ,,, | Performed by: NURSE PRACTITIONER

## 2022-07-20 PROCEDURE — 1126F PR PAIN SEVERITY QUANTIFIED, NO PAIN PRESENT: ICD-10-PCS | Mod: ,,, | Performed by: NURSE PRACTITIONER

## 2022-07-20 PROCEDURE — 3008F BODY MASS INDEX DOCD: CPT | Mod: ,,, | Performed by: NURSE PRACTITIONER

## 2022-07-20 PROCEDURE — 3060F PR POS MICROALBUMINURIA RESULT DOCUMENTED/REVIEW: ICD-10-PCS | Mod: ,,, | Performed by: NURSE PRACTITIONER

## 2022-07-20 PROCEDURE — 3078F DIAST BP <80 MM HG: CPT | Mod: ,,, | Performed by: NURSE PRACTITIONER

## 2022-07-20 PROCEDURE — 1101F PR PT FALLS ASSESS DOC 0-1 FALLS W/OUT INJ PAST YR: ICD-10-PCS | Mod: ,,, | Performed by: NURSE PRACTITIONER

## 2022-07-20 PROCEDURE — 3288F FALL RISK ASSESSMENT DOCD: CPT | Mod: ,,, | Performed by: NURSE PRACTITIONER

## 2022-07-20 PROCEDURE — 3078F PR MOST RECENT DIASTOLIC BLOOD PRESSURE < 80 MM HG: ICD-10-PCS | Mod: ,,, | Performed by: NURSE PRACTITIONER

## 2022-07-20 RX ORDER — AZITHROMYCIN 250 MG/1
TABLET, FILM COATED ORAL
Qty: 6 TABLET | Refills: 0 | Status: SHIPPED | OUTPATIENT
Start: 2022-07-20 | End: 2022-07-25

## 2022-07-20 RX ORDER — BENZONATATE 100 MG/1
100 CAPSULE ORAL 3 TIMES DAILY PRN
Qty: 30 CAPSULE | Refills: 0 | Status: SHIPPED | OUTPATIENT
Start: 2022-07-20 | End: 2022-07-30

## 2022-07-20 NOTE — PATIENT INSTRUCTIONS
Do not take zocor while taking paxlovid.     Continue Aspirin for 30 days. Complete full course of Azithromycin and Decadron. Begin Zinc, Zyrtec, Vitamin C and Vitamin D3 medications over the counter daily. Albuterol inhaler every 4-6 hours as needed for cough, shortness of breath.    Stay home except to get medical care.   Separate yourself from other people and animals in your home.   Call ahead before visiting your doctor.   Wear a face mask.   Cover your coughs and sneezes.   Clean your hands often.   Avoid sharing personal household items.   Clean all high-touch surfaces every day.   Monitor your symptoms. Seek prompt medical attention if your illness is worsening (e.g., difficulty breathing). Before seeking care, call your healthcare provider.   If you have a medical emergency and must call 911, notify the dispatcher that you have or are being evaluated for COVID-19. If possible, put on a face mask before emergency medical services arrive.   Use the following symptom-based strategy to return to normal activity following a suspected or confirmed case of COVID-19. Continue isolation until:   At resolution of fever without the use of fever-reducing medications and improvement in respiratory symptoms (e.g. cough, shortness of breath), and   At least 5 days have passed since the first positive test.          Instructions for household members, intimate partners and caregivers in a non-healthcare setting of a patient with confirmed or suspected COVID-19:        Close contacts should monitor their health and call their healthcare provider right away if they develop symptoms suggestive of COVID-19 (e.g., fever, cough, shortness of breath).   Stay home except to get medical care. Separate yourself from other people and animals in the home.  Monitor the patients symptoms. If the patient is getting sicker, call his or her healthcare provider. If the patient has a medical emergency and you need to call 911, notify the  dispatch personnel that the patient has or is being evaluated for COVID-19.   Wear a facemask when around other people such as sharing a room or vehicle and before entering a healthcare provider's office.  Cover coughs and sneezes with a tissue. Throw used tissues in a lined trash can immediately and wash hands.  Clean hands often with soap and water for at least 20 seconds or with an alcohol-based hand , rubbing hands together until they feel dry. Avoid touching your eyes, nose, and mouth with unwashed hands.  Clean all high-touch; surfaces every day, including counters, tabletops, doorknobs, bathroom fixtures, toilets, phones, keyboards, tablets, bedside tables, etc. Use a household cleaning spray or wipe according to label instructions.  Avoid sharing personal household items such as dishes, drinking glasses, cups, towels, bedding, etc. After these items are used, they should be washed thoroughly with soap and water.  Continue isolation until:  At least resolution of fever without the use of fever-reducing medications and improvement in respiratory symptoms (e.g. cough, shortness of breath), and   At least 5 days have passed since the patients first positive test.

## 2022-07-20 NOTE — TELEPHONE ENCOUNTER
[facility] does not have the p o covid medication and would like it to be sent to [facility].   35

## 2022-07-20 NOTE — PROGRESS NOTES
Olga Monterroso NP   [facility]  [address]  [place]     PATIENT NAME: René Moscoso  : 1950  DATE: 22  MRN: [id number]      Billing Provider: Olga Monterroso NP  Level of Service:   Patient PCP Information     Provider PCP Type    Olga Monterroso NP General          Reason for Visit / Chief Complaint: Cough, Nasal Congestion (Pt c/o feeling tired, nasal congestion, cough x 2 days .  Request to be tested for covid. Pt states his FBS  was 178 this AM), Sinus Problem (PT states he started taking the zpack and Ed A Hist yesterday), and Fatigue       Update PCP  Update Chief Complaint         History of Present Illness / Problem Focused Workflow     René Moscoso presents to the clinic c/o cough, nasal congestion/pt c/o feeling tired, nasal congeston cough x 2 days, reuuest to be tested for covid, stated fbs was 178 this am. Sinus problem, pt states he started taking the spack and abby hist yesterday, and fatigue      Review of Systems     Review of Systems   Constitutional: Positive for fatigue. Negative for chills and fever.   HENT: Positive for nasal congestion. Negative for ear pain, facial swelling, hearing loss, mouth dryness, mouth sores, postnasal drip, rhinorrhea, sinus pressure/congestion and goiter.    Eyes: Negative for discharge and itching.   Respiratory: Positive for cough. Negative for shortness of breath and wheezing.    Cardiovascular: Negative for chest pain and leg swelling.   Gastrointestinal: Negative for abdominal pain, change in bowel habit and change in bowel habit.   Genitourinary: Negative for difficulty urinating, dysuria, enuresis, frequency, hematuria and urgency.   Neurological: Negative for dizziness, vertigo, syncope, weakness and headaches.   Psychiatric/Behavioral: Negative for decreased concentration.        Medical / Social / Family History     Past Medical History:   Diagnosis Date    Arthritis     Chronic pain 2021    COPD (chronic obstructive  pulmonary disease)     Diabetes type 2, controlled     Hyperlipidemia     Neuropathy        Past Surgical History:   Procedure Laterality Date    CARDIAC CATHETERIZATION      ESOPHAGOGASTRODUODENOSCOPY  10/22/2014    FLEXIBLE SIGMOIDOSCOPY  10/23/2014    INJECTION OF FACET JOINT Bilateral 07/01/2014    Bilateral L3-S1 Facet Injection - 7/1/14 and 3/18/14    SINUS SURGERY         Social History    reports that he quit smoking about 11 years ago. His smoking use included cigarettes. He started smoking about 31 years ago. He has a 40.00 pack-year smoking history. His smokeless tobacco use includes snuff. He reports previous alcohol use. He reports current drug use. Drug: Hydrocodone.    Family History  's family history includes Arthritis in his mother; COPD in his brother; Cancer in his father, mother, and sister; Diabetes in his mother; Heart disease in his mother; Hypertension in his mother.    Medications and Allergies     Medications  Outpatient Medications Marked as Taking for the 7/20/22 encounter (Office Visit) with Olga Mnoterroso NP   Medication Sig Dispense Refill    albuterol (ACCUNEB) 0.63 mg/3 mL Nebu Take 0.63 mg by nebulization every 6 (six) hours as needed. Rescue      aspirin 81 MG Chew Take 81 mg by mouth once daily.      azithromycin (Z-JUDY) 250 MG tablet Take 2 tablets by mouth on day 1; Take 1 tablet by mouth on days 2-5 6 tablet 0    bacitracin 500 unit/gram ointment Apply topically 2 (two) times daily. 30 g 0    blood-glucose meter Misc USE TO CHECK BLOOD SUGAR      chlorpheniramine-phenylephrine (ED A-HIST) 4-10 mg per tablet Take 1 tablet by mouth every 6 (six) hours as needed for Congestion. 30 tablet 0    gabapentin (NEURONTIN) 600 MG tablet TAKE ONE TABLET BY MOUTH TWICE DAILY 180 tablet 1    gemfibroziL (LOPID) 600 MG tablet TAKE ONE TABLET BY MOUTH TWICE DAILY 180 tablet 1    hydroCHLOROthiazide (MICROZIDE) 12.5 mg capsule TAKE ONE CAPSULE BY MOUTH EVERY DAY 90  "capsule 1    insulin aspart protamine-insulin aspart (NOVOLOG MIX 70-30FLEXPEN U-100) 100 unit/mL (70-30) InPn pen Inject 94 Units into the skin every morning. Take 94 units every morning and 90 units every evening 6 each 5    ipratropium (ATROVENT) 42 mcg (0.06 %) nasal spray 2 sprays by Nasal route 3 (three) times daily. 15 mL 2    ipratropium/albuterol sulfate (COMBIVENT INHL) Inhale into the lungs.      isosorbide mononitrate (IMDUR) 30 MG 24 hr tablet TAKE ONE TABLET BY MOUTH ONCE DAILY 90 tablet 1    LEVEMIR FLEXTOUCH U-100 INSULN 100 unit/mL (3 mL) InPn pen INJECT 92 units SUBCUTANEOUSLY AT BEDTIME 30 mL 5    linaGLIPtin (TRADJENTA) 5 mg Tab tablet Take 5 mg by mouth once daily.      lisinopriL (PRINIVIL,ZESTRIL) 5 MG tablet TAKE ONE TABLET BY MOUTH DAILY 90 tablet 1    omeprazole (PRILOSEC) 20 MG capsule TAKE ONE CAPSULE BY MOUTH EVERY DAY 28 capsule 1    [DISCONTINUED] azithromycin (Z-JUDY) 250 MG tablet Take 2 tablets by mouth on day 1; Take 1 tablet by mouth on days 2-5 6 tablet 0       Allergies  Review of patient's allergies indicates:  No Known Allergies    Physical Examination     Vitals:    07/20/22 1320   BP: 134/70   BP Location: Left arm   Patient Position: Sitting   BP Method: Large (Manual)   Pulse: 87   Resp: 20   Temp: 97.9 °F (36.6 °C)   TempSrc: Oral   SpO2: 98%   Weight: 133.6 kg (294 lb 8 oz)   Height: 6' 3" (1.905 m)      Physical Exam  Constitutional:       Appearance: Normal appearance.   HENT:      Head: Normocephalic.      Right Ear: Tympanic membrane, ear canal and external ear normal.      Left Ear: Tympanic membrane, ear canal and external ear normal.      Nose: Rhinorrhea present.      Mouth/Throat:      Mouth: Mucous membranes are moist.      Pharynx: Oropharynx is clear.   Eyes:      Extraocular Movements: Extraocular movements intact.      Pupils: Pupils are equal, round, and reactive to light.      Comments: Conjunctivae erythematous   Cardiovascular:      Rate and " Rhythm: Normal rate and regular rhythm.      Pulses: Normal pulses.      Heart sounds: Normal heart sounds.   Pulmonary:      Effort: Pulmonary effort is normal.      Breath sounds: Normal breath sounds.   Abdominal:      General: Bowel sounds are normal.      Palpations: Abdomen is soft.   Musculoskeletal:         General: Normal range of motion.      Cervical back: Normal range of motion and neck supple.   Skin:     General: Skin is warm and dry.      Capillary Refill: Capillary refill takes less than 2 seconds.   Neurological:      General: No focal deficit present.      Mental Status: He is alert and oriented to person, place, and time.   Psychiatric:         Mood and Affect: Mood normal.         Behavior: Behavior normal.      Comments: Pt is  Anxious and is worrying that he is going to die alone          Assessment and Plan (including Health Maintenance)      Problem List  Smart Sets  Document Outside HM   :    Plan: rest, increase fluids, return to clinic as needed. meds as ordered,     Cough  -     POCT SARS-COV2 (COVID) with Flu Antigen; Future; Expected date: 07/20/2022    Exposure to COVID-19 virus  -     POCT SARS-COV2 (COVID) with Flu Antigen; Future; Expected date: 07/20/2022    COVID  -     azithromycin (Z-JUDY) 250 MG tablet; Take 2 tablets by mouth on day 1; Take 1 tablet by mouth on days 2-5  Dispense: 6 tablet; Refill: 0  -     benzonatate (TESSALON) 100 MG capsule; Take 1 capsule (100 mg total) by mouth 3 (three) times daily as needed for Cough.  Dispense: 30 capsule; Refill: 0  -     Discontinue: nirmatrelvir-ritonavir 300 mg (150 mg x 2)-100 mg copackaged tablets (EUA); Take 3 tablets by mouth 2 (two) times daily for 5 days. Each dose contains 2 nirmatrelvir (pink tablets) and 1 ritonavir (white tablet). Take all 3 tablets together  Dispense: 30 tablet; Refill: 0            Health Maintenance Due   Topic Date Due    Hepatitis C Screening  Never done    Sign Pain Contract  Never done     Shingles Vaccine (1 of 2) Never done    Colorectal Cancer Screening  10/03/2017    Abdominal Aortic Aneurysm Screening  Never done    Pneumococcal Vaccines (Age 65+) (3 - PPSV23 or PCV20) 10/20/2020    COVID-19 Vaccine (3 - Booster) 07/10/2021    Hemoglobin A1c  07/20/2022    Foot Exam  08/12/2022       Problem List Items Addressed This Visit    None     Visit Diagnoses     Cough    -  Primary    Relevant Orders    POCT SARS-COV2 (COVID) with Flu Antigen    Exposure to COVID-19 virus        Relevant Orders    POCT SARS-COV2 (COVID) with Flu Antigen    COVID        Relevant Medications    azithromycin (Z-JUDY) 250 MG tablet    benzonatate (TESSALON) 100 MG capsule            Health Maintenance Topics with due status: Not Due       Topic Last Completion Date    TETANUS VACCINE 05/27/2021    Influenza Vaccine 10/04/2021    Eye Exam 12/29/2021    Diabetes Urine Screening 01/20/2022    Lipid Panel 01/20/2022       Procedures     Future Appointments   Date Time Provider Department Center   7/21/2022  8:30 AM [facility]    7/22/2022  9:30 AM Alison Chaudhary RD [facility]   7/28/2022  9:00 AM EVARISTO Roberts RASCC [facility]   8/1/2022  9:30 AM AWV [facility] [place]   8/22/2022 10:15 AM Olga Monterroso NP [facility]        Follow up in about 1 month (around 8/20/2022) for f\u.       Signature:  Olga Monterroso NP    Date of encounter: 7/20/22

## 2022-07-28 ENCOUNTER — CLINICAL SUPPORT (OUTPATIENT)
Dept: WOUND CARE | Facility: HOSPITAL | Age: 72
End: 2022-07-28
Attending: NURSE PRACTITIONER
Payer: MEDICARE

## 2022-07-28 VITALS
SYSTOLIC BLOOD PRESSURE: 147 MMHG | HEART RATE: 99 BPM | DIASTOLIC BLOOD PRESSURE: 90 MMHG | RESPIRATION RATE: 20 BRPM | TEMPERATURE: 97 F

## 2022-07-28 DIAGNOSIS — E11.621 TYPE 2 DIABETES MELLITUS WITH LEFT DIABETIC FOOT ULCER: Primary | ICD-10-CM

## 2022-07-28 DIAGNOSIS — I87.332 IDIOPATHIC CHRONIC VENOUS HYPERTENSION OF LEFT LOWER EXTREMITY WITH ULCER AND INFLAMMATION: ICD-10-CM

## 2022-07-28 DIAGNOSIS — L97.529 TYPE 2 DIABETES MELLITUS WITH LEFT DIABETIC FOOT ULCER: Primary | ICD-10-CM

## 2022-07-28 PROCEDURE — 99212 OFFICE O/P EST SF 10 MIN: CPT

## 2022-07-28 NOTE — PROGRESS NOTES
HPI  This information was obtained from the patient  The following HPI elements were documented for the patient's wound:  Location: left ankle  Duration: onset 6/3/22  Timing: no pain  Context: venous  Modifying Factors: edema  Associated Signs and Symptoms: epithelial  70 y/o WM seen today for venous ulcer to left ankle. Wound has resolved. He was here last on 7/14/22. Compliance with POC remains good, no S/S infection    Review of Systems (ROS)  This information was obtained from the patient    Complaints and Symptoms  Patient complains of:  Cardiovascular (Central/Peripheral): Edema, Lower extremity (leg) swelling  Integumentary (Hair/Skin/Nails): Calluses/Corns (left foot)  Musculoskeletal: Assistive Devices (walking cane)  Psychiatric: Depression    Patient denies complaints or symptoms related to:  Allergic/Immunologic: Hay Fever  Cardiovascular (Central/Peripheral): Lower extremity (leg) resting pain  Constitutional Symptoms (General Health): Chills, Fatigue, Fever, Loss of Appetite  Ear/Nose/Mouth/Throat: Current Infection, Hearing Loss / Aid  Endocrine: Cold Intolerance, Heat Intolerance  Eyes: Double Vision / Spots / Flashing Lights, Partial/Complete Blindness, Vision Changes  Gastrointestinal (GI): Nausea / Vomiting / Diarrhea (N/V/D)  Integumentary (Hair/Skin/Nails): Open Sore  Neurological: Abnormal Gait, Headaches, Weakness  Psychiatric: Anxiety  Respiratory    Physical Exam  Constitutional  Pulse rate and rhythm regular. Afebrile. Height within normal limits. Weight WNL. Well developed, well nourished, and in no acute distress. Alert and oriented x3.    Eyes:  Conjunctiva without icterus.    Ears, Nose, Mouth, and Throat:  Symmetric hearing.    Respiratory:  Even respirations without use of accessory muscles. No intercoastal retractions noted. Even and non labored respiration.    Cardiovascular:  See lower extremity assessment when applicable. edema .    Musculoskeletal:  Normal gait.    Integumentary  (Hair, Skin)  no adipose exposed, wound resolved.    Psychiatric:  Judgement and insight: Normal affect with normal thought pattern. Orientation to time, place and person: Normal affect with normal thought pattern. Recent and remote memory: Normal affect with normal thought pattern. Mood and affect: Normal affect with normal thought pattern.

## 2022-08-01 ENCOUNTER — OFFICE VISIT (OUTPATIENT)
Dept: PAIN MEDICINE | Facility: CLINIC | Age: 72
End: 2022-08-01
Payer: MEDICARE

## 2022-08-01 VITALS
HEART RATE: 87 BPM | BODY MASS INDEX: 35.81 KG/M2 | HEIGHT: 75 IN | RESPIRATION RATE: 18 BRPM | DIASTOLIC BLOOD PRESSURE: 69 MMHG | SYSTOLIC BLOOD PRESSURE: 115 MMHG | WEIGHT: 288 LBS

## 2022-08-01 DIAGNOSIS — E11.42 DIABETIC POLYNEUROPATHY ASSOCIATED WITH TYPE 2 DIABETES MELLITUS: Chronic | ICD-10-CM

## 2022-08-01 DIAGNOSIS — M54.2 NECK PAIN: ICD-10-CM

## 2022-08-01 DIAGNOSIS — M47.817 LUMBOSACRAL SPONDYLOSIS WITHOUT MYELOPATHY: Primary | Chronic | ICD-10-CM

## 2022-08-01 DIAGNOSIS — M79.672 FOOT PAIN, LEFT: Chronic | ICD-10-CM

## 2022-08-01 DIAGNOSIS — Z79.899 ENCOUNTER FOR LONG-TERM (CURRENT) USE OF OTHER MEDICATIONS: ICD-10-CM

## 2022-08-01 PROCEDURE — 3066F PR DOCUMENTATION OF TREATMENT FOR NEPHROPATHY: ICD-10-PCS | Mod: CPTII,,, | Performed by: PHYSICIAN ASSISTANT

## 2022-08-01 PROCEDURE — 3060F PR POS MICROALBUMINURIA RESULT DOCUMENTED/REVIEW: ICD-10-PCS | Mod: CPTII,,, | Performed by: PHYSICIAN ASSISTANT

## 2022-08-01 PROCEDURE — 3060F POS MICROALBUMINURIA REV: CPT | Mod: CPTII,,, | Performed by: PHYSICIAN ASSISTANT

## 2022-08-01 PROCEDURE — 3078F PR MOST RECENT DIASTOLIC BLOOD PRESSURE < 80 MM HG: ICD-10-PCS | Mod: CPTII,,, | Performed by: PHYSICIAN ASSISTANT

## 2022-08-01 PROCEDURE — 1125F AMNT PAIN NOTED PAIN PRSNT: CPT | Mod: CPTII,,, | Performed by: PHYSICIAN ASSISTANT

## 2022-08-01 PROCEDURE — 1125F PR PAIN SEVERITY QUANTIFIED, PAIN PRESENT: ICD-10-PCS | Mod: CPTII,,, | Performed by: PHYSICIAN ASSISTANT

## 2022-08-01 PROCEDURE — 3051F PR MOST RECENT HEMOGLOBIN A1C LEVEL 7.0 - < 8.0%: ICD-10-PCS | Mod: CPTII,,, | Performed by: PHYSICIAN ASSISTANT

## 2022-08-01 PROCEDURE — 99214 OFFICE O/P EST MOD 30 MIN: CPT | Mod: S$PBB,,, | Performed by: PHYSICIAN ASSISTANT

## 2022-08-01 PROCEDURE — 3008F PR BODY MASS INDEX (BMI) DOCUMENTED: ICD-10-PCS | Mod: CPTII,,, | Performed by: PHYSICIAN ASSISTANT

## 2022-08-01 PROCEDURE — 3074F PR MOST RECENT SYSTOLIC BLOOD PRESSURE < 130 MM HG: ICD-10-PCS | Mod: CPTII,,, | Performed by: PHYSICIAN ASSISTANT

## 2022-08-01 PROCEDURE — 3288F FALL RISK ASSESSMENT DOCD: CPT | Mod: CPTII,,, | Performed by: PHYSICIAN ASSISTANT

## 2022-08-01 PROCEDURE — 3051F HG A1C>EQUAL 7.0%<8.0%: CPT | Mod: CPTII,,, | Performed by: PHYSICIAN ASSISTANT

## 2022-08-01 PROCEDURE — 3288F PR FALLS RISK ASSESSMENT DOCUMENTED: ICD-10-PCS | Mod: CPTII,,, | Performed by: PHYSICIAN ASSISTANT

## 2022-08-01 PROCEDURE — 4010F PR ACE/ARB THEARPY RXD/TAKEN: ICD-10-PCS | Mod: CPTII,,, | Performed by: PHYSICIAN ASSISTANT

## 2022-08-01 PROCEDURE — 3074F SYST BP LT 130 MM HG: CPT | Mod: CPTII,,, | Performed by: PHYSICIAN ASSISTANT

## 2022-08-01 PROCEDURE — 3078F DIAST BP <80 MM HG: CPT | Mod: CPTII,,, | Performed by: PHYSICIAN ASSISTANT

## 2022-08-01 PROCEDURE — 1159F PR MEDICATION LIST DOCUMENTED IN MEDICAL RECORD: ICD-10-PCS | Mod: CPTII,,, | Performed by: PHYSICIAN ASSISTANT

## 2022-08-01 PROCEDURE — 99214 PR OFFICE/OUTPT VISIT, EST, LEVL IV, 30-39 MIN: ICD-10-PCS | Mod: S$PBB,,, | Performed by: PHYSICIAN ASSISTANT

## 2022-08-01 PROCEDURE — 1101F PT FALLS ASSESS-DOCD LE1/YR: CPT | Mod: CPTII,,, | Performed by: PHYSICIAN ASSISTANT

## 2022-08-01 PROCEDURE — 99213 OFFICE O/P EST LOW 20 MIN: CPT | Mod: PBBFAC | Performed by: PHYSICIAN ASSISTANT

## 2022-08-01 PROCEDURE — 4010F ACE/ARB THERAPY RXD/TAKEN: CPT | Mod: CPTII,,, | Performed by: PHYSICIAN ASSISTANT

## 2022-08-01 PROCEDURE — 80305 DRUG TEST PRSMV DIR OPT OBS: CPT | Mod: PBBFAC | Performed by: PHYSICIAN ASSISTANT

## 2022-08-01 PROCEDURE — 3066F NEPHROPATHY DOC TX: CPT | Mod: CPTII,,, | Performed by: PHYSICIAN ASSISTANT

## 2022-08-01 PROCEDURE — 3008F BODY MASS INDEX DOCD: CPT | Mod: CPTII,,, | Performed by: PHYSICIAN ASSISTANT

## 2022-08-01 PROCEDURE — 1159F MED LIST DOCD IN RCRD: CPT | Mod: CPTII,,, | Performed by: PHYSICIAN ASSISTANT

## 2022-08-01 PROCEDURE — 1101F PR PT FALLS ASSESS DOC 0-1 FALLS W/OUT INJ PAST YR: ICD-10-PCS | Mod: CPTII,,, | Performed by: PHYSICIAN ASSISTANT

## 2022-08-01 RX ORDER — HYDROCODONE BITARTRATE AND ACETAMINOPHEN 10; 325 MG/1; MG/1
1 TABLET ORAL EVERY 8 HOURS
Qty: 90 TABLET | Refills: 0 | Status: SHIPPED | OUTPATIENT
Start: 2022-09-30 | End: 2022-10-30

## 2022-08-01 RX ORDER — HYDROCODONE BITARTRATE AND ACETAMINOPHEN 10; 325 MG/1; MG/1
1 TABLET ORAL EVERY 8 HOURS
Qty: 90 TABLET | Refills: 0 | Status: SHIPPED | OUTPATIENT
Start: 2022-08-01 | End: 2022-08-31

## 2022-08-01 RX ORDER — HYDROCODONE BITARTRATE AND ACETAMINOPHEN 10; 325 MG/1; MG/1
1 TABLET ORAL EVERY 8 HOURS
Qty: 90 TABLET | Refills: 0 | Status: SHIPPED | OUTPATIENT
Start: 2022-08-31 | End: 2022-09-30

## 2022-08-01 NOTE — PROGRESS NOTES
Subjective:         Patient ID: René Moscoso is a 71 y.o. male.    Chief Complaint: Low-back Pain      Low-back Pain  Associated symptoms include leg pain. Pertinent negatives include no bladder incontinence, chest pain, dysuria or fever.   Pain  This is a chronic problem. The current episode started more than 1 year ago. The problem occurs daily. The problem has been unchanged. Associated symptoms include arthralgias. Pertinent negatives include no change in bowel habit, chest pain, chills, coughing, diaphoresis, fatigue, fever, neck pain, rash, sore throat, vertigo or vomiting.     Review of Systems   Constitutional: Negative for activity change, appetite change, chills, diaphoresis, fatigue and fever.   HENT: Negative for drooling, ear discharge, ear pain, facial swelling, nosebleeds, sore throat, trouble swallowing, voice change and goiter.    Eyes: Negative for photophobia, pain, discharge, redness and visual disturbance.   Respiratory: Negative for apnea, cough, choking, chest tightness, shortness of breath, wheezing and stridor.    Cardiovascular: Negative for chest pain, palpitations and leg swelling.   Gastrointestinal: Negative for abdominal distention, change in bowel habit, diarrhea, rectal pain, vomiting, fecal incontinence and change in bowel habit.   Endocrine: Negative for cold intolerance, heat intolerance, polydipsia, polyphagia and polyuria.   Genitourinary: Negative for bladder incontinence, dysuria, flank pain and frequency.   Musculoskeletal: Positive for arthralgias, back pain and leg pain. Negative for neck pain and neck stiffness.   Integumentary:  Negative for color change, pallor and rash.   Neurological: Negative for dizziness, vertigo, seizures, syncope, facial asymmetry, speech difficulty, light-headedness, disturbances in coordination, memory loss and coordination difficulties.   Hematological: Negative for adenopathy. Does not bruise/bleed easily.   Psychiatric/Behavioral: Negative  "for agitation, behavioral problems, confusion, decreased concentration, dysphoric mood, hallucinations, self-injury and suicidal ideas. The patient is not nervous/anxious and is not hyperactive.            Past Medical History:   Diagnosis Date    Arthritis     Chronic pain 2021    COPD (chronic obstructive pulmonary disease)     Diabetes type 2, controlled     Hyperlipidemia     Neuropathy      Past Surgical History:   Procedure Laterality Date    CARDIAC CATHETERIZATION      ESOPHAGOGASTRODUODENOSCOPY  10/22/2014    FLEXIBLE SIGMOIDOSCOPY  10/23/2014    INJECTION OF FACET JOINT Bilateral 2014    Bilateral L3-S1 Facet Injection - 14 and 3/18/14    SINUS SURGERY       Social History     Socioeconomic History    Marital status: Single   Tobacco Use    Smoking status: Former Smoker     Packs/day: 2.00     Years: 20.00     Pack years: 40.00     Types: Cigarettes     Start date:      Quit date: 2011     Years since quittin.5    Smokeless tobacco: Current User     Types: Snuff   Substance and Sexual Activity    Alcohol use: Not Currently    Drug use: Yes     Types: Hydrocodone    Sexual activity: Not Currently     Family History   Problem Relation Age of Onset    Diabetes Mother     Heart disease Mother     Hypertension Mother     Arthritis Mother     Cancer Mother     Cancer Father     Cancer Sister     COPD Brother      Review of patient's allergies indicates:  No Known Allergies     Objective:  Vitals:    22 1002   BP: 115/69   Pulse: 87   Resp: 18   Weight: 130.6 kg (288 lb)   Height: 6' 3" (1.905 m)   PainSc:   4         Physical Exam  Vitals and nursing note reviewed. Exam conducted with a chaperone present.   Constitutional:       General: He is awake. He is not in acute distress.     Appearance: Normal appearance. He is not diaphoretic.   HENT:      Head: Normocephalic and atraumatic.      Nose: Nose normal.      Mouth/Throat:      Mouth: Mucous " membranes are moist.      Pharynx: Oropharynx is clear.   Eyes:      Conjunctiva/sclera: Conjunctivae normal.      Pupils: Pupils are equal, round, and reactive to light.   Cardiovascular:      Rate and Rhythm: Normal rate.   Pulmonary:      Effort: Pulmonary effort is normal. No respiratory distress.   Abdominal:      Palpations: Abdomen is soft.      Tenderness: There is no guarding.   Musculoskeletal:         General: Normal range of motion.      Cervical back: Normal range of motion and neck supple. No rigidity.      Thoracic back: Tenderness present.      Lumbar back: Tenderness present.   Skin:     General: Skin is warm and dry.      Coloration: Skin is not jaundiced or pale.   Neurological:      General: No focal deficit present.      Mental Status: He is alert and oriented to person, place, and time. Mental status is at baseline.      Cranial Nerves: No cranial nerve deficit (II-XII).   Psychiatric:         Mood and Affect: Mood normal.         Behavior: Behavior normal. Behavior is cooperative.         Thought Content: Thought content normal.           US Lower Extrem Arteries Bilat with RYNA (xpd)  Narrative: EXAMINATION:  US ARTERIAL LOWER EXTREMITY BILAT WITH RYAN (XPD)    CLINICAL HISTORY:  Cellulitis of left lower limb    TECHNIQUE:  Grayscale and color flow images of both lower extremities were obtained. Blood pressures were also obtained at the upper arms and lower extremities bilaterally (where applicable).    COMPARISON:  2020    FINDINGS:  Right RYAN 1.0 and left RAYN 1.1.    Peak systolic arterial velocities are as follows:    Right CFA: 137 cm/s    Right profundus: 66 cm/s    Right SFA proximal: 98 cm/s    Right SFA mid: 106 cm/s    Right SFA distal: 86 cm/s    Right popliteal: 106 cm/s    Right PTA: 66 cm/s    Right DPA: 60 cm/s    Left CFA: 115 cm/s    Left profundus: 50 cm/s    Left SFA proximal: 98 cm/s    Left SFA mid: 108 cm/s    Left SFA distal: 85 cm/s    Left popliteal : 131 cm/s    Left  PTA: 125 cm/s    Left DPA: 96 cm/s    Velocities of the anterior tibial arteries are normal.    Triphasic and biphasic waveforms.  Monophasic waveform of the left dorsalis pedis artery.  Impression: No focal occlusions or high-grade stenosis.    ABIs are normal    The Ultrasound images were captured and stored.    Place of service: [facility].    Electronically signed by: Frank Mendez  Date:    06/09/2022  Time:    13:26       Clinical Support on 06/23/2022   Component Date Value Ref Range Status    Culture, Tissue 06/23/2022 Heavy Growth Staphylococcus aureus (A)  Final   Clinical Support on 06/09/2022   Component Date Value Ref Range Status    Culture, Tissue 06/09/2022 Light Growth Proteus mirabilis (A)  Final    Culture, Tissue 06/09/2022 Heavy Growth Staphylococcus aureus (A)  Final   Office Visit on 05/26/2022   Component Date Value Ref Range Status    POC Glucose 05/26/2022 133 (A) 70 - 110 MG/DL Final   Patient Outreach on 05/18/2022   Component Date Value Ref Range Status    Left Eye DM Retinopathy 12/29/2021 Positive   Final    Right Eye DM Retinopathy 12/29/2021 Negative   Final   Office Visit on 04/20/2022   Component Date Value Ref Range Status    POC Amphetamines 04/20/2022 Negative  Negative, Inconclusive Final    POC Barbiturates 04/20/2022 Negative  Negative, Inconclusive Final    POC Benzodiazepines 04/20/2022 Negative  Negative, Inconclusive Final    POC Cocaine 04/20/2022 Negative  Negative, Inconclusive Final    POC THC 04/20/2022 Negative  Negative, Inconclusive Final    POC Methadone 04/20/2022 Negative  Negative, Inconclusive Final    POC Methamphetamine 04/20/2022 Negative  Negative, Inconclusive Final    POC Opiates 04/20/2022 Presumptive Positive (A) Negative, Inconclusive Final    POC Oxycodone 04/20/2022 Negative  Negative, Inconclusive Final    POC Phencyclidine 04/20/2022 Negative  Negative, Inconclusive Final    POC Methylenedioxymethamphetamine * 04/20/2022  Negative  Negative, Inconclusive Final    POC Tricyclic Antidepressants 04/20/2022 Negative  Negative, Inconclusive Final    POC Buprenorphine 04/20/2022 Negative   Final     Acceptable 04/20/2022 Yes   Final    POC Temperature (Urine) 04/20/2022 92   Final   Office Visit on 03/09/2022   Component Date Value Ref Range Status    POC Glucose 03/09/2022 209 (A) 70 - 110 MG/DL Final         Orders Placed This Encounter   Procedures    Ambulatory referral/consult to Physical/Occupational Therapy     Standing Status:   Future     Standing Expiration Date:   9/1/2023     Referral Priority:   Routine     Referral Type:   Physical Medicine     Referral Reason:   Specialty Services Required     Number of Visits Requested:   1    POCT Urine Drug Screen Presump     Interpretive Information:     Negative:  No drug detected at the cut off level.   Positive:  This result represents presumptive positive for the   tested drug, other substances may yield a positive response other   than the analyte of interest. This result should be utilized for   diagnostic purpose only. Confirmation testing will be performed upon physician request only.          Requested Prescriptions     Signed Prescriptions Disp Refills    HYDROcodone-acetaminophen (NORCO)  mg per tablet 90 tablet 0     Sig: Take 1 tablet by mouth every 8 (eight) hours.    HYDROcodone-acetaminophen (NORCO)  mg per tablet 90 tablet 0     Sig: Take 1 tablet by mouth every 8 (eight) hours.    HYDROcodone-acetaminophen (NORCO)  mg per tablet 90 tablet 0     Sig: Take 1 tablet by mouth every 8 (eight) hours.       Assessment:     1. Lumbosacral spondylosis without myelopathy    2. Encounter for long-term (current) use of other medications    3. Diabetic polyneuropathy associated with type 2 diabetes mellitus    4. Foot pain, left    5. Neck pain         A's of Opioid Risk Assessment  Activity:Patient can perform ADL.   Analgesia:Patients  pain is partially controlled by current medication. Patient has tried OTC medications such as Tylenol and Ibuprofen with out relief.   Adverse Effects: Patient denies constipation or sedation.  Aberrant Behavior:  reviewed with no aberrant drug seeking/taking behavior.  Overdose reversal drug naloxone discussed    Drug screen reviewed      Plan:    Pharmacy closed on weekends    Continues following wound management chronic left lower extremity diabetic ulcers     Complaint back pain neck and joint pain    Physical therapy prescription given to the patient    X-ray lumbar spine    Considering lumbar medial branch block    He would like to continue conservative management    Continue home exercise program as directed    Follow-up 3 months sooner if needed     Dr. Asif, October 2022    Bring original prescription medication bottles/container/box with labels to each visit

## 2022-08-11 ENCOUNTER — CLINICAL SUPPORT (OUTPATIENT)
Dept: WOUND CARE | Facility: HOSPITAL | Age: 72
End: 2022-08-11
Attending: NURSE PRACTITIONER
Payer: MEDICARE

## 2022-08-11 VITALS
TEMPERATURE: 99 F | HEART RATE: 91 BPM | RESPIRATION RATE: 20 BRPM | DIASTOLIC BLOOD PRESSURE: 81 MMHG | SYSTOLIC BLOOD PRESSURE: 140 MMHG

## 2022-08-11 DIAGNOSIS — I87.332 IDIOPATHIC CHRONIC VENOUS HYPERTENSION OF LEFT LOWER EXTREMITY WITH ULCER AND INFLAMMATION: Primary | ICD-10-CM

## 2022-08-11 PROCEDURE — 99213 OFFICE O/P EST LOW 20 MIN: CPT

## 2022-08-11 PROCEDURE — 99213 OFFICE O/P EST LOW 20 MIN: CPT | Performed by: NURSE PRACTITIONER

## 2022-08-11 RX ORDER — SULFAMETHOXAZOLE AND TRIMETHOPRIM 800; 160 MG/1; MG/1
1 TABLET ORAL 2 TIMES DAILY
Qty: 20 TABLET | Refills: 0 | Status: SHIPPED | OUTPATIENT
Start: 2022-08-11 | End: 2022-09-01 | Stop reason: ALTCHOICE

## 2022-08-11 NOTE — PROGRESS NOTES
WOUND CARE, [facility]  [address]  [place] [address]     PATIENT NAME: René Moscoso  : 1950  DATE: 22  MRN: [id number]      [facility]  Level of Service:   Patient PCP Information     Provider PCP Type    Olga Monterroso NP General          Reason for Visit / Chief Complaint: No chief complaint on file.       Update PCP  Update Chief Complaint         History of Present Illness / Problem Focused Workflow     René Moscoso presents to the clinic c/o redness and blisters left inner ankle after he had been out doors  Duration: 2022  Context: Edema.  Severity: 2/10   Hx of DM, HTN      Review of Systems     Review of Systems   Constitutional: Negative.  Negative for activity change, appetite change, chills and fever.   Eyes: Negative for visual disturbance.   Respiratory: Negative for cough, chest tightness and shortness of breath.    Cardiovascular: Negative for chest pain.   Gastrointestinal: Negative for abdominal pain, change in bowel habit, nausea, vomiting and change in bowel habit.   Endocrine: Negative for polydipsia, polyphagia and polyuria.   Integumentary:  Positive for wound (left ankle x 2).   Neurological: Negative for weakness and headaches.        Medical / Social / Family History     Past Medical History:   Diagnosis Date    Arthritis     Chronic pain 2021    COPD (chronic obstructive pulmonary disease)     Diabetes type 2, controlled     Hyperlipidemia     Neuropathy        Past Surgical History:   Procedure Laterality Date    CARDIAC CATHETERIZATION      ESOPHAGOGASTRODUODENOSCOPY  10/22/2014    FLEXIBLE SIGMOIDOSCOPY  10/23/2014    INJECTION OF FACET JOINT Bilateral 2014    Bilateral L3-S1 Facet Injection - 14 and 3/18/14    SINUS SURGERY         Social History    reports that he quit smoking about 11 years ago. His smoking use included cigarettes. He started smoking about 31 years ago. He  has a 40.00 pack-year smoking history. His smokeless tobacco use includes snuff. He reports previous alcohol use. He reports current drug use. Drug: Hydrocodone.    Family History  's family history includes Arthritis in his mother; COPD in his brother; Cancer in his father, mother, and sister; Diabetes in his mother; Heart disease in his mother; Hypertension in his mother.    Medications and Allergies     Medications  No outpatient medications have been marked as taking for the 8/11/22 encounter (Clinical Support) with WOUND CARE, [facility].       Allergies  Review of patient's allergies indicates:  No Known Allergies    Physical Examination   There were no vitals filed for this visit.   Physical Exam  Constitutional:       General: He is not in acute distress.     Appearance: Normal appearance.   Neck:      Thyroid: No thyromegaly.   Cardiovascular:      Rate and Rhythm: Normal rate and regular rhythm.      Pulses:           Dorsalis pedis pulses are 3+ on the left side.      Heart sounds: Normal heart sounds. No murmur heard.  Pulmonary:      Effort: Pulmonary effort is normal. No accessory muscle usage or respiratory distress.      Breath sounds: Normal breath sounds.   Abdominal:      Palpations: Abdomen is soft.   Musculoskeletal:         General: Normal range of motion.      Cervical back: Neck supple.        Feet:    Feet:      Left foot:      Skin integrity: Blister, skin breakdown and erythema present.   Skin:     General: Skin is warm and dry.      Capillary Refill: Capillary refill takes 2 to 3 seconds.      Coloration: Skin is not jaundiced or pale.   Neurological:      Mental Status: He is alert and oriented to person, place, and time.      Gait: Gait is intact.          Assessment and Plan (including Health Maintenance)      Problem List  Smart Sets  Document Outside HM   :    Plan: See wound docs for treatment and plan. Avoid adhesive to skin, wrap dressing with coban  EXAMINATION:  US ARTERIAL  LOWER EXTREMITY BILAT WITH RYAN (XPD)     CLINICAL HISTORY:  Cellulitis of left lower limb     TECHNIQUE:  Grayscale and color flow images of both lower extremities were obtained. Blood pressures were also obtained at the upper arms and lower extremities bilaterally (where applicable).     COMPARISON:  2020     FINDINGS:  Right RYAN 1.0 and left RYAN 1.1.      Health Maintenance Due   Topic Date Due    Hepatitis C Screening  Never done    Sign Pain Contract  Never done    LDCT Lung Screen  Never done    Shingles Vaccine (1 of 2) Never done    Colorectal Cancer Screening  10/03/2017    Abdominal Aortic Aneurysm Screening  Never done    Pneumococcal Vaccines (Age 65+) (3 - PPSV23 or PCV20) 10/20/2020    COVID-19 Vaccine (3 - Booster) 07/10/2021    Hemoglobin A1c  07/20/2022    Foot Exam  08/12/2022       Problem List Items Addressed This Visit    None     Visit Diagnoses     Idiopathic chronic venous hypertension of left lower extremity with ulcer and inflammation    -  Primary        Idiopathic chronic venous hypertension of left lower extremity with ulcer and inflammation       Health Maintenance Topics with due status: Not Due       Topic Last Completion Date    TETANUS VACCINE 05/27/2021    Influenza Vaccine 10/04/2021    Eye Exam 12/29/2021    Diabetes Urine Screening 01/20/2022    Lipid Panel 01/20/2022       Procedures     Future Appointments   Date Time Provider Department Center   8/11/2022  8:30 AM WOUND CARE, [facility]   8/15/2022  2:00 PM Sancho Moreno PT [facility]   8/22/2022  8:15 AM Sharon Asif MD [facility]   8/22/2022 10:15 AM Olga Monterroso NP [facility] [place]   9/16/2022  2:30 PM AWV NURSE, [facility]ILY MEDICINE [facility] [place]        Follow up in about 1 week (around 8/18/2022).     Signature:  HALIE Collado    Date of encounter: 8/11/22

## 2022-08-18 ENCOUNTER — CLINICAL SUPPORT (OUTPATIENT)
Dept: WOUND CARE | Facility: HOSPITAL | Age: 72
End: 2022-08-18
Attending: NURSE PRACTITIONER
Payer: MEDICARE

## 2022-08-18 VITALS
RESPIRATION RATE: 20 BRPM | SYSTOLIC BLOOD PRESSURE: 130 MMHG | HEART RATE: 80 BPM | DIASTOLIC BLOOD PRESSURE: 88 MMHG | TEMPERATURE: 98 F

## 2022-08-18 DIAGNOSIS — I87.332 IDIOPATHIC CHRONIC VENOUS HYPERTENSION OF LEFT LOWER EXTREMITY WITH ULCER AND INFLAMMATION: Primary | ICD-10-CM

## 2022-08-18 PROCEDURE — 99213 OFFICE O/P EST LOW 20 MIN: CPT

## 2022-08-18 PROCEDURE — 99213 OFFICE O/P EST LOW 20 MIN: CPT | Performed by: NURSE PRACTITIONER

## 2022-08-18 NOTE — PROGRESS NOTES
WOUND CARE, [facility]  [address]  [place] [address]     PATIENT NAME: René Moscoso  : 1950  DATE: 22  MRN: [id number]      Billing Provider: WOUND CARE, [facility]  Level of Service:   Patient PCP Information     Provider PCP Type    Olga Monterroso NP General          Reason for Visit / Chief Complaint: Follow up on wound      Update PCP  Update Chief Complaint         History of Present Illness / Problem Focused Workflow     René Moscoso presents to the clinic c/o redness and blisters left inner ankle after he had been out doors  Duration: 2022  Context: Edema.  Severity: 2/10   Hx of DM, HTN      Review of Systems     Review of Systems   Constitutional: Negative.  Negative for activity change, appetite change, chills and fever.   Eyes: Negative for visual disturbance.   Respiratory: Negative for cough, chest tightness and shortness of breath.    Cardiovascular: Negative for chest pain.   Gastrointestinal: Negative for abdominal pain, change in bowel habit, nausea, vomiting and change in bowel habit.   Endocrine: Negative for polydipsia, polyphagia and polyuria.   Integumentary:  Positive for wound (left ankle x 2).   Neurological: Negative for weakness and headaches.        Medical / Social / Family History     Past Medical History:   Diagnosis Date    Arthritis     Chronic pain 2021    COPD (chronic obstructive pulmonary disease)     Diabetes type 2, controlled     Hyperlipidemia     Neuropathy        Past Surgical History:   Procedure Laterality Date    CARDIAC CATHETERIZATION      ESOPHAGOGASTRODUODENOSCOPY  10/22/2014    FLEXIBLE SIGMOIDOSCOPY  10/23/2014    INJECTION OF FACET JOINT Bilateral 2014    Bilateral L3-S1 Facet Injection - 14 and 3/18/14    SINUS SURGERY         Social History    reports that he quit smoking about 11 years ago. His smoking use included cigarettes. He started smoking about 31 years ago. He has a  40.00 pack-year smoking history. His smokeless tobacco use includes snuff. He reports previous alcohol use. He reports current drug use. Drug: Hydrocodone.    Family History  's family history includes Arthritis in his mother; COPD in his brother; Cancer in his father, mother, and sister; Diabetes in his mother; Heart disease in his mother; Hypertension in his mother.    Medications and Allergies     Medications  No outpatient medications have been marked as taking for the 8/18/22 encounter (Clinical Support) with WOUND CARE, [facility].       Allergies  Review of patient's allergies indicates:  No Known Allergies    Physical Examination   There were no vitals filed for this visit.   Physical Exam  Constitutional:       General: He is not in acute distress.     Appearance: Normal appearance.   Neck:      Thyroid: No thyromegaly.   Cardiovascular:      Rate and Rhythm: Normal rate and regular rhythm.      Pulses:           Dorsalis pedis pulses are 3+ on the left side.      Heart sounds: Normal heart sounds. No murmur heard.  Pulmonary:      Effort: Pulmonary effort is normal. No accessory muscle usage or respiratory distress.      Breath sounds: Normal breath sounds.   Abdominal:      Palpations: Abdomen is soft.   Musculoskeletal:         General: Normal range of motion.      Cervical back: Neck supple.        Feet:    Feet:      Left foot:      Skin integrity: Blister, skin breakdown and erythema present.   Skin:     General: Skin is warm and dry.      Capillary Refill: Capillary refill takes 2 to 3 seconds.      Coloration: Skin is not jaundiced or pale.   Neurological:      Mental Status: He is alert and oriented to person, place, and time.      Gait: Gait is intact.          Assessment and Plan (including Health Maintenance)      Problem List  Smart Sets  Document Outside HM   :    Plan: See wound docs for treatment and plan. Avoid adhesive to skin, cover with promogran and conform wrap dressing with  coban    EXAMINATION:  US ARTERIAL LOWER EXTREMITY BILAT WITH RYAN (XPD)     CLINICAL HISTORY:  Cellulitis of left lower limb     TECHNIQUE:  Grayscale and color flow images of both lower extremities were obtained. Blood pressures were also obtained at the upper arms and lower extremities bilaterally (where applicable).     COMPARISON:  2020     FINDINGS:  Right RYAN 1.0 and left RYAN 1.1.      Health Maintenance Due   Topic Date Due    Hepatitis C Screening  Never done    Sign Pain Contract  Never done    LDCT Lung Screen  Never done    Shingles Vaccine (1 of 2) Never done    Colorectal Cancer Screening  10/03/2017    Abdominal Aortic Aneurysm Screening  Never done    Pneumococcal Vaccines (Age 65+) (3 - PPSV23 or PCV20) 10/20/2020    COVID-19 Vaccine (3 - Booster) 07/10/2021    Hemoglobin A1c  07/20/2022    Foot Exam  08/12/2022       Problem List Items Addressed This Visit    None     Visit Diagnoses     Idiopathic chronic venous hypertension of left lower extremity with ulcer and inflammation    -  Primary        Idiopathic chronic venous hypertension of left lower extremity with ulcer and inflammation       Health Maintenance Topics with due status: Not Due       Topic Last Completion Date    TETANUS VACCINE 05/27/2021    Influenza Vaccine 10/04/2021    Eye Exam 12/29/2021    Diabetes Urine Screening 01/20/2022    Lipid Panel 01/20/2022       Procedures       Future Appointments   Date Time Provider Department Center   8/18/2022  8:30 AM WOUND CARE, [facility] [place] [facility]   8/22/2022  8:15 AM Sharon Asif MD [facility]   8/22/2022 10:15 AM Olga Monterroso NP [facility]   9/16/2022  2:30 PM AWV NURSE, [facility]        Follow up in about 2 weeks (around 9/1/2022).     Signature:  HALIE Collado    Date of encounter: 8/18/22

## 2022-09-01 ENCOUNTER — CLINICAL SUPPORT (OUTPATIENT)
Dept: WOUND CARE | Facility: HOSPITAL | Age: 72
End: 2022-09-01
Attending: NURSE PRACTITIONER
Payer: MEDICARE

## 2022-09-01 VITALS
DIASTOLIC BLOOD PRESSURE: 74 MMHG | HEART RATE: 75 BPM | RESPIRATION RATE: 20 BRPM | TEMPERATURE: 98 F | SYSTOLIC BLOOD PRESSURE: 125 MMHG

## 2022-09-01 DIAGNOSIS — E11.621 TYPE 2 DIABETES MELLITUS WITH LEFT DIABETIC FOOT ULCER: Primary | ICD-10-CM

## 2022-09-01 DIAGNOSIS — L97.529 TYPE 2 DIABETES MELLITUS WITH LEFT DIABETIC FOOT ULCER: Primary | ICD-10-CM

## 2022-09-01 PROCEDURE — 11042 DBRDMT SUBQ TIS 1ST 20SQCM/<: CPT | Performed by: NURSE PRACTITIONER

## 2022-09-01 PROCEDURE — 87070 CULTURE OTHR SPECIMN AEROBIC: CPT

## 2022-09-01 PROCEDURE — 11042 DBRDMT SUBQ TIS 1ST 20SQCM/<: CPT

## 2022-09-01 RX ORDER — SULFAMETHOXAZOLE AND TRIMETHOPRIM 800; 160 MG/1; MG/1
1 TABLET ORAL 2 TIMES DAILY
Qty: 20 TABLET | Refills: 0 | Status: SHIPPED | OUTPATIENT
Start: 2022-09-01 | End: 2022-09-06 | Stop reason: CLARIF

## 2022-09-01 NOTE — PROGRESS NOTES
No name on file   [facility]  [address]  [place] [address]     PATIENT NAME: René Moscoso  : 1950  DATE: 22  MRN: [id number]      Billing Provider: No name on file  Level of Service:   Patient PCP Information       Provider PCP Type    Olga Monterroso NP General            Reason for Visit / Chief Complaint: No chief complaint on file.       Update PCP  Update Chief Complaint         History of Present Illness / Problem Focused Workflow     René Moscoso presents to the clinic for follow up on wound    Location: Wound left fifth metatarsal head   Severity: Insensate  Duration: 2022  Context: uncertain  Modifying Factors: Hx of DM not well controlled and HTN  Associated Signs and Symptom: Small amt of serous drainage.  Ulcertion on left medial ankle healed        Review of Systems     Review of Systems   Constitutional: Negative.  Negative for chills and fever.   Respiratory:  Negative for shortness of breath.    Cardiovascular:  Negative for chest pain.   Gastrointestinal:  Negative for nausea and vomiting.   Integumentary:  Positive for wound (left foot).   Neurological:  Negative for weakness and headaches.      Medical / Social / Family History     Past Medical History:   Diagnosis Date    Arthritis     Chronic pain 2021    COPD (chronic obstructive pulmonary disease)     Diabetes type 2, controlled     Hyperlipidemia     Neuropathy        Past Surgical History:   Procedure Laterality Date    CARDIAC CATHETERIZATION      ESOPHAGOGASTRODUODENOSCOPY  10/22/2014    FLEXIBLE SIGMOIDOSCOPY  10/23/2014    INJECTION OF FACET JOINT Bilateral 2014    Bilateral L3-S1 Facet Injection - 14 and 3/18/14    SINUS SURGERY         Social History    reports that he quit smoking about 11 years ago. His smoking use included cigarettes. He started smoking about 31 years ago. He has a 40.00 pack-year smoking history. His smokeless tobacco use includes snuff. He reports that he  does not currently use alcohol. He reports current drug use. Drug: Hydrocodone.    Family History  MrBarby's family history includes Arthritis in his mother; COPD in his brother; Cancer in his father, mother, and sister; Diabetes in his mother; Heart disease in his mother; Hypertension in his mother.    Medications and Allergies     Medications  No outpatient medications have been marked as taking for the 9/1/22 encounter (Clinical Support) with WOUND CARE, [facility].       Allergies  Review of patient's allergies indicates:  No Known Allergies    Physical Examination   There were no vitals filed for this visit.   Physical Exam  Constitutional:       General: He is not in acute distress.     Appearance: Normal appearance.   Cardiovascular:      Rate and Rhythm: Normal rate and regular rhythm.      Pulses:           Dorsalis pedis pulses are 3+ on the left side.        Posterior tibial pulses are 3+ on the left side.   Pulmonary:      Effort: Pulmonary effort is normal.      Breath sounds: Normal breath sounds.   Musculoskeletal:      Right lower leg: No edema.      Left lower leg: No edema.        Feet:    Feet:      Left foot:      Skin integrity: Callus present. No erythema or warmth.      Comments: See wound docs note for assessment, pictures and measurements. Improved today  #1 wound healed  Skin:     General: Skin is warm and dry.      Capillary Refill: Capillary refill takes 2 to 3 seconds.      Coloration: Skin is not pale.      Findings: Wound present.   Neurological:      Sensory: Sensory deficit present.        Assessment and Plan (including Health Maintenance)      Problem List  Smart Sets  Document Outside HM   :    Plan: See wound docs for treatment and plan. HH to continue to follow.  Tissue culture obtained and rx for Bactrim DS sent to pharmacy since they will be closed Monday for the holiday.          Health Maintenance Due   Topic Date Due    Hepatitis C Screening  Never done    Sign Pain Contract   Never done    LDCT Lung Screen  Never done    Shingles Vaccine (1 of 2) Never done    Colorectal Cancer Screening  10/03/2017    Abdominal Aortic Aneurysm Screening  Never done    Pneumococcal Vaccines (Age 65+) (3 - PPSV23 or PCV20) 10/20/2020    COVID-19 Vaccine (3 - Booster) 07/10/2021    Hemoglobin A1c  07/20/2022    Foot Exam  08/12/2022    Influenza Vaccine (1) 09/01/2022       Problem List Items Addressed This Visit          Endocrine    Type 2 diabetes mellitus with left diabetic foot ulcer - Primary    Relevant Orders    Culture, Tissue     Type 2 diabetes mellitus with left diabetic foot ulcer  Comments:  5th metatarsal head  Orders:  -     Culture, Tissue    Other orders  -     sulfamethoxazole-trimethoprim 800-160mg (BACTRIM DS) 800-160 mg Tab; Take 1 tablet by mouth 2 (two) times daily.  Dispense: 20 tablet; Refill: 0       Health Maintenance Topics with due status: Not Due       Topic Last Completion Date    TETANUS VACCINE 05/27/2021    Eye Exam 12/29/2021    Diabetes Urine Screening 01/20/2022    Lipid Panel 01/20/2022           Future Appointments   Date Time Provider Department Center   9/16/2022  2:30 PM AWV NURSE, [facility] [place] [facility]   11/16/2022  8:00 AM Sharon Asif MD [facility]        Follow up in about 1 week (around 9/8/2022).     Signature:  HALIE Collado    Date of encounter: 9/1/22

## 2022-09-05 LAB
BACTERIA TISS AEROBE CULT: ABNORMAL

## 2022-09-06 RX ORDER — CIPROFLOXACIN 500 MG/1
500 TABLET ORAL 2 TIMES DAILY
Qty: 14 TABLET | Refills: 0 | Status: SHIPPED | OUTPATIENT
Start: 2022-09-06 | End: 2022-11-03 | Stop reason: ALTCHOICE

## 2022-09-06 RX ORDER — CEPHALEXIN 500 MG/1
500 CAPSULE ORAL 4 TIMES DAILY
Qty: 20 CAPSULE | Refills: 0 | Status: SHIPPED | OUTPATIENT
Start: 2022-09-06 | End: 2022-11-03 | Stop reason: ALTCHOICE

## 2022-09-08 ENCOUNTER — CLINICAL SUPPORT (OUTPATIENT)
Dept: WOUND CARE | Facility: HOSPITAL | Age: 72
End: 2022-09-08
Attending: FAMILY MEDICINE
Payer: MEDICARE

## 2022-09-08 VITALS — TEMPERATURE: 98 F | DIASTOLIC BLOOD PRESSURE: 74 MMHG | HEART RATE: 74 BPM | SYSTOLIC BLOOD PRESSURE: 127 MMHG

## 2022-09-08 DIAGNOSIS — L97.529 TYPE 2 DIABETES MELLITUS WITH LEFT DIABETIC FOOT ULCER: Primary | ICD-10-CM

## 2022-09-08 DIAGNOSIS — E11.621 TYPE 2 DIABETES MELLITUS WITH LEFT DIABETIC FOOT ULCER: Primary | ICD-10-CM

## 2022-09-08 PROCEDURE — 11042 DBRDMT SUBQ TIS 1ST 20SQCM/<: CPT

## 2022-09-08 PROCEDURE — 11042 DBRDMT SUBQ TIS 1ST 20SQCM/<: CPT | Performed by: NURSE PRACTITIONER

## 2022-09-08 NOTE — PROGRESS NOTES
No name on file   [facility]  [address]  [place] [address]     PATIENT NAME: René Moscoso  : 1950  DATE: 22  MRN: [id number]      Billing Provider: No name on file  Level of Service:   Patient PCP Information       Provider PCP Type    Olga Monterroso NP General            Reason for Visit / Chief Complaint: No chief complaint on file.       Update PCP  Update Chief Complaint         History of Present Illness / Problem Focused Workflow     René Moscoso presents to the clinic for follow up on wound    Location: Wound left fifth metatarsal head   Severity: Insensate  Duration: 2022  Context: uncertain  Modifying Factors: Hx of DM not well controlled and HTN  Associated Signs and Symptom: Small amt of serous drainage.  Ulcertion on left medial ankle healed        Review of Systems     Review of Systems   Constitutional: Negative.  Negative for chills and fever.   Respiratory:  Negative for shortness of breath.    Cardiovascular:  Negative for chest pain.   Gastrointestinal:  Negative for nausea and vomiting.   Integumentary:  Positive for wound (left foot).   Neurological:  Negative for weakness and headaches.      Medical / Social / Family History     Past Medical History:   Diagnosis Date    Arthritis     Chronic pain 2021    COPD (chronic obstructive pulmonary disease)     Diabetes type 2, controlled     Hyperlipidemia     Neuropathy        Past Surgical History:   Procedure Laterality Date    CARDIAC CATHETERIZATION      ESOPHAGOGASTRODUODENOSCOPY  10/22/2014    FLEXIBLE SIGMOIDOSCOPY  10/23/2014    INJECTION OF FACET JOINT Bilateral 2014    Bilateral L3-S1 Facet Injection - 14 and 3/18/14    SINUS SURGERY         Social History    reports that he quit smoking about 11 years ago. His smoking use included cigarettes. He started smoking about 31 years ago. He has a 40.00 pack-year smoking history. His smokeless tobacco use includes snuff. He reports that he  does not currently use alcohol. He reports current drug use. Drug: Hydrocodone.    Family History  MrBarby's family history includes Arthritis in his mother; COPD in his brother; Cancer in his father, mother, and sister; Diabetes in his mother; Heart disease in his mother; Hypertension in his mother.    Medications and Allergies     Medications  No outpatient medications have been marked as taking for the 9/8/22 encounter (Clinical Support) with WOUND CARE, [facility].       Allergies  Review of patient's allergies indicates:  No Known Allergies    Physical Examination   There were no vitals filed for this visit.   Physical Exam  Constitutional:       General: He is not in acute distress.     Appearance: Normal appearance.   Cardiovascular:      Rate and Rhythm: Normal rate and regular rhythm.      Pulses:           Dorsalis pedis pulses are 3+ on the left side.        Posterior tibial pulses are 3+ on the left side.   Pulmonary:      Effort: Pulmonary effort is normal.      Breath sounds: Normal breath sounds.   Musculoskeletal:      Right lower leg: No edema.      Left lower leg: No edema.        Feet:    Feet:      Left foot:      Skin integrity: Callus present. No erythema or warmth.      Comments: See wound docs note for assessment, pictures and measurements. Improved today  #1 wound healed  Skin:     General: Skin is warm and dry.      Capillary Refill: Capillary refill takes 2 to 3 seconds.      Coloration: Skin is not pale.      Findings: Wound present.   Neurological:      Sensory: Sensory deficit present.        Assessment and Plan (including Health Maintenance)      Problem List  Smart Sets  Document Outside HM   :    Plan: See wound docs for treatment and plan. HH to continue to follow. Keep weight off of wound as much as possible.  Pt to stop Bactrim DS and go to pharmacy today and  Cipro 500 mg and keflex 500 mg.      Health Maintenance Due   Topic Date Due    Hepatitis C Screening  Never done     Sign Pain Contract  Never done    LDCT Lung Screen  Never done    Shingles Vaccine (1 of 2) Never done    Colorectal Cancer Screening  10/03/2017    Abdominal Aortic Aneurysm Screening  Never done    Pneumococcal Vaccines (Age 65+) (3 - PPSV23 or PCV20) 10/20/2020    COVID-19 Vaccine (3 - Booster) 07/10/2021    Hemoglobin A1c  07/20/2022    Foot Exam  08/12/2022    Influenza Vaccine (1) 09/01/2022       Problem List Items Addressed This Visit          Endocrine    Type 2 diabetes mellitus with left diabetic foot ulcer - Primary     Type 2 diabetes mellitus with left diabetic foot ulcer         Health Maintenance Topics with due status: Not Due       Topic Last Completion Date    TETANUS VACCINE 05/27/2021    Eye Exam 12/29/2021    Diabetes Urine Screening 01/20/2022    Lipid Panel 01/20/2022           Future Appointments   Date Time Provider Department Center   9/16/2022  2:30 PM [facility]   11/16/2022  8:00 AM Sharon Asif MD [facility]        Follow up in about 1 week (around 9/15/2022).     Signature:  HALIE Collado    Date of encounter: 9/8/22

## 2022-09-15 ENCOUNTER — CLINICAL SUPPORT (OUTPATIENT)
Dept: WOUND CARE | Facility: HOSPITAL | Age: 72
End: 2022-09-15
Attending: NURSE PRACTITIONER
Payer: MEDICARE

## 2022-09-15 VITALS
TEMPERATURE: 99 F | SYSTOLIC BLOOD PRESSURE: 122 MMHG | HEART RATE: 78 BPM | DIASTOLIC BLOOD PRESSURE: 76 MMHG | RESPIRATION RATE: 20 BRPM

## 2022-09-15 DIAGNOSIS — L97.529 TYPE 2 DIABETES MELLITUS WITH LEFT DIABETIC FOOT ULCER: Primary | ICD-10-CM

## 2022-09-15 DIAGNOSIS — E11.621 TYPE 2 DIABETES MELLITUS WITH LEFT DIABETIC FOOT ULCER: Primary | ICD-10-CM

## 2022-09-15 PROCEDURE — 11042 DBRDMT SUBQ TIS 1ST 20SQCM/<: CPT

## 2022-09-15 PROCEDURE — 11042 DBRDMT SUBQ TIS 1ST 20SQCM/<: CPT | Performed by: NURSE PRACTITIONER

## 2022-09-15 NOTE — PROGRESS NOTES
No name on file   [facility]  [address]  [place] [address]     PATIENT NAME: René Moscoso  : 1950  DATE: 9/15/22  MRN: [id number]      Billing Provider: No name on file  Level of Service:   Patient PCP Information       Provider PCP Type    Olga Monterroso NP General            Reason for Visit / Chief Complaint: No chief complaint on file.       Update PCP  Update Chief Complaint         History of Present Illness / Problem Focused Workflow     René Moscoso presents to the clinic for follow up on wound    Location: Wound left fifth metatarsal head   Severity: Insensate  Duration: 2022  Context: uncertain  Modifying Factors: Hx of DM not well controlled and HTN  Associated Signs and Symptom: Small amt of serous drainage.  Ulcertion on left medial ankle healed        Review of Systems     Review of Systems   Constitutional: Negative.  Negative for chills and fever.   Respiratory:  Negative for shortness of breath.    Cardiovascular:  Negative for chest pain.   Gastrointestinal:  Negative for nausea and vomiting.   Integumentary:  Positive for wound (left foot).   Neurological:  Negative for weakness and headaches.      Medical / Social / Family History     Past Medical History:   Diagnosis Date    Arthritis     Chronic pain 2021    COPD (chronic obstructive pulmonary disease)     Diabetes type 2, controlled     Hyperlipidemia     Neuropathy        Past Surgical History:   Procedure Laterality Date    CARDIAC CATHETERIZATION      ESOPHAGOGASTRODUODENOSCOPY  10/22/2014    FLEXIBLE SIGMOIDOSCOPY  10/23/2014    INJECTION OF FACET JOINT Bilateral 2014    Bilateral L3-S1 Facet Injection - 14 and 3/18/14    SINUS SURGERY         Social History    reports that he quit smoking about 11 years ago. His smoking use included cigarettes. He started smoking about 31 years ago. He has a 40.00 pack-year smoking history. His smokeless tobacco use includes snuff. He reports that  he does not currently use alcohol. He reports current drug use. Drug: Hydrocodone.    Family History  MrBarby's family history includes Arthritis in his mother; COPD in his brother; Cancer in his father, mother, and sister; Diabetes in his mother; Heart disease in his mother; Hypertension in his mother.    Medications and Allergies     Medications  No outpatient medications have been marked as taking for the 9/15/22 encounter (Clinical Support) with WOUND CARE, [facility].       Allergies  Review of patient's allergies indicates:  No Known Allergies    Physical Examination   There were no vitals filed for this visit.   Physical Exam  Constitutional:       General: He is not in acute distress.     Appearance: Normal appearance.   Cardiovascular:      Rate and Rhythm: Normal rate and regular rhythm.      Pulses:           Dorsalis pedis pulses are 3+ on the left side.        Posterior tibial pulses are 3+ on the left side.   Pulmonary:      Effort: Pulmonary effort is normal.      Breath sounds: Normal breath sounds.   Musculoskeletal:      Right lower leg: No edema.      Left lower leg: No edema.        Feet:    Feet:      Left foot:      Skin integrity: Callus present. No erythema or warmth.      Comments: See wound docs note for assessment, pictures and measurements. Improved today  #1 wound healed  Skin:     General: Skin is warm and dry.      Capillary Refill: Capillary refill takes 2 to 3 seconds.      Coloration: Skin is not pale.      Findings: Wound present.   Neurological:      Sensory: Sensory deficit present.        Assessment and Plan (including Health Maintenance)      Problem List  Smart Sets  Document Outside HM   :    Plan: See wound docs for treatment and plan. HH to continue to follow. Keep weight off of wound as much as possible, wear Darco shoe  Complete antibiotics    Health Maintenance Due   Topic Date Due    Hepatitis C Screening  Never done    Sign Pain Contract  Never done    LDCT Lung Screen   Never done    Shingles Vaccine (1 of 2) Never done    Colorectal Cancer Screening  10/03/2017    Abdominal Aortic Aneurysm Screening  Never done    Pneumococcal Vaccines (Age 65+) (3 - PPSV23 or PCV20) 10/20/2020    COVID-19 Vaccine (3 - Booster) 07/10/2021    Hemoglobin A1c  07/20/2022    Foot Exam  08/12/2022    Influenza Vaccine (1) 09/01/2022       Problem List Items Addressed This Visit          Endocrine    Type 2 diabetes mellitus with left diabetic foot ulcer - Primary     Type 2 diabetes mellitus with left diabetic foot ulcer           Health Maintenance Topics with due status: Not Due       Topic Last Completion Date    TETANUS VACCINE 05/27/2021    Eye Exam 12/29/2021    Diabetes Urine Screening 01/20/2022    Lipid Panel 01/20/2022           Future Appointments   Date Time Provider Department Center   9/15/2022  8:30 AM WOUND CARE, [facility]   9/16/2022  2:30 PM AWV NURSE, [facility] [place]   11/16/2022  8:00 AM Sharon Asif MD [facility]        Follow up in about 1 month (around 10/15/2022).     Signature:  HALIE Collado    Date of encounter: 9/15/22

## 2022-09-22 ENCOUNTER — CLINICAL SUPPORT (OUTPATIENT)
Dept: WOUND CARE | Facility: HOSPITAL | Age: 72
End: 2022-09-22
Attending: NURSE PRACTITIONER
Payer: MEDICARE

## 2022-09-22 VITALS — TEMPERATURE: 100 F | HEART RATE: 74 BPM | DIASTOLIC BLOOD PRESSURE: 81 MMHG | SYSTOLIC BLOOD PRESSURE: 134 MMHG

## 2022-09-22 DIAGNOSIS — E11.621 TYPE 2 DIABETES MELLITUS WITH LEFT DIABETIC FOOT ULCER: Primary | ICD-10-CM

## 2022-09-22 DIAGNOSIS — L97.529 TYPE 2 DIABETES MELLITUS WITH LEFT DIABETIC FOOT ULCER: Primary | ICD-10-CM

## 2022-09-22 PROCEDURE — 11042 DBRDMT SUBQ TIS 1ST 20SQCM/<: CPT

## 2022-09-22 PROCEDURE — 11042 DBRDMT SUBQ TIS 1ST 20SQCM/<: CPT | Performed by: NURSE PRACTITIONER

## 2022-09-22 NOTE — PROGRESS NOTES
No name on file   [facility]  [address]  [place] [address]     PATIENT NAME: René Moscoso  : 1950  DATE: 22  MRN: [id number]      Billing Provider: No name on file  Level of Service:   Patient PCP Information       Provider PCP Type    Olga Monterroso NP General            Reason for Visit / Chief Complaint: No chief complaint on file.       Update PCP  Update Chief Complaint         History of Present Illness / Problem Focused Workflow     René Moscoso presents to the clinic for follow up on wound    Location: Wound left fifth metatarsal head   Severity: Insensate  Duration: 2022  Context: uncertain  Modifying Factors: Hx of DM not well controlled and HTN  Associated Signs and Symptom: Small amt of serous drainage.  Ulcertion on left medial ankle healed        Review of Systems     Review of Systems   Constitutional: Negative.  Negative for chills and fever.   Respiratory:  Negative for shortness of breath.    Cardiovascular:  Negative for chest pain.   Gastrointestinal:  Negative for nausea and vomiting.   Integumentary:  Positive for wound (left foot).   Neurological:  Negative for weakness and headaches.      Medical / Social / Family History     Past Medical History:   Diagnosis Date    Arthritis     Chronic pain 2021    COPD (chronic obstructive pulmonary disease)     Diabetes type 2, controlled     Hyperlipidemia     Neuropathy        Past Surgical History:   Procedure Laterality Date    CARDIAC CATHETERIZATION      ESOPHAGOGASTRODUODENOSCOPY  10/22/2014    FLEXIBLE SIGMOIDOSCOPY  10/23/2014    INJECTION OF FACET JOINT Bilateral 2014    Bilateral L3-S1 Facet Injection - 14 and 3/18/14    SINUS SURGERY         Social History    reports that he quit smoking about 11 years ago. His smoking use included cigarettes. He started smoking about 31 years ago. He has a 40.00 pack-year smoking history. His smokeless tobacco use includes snuff. He reports that  he does not currently use alcohol. He reports current drug use. Drug: Hydrocodone.    Family History  MrBarby's family history includes Arthritis in his mother; COPD in his brother; Cancer in his father, mother, and sister; Diabetes in his mother; Heart disease in his mother; Hypertension in his mother.    Medications and Allergies     Medications  No outpatient medications have been marked as taking for the 9/22/22 encounter (Clinical Support) with WOUND CARE, [facility].       Allergies  Review of patient's allergies indicates:  No Known Allergies    Physical Examination   There were no vitals filed for this visit.   Physical Exam  Constitutional:       General: He is not in acute distress.     Appearance: Normal appearance.   Cardiovascular:      Rate and Rhythm: Normal rate and regular rhythm.      Pulses:           Dorsalis pedis pulses are 3+ on the left side.        Posterior tibial pulses are 3+ on the left side.   Pulmonary:      Effort: Pulmonary effort is normal.      Breath sounds: Normal breath sounds.   Musculoskeletal:      Right lower leg: No edema.      Left lower leg: No edema.        Feet:    Feet:      Left foot:      Skin integrity: Callus present. No erythema or warmth.      Comments: See wound docs note for assessment, pictures and measurements. Improved today    Skin:     General: Skin is warm and dry.      Capillary Refill: Capillary refill takes 2 to 3 seconds.      Coloration: Skin is not pale.      Findings: Wound present.   Neurological:      Sensory: Sensory deficit present.        Assessment and Plan (including Health Maintenance)      Problem List  Smart Sets  Document Outside HM   :    Plan: See wound docs for treatment and plan. HH to continue to follow. Keep weight off of wound as much as possible, wear Darco shoe  Complete antibiotics    Health Maintenance Due   Topic Date Due    Hepatitis C Screening  Never done    Sign Pain Contract  Never done    LDCT Lung Screen  Never done     Shingles Vaccine (1 of 2) Never done    Colorectal Cancer Screening  10/03/2017    Abdominal Aortic Aneurysm Screening  Never done    Pneumococcal Vaccines (Age 65+) (3 - PPSV23 or PCV20) 10/20/2020    COVID-19 Vaccine (3 - Booster) 07/10/2021    Hemoglobin A1c  07/20/2022    Foot Exam  08/12/2022    Influenza Vaccine (1) 09/01/2022       Problem List Items Addressed This Visit          Endocrine    Type 2 diabetes mellitus with left diabetic foot ulcer - Primary     Type 2 diabetes mellitus with left diabetic foot ulcer             Health Maintenance Topics with due status: Not Due       Topic Last Completion Date    TETANUS VACCINE 05/27/2021    Eye Exam 12/29/2021    Diabetes Urine Screening 01/20/2022    Lipid Panel 01/20/2022           Future Appointments   Date Time Provider Department Center   9/22/2022  8:30 AM WOUND CARE, [facility]   11/16/2022  8:00 AM Sharon Asif MD [facility]        Follow up in about 1 week (around 9/29/2022).     Signature:  HALIE Collado    Date of encounter: 9/22/22

## 2022-09-29 ENCOUNTER — CLINICAL SUPPORT (OUTPATIENT)
Dept: WOUND CARE | Facility: HOSPITAL | Age: 72
End: 2022-09-29
Attending: NURSE PRACTITIONER
Payer: MEDICARE

## 2022-09-29 VITALS
TEMPERATURE: 100 F | RESPIRATION RATE: 18 BRPM | HEART RATE: 82 BPM | SYSTOLIC BLOOD PRESSURE: 153 MMHG | DIASTOLIC BLOOD PRESSURE: 89 MMHG

## 2022-09-29 DIAGNOSIS — L97.529 TYPE 2 DIABETES MELLITUS WITH LEFT DIABETIC FOOT ULCER: Primary | ICD-10-CM

## 2022-09-29 DIAGNOSIS — L97.322 VENOUS STASIS ULCER OF LEFT ANKLE WITH FAT LAYER EXPOSED WITH VARICOSE VEINS: ICD-10-CM

## 2022-09-29 DIAGNOSIS — I83.023 VENOUS STASIS ULCER OF LEFT ANKLE WITH FAT LAYER EXPOSED WITH VARICOSE VEINS: ICD-10-CM

## 2022-09-29 DIAGNOSIS — E11.621 TYPE 2 DIABETES MELLITUS WITH LEFT DIABETIC FOOT ULCER: Primary | ICD-10-CM

## 2022-09-29 PROCEDURE — 11042 DBRDMT SUBQ TIS 1ST 20SQCM/<: CPT | Performed by: NURSE PRACTITIONER

## 2022-09-29 PROCEDURE — 11042 DBRDMT SUBQ TIS 1ST 20SQCM/<: CPT

## 2022-09-29 NOTE — PROGRESS NOTES
No name on file   [facility]  [address]  [place] [address]     PATIENT NAME: René Moscoso  : 1950  DATE: 22  MRN: [id number]      Billing Provider: No name on file  Level of Service:   Patient PCP Information       Provider PCP Type    Olga Monterroso NP General            Reason for Visit / Chief Complaint: No chief complaint on file.       Update PCP  Update Chief Complaint         History of Present Illness / Problem Focused Workflow     René Moscoso presents to the clinic for follow up on wound    Location: Wound left fifth metatarsal head   Severity: Insensate  Duration: 2022  Context: uncertain  Modifying Factors: Hx of DM not well controlled and HTN  Associated Signs and Symptom: Small amt of serous drainage.  Ulcertion on left medial ankle healed        Review of Systems     Review of Systems   Constitutional: Negative.  Negative for chills and fever.   Respiratory:  Negative for shortness of breath.    Cardiovascular:  Negative for chest pain.   Gastrointestinal:  Negative for nausea and vomiting.   Integumentary:  Positive for wound (left foot).   Neurological:  Negative for weakness and headaches.      Medical / Social / Family History     Past Medical History:   Diagnosis Date    Arthritis     Chronic pain 2021    COPD (chronic obstructive pulmonary disease)     Diabetes type 2, controlled     Hyperlipidemia     Neuropathy        Past Surgical History:   Procedure Laterality Date    CARDIAC CATHETERIZATION      ESOPHAGOGASTRODUODENOSCOPY  10/22/2014    FLEXIBLE SIGMOIDOSCOPY  10/23/2014    INJECTION OF FACET JOINT Bilateral 2014    Bilateral L3-S1 Facet Injection - 14 and 3/18/14    SINUS SURGERY         Social History    reports that he quit smoking about 11 years ago. His smoking use included cigarettes. He started smoking about 31 years ago. He has a 40.00 pack-year smoking history. His smokeless tobacco use includes snuff. He reports that  he does not currently use alcohol. He reports current drug use. Drug: Hydrocodone.    Family History  MrBarby's family history includes Arthritis in his mother; COPD in his brother; Cancer in his father, mother, and sister; Diabetes in his mother; Heart disease in his mother; Hypertension in his mother.    Medications and Allergies     Medications  No outpatient medications have been marked as taking for the 9/29/22 encounter (Clinical Support) with WOUND CARE, [facility].       Allergies  Review of patient's allergies indicates:  No Known Allergies    Physical Examination   There were no vitals filed for this visit.   Physical Exam  Constitutional:       General: He is not in acute distress.     Appearance: Normal appearance.   Cardiovascular:      Rate and Rhythm: Normal rate and regular rhythm.      Pulses:           Dorsalis pedis pulses are 3+ on the left side.        Posterior tibial pulses are 3+ on the left side.   Pulmonary:      Effort: Pulmonary effort is normal.      Breath sounds: Normal breath sounds.   Musculoskeletal:      Right lower leg: No edema.      Left lower leg: No edema.        Feet:    Feet:      Left foot:      Skin integrity: Callus present. No erythema or warmth.      Comments: See wound docs note for assessment, pictures and measurements. Improved today    Skin:     General: Skin is warm and dry.      Capillary Refill: Capillary refill takes 2 to 3 seconds.      Coloration: Skin is not pale.      Findings: Wound present.   Neurological:      Sensory: Sensory deficit present.        Assessment and Plan (including Health Maintenance)      Problem List  Smart Sets  Document Outside HM   :    Plan: See wound docs for treatment and plan. HH to continue to follow. Keep weight off of wound as much as possible, wear Darco shoe        Health Maintenance Due   Topic Date Due    Hepatitis C Screening  Never done    Sign Pain Contract  Never done    LDCT Lung Screen  Never done    Shingles Vaccine (1 of  2) Never done    Colorectal Cancer Screening  10/03/2017    Abdominal Aortic Aneurysm Screening  Never done    Pneumococcal Vaccines (Age 65+) (3 - PPSV23 or PCV20) 10/20/2020    COVID-19 Vaccine (3 - Booster) 04/07/2021    Hemoglobin A1c  07/20/2022    Foot Exam  08/12/2022    Influenza Vaccine (1) 09/01/2022       Problem List Items Addressed This Visit          Endocrine    Type 2 diabetes mellitus with left diabetic foot ulcer - Primary     Other Visit Diagnoses       Venous stasis ulcer of left ankle with fat layer exposed with varicose veins              Type 2 diabetes mellitus with left diabetic foot ulcer    Venous stasis ulcer of left ankle with fat layer exposed with varicose veins               Health Maintenance Topics with due status: Not Due       Topic Last Completion Date    TETANUS VACCINE 05/27/2021    Eye Exam 12/29/2021    Diabetes Urine Screening 01/20/2022    Lipid Panel 01/20/2022           Future Appointments   Date Time Provider Department Center   9/29/2022  8:30 AM WOUND CARE, [facility]    11/16/2022  8:00 AM Sharon Asif MD [facility]        Follow up in about 1 week (around 10/6/2022).     Signature:  HALIE Collado    Date of encounter: 9/29/22

## 2022-10-20 ENCOUNTER — CLINICAL SUPPORT (OUTPATIENT)
Dept: WOUND CARE | Facility: HOSPITAL | Age: 72
End: 2022-10-20
Attending: NURSE PRACTITIONER
Payer: MEDICARE

## 2022-10-20 VITALS
RESPIRATION RATE: 20 BRPM | HEART RATE: 70 BPM | DIASTOLIC BLOOD PRESSURE: 78 MMHG | SYSTOLIC BLOOD PRESSURE: 120 MMHG | TEMPERATURE: 98 F

## 2022-10-20 DIAGNOSIS — L97.529 TYPE 2 DIABETES MELLITUS WITH LEFT DIABETIC FOOT ULCER: Primary | ICD-10-CM

## 2022-10-20 DIAGNOSIS — E11.621 TYPE 2 DIABETES MELLITUS WITH LEFT DIABETIC FOOT ULCER: Primary | ICD-10-CM

## 2022-10-20 PROCEDURE — 11042 DBRDMT SUBQ TIS 1ST 20SQCM/<: CPT

## 2022-10-20 PROCEDURE — 11042 DBRDMT SUBQ TIS 1ST 20SQCM/<: CPT | Performed by: NURSE PRACTITIONER

## 2022-10-20 NOTE — PROGRESS NOTES
No name on file   [facility]  [address]  [place] [address]     PATIENT NAME: René Moscoso  : 1950  DATE: 10/20/22  MRN: [id number]      Billing Provider: No name on file  Level of Service:   Patient PCP Information       Provider PCP Type    Olga Monterroso NP General            Reason for Visit / Chief Complaint: No chief complaint on file.       Update PCP  Update Chief Complaint         History of Present Illness / Problem Focused Workflow     René Moscoso presents to the clinic for follow up on wound    Location: Wound left fifth metatarsal head   Severity: Insensate  Duration: 2022  Context: uncertain  Modifying Factors: Hx of DM not well controlled and HTN  Associated Signs and Symptom: Small amt of serous drainage.  Ulcertion on left medial ankle healed        Review of Systems     Review of Systems   Constitutional: Negative.  Negative for chills and fever.   Respiratory:  Negative for shortness of breath.    Cardiovascular:  Negative for chest pain.   Gastrointestinal:  Negative for nausea and vomiting.   Integumentary:  Positive for wound (left foot).   Neurological:  Negative for weakness and headaches.      Medical / Social / Family History     Past Medical History:   Diagnosis Date    Arthritis     Chronic pain 2021    COPD (chronic obstructive pulmonary disease)     Diabetes type 2, controlled     Hyperlipidemia     Neuropathy        Past Surgical History:   Procedure Laterality Date    CARDIAC CATHETERIZATION      ESOPHAGOGASTRODUODENOSCOPY  10/22/2014    FLEXIBLE SIGMOIDOSCOPY  10/23/2014    INJECTION OF FACET JOINT Bilateral 2014    Bilateral L3-S1 Facet Injection - 14 and 3/18/14    SINUS SURGERY         Social History    reports that he quit smoking about 11 years ago. His smoking use included cigarettes. He started smoking about 31 years ago. He has a 40.00 pack-year smoking history. His smokeless tobacco use includes snuff. He reports that  he does not currently use alcohol. He reports current drug use. Drug: Hydrocodone.    Family History  MrBarby's family history includes Arthritis in his mother; COPD in his brother; Cancer in his father, mother, and sister; Diabetes in his mother; Heart disease in his mother; Hypertension in his mother.    Medications and Allergies     Medications  No outpatient medications have been marked as taking for the 10/20/22 encounter (Clinical Support) with WOUND CARE, [facility].       Allergies  Review of patient's allergies indicates:  No Known Allergies    Physical Examination   There were no vitals filed for this visit.   Physical Exam  Constitutional:       General: He is not in acute distress.     Appearance: Normal appearance.   Cardiovascular:      Rate and Rhythm: Normal rate and regular rhythm.      Pulses:           Dorsalis pedis pulses are 3+ on the left side.        Posterior tibial pulses are 3+ on the left side.   Pulmonary:      Effort: Pulmonary effort is normal.      Breath sounds: Normal breath sounds.   Musculoskeletal:      Right lower leg: No edema.      Left lower leg: No edema.        Feet:    Feet:      Left foot:      Skin integrity: Skin breakdown and callus present. No erythema or warmth.      Comments: See wound docs note for assessment, pictures and measurements.     Skin:     General: Skin is warm and dry.      Capillary Refill: Capillary refill takes 2 to 3 seconds.      Coloration: Skin is not pale.      Findings: Wound present.   Neurological:      Sensory: Sensory deficit present.        Assessment and Plan (including Health Maintenance)      Problem List  Smart Sets  Document Outside HM   :    Plan: See wound docs for treatment and plan. HH to continue to follow. Keep weight off of wound as much as possible, wear Darco shoe        Health Maintenance Due   Topic Date Due    Hepatitis C Screening  Never done    Sign Pain Contract  Never done    LDCT Lung Screen  Never done    Shingles Vaccine  (1 of 2) Never done    Colorectal Cancer Screening  10/03/2017    Abdominal Aortic Aneurysm Screening  Never done    Pneumococcal Vaccines (Age 65+) (3 - PPSV23 if available, else PCV20) 10/20/2020    COVID-19 Vaccine (3 - Booster) 04/07/2021    Hemoglobin A1c  07/20/2022    Foot Exam  08/12/2022    Influenza Vaccine (1) 09/01/2022       Problem List Items Addressed This Visit          Endocrine    Type 2 diabetes mellitus with left diabetic foot ulcer - Primary     Type 2 diabetes mellitus with left diabetic foot ulcer                 Health Maintenance Topics with due status: Not Due       Topic Last Completion Date    TETANUS VACCINE 05/27/2021    Eye Exam 12/29/2021    Diabetes Urine Screening 01/20/2022    Lipid Panel 01/20/2022           Future Appointments   Date Time Provider Department Center   11/16/2022  8:00 AM Sharon Asif MD [facility]        Follow up in about 2 weeks (around 11/3/2022).     Signature:  HALIE Collado    Date of encounter: 10/20/22

## 2022-11-03 ENCOUNTER — CLINICAL SUPPORT (OUTPATIENT)
Dept: WOUND CARE | Facility: HOSPITAL | Age: 72
End: 2022-11-03
Attending: NURSE PRACTITIONER
Payer: MEDICARE

## 2022-11-03 VITALS
SYSTOLIC BLOOD PRESSURE: 131 MMHG | TEMPERATURE: 100 F | DIASTOLIC BLOOD PRESSURE: 82 MMHG | HEART RATE: 72 BPM | RESPIRATION RATE: 20 BRPM

## 2022-11-03 DIAGNOSIS — L97.529 TYPE 2 DIABETES MELLITUS WITH LEFT DIABETIC FOOT ULCER: Primary | ICD-10-CM

## 2022-11-03 DIAGNOSIS — S90.821A BLISTER OF RIGHT HEEL, INITIAL ENCOUNTER: ICD-10-CM

## 2022-11-03 DIAGNOSIS — E11.621 TYPE 2 DIABETES MELLITUS WITH LEFT DIABETIC FOOT ULCER: Primary | ICD-10-CM

## 2022-11-03 PROCEDURE — 11042 DBRDMT SUBQ TIS 1ST 20SQCM/<: CPT | Performed by: NURSE PRACTITIONER

## 2022-11-03 PROCEDURE — 11045 DBRDMT SUBQ TISS EACH ADDL: CPT | Performed by: NURSE PRACTITIONER

## 2022-11-03 PROCEDURE — 11042 DBRDMT SUBQ TIS 1ST 20SQCM/<: CPT

## 2022-11-03 PROCEDURE — 87070 CULTURE OTHR SPECIMN AEROBIC: CPT

## 2022-11-03 PROCEDURE — 11045 DBRDMT SUBQ TISS EACH ADDL: CPT

## 2022-11-03 RX ORDER — SULFAMETHOXAZOLE AND TRIMETHOPRIM 800; 160 MG/1; MG/1
1 TABLET ORAL 2 TIMES DAILY
Qty: 28 TABLET | Refills: 0 | Status: SHIPPED | OUTPATIENT
Start: 2022-11-03 | End: 2022-11-21

## 2022-11-03 NOTE — PROGRESS NOTES
No name on file   [facility]  [address]  [place] [address]     PATIENT NAME: René Moscoso  : 1950  DATE: 11/3/22  MRN: [id number]      Billing Provider: No name on file  Level of Service:   Patient PCP Information       Provider PCP Type    Olga Monterroso NP General            Reason for Visit / Chief Complaint: No chief complaint on file.       Update PCP  Update Chief Complaint         History of Present Illness / Problem Focused Workflow     René Moscoso presents to the clinic for follow up on wound    Location: Wound left fifth metatarsal head and new blister on right lateral heel  Severity: Insensate  Duration: 2022 (left 5th metatarsal head 10/30/2022 ( right lateral heel)  Context:Wearing different shoes  Modifying Factors: Hx of DM not well controlled and HTN  Associated Signs and Symptom: Small amt of serous drainage.          Review of Systems     Review of Systems   Constitutional: Negative.  Negative for chills and fever.   Respiratory:  Negative for shortness of breath.    Cardiovascular:  Negative for chest pain.   Gastrointestinal:  Negative for nausea and vomiting.   Integumentary:  Positive for wound (left foot; right heel).   Neurological:  Negative for weakness and headaches.      Medical / Social / Family History     Past Medical History:   Diagnosis Date    Arthritis     Chronic pain 2021    COPD (chronic obstructive pulmonary disease)     Diabetes type 2, controlled     Hyperlipidemia     Neuropathy        Past Surgical History:   Procedure Laterality Date    CARDIAC CATHETERIZATION      ESOPHAGOGASTRODUODENOSCOPY  10/22/2014    FLEXIBLE SIGMOIDOSCOPY  10/23/2014    INJECTION OF FACET JOINT Bilateral 2014    Bilateral L3-S1 Facet Injection - 14 and 3/18/14    SINUS SURGERY         Social History    reports that he quit smoking about 11 years ago. His smoking use included cigarettes. He started smoking about 31 years ago. He has a 40.00  pack-year smoking history. His smokeless tobacco use includes snuff. He reports that he does not currently use alcohol. He reports current drug use. Drug: Hydrocodone.    Family History  's family history includes Arthritis in his mother; COPD in his brother; Cancer in his father, mother, and sister; Diabetes in his mother; Heart disease in his mother; Hypertension in his mother.    Medications and Allergies     Medications  No outpatient medications have been marked as taking for the 11/3/22 encounter (Clinical Support) with WOUND CARE, [facility].       Allergies  Review of patient's allergies indicates:  No Known Allergies    Physical Examination   There were no vitals filed for this visit.   Physical Exam  Constitutional:       General: He is not in acute distress.     Appearance: Normal appearance.   Cardiovascular:      Rate and Rhythm: Normal rate and regular rhythm.      Pulses:           Dorsalis pedis pulses are 2+ on the right side and 3+ on the left side.        Posterior tibial pulses are 2+ on the right side and 3+ on the left side.   Pulmonary:      Effort: Pulmonary effort is normal.      Breath sounds: Normal breath sounds.   Musculoskeletal:      Right lower leg: No edema.      Left lower leg: No edema.        Feet:    Feet:      Right foot:      Skin integrity: Blister and skin breakdown present.      Left foot:      Skin integrity: Skin breakdown present. No erythema, warmth or callus.      Comments: See wound docs note for assessment, pictures and measurements.   Both wounds macerated    Skin:     General: Skin is warm and dry.      Capillary Refill: Capillary refill takes 2 to 3 seconds.      Coloration: Skin is not pale.      Findings: Wound present.   Neurological:      Sensory: Sensory deficit present.        Assessment and Plan (including Health Maintenance)      Problem List  Smart Sets  Document Outside HM   :    Plan: See wound docs for treatment and plan. HH to continue to follow.  Keep weight off of wound as much as possible, wear Darco shoe        Health Maintenance Due   Topic Date Due    Hepatitis C Screening  Never done    Sign Pain Contract  Never done    LDCT Lung Screen  Never done    Shingles Vaccine (1 of 2) Never done    Colorectal Cancer Screening  10/03/2017    Abdominal Aortic Aneurysm Screening  Never done    Pneumococcal Vaccines (Age 65+) (3 - PPSV23 if available, else PCV20) 10/20/2020    COVID-19 Vaccine (3 - Booster) 04/07/2021    Hemoglobin A1c  07/20/2022    Foot Exam  08/12/2022    Influenza Vaccine (1) 09/01/2022       Problem List Items Addressed This Visit          Endocrine    Type 2 diabetes mellitus with left diabetic foot ulcer - Primary     Type 2 diabetes mellitus with left diabetic foot ulcer    Other orders  -     sulfamethoxazole-trimethoprim 800-160mg (BACTRIM DS) 800-160 mg Tab; Take 1 tablet by mouth 2 (two) times daily.  Dispense: 28 tablet; Refill: 0                   Health Maintenance Topics with due status: Not Due       Topic Last Completion Date    TETANUS VACCINE 05/27/2021    Eye Exam 12/29/2021    Diabetes Urine Screening 01/20/2022    Lipid Panel 01/20/2022           Future Appointments   Date Time Provider Department Center   11/16/2022  8:00 AM Sharon Asif MD [facility]        Follow up in about 1 week (around 11/10/2022).     Signature:  HALIE Collado    Date of encounter: 11/3/22

## 2022-11-05 LAB — BACTERIA TISS AEROBE CULT: ABNORMAL

## 2022-11-09 DIAGNOSIS — Z71.89 COMPLEX CARE COORDINATION: ICD-10-CM

## 2022-11-10 ENCOUNTER — CLINICAL SUPPORT (OUTPATIENT)
Dept: WOUND CARE | Facility: HOSPITAL | Age: 72
End: 2022-11-10
Attending: NURSE PRACTITIONER
Payer: MEDICARE

## 2022-11-10 VITALS
TEMPERATURE: 98 F | RESPIRATION RATE: 20 BRPM | DIASTOLIC BLOOD PRESSURE: 76 MMHG | SYSTOLIC BLOOD PRESSURE: 129 MMHG | HEART RATE: 78 BPM

## 2022-11-10 DIAGNOSIS — S90.821A BLISTER OF RIGHT HEEL, INITIAL ENCOUNTER: ICD-10-CM

## 2022-11-10 DIAGNOSIS — E11.621 TYPE 2 DIABETES MELLITUS WITH LEFT DIABETIC FOOT ULCER: Primary | ICD-10-CM

## 2022-11-10 DIAGNOSIS — L97.529 TYPE 2 DIABETES MELLITUS WITH LEFT DIABETIC FOOT ULCER: Primary | ICD-10-CM

## 2022-11-10 PROCEDURE — 11042 DBRDMT SUBQ TIS 1ST 20SQCM/<: CPT | Performed by: NURSE PRACTITIONER

## 2022-11-10 PROCEDURE — 11042 DBRDMT SUBQ TIS 1ST 20SQCM/<: CPT

## 2022-11-10 NOTE — PROGRESS NOTES
WOUND CARE, [facility]  [address]  [place] [address]     PATIENT NAME: René Moscoso  : 1950  DATE: 11/10/22  MRN: [id number]      Billing Provider: WOUND CARE, [facility]  Level of Service:   Patient PCP Information       Provider PCP Type    Olga Monterroso NP General            Reason for Visit / Chief Complaint: No chief complaint on file.       Update PCP  Update Chief Complaint         History of Present Illness / Problem Focused Workflow     René Moscoso presents to the clinic for follow up on wound    Location: Wound left fifth metatarsal head and new blister on right lateral heel  Severity: Insensate  Duration: 2022 (left 5th metatarsal head 10/30/2022 ( right lateral heel)  Context:Wearing different shoes  Modifying Factors: Hx of DM not well controlled and HTN  Associated Signs and Symptom: Small amt of serous drainage.          Review of Systems     Review of Systems   Constitutional: Negative.  Negative for chills and fever.   Respiratory:  Negative for shortness of breath.    Cardiovascular:  Negative for chest pain.   Gastrointestinal:  Negative for nausea and vomiting.   Integumentary:  Positive for wound (left foot; right heel).   Neurological:  Negative for weakness and headaches.      Medical / Social / Family History     Past Medical History:   Diagnosis Date    Arthritis     Chronic pain 2021    COPD (chronic obstructive pulmonary disease)     Diabetes type 2, controlled     Hyperlipidemia     Neuropathy        Past Surgical History:   Procedure Laterality Date    CARDIAC CATHETERIZATION      ESOPHAGOGASTRODUODENOSCOPY  10/22/2014    FLEXIBLE SIGMOIDOSCOPY  10/23/2014    INJECTION OF FACET JOINT Bilateral 2014    Bilateral L3-S1 Facet Injection - 14 and 3/18/14    SINUS SURGERY         Social History    reports that he quit smoking about 11 years ago. His smoking use included cigarettes. He started smoking about 31 years ago. He  has a 40.00 pack-year smoking history. His smokeless tobacco use includes snuff. He reports that he does not currently use alcohol. He reports current drug use. Drug: Hydrocodone.    Family History  's family history includes Arthritis in his mother; COPD in his brother; Cancer in his father, mother, and sister; Diabetes in his mother; Heart disease in his mother; Hypertension in his mother.    Medications and Allergies     Medications  No outpatient medications have been marked as taking for the 11/10/22 encounter (Clinical Support) with WOUND CARE, [facility].       Allergies  Review of patient's allergies indicates:  No Known Allergies    Physical Examination   There were no vitals filed for this visit.   Physical Exam  Constitutional:       General: He is not in acute distress.     Appearance: Normal appearance.   Cardiovascular:      Rate and Rhythm: Normal rate and regular rhythm.      Pulses:           Dorsalis pedis pulses are 2+ on the right side and 3+ on the left side.        Posterior tibial pulses are 2+ on the right side and 3+ on the left side.   Pulmonary:      Effort: Pulmonary effort is normal.      Breath sounds: Normal breath sounds.   Musculoskeletal:      Right lower leg: No edema.      Left lower leg: No edema.        Feet:    Feet:      Right foot:      Skin integrity: Blister and skin breakdown present.      Left foot:      Skin integrity: Skin breakdown present. No erythema, warmth or callus.      Comments: See wound docs note for assessment, pictures and measurements.   Both wounds macerated    Skin:     General: Skin is warm and dry.      Capillary Refill: Capillary refill takes 2 to 3 seconds.      Coloration: Skin is not pale.      Findings: Wound present.   Neurological:      Sensory: Sensory deficit present.        Assessment and Plan (including Health Maintenance)      Problem List  Smart Sets  Document Outside HM   :    Plan: See wound docs for treatment and plan. HH to continue  to follow. Keep weight off of wound as much as possible, wear Darco shoe        Health Maintenance Due   Topic Date Due    Hepatitis C Screening  Never done    Sign Pain Contract  Never done    LDCT Lung Screen  Never done    Shingles Vaccine (1 of 2) Never done    Colorectal Cancer Screening  10/03/2017    Abdominal Aortic Aneurysm Screening  Never done    Pneumococcal Vaccines (Age 65+) (3 - PPSV23 if available, else PCV20) 10/20/2020    COVID-19 Vaccine (3 - Booster) 04/07/2021    Hemoglobin A1c  07/20/2022    Foot Exam  08/12/2022    Influenza Vaccine (1) 09/01/2022       Problem List Items Addressed This Visit          Endocrine    Type 2 diabetes mellitus with left diabetic foot ulcer - Primary     Other Visit Diagnoses       Blister of right heel, initial encounter              Type 2 diabetes mellitus with left diabetic foot ulcer    Blister of right heel, initial encounter                     Health Maintenance Topics with due status: Not Due       Topic Last Completion Date    TETANUS VACCINE 05/27/2021    Eye Exam 12/29/2021    Diabetes Urine Screening 01/20/2022    Lipid Panel 01/20/2022           Future Appointments   Date Time Provider Department Center   11/16/2022  8:00 AM Sharon Asif MD [facility]        Follow up in about 1 week (around 11/17/2022).     Signature:  HALIE Collado    Date of encounter: 11/10/22

## 2022-11-16 ENCOUNTER — OFFICE VISIT (OUTPATIENT)
Dept: PAIN MEDICINE | Facility: CLINIC | Age: 72
End: 2022-11-16
Payer: MEDICARE

## 2022-11-16 VITALS
BODY MASS INDEX: 36.04 KG/M2 | HEIGHT: 76 IN | HEART RATE: 82 BPM | DIASTOLIC BLOOD PRESSURE: 74 MMHG | SYSTOLIC BLOOD PRESSURE: 123 MMHG | WEIGHT: 296 LBS

## 2022-11-16 DIAGNOSIS — E11.42 DIABETIC POLYNEUROPATHY ASSOCIATED WITH TYPE 2 DIABETES MELLITUS: Chronic | ICD-10-CM

## 2022-11-16 DIAGNOSIS — M47.817 LUMBOSACRAL SPONDYLOSIS WITHOUT MYELOPATHY: Chronic | ICD-10-CM

## 2022-11-16 DIAGNOSIS — Z79.899 ENCOUNTER FOR LONG-TERM (CURRENT) USE OF OTHER MEDICATIONS: Primary | ICD-10-CM

## 2022-11-16 DIAGNOSIS — M79.672 FOOT PAIN, LEFT: Chronic | ICD-10-CM

## 2022-11-16 LAB

## 2022-11-16 PROCEDURE — 4010F ACE/ARB THERAPY RXD/TAKEN: CPT | Mod: CPTII,,, | Performed by: PAIN MEDICINE

## 2022-11-16 PROCEDURE — 1101F PT FALLS ASSESS-DOCD LE1/YR: CPT | Mod: CPTII,,, | Performed by: PAIN MEDICINE

## 2022-11-16 PROCEDURE — 3078F PR MOST RECENT DIASTOLIC BLOOD PRESSURE < 80 MM HG: ICD-10-PCS | Mod: CPTII,,, | Performed by: PAIN MEDICINE

## 2022-11-16 PROCEDURE — 3008F PR BODY MASS INDEX (BMI) DOCUMENTED: ICD-10-PCS | Mod: CPTII,,, | Performed by: PAIN MEDICINE

## 2022-11-16 PROCEDURE — 3051F PR MOST RECENT HEMOGLOBIN A1C LEVEL 7.0 - < 8.0%: ICD-10-PCS | Mod: CPTII,,, | Performed by: PAIN MEDICINE

## 2022-11-16 PROCEDURE — 99215 OFFICE O/P EST HI 40 MIN: CPT | Mod: PBBFAC | Performed by: PAIN MEDICINE

## 2022-11-16 PROCEDURE — 3066F NEPHROPATHY DOC TX: CPT | Mod: CPTII,,, | Performed by: PAIN MEDICINE

## 2022-11-16 PROCEDURE — 99214 PR OFFICE/OUTPT VISIT, EST, LEVL IV, 30-39 MIN: ICD-10-PCS | Mod: S$PBB,,, | Performed by: PAIN MEDICINE

## 2022-11-16 PROCEDURE — 3288F PR FALLS RISK ASSESSMENT DOCUMENTED: ICD-10-PCS | Mod: CPTII,,, | Performed by: PAIN MEDICINE

## 2022-11-16 PROCEDURE — 1125F PR PAIN SEVERITY QUANTIFIED, PAIN PRESENT: ICD-10-PCS | Mod: CPTII,,, | Performed by: PAIN MEDICINE

## 2022-11-16 PROCEDURE — 99214 OFFICE O/P EST MOD 30 MIN: CPT | Mod: S$PBB,,, | Performed by: PAIN MEDICINE

## 2022-11-16 PROCEDURE — 1125F AMNT PAIN NOTED PAIN PRSNT: CPT | Mod: CPTII,,, | Performed by: PAIN MEDICINE

## 2022-11-16 PROCEDURE — 3078F DIAST BP <80 MM HG: CPT | Mod: CPTII,,, | Performed by: PAIN MEDICINE

## 2022-11-16 PROCEDURE — 80305 DRUG TEST PRSMV DIR OPT OBS: CPT | Mod: PBBFAC | Performed by: PAIN MEDICINE

## 2022-11-16 PROCEDURE — 3060F PR POS MICROALBUMINURIA RESULT DOCUMENTED/REVIEW: ICD-10-PCS | Mod: CPTII,,, | Performed by: PAIN MEDICINE

## 2022-11-16 PROCEDURE — 3008F BODY MASS INDEX DOCD: CPT | Mod: CPTII,,, | Performed by: PAIN MEDICINE

## 2022-11-16 PROCEDURE — 3051F HG A1C>EQUAL 7.0%<8.0%: CPT | Mod: CPTII,,, | Performed by: PAIN MEDICINE

## 2022-11-16 PROCEDURE — 1101F PR PT FALLS ASSESS DOC 0-1 FALLS W/OUT INJ PAST YR: ICD-10-PCS | Mod: CPTII,,, | Performed by: PAIN MEDICINE

## 2022-11-16 PROCEDURE — 3066F PR DOCUMENTATION OF TREATMENT FOR NEPHROPATHY: ICD-10-PCS | Mod: CPTII,,, | Performed by: PAIN MEDICINE

## 2022-11-16 PROCEDURE — 4010F PR ACE/ARB THEARPY RXD/TAKEN: ICD-10-PCS | Mod: CPTII,,, | Performed by: PAIN MEDICINE

## 2022-11-16 PROCEDURE — 3074F PR MOST RECENT SYSTOLIC BLOOD PRESSURE < 130 MM HG: ICD-10-PCS | Mod: CPTII,,, | Performed by: PAIN MEDICINE

## 2022-11-16 PROCEDURE — 3288F FALL RISK ASSESSMENT DOCD: CPT | Mod: CPTII,,, | Performed by: PAIN MEDICINE

## 2022-11-16 PROCEDURE — 3074F SYST BP LT 130 MM HG: CPT | Mod: CPTII,,, | Performed by: PAIN MEDICINE

## 2022-11-16 PROCEDURE — 3060F POS MICROALBUMINURIA REV: CPT | Mod: CPTII,,, | Performed by: PAIN MEDICINE

## 2022-11-16 RX ORDER — HYDROCODONE BITARTRATE AND ACETAMINOPHEN 10; 325 MG/1; MG/1
1 TABLET ORAL EVERY 8 HOURS PRN
Qty: 90 TABLET | Refills: 0 | Status: SHIPPED | OUTPATIENT
Start: 2022-11-16 | End: 2022-01-01

## 2022-11-16 RX ORDER — HYDROCODONE BITARTRATE AND ACETAMINOPHEN 10; 325 MG/1; MG/1
1 TABLET ORAL EVERY 8 HOURS PRN
Qty: 90 TABLET | Refills: 0 | Status: SHIPPED | OUTPATIENT
Start: 2023-01-01 | End: 2023-01-01 | Stop reason: SDUPTHER

## 2022-11-16 RX ORDER — HYDROCODONE BITARTRATE AND ACETAMINOPHEN 10; 325 MG/1; MG/1
1 TABLET ORAL EVERY 8 HOURS PRN
Qty: 90 TABLET | Refills: 0 | Status: SHIPPED | OUTPATIENT
Start: 2022-01-01 | End: 2023-01-01 | Stop reason: SDUPTHER

## 2022-11-16 NOTE — PROGRESS NOTES
She Disclaimer: This note has been generated using voice-recognition software. There may be typographical errors that have been missed during proof-reading        Patient ID: René Moscoso is a 72 y.o. male.      Chief Complaint: Foot Pain      72 year old male returns for  re-evaluation of severe pain involving the bilateral lower extremities, diabetic neuropathy and bilateral foot ulceration.  He remains in wound care once a week and is treated with Bactrim.  His pain is tolerable with Norco and he returns today for medication refill.   He also suffers from chronic lower back pain and x-rays reveal multilevel degenerative changes, bilateral stenosis and L5-S1 anterolisthesis.  Medial branch blocks were discussed but due to his ongoing chronic infection he is not a candidate at this time.        Pain Assessment  Pain Assessment: 0-10  Pain Score:   6  Pain Location: Other (Comment) (lower back and bilateral feet)  Pain Descriptors: Aching, Constant  Pain Frequency: Continuous  Clinical Progression: Not changed  Aggravating Factors: Standing, Walking, Other (Comment) (sitting and lying)  Pain Intervention(s): Medication (See eMAR)      A's of Opioid Risk Assessment  Activity:Patient is unable to  perform ADL.   Analgesia:Patients pain is  controlled by current medication.   Adverse Effects: Patient denies constipation or sedation.  Aberrant Behavior:  reviewed with no aberrant drug seeking/taking behavior.      Patient denies any suicidal or homicidal ideations    Physical Therapy/Home Exercise: not applicable      US Lower Extrem Arteries Bilat with RYAN (xpd)  Narrative: EXAMINATION:  US ARTERIAL LOWER EXTREMITY BILAT WITH RYAN (XPD)    CLINICAL HISTORY:  Cellulitis of left lower limb    TECHNIQUE:  Grayscale and color flow images of both lower extremities were obtained. Blood pressures were also obtained at the upper arms and lower extremities bilaterally (where  applicable).    COMPARISON:  2020    FINDINGS:  Right RYAN 1.0 and left RYAN 1.1.    Peak systolic arterial velocities are as follows:    Right CFA: 137 cm/s    Right profundus: 66 cm/s    Right SFA proximal: 98 cm/s    Right SFA mid: 106 cm/s    Right SFA distal: 86 cm/s    Right popliteal: 106 cm/s    Right PTA: 66 cm/s    Right DPA: 60 cm/s    Left CFA: 115 cm/s    Left profundus: 50 cm/s    Left SFA proximal: 98 cm/s    Left SFA mid: 108 cm/s    Left SFA distal: 85 cm/s    Left popliteal : 131 cm/s    Left PTA: 125 cm/s    Left DPA: 96 cm/s    Velocities of the anterior tibial arteries are normal.    Triphasic and biphasic waveforms.  Monophasic waveform of the left dorsalis pedis artery.  Impression: No focal occlusions or high-grade stenosis.    ABIs are normal    The Ultrasound images were captured and stored.    Place of service: [facility].    Electronically signed by: Frank Mendez  Date:    06/09/2022  Time:    13:26      Review of Systems   Constitutional: Negative.    HENT: Negative.     Eyes: Negative.    Respiratory: Negative.     Cardiovascular: Negative.    Gastrointestinal: Negative.    Endocrine: Negative.    Genitourinary: Negative.    Musculoskeletal:  Positive for arthralgias, back pain and leg pain.   Integumentary:  Negative.   Allergic/Immunologic: Negative.    Neurological:  Positive for weakness and numbness.   Hematological: Negative.    Psychiatric/Behavioral: Negative.             Past Medical History:   Diagnosis Date    Arthritis     Chronic pain 02/25/2021    COPD (chronic obstructive pulmonary disease)     Diabetes type 2, controlled     Hyperlipidemia     Neuropathy      Past Surgical History:   Procedure Laterality Date    CARDIAC CATHETERIZATION      ESOPHAGOGASTRODUODENOSCOPY  10/22/2014    FLEXIBLE SIGMOIDOSCOPY  10/23/2014    INJECTION OF FACET JOINT Bilateral 07/01/2014    Bilateral L3-S1 Facet Injection - 7/1/14 and 3/18/14    SINUS SURGERY       Social History      Socioeconomic History    Marital status: Single   Tobacco Use    Smoking status: Former     Packs/day: 2.00     Years: 20.00     Pack years: 40.00     Types: Cigarettes     Start date:      Quit date: 2011     Years since quittin.8    Smokeless tobacco: Current     Types: Snuff   Substance and Sexual Activity    Alcohol use: Not Currently    Drug use: Yes     Types: Hydrocodone    Sexual activity: Not Currently     Family History   Problem Relation Age of Onset    Diabetes Mother     Heart disease Mother     Hypertension Mother     Arthritis Mother     Cancer Mother     Cancer Father     Cancer Sister     COPD Brother      Review of patient's allergies indicates:  No Known Allergies  has a current medication list which includes the following prescription(s): albuterol, aspirin, blood-glucose meter, gabapentin, gemfibrozil, hydrochlorothiazide, insulin aspart protamine-insulin aspart, ipratropium, ipratropium/albuterol sulfate, isosorbide mononitrate, levemir flextouch u-100 insuln, linagliptin, linagliptin, lisinopril, memantine, omeprazole, rybelsus, simvastatin, sulfamethoxazole-trimethoprim 800-160mg, synjardy xr, tizanidine, true metrix glucose test strip, ventolin hfa, hydrocodone-acetaminophen, [START ON 2022] hydrocodone-acetaminophen, and [START ON 2023] hydrocodone-acetaminophen.      Objective:  Vitals:    22 0758   BP: 123/74   Pulse: 82        Physical Exam  Vitals and nursing note reviewed.   Constitutional:       General: He is not in acute distress.     Appearance: Normal appearance. He is obese. He is not ill-appearing, toxic-appearing or diaphoretic.   HENT:      Head: Normocephalic and atraumatic.      Nose: Nose normal.      Mouth/Throat:      Mouth: Mucous membranes are moist.   Eyes:      Extraocular Movements: Extraocular movements intact.      Pupils: Pupils are equal, round, and reactive to light.   Cardiovascular:      Rate and Rhythm: Normal rate and  regular rhythm.      Heart sounds: Normal heart sounds.   Pulmonary:      Effort: Pulmonary effort is normal. No respiratory distress.      Breath sounds: Normal breath sounds. No stridor. No wheezing or rhonchi.   Abdominal:      General: Bowel sounds are normal.      Palpations: Abdomen is soft.   Musculoskeletal:         General: No swelling, tenderness or deformity.      Cervical back: Normal and normal range of motion. No spasms or tenderness. No pain with movement. Normal range of motion.      Thoracic back: Normal.      Lumbar back: Spasms and bony tenderness present. Decreased range of motion. Negative right straight leg raise test and negative left straight leg raise test. No scoliosis.      Right lower leg: No edema.      Left lower leg: No edema.   Skin:     General: Skin is warm.      Findings: Wound (bilateral feet) present.   Neurological:      General: No focal deficit present.      Mental Status: He is alert and oriented to person, place, and time. Mental status is at baseline.      Cranial Nerves: No cranial nerve deficit.      Sensory: Sensation is intact. No sensory deficit.      Motor: No weakness.      Coordination: Coordination normal.      Gait: Gait abnormal.      Deep Tendon Reflexes: Reflexes are normal and symmetric.   Psychiatric:         Mood and Affect: Mood normal.         Behavior: Behavior normal.         Assessment:      1. Encounter for long-term (current) use of other medications    2. Diabetic polyneuropathy associated with type 2 diabetes mellitus    3. Lumbosacral spondylosis without myelopathy    4. Foot pain, left            Plan:  1. reviewed  2.Addiction, Dependency, Tolerance, Opioid abuse-misuse, Death, Diversion Discussed. Overdose reversal drug Naloxone discussed.  3.Refill/Continue medications for pain control and function       Requested Prescriptions     Signed Prescriptions Disp Refills    HYDROcodone-acetaminophen (NORCO)  mg per tablet 90 tablet 0      Sig: Take 1 tablet by mouth every 8 (eight) hours as needed for Pain (pain).    HYDROcodone-acetaminophen (NORCO)  mg per tablet 90 tablet 0     Sig: Take 1 tablet by mouth every 8 (eight) hours as needed for Pain (pain).    HYDROcodone-acetaminophen (NORCO)  mg per tablet 90 tablet 0     Sig: Take 1 tablet by mouth every 8 (eight) hours as needed for Pain (pain).     4.Urine drug screen point of care obtained and consistent with prescribed medications and medication refill date    Orders Placed This Encounter   Procedures    POCT Urine Drug Screen Presump     Interpretive Information:     Negative:  No drug detected at the cut off level.   Positive:  This result represents presumptive positive for the   tested drug, other substances may yield a positive response other   than the analyte of interest. This result should be utilized for   diagnostic purpose only. Confirmation testing will be performed upon physician request only.         5.Patient defers nerve block injections, physical therapy or surgical consultation  6. Continue wound care for bilateral foot ulceration as scheduled  7.Follow with EVARISTO Wheat in 3 months for re-evaluation and medication refill       report:  Reviewed and consistent with medication use as prescribed.      The total time spent for evaluation and management on 11/17/2022 including reviewing separately obtained history, performing a medically appropriate exam and evaluation, documenting clinical information in the health record, independently interpreting results and communicating them to the patient/family/caregiver, and ordering medications/tests/procedures was between 15-29 minutes.    The above plan and management options were discussed at length with patient. Patient is in agreement with the above and verbalized understanding. It will be communicated with the referring physician via electronic record, fax, or mail.

## 2022-11-16 NOTE — PROGRESS NOTES
WOUND CARE, [facility]  [address]  [place] [address]     PATIENT NAME: René Moscoso  : 1950  DATE: 22  MRN: [id number]      Billing Provider: WOUND CARE, [facility]  Level of Service:   Patient PCP Information       Provider PCP Type    Olga Monterroso NP General            Reason for Visit / Chief Complaint: No chief complaint on file.       Update PCP  Update Chief Complaint         History of Present Illness / Problem Focused Workflow     René Moscoso presents to the clinic for follow up on wound    Location: Wound left fifth metatarsal head and new blister on right lateral heel  Severity: Insensate  Duration: 2022 (left 5th metatarsal head 10/30/2022 ( right lateral heel)  Context:Wearing different shoes  Modifying Factors: Hx of DM not well controlled and HTN  Associated Signs and Symptom: Small amt of serous drainage.          Review of Systems     Review of Systems   Constitutional: Negative.  Negative for chills and fever.   Respiratory:  Negative for shortness of breath.    Cardiovascular:  Negative for chest pain.   Gastrointestinal:  Negative for nausea and vomiting.   Integumentary:  Positive for wound (left foot; right heel).   Neurological:  Negative for weakness and headaches.      Medical / Social / Family History     Past Medical History:   Diagnosis Date    Arthritis     Chronic pain 2021    COPD (chronic obstructive pulmonary disease)     Diabetes type 2, controlled     Hyperlipidemia     Neuropathy        Past Surgical History:   Procedure Laterality Date    CARDIAC CATHETERIZATION      ESOPHAGOGASTRODUODENOSCOPY  10/22/2014    FLEXIBLE SIGMOIDOSCOPY  10/23/2014    INJECTION OF FACET JOINT Bilateral 2014    Bilateral L3-S1 Facet Injection - 14 and 3/18/14    SINUS SURGERY         Social History    reports that he quit smoking about 11 years ago. His smoking use included cigarettes. He started smoking about 31 years ago. He  has a 40.00 pack-year smoking history. His smokeless tobacco use includes snuff. He reports that he does not currently use alcohol. He reports current drug use. Drug: Hydrocodone.    Family History  's family history includes Arthritis in his mother; COPD in his brother; Cancer in his father, mother, and sister; Diabetes in his mother; Heart disease in his mother; Hypertension in his mother.    Medications and Allergies     Medications  No outpatient medications have been marked as taking for the 11/17/22 encounter (Clinical Support) with WOUND CARE, [facility].       Allergies  Review of patient's allergies indicates:  No Known Allergies    Physical Examination   There were no vitals filed for this visit.   Physical Exam  Constitutional:       General: He is not in acute distress.     Appearance: Normal appearance.   Cardiovascular:      Rate and Rhythm: Normal rate and regular rhythm.      Pulses:           Dorsalis pedis pulses are 2+ on the right side and 3+ on the left side.        Posterior tibial pulses are 2+ on the right side and 3+ on the left side.   Pulmonary:      Effort: Pulmonary effort is normal.      Breath sounds: Normal breath sounds.   Musculoskeletal:      Right lower leg: No edema.      Left lower leg: No edema.        Feet:    Feet:      Right foot:      Skin integrity: Blister and skin breakdown present.      Left foot:      Skin integrity: Skin breakdown present. No erythema, warmth or callus.      Comments: See wound docs note for assessment, pictures and measurements.   Both wounds macerated    Skin:     General: Skin is warm and dry.      Capillary Refill: Capillary refill takes 2 to 3 seconds.      Coloration: Skin is not pale.      Findings: Wound present.   Neurological:      Sensory: Sensory deficit present.        Assessment and Plan (including Health Maintenance)      Problem List  Smart Sets  Document Outside HM   :    Plan: See wound docs for treatment and plan. HH to continue  to follow. Keep weight off of wound as much as possible, wear Darco shoe        Health Maintenance Due   Topic Date Due    Hepatitis C Screening  Never done    Sign Pain Contract  Never done    LDCT Lung Screen  Never done    Shingles Vaccine (1 of 2) Never done    Colorectal Cancer Screening  10/03/2017    Abdominal Aortic Aneurysm Screening  Never done    Pneumococcal Vaccines (Age 65+) (3 - PPSV23 if available, else PCV20) 10/20/2020    COVID-19 Vaccine (3 - Booster) 04/07/2021    Hemoglobin A1c  07/20/2022    Foot Exam  08/12/2022    Influenza Vaccine (1) 09/01/2022       Problem List Items Addressed This Visit          Endocrine    Type 2 diabetes mellitus with left diabetic foot ulcer - Primary    Type 2 diabetes mellitus with foot ulcer, with long-term current use of insulin     Type 2 diabetes mellitus with left diabetic foot ulcer    Type 2 diabetes mellitus with foot ulcer, with long-term current use of insulin  Comments:  right heel                       Health Maintenance Topics with due status: Not Due       Topic Last Completion Date    TETANUS VACCINE 05/27/2021    Eye Exam 12/29/2021    Diabetes Urine Screening 01/20/2022    Lipid Panel 01/20/2022           Future Appointments   Date Time Provider Department Center   2/13/2023  9:00 AM EVARISTO Roberts [facility]        Follow up in about 2 weeks (around 12/1/2022).     Signature:  HALIE Collado    Date of encounter: 11/17/22

## 2022-11-17 ENCOUNTER — CLINICAL SUPPORT (OUTPATIENT)
Dept: WOUND CARE | Facility: HOSPITAL | Age: 72
End: 2022-11-17
Attending: NURSE PRACTITIONER
Payer: MEDICARE

## 2022-11-17 VITALS
TEMPERATURE: 97 F | RESPIRATION RATE: 18 BRPM | SYSTOLIC BLOOD PRESSURE: 147 MMHG | DIASTOLIC BLOOD PRESSURE: 82 MMHG | HEART RATE: 84 BPM

## 2022-11-17 DIAGNOSIS — Z79.4 TYPE 2 DIABETES MELLITUS WITH FOOT ULCER, WITH LONG-TERM CURRENT USE OF INSULIN: ICD-10-CM

## 2022-11-17 DIAGNOSIS — E11.621 TYPE 2 DIABETES MELLITUS WITH FOOT ULCER, WITH LONG-TERM CURRENT USE OF INSULIN: ICD-10-CM

## 2022-11-17 DIAGNOSIS — L97.509 TYPE 2 DIABETES MELLITUS WITH FOOT ULCER, WITH LONG-TERM CURRENT USE OF INSULIN: ICD-10-CM

## 2022-11-17 DIAGNOSIS — L97.529 TYPE 2 DIABETES MELLITUS WITH LEFT DIABETIC FOOT ULCER: Primary | ICD-10-CM

## 2022-11-17 DIAGNOSIS — E11.621 TYPE 2 DIABETES MELLITUS WITH LEFT DIABETIC FOOT ULCER: Primary | ICD-10-CM

## 2022-11-17 PROCEDURE — 11042 DBRDMT SUBQ TIS 1ST 20SQCM/<: CPT | Performed by: NURSE PRACTITIONER

## 2022-11-17 PROCEDURE — 87070 CULTURE OTHR SPECIMN AEROBIC: CPT

## 2022-11-17 PROCEDURE — 11042 DBRDMT SUBQ TIS 1ST 20SQCM/<: CPT

## 2022-11-17 PROCEDURE — 11045 DBRDMT SUBQ TISS EACH ADDL: CPT | Performed by: NURSE PRACTITIONER

## 2022-11-17 PROCEDURE — 11045 DBRDMT SUBQ TISS EACH ADDL: CPT

## 2022-11-20 LAB — BACTERIA TISS AEROBE CULT: ABNORMAL

## 2022-11-21 RX ORDER — CIPROFLOXACIN 500 MG/1
500 TABLET ORAL 2 TIMES DAILY
Qty: 14 TABLET | Refills: 0 | Status: SHIPPED | OUTPATIENT
Start: 2022-11-21 | End: 2022-12-01 | Stop reason: SDUPTHER

## 2022-12-01 ENCOUNTER — CLINICAL SUPPORT (OUTPATIENT)
Dept: WOUND CARE | Facility: HOSPITAL | Age: 72
End: 2022-12-01
Attending: NURSE PRACTITIONER
Payer: MEDICARE

## 2022-12-01 VITALS — SYSTOLIC BLOOD PRESSURE: 148 MMHG | HEART RATE: 87 BPM | DIASTOLIC BLOOD PRESSURE: 90 MMHG | RESPIRATION RATE: 18 BRPM

## 2022-12-01 DIAGNOSIS — E11.621 TYPE 2 DIABETES MELLITUS WITH LEFT DIABETIC FOOT ULCER: Primary | ICD-10-CM

## 2022-12-01 DIAGNOSIS — E11.621 TYPE 2 DIABETES MELLITUS WITH FOOT ULCER, WITH LONG-TERM CURRENT USE OF INSULIN: ICD-10-CM

## 2022-12-01 DIAGNOSIS — Z79.4 TYPE 2 DIABETES MELLITUS WITH FOOT ULCER, WITH LONG-TERM CURRENT USE OF INSULIN: ICD-10-CM

## 2022-12-01 DIAGNOSIS — L97.509 TYPE 2 DIABETES MELLITUS WITH FOOT ULCER, WITH LONG-TERM CURRENT USE OF INSULIN: ICD-10-CM

## 2022-12-01 DIAGNOSIS — L97.529 TYPE 2 DIABETES MELLITUS WITH LEFT DIABETIC FOOT ULCER: Primary | ICD-10-CM

## 2022-12-01 PROCEDURE — 11042 DBRDMT SUBQ TIS 1ST 20SQCM/<: CPT | Performed by: NURSE PRACTITIONER

## 2022-12-01 PROCEDURE — 11042 DBRDMT SUBQ TIS 1ST 20SQCM/<: CPT | Mod: GZ

## 2022-12-01 RX ORDER — CIPROFLOXACIN 500 MG/1
500 TABLET ORAL 2 TIMES DAILY
Qty: 14 TABLET | Refills: 0 | Status: SHIPPED | OUTPATIENT
Start: 2022-12-01 | End: 2022-01-01 | Stop reason: SDUPTHER

## 2022-12-01 NOTE — PROGRESS NOTES
WOUND CARE, [facility]  [address]  [place] [address]     PATIENT NAME: René Moscoso  : 1950  DATE: 22  MRN: [id number]      Billing Provider: WOUND CARE, [facility]  Level of Service:   Patient PCP Information       Provider PCP Type    Olga Monterroso NP General            Reason for Visit / Chief Complaint: No chief complaint on file.       Update PCP  Update Chief Complaint         History of Present Illness / Problem Focused Workflow     René Moscoso presents to the clinic for follow up on wound    Location: Wound left fifth metatarsal head and new blister on right lateral heel  Severity: Insensate  Duration: 2022 (left 5th metatarsal head 10/30/2022 ( right lateral heel)  Context:Wearing different shoes  Modifying Factors: Hx of DM not well controlled and HTN  Associated Signs and Symptom: Small amt of serous drainage.          Review of Systems     Review of Systems   Constitutional: Negative.  Negative for chills and fever.   Respiratory:  Negative for shortness of breath.    Cardiovascular:  Negative for chest pain.   Gastrointestinal:  Negative for nausea and vomiting.   Integumentary:  Positive for wound (left foot; right heel).   Neurological:  Negative for weakness and headaches.      Medical / Social / Family History     Past Medical History:   Diagnosis Date    Arthritis     Chronic pain 2021    COPD (chronic obstructive pulmonary disease)     Diabetes type 2, controlled     Hyperlipidemia     Neuropathy        Past Surgical History:   Procedure Laterality Date    CARDIAC CATHETERIZATION      ESOPHAGOGASTRODUODENOSCOPY  10/22/2014    FLEXIBLE SIGMOIDOSCOPY  10/23/2014    INJECTION OF FACET JOINT Bilateral 2014    Bilateral L3-S1 Facet Injection - 14 and 3/18/14    SINUS SURGERY         Social History    reports that he quit smoking about 11 years ago. His smoking use included cigarettes. He started smoking about 31 years ago. He  has a 40.00 pack-year smoking history. His smokeless tobacco use includes snuff. He reports that he does not currently use alcohol. He reports current drug use. Drug: Hydrocodone.    Family History  's family history includes Arthritis in his mother; COPD in his brother; Cancer in his father, mother, and sister; Diabetes in his mother; Heart disease in his mother; Hypertension in his mother.    Medications and Allergies     Medications  No outpatient medications have been marked as taking for the 12/1/22 encounter (Clinical Support) with WOUND CARE, [facility].       Allergies  Review of patient's allergies indicates:  No Known Allergies    Physical Examination   There were no vitals filed for this visit.   Physical Exam  Constitutional:       General: He is not in acute distress.     Appearance: Normal appearance.   Cardiovascular:      Rate and Rhythm: Normal rate and regular rhythm.      Pulses:           Dorsalis pedis pulses are 2+ on the right side and 3+ on the left side.        Posterior tibial pulses are 2+ on the right side and 3+ on the left side.   Pulmonary:      Effort: Pulmonary effort is normal.      Breath sounds: Normal breath sounds.   Musculoskeletal:      Right lower leg: No edema.      Left lower leg: No edema.        Feet:    Feet:      Right foot:      Skin integrity: Blister and skin breakdown present.      Left foot:      Skin integrity: Skin breakdown present. No erythema, warmth or callus.      Comments: See wound docs note for assessment, pictures and measurements.   Both wounds macerated    Skin:     General: Skin is warm and dry.      Capillary Refill: Capillary refill takes 2 to 3 seconds.      Coloration: Skin is not pale.      Findings: Wound present.   Neurological:      Sensory: Sensory deficit present.        Assessment and Plan (including Health Maintenance)      Problem List  Smart Sets  Document Outside HM   :    Plan: See wound docs for treatment and plan. HH to continue to  follow. Keep weight off of wound as much as possible, wear Darco shoe        Health Maintenance Due   Topic Date Due    Hepatitis C Screening  Never done    Sign Pain Contract  Never done    LDCT Lung Screen  Never done    Shingles Vaccine (1 of 2) Never done    Colorectal Cancer Screening  10/03/2017    Abdominal Aortic Aneurysm Screening  Never done    Pneumococcal Vaccines (Age 65+) (3 - PPSV23 if available, else PCV20) 10/20/2020    COVID-19 Vaccine (3 - Booster) 04/07/2021    Hemoglobin A1c  07/20/2022    Foot Exam  08/12/2022    Influenza Vaccine (1) 09/01/2022    Eye Exam  12/29/2022    Diabetes Urine Screening  01/20/2023       Problem List Items Addressed This Visit          Endocrine    Type 2 diabetes mellitus with left diabetic foot ulcer - Primary    Type 2 diabetes mellitus with foot ulcer, with long-term current use of insulin     Type 2 diabetes mellitus with left diabetic foot ulcer    Type 2 diabetes mellitus with foot ulcer, with long-term current use of insulin    Other orders  -     ciprofloxacin HCl (CIPRO) 500 MG tablet; Take 1 tablet (500 mg total) by mouth 2 (two) times daily.  Dispense: 14 tablet; Refill: 0                         Health Maintenance Topics with due status: Not Due       Topic Last Completion Date    TETANUS VACCINE 05/27/2021    Lipid Panel 01/20/2022           Future Appointments   Date Time Provider Department Center   12/1/2022  8:30 AM WOUND CARE, [facility]   2/13/2023  9:00 AM EVARISTO Roberts [facility]        Follow up in about 1 week (around 12/8/2022).     Signature:  HALIE Collado    Date of encounter: 12/1/22

## 2022-12-08 ENCOUNTER — CLINICAL SUPPORT (OUTPATIENT)
Dept: WOUND CARE | Facility: HOSPITAL | Age: 72
End: 2022-12-08
Attending: NURSE PRACTITIONER
Payer: MEDICARE

## 2022-12-08 VITALS
DIASTOLIC BLOOD PRESSURE: 82 MMHG | TEMPERATURE: 100 F | HEART RATE: 79 BPM | SYSTOLIC BLOOD PRESSURE: 135 MMHG | RESPIRATION RATE: 20 BRPM

## 2022-12-08 DIAGNOSIS — L97.529 TYPE 2 DIABETES MELLITUS WITH LEFT DIABETIC FOOT ULCER: ICD-10-CM

## 2022-12-08 DIAGNOSIS — Z79.4 TYPE 2 DIABETES MELLITUS WITH FOOT ULCER, WITH LONG-TERM CURRENT USE OF INSULIN: Primary | ICD-10-CM

## 2022-12-08 DIAGNOSIS — E11.621 TYPE 2 DIABETES MELLITUS WITH LEFT DIABETIC FOOT ULCER: ICD-10-CM

## 2022-12-08 DIAGNOSIS — L97.509 TYPE 2 DIABETES MELLITUS WITH FOOT ULCER, WITH LONG-TERM CURRENT USE OF INSULIN: Primary | ICD-10-CM

## 2022-12-08 DIAGNOSIS — E11.621 TYPE 2 DIABETES MELLITUS WITH FOOT ULCER, WITH LONG-TERM CURRENT USE OF INSULIN: Primary | ICD-10-CM

## 2022-12-08 PROCEDURE — 11042 DBRDMT SUBQ TIS 1ST 20SQCM/<: CPT | Performed by: NURSE PRACTITIONER

## 2022-12-08 PROCEDURE — 11042 DBRDMT SUBQ TIS 1ST 20SQCM/<: CPT

## 2022-12-08 NOTE — PROGRESS NOTES
WOUND CARE, [facility]  [address]  [place] [address]     PATIENT NAME: René Moscoso  : 1950  DATE: 22  MRN: [id number]      Billing Provider: WOUND CARE, [facility]  Level of Service:   Patient PCP Information       Provider PCP Type    Olga Monterroso NP General            Reason for Visit / Chief Complaint: No chief complaint on file.       Update PCP  Update Chief Complaint         History of Present Illness / Problem Focused Workflow     René Moscoso presents to the clinic for follow up on wound    Location: Wound left fifth metatarsal head and blister on right lateral heel  Severity: Insensate  Duration: 2022 (left 5th metatarsal head 10/30/2022 ( right lateral heel)  Context:Wearing different shoes  Modifying Factors: Hx of DM not well controlled and HTN  Associated Signs and Symptom: Small amt of serous drainage.          Review of Systems     Review of Systems   Constitutional: Negative.  Negative for chills and fever.   Respiratory:  Negative for shortness of breath.    Cardiovascular:  Negative for chest pain.   Gastrointestinal:  Negative for nausea and vomiting.   Integumentary:  Positive for wound (left foot; right heel).   Neurological:  Negative for weakness and headaches.      Medical / Social / Family History     Past Medical History:   Diagnosis Date    Arthritis     Chronic pain 2021    COPD (chronic obstructive pulmonary disease)     Diabetes type 2, controlled     Hyperlipidemia     Neuropathy        Past Surgical History:   Procedure Laterality Date    CARDIAC CATHETERIZATION      ESOPHAGOGASTRODUODENOSCOPY  10/22/2014    FLEXIBLE SIGMOIDOSCOPY  10/23/2014    INJECTION OF FACET JOINT Bilateral 2014    Bilateral L3-S1 Facet Injection - 14 and 3/18/14    SINUS SURGERY         Social History    reports that he quit smoking about 11 years ago. His smoking use included cigarettes. He started smoking about 31 years ago. He has a  40.00 pack-year smoking history. His smokeless tobacco use includes snuff. He reports that he does not currently use alcohol. He reports current drug use. Drug: Hydrocodone.    Family History  's family history includes Arthritis in his mother; COPD in his brother; Cancer in his father, mother, and sister; Diabetes in his mother; Heart disease in his mother; Hypertension in his mother.    Medications and Allergies     Medications  No outpatient medications have been marked as taking for the 12/8/22 encounter (Clinical Support) with WOUND CARE, [facility].       Allergies  Review of patient's allergies indicates:  No Known Allergies    Physical Examination   There were no vitals filed for this visit.   Physical Exam  Constitutional:       General: He is not in acute distress.     Appearance: Normal appearance.   Cardiovascular:      Rate and Rhythm: Normal rate and regular rhythm.      Pulses:           Dorsalis pedis pulses are 2+ on the right side and 3+ on the left side.        Posterior tibial pulses are 2+ on the right side and 3+ on the left side.   Pulmonary:      Effort: Pulmonary effort is normal.      Breath sounds: Normal breath sounds.   Musculoskeletal:      Right lower leg: No edema.      Left lower leg: No edema.        Feet:    Feet:      Right foot:      Skin integrity: Blister and skin breakdown present.      Left foot:      Skin integrity: Skin breakdown present. No erythema, warmth or callus.      Comments: See wound docs note for assessment, pictures and measurements.   Both wounds macerated    Skin:     General: Skin is warm and dry.      Capillary Refill: Capillary refill takes 2 to 3 seconds.      Coloration: Skin is not pale.      Findings: Wound present.   Neurological:      Sensory: Sensory deficit present.        Assessment and Plan (including Health Maintenance)      Problem List  Smart Sets  Document Outside HM   :    Plan: See wound docs for treatment and plan. HH to continue to  follow. Keep weight off of wound as much as possible, wear Darco shoe        Health Maintenance Due   Topic Date Due    Hepatitis C Screening  Never done    Sign Pain Contract  Never done    LDCT Lung Screen  Never done    Shingles Vaccine (1 of 2) Never done    Colorectal Cancer Screening  10/03/2017    Abdominal Aortic Aneurysm Screening  Never done    Pneumococcal Vaccines (Age 65+) (3 - PPSV23 if available, else PCV20) 10/20/2020    COVID-19 Vaccine (3 - Booster) 04/07/2021    Hemoglobin A1c  07/20/2022    Foot Exam  08/12/2022    Influenza Vaccine (1) 09/01/2022    Eye Exam  12/29/2022    Diabetes Urine Screening  01/20/2023       Problem List Items Addressed This Visit          Endocrine    Type 2 diabetes mellitus with left diabetic foot ulcer    Type 2 diabetes mellitus with foot ulcer, with long-term current use of insulin - Primary     Type 2 diabetes mellitus with foot ulcer, with long-term current use of insulin    Type 2 diabetes mellitus with left diabetic foot ulcer                           Health Maintenance Topics with due status: Not Due       Topic Last Completion Date    TETANUS VACCINE 05/27/2021    Lipid Panel 01/20/2022           Future Appointments   Date Time Provider Department Center   12/8/2022  8:30 AM WOUND CARE, [facility]    2/13/2023  9:00 AM [facility]        Follow up in about 1 week (around 12/15/2022).     Signature:  HALIE Collado    Date of encounter: 12/8/22

## 2022-12-15 NOTE — PROGRESS NOTES
WOUND CARE, [facility]  [address]  [place] [address]     PATIENT NAME: René Moscoso  : 1950  DATE: 12/15/22  MRN: [id number]      Billing Provider: WOUND CARE, [facility]  Level of Service:   Patient PCP Information       Provider PCP Type    Olga Monterroso NP General            Reason for Visit / Chief Complaint: No chief complaint on file.       Update PCP  Update Chief Complaint         History of Present Illness / Problem Focused Workflow     René Moscoso presents to the clinic for follow up on wound    Location: Wound left fifth metatarsal head and blister on right lateral heel  Severity: Insensate  Duration: 2022 (left 5th metatarsal head 10/30/2022 ( right lateral heel)  Context:Wearing different shoes  Modifying Factors: Hx of DM not well controlled and HTN  Associated Signs and Symptom: Small amt of serous drainage.          Review of Systems     Review of Systems   Constitutional: Negative.  Negative for chills and fever.   Respiratory:  Negative for shortness of breath.    Cardiovascular:  Negative for chest pain.   Gastrointestinal:  Negative for nausea and vomiting.   Integumentary:  Positive for wound (left foot; right heel).   Neurological:  Negative for weakness and headaches.      Medical / Social / Family History     Past Medical History:   Diagnosis Date    Arthritis     Chronic pain 2021    COPD (chronic obstructive pulmonary disease)     Diabetes type 2, controlled     Hyperlipidemia     Neuropathy        Past Surgical History:   Procedure Laterality Date    CARDIAC CATHETERIZATION      ESOPHAGOGASTRODUODENOSCOPY  10/22/2014    FLEXIBLE SIGMOIDOSCOPY  10/23/2014    INJECTION OF FACET JOINT Bilateral 2014    Bilateral L3-S1 Facet Injection - 14 and 3/18/14    SINUS SURGERY         Social History    reports that he quit smoking about 11 years ago. His smoking use included cigarettes. He started smoking about 31 years ago. He has a  40.00 pack-year smoking history. His smokeless tobacco use includes snuff. He reports that he does not currently use alcohol. He reports current drug use. Drug: Hydrocodone.    Family History  's family history includes Arthritis in his mother; COPD in his brother; Cancer in his father, mother, and sister; Diabetes in his mother; Heart disease in his mother; Hypertension in his mother.    Medications and Allergies     Medications  No outpatient medications have been marked as taking for the 12/15/22 encounter (Clinical Support) with WOUND CARE, [facility].       Allergies  Review of patient's allergies indicates:  No Known Allergies    Physical Examination   There were no vitals filed for this visit.   Physical Exam  Constitutional:       General: He is not in acute distress.     Appearance: Normal appearance.   Cardiovascular:      Rate and Rhythm: Normal rate and regular rhythm.      Pulses:           Dorsalis pedis pulses are 2+ on the right side and 3+ on the left side.        Posterior tibial pulses are 2+ on the right side and 3+ on the left side.   Pulmonary:      Effort: Pulmonary effort is normal.      Breath sounds: Normal breath sounds.   Musculoskeletal:      Right lower leg: No edema.      Left lower leg: No edema.        Feet:    Feet:      Right foot:      Skin integrity: Blister and skin breakdown present.      Left foot:      Skin integrity: Skin breakdown present. No erythema, warmth or callus.      Comments: See wound docs note for assessment, pictures and measurements.       Skin:     General: Skin is warm and dry.      Capillary Refill: Capillary refill takes 2 to 3 seconds.      Coloration: Skin is not pale.      Findings: Wound present.   Neurological:      Sensory: Sensory deficit present.        Assessment and Plan (including Health Maintenance)      Problem List  Smart Sets  Document Outside HM   :    Plan: See wound docs for treatment and plan. HH to continue to follow. Keep weight off  of wound as much as possible, wear Darco shoe        Health Maintenance Due   Topic Date Due    Hepatitis C Screening  Never done    Sign Pain Contract  Never done    LDCT Lung Screen  Never done    Shingles Vaccine (1 of 2) Never done    Colorectal Cancer Screening  10/03/2017    Abdominal Aortic Aneurysm Screening  Never done    Pneumococcal Vaccines (Age 65+) (3 - PPSV23 if available, else PCV20) 10/20/2020    COVID-19 Vaccine (3 - Booster) 04/07/2021    Hemoglobin A1c  07/20/2022    Foot Exam  08/12/2022    Influenza Vaccine (1) 09/01/2022    Eye Exam  12/29/2022    Diabetes Urine Screening  01/20/2023       Problem List Items Addressed This Visit          Endocrine    Type 2 diabetes mellitus with left diabetic foot ulcer - Primary    Relevant Medications    ciprofloxacin HCl (CIPRO) 500 MG tablet    Type 2 diabetes mellitus with foot ulcer, with long-term current use of insulin     Other Visit Diagnoses       Blister of right heel, initial encounter        Relevant Medications    ciprofloxacin HCl (CIPRO) 500 MG tablet          Type 2 diabetes mellitus with left diabetic foot ulcer  -     ciprofloxacin HCl (CIPRO) 500 MG tablet; Take 1 tablet (500 mg total) by mouth 2 (two) times daily.  Dispense: 14 tablet; Refill: 0    Type 2 diabetes mellitus with foot ulcer, with long-term current use of insulin    Blister of right heel, initial encounter  -     ciprofloxacin HCl (CIPRO) 500 MG tablet; Take 1 tablet (500 mg total) by mouth 2 (two) times daily.  Dispense: 14 tablet; Refill: 0                             Health Maintenance Topics with due status: Not Due       Topic Last Completion Date    TETANUS VACCINE 05/27/2021    Lipid Panel 01/20/2022           Future Appointments   Date Time Provider Department Center   2/13/2023  9:00 AM EVARISTO Roberts [facility]        Follow up in about 2 weeks (around 12/29/2022).     Signature:  HALIE Clolado    Date of encounter:  12/15/22

## 2022-12-30 NOTE — PROGRESS NOTES
Subjective:       Patient ID: René Moscoso is a 72 y.o. male.    Chief Complaint: No chief complaint on file.    HPI  This information was obtained from the patient  The following HPI elements were documented for the patient's wound:  Location: #12- L metatarsal head; 313- R lateral posterior foot  Duration: onset #12- 8/26/22; #13- 10/31/22  Timing: no pain reported  Context: diabetic  Modifying Factors: edema  Associated Signs and Symptoms: erythema, callus, edema  71 y/o WM seen today for wounds to bilateral lower extremities. He was started on Cipro 2 wks ago for infection with MRSA of wound to right foot. There is odor now to left MTH so will culture this wound today and begin Doxycycline until C&S results  Complaints and Symptoms  Patient complains of:  Cardiovascular (Central/Peripheral): Edema, Lower extremity (leg) swelling  Integumentary (Hair/Skin/Nails): Calluses/Corns (left foot)  Musculoskeletal: Assistive Devices (walking cane)  Psychiatric: Depression    Patient denies complaints or symptoms related to:  Allergic/Immunologic: Hay Fever  Cardiovascular (Central/Peripheral): Lower extremity (leg) resting pain  Constitutional Symptoms (General Health): Chills, Fatigue, Fever, Loss of Appetite  Ear/Nose/Mouth/Throat: Current Infection, Hearing Loss / Aid  Endocrine: Cold Intolerance, Heat Intolerance  Eyes: Double Vision / Spots / Flashing Lights, Partial/Complete Blindness, Vision Changes  Gastrointestinal (GI): Nausea / Vomiting / Diarrhea (N/V/D)  Integumentary (Hair/Skin/Nails): Open Sore  Neurological: Abnormal Gait, Headaches, Weakness  Psychiatric: Anxiety  Respiratory        Objective:      Physical Exam  Constitutional  Pulse rate and rhythm regular. Afebrile. Height within normal limits. Weight WNL. Well developed, well nourished, and in no acute distress. Alert and oriented x3.    Eyes:  Conjunctiva without icterus.    Ears, Nose, Mouth, and Throat:  Symmetric hearing.    Respiratory:  Even  respirations without use of accessory muscles. No intercoastal retractions noted. Even and non labored respiration.    Cardiovascular:  See lower extremity assessment when applicable. edema .    Musculoskeletal:  Normal gait.    Integumentary (Hair, Skin)  adipose exposed.    Psychiatric:  Judgement and insight: Normal affect with normal thought pattern. Orientation to time, place and person: Normal affect with normal thought pattern. Recent and remote memory: Normal affect with normal thought pattern. Mood and affect: Normal affect with normal thought pattern.    Assessment:       Problem List Items Addressed This Visit          Endocrine    Type 2 diabetes mellitus with left diabetic foot ulcer - Primary     Other Visit Diagnoses       Idiopathic chronic venous hypertension of left lower extremity with ulcer and inflammation        Non-pressure chronic ulcer of other part of right lower leg limited to breakdown of skin        Venous stasis ulcer of left ankle with fat layer exposed with varicose veins                  Plan:        Wound Orders:  Wound #12 Left Metatarsal head fifth    Anesthetic  2% Viscous Lidocaine to wound bed prior to procedure. - clinic use only    Hand Hygiene/Smoking Cessation  Wash hands before and after wound care. Call the Wound Center at [phone] if you have signs or symptoms of infection, increased drainage, increasing odor, or unusual redness. After Wound Care hours, please notify your PCP or go to the ER.    Cleanser  Cleanse Wound with Normal Saline  Cleanse wound with non-cytotoxic wound cleanser. - May use Anasept or Vashe to clean with prn odor  Other order: - Wash foot with warm water and mild soap and pat dry with dressing changes    Dressings  Apply dressing. - cover with silvercel, gauze /ABD pad and conform and tape and offloading felt or donut or peg assist darco shoe for offloadng Change qd and prn soiling or dislodgement  Other: - betadine around wounds on macerated  skin and air dry    Compression/Edema Control  Elevate leg(s) as much as possible. Avoid standing in one position for more than 10 minutes. Avoid settings with legs down. Do not cross legs when sitting.  Apply Knee-high gradient compression stockings. - spandagrip size f-g    Off-Loading  Keep weight off: - offloading felt/donut pad/darco peg assist shoe    Follow-Up Appointments  Return Appointment 1 week - for wound care    Home Health  Home Health Care: If you have any questions or concerns please contact the wound center. - Please take extra supplies for wound care to wound on right foot. Used ABD pad today due to heavy drainage. If needed make extra visits to make sure dressing is changed qd this week.  Other: - patient needs betadine for around wounds  1. Home Health-Skilled Nurse for dressing change______x on clinic wk_______X on Non-Clinic Wk. - Home Health-Skilled Nurse for dressing change__2____x on clinic wk_____3 x on non clinic week    3. If caregiver willing and able to perform-may teach dressing change procedure.  4. May add 2-3 additional skilled nurse visits prn for wound care.    Antibiotic Therapy  Take oral antibiotics as prescribed - Doxycycline 100mg 1po bid x 10 days called to [facility]        Wound #13 Right, Lateral, Posterior Foot    Cleanser  Cleanse Wound with Normal Saline  Cleanse wound with non-cytotoxic wound cleanser. - May use Anasept or Vashe to clean with prn odor  Other order: - Wash foot with warm water and mild soap and pat dry with dressing changes    Dressings  Apply dressing. - cover with silvercel, gauze /ABD pad and conform and tape and offloading felt or donut or peg assist darco shoe for offloadng Change qd and prn soiling or dislodgement  Other: - betadine around wounds on macerated skin and air dry    Off-Loading  Keep weight off: - offloading felt/donut pad/darco peg assist shoe      Additional Orders:    Dietary  Supplement with daily multivitamin.  Follow  Diabetic diet.  Vitamin C 500 mg. twice a day.    Laboratory:  Culture & Sensitivity - tissue left foot wound

## 2023-01-01 ENCOUNTER — CLINICAL SUPPORT (OUTPATIENT)
Dept: WOUND CARE | Facility: HOSPITAL | Age: 73
End: 2023-01-01
Attending: NURSE PRACTITIONER
Payer: MEDICARE

## 2023-01-01 ENCOUNTER — OFFICE VISIT (OUTPATIENT)
Dept: PAIN MEDICINE | Facility: CLINIC | Age: 73
End: 2023-01-01
Payer: MEDICARE

## 2023-01-01 ENCOUNTER — HOSPITAL ENCOUNTER (OUTPATIENT)
Dept: RADIOLOGY | Facility: HOSPITAL | Age: 73
Discharge: HOME | End: 2023-05-11
Attending: NURSE PRACTITIONER
Payer: MEDICARE

## 2023-01-01 ENCOUNTER — HOSPITAL ENCOUNTER (OUTPATIENT)
Dept: RADIOLOGY | Facility: HOSPITAL | Age: 73
Discharge: HOME | End: 2023-01-12
Attending: SURGERY
Payer: MEDICARE

## 2023-01-01 ENCOUNTER — OFFICE VISIT (OUTPATIENT)
Dept: FAMILY MEDICINE | Facility: CLINIC | Age: 73
End: 2023-01-01
Payer: MEDICARE

## 2023-01-01 ENCOUNTER — INFUSION (OUTPATIENT)
Dept: INFUSION THERAPY | Facility: HOSPITAL | Age: 73
End: 2023-01-01
Attending: NURSE PRACTITIONER
Payer: MEDICARE

## 2023-01-01 ENCOUNTER — DOCUMENT SCAN (OUTPATIENT)
Dept: HOME HEALTH SERVICES | Facility: HOSPITAL | Age: 73
End: 2023-01-01
Payer: MEDICARE

## 2023-01-01 ENCOUNTER — TELEPHONE (OUTPATIENT)
Dept: FAMILY MEDICINE | Facility: CLINIC | Age: 73
End: 2023-01-01
Payer: MEDICARE

## 2023-01-01 ENCOUNTER — EXTERNAL HOME HEALTH (OUTPATIENT)
Dept: HOME HEALTH SERVICES | Facility: HOSPITAL | Age: 73
End: 2023-01-01
Payer: MEDICARE

## 2023-01-01 ENCOUNTER — PATIENT OUTREACH (OUTPATIENT)
Dept: ADMINISTRATIVE | Facility: HOSPITAL | Age: 73
End: 2023-01-01

## 2023-01-01 ENCOUNTER — EXTERNAL CHRONIC CARE MANAGEMENT (OUTPATIENT)
Dept: FAMILY MEDICINE | Facility: CLINIC | Age: 73
End: 2023-01-01
Payer: MEDICARE

## 2023-01-01 ENCOUNTER — PATIENT OUTREACH (OUTPATIENT)
Dept: FAMILY MEDICINE | Facility: CLINIC | Age: 73
End: 2023-01-01
Payer: MEDICARE

## 2023-01-01 ENCOUNTER — HOSPITAL ENCOUNTER (OUTPATIENT)
Dept: RADIOLOGY | Facility: HOSPITAL | Age: 73
Discharge: HOME | End: 2023-09-28
Attending: NURSE PRACTITIONER
Payer: MEDICARE

## 2023-01-01 VITALS
HEART RATE: 71 BPM | DIASTOLIC BLOOD PRESSURE: 79 MMHG | SYSTOLIC BLOOD PRESSURE: 134 MMHG | HEART RATE: 76 BPM | RESPIRATION RATE: 22 BRPM | OXYGEN SATURATION: 91 % | DIASTOLIC BLOOD PRESSURE: 73 MMHG | SYSTOLIC BLOOD PRESSURE: 127 MMHG | HEIGHT: 73 IN | BODY MASS INDEX: 39.36 KG/M2 | WEIGHT: 297 LBS | TEMPERATURE: 98 F | RESPIRATION RATE: 18 BRPM

## 2023-01-01 VITALS — SYSTOLIC BLOOD PRESSURE: 121 MMHG | DIASTOLIC BLOOD PRESSURE: 79 MMHG | HEART RATE: 75 BPM | RESPIRATION RATE: 20 BRPM

## 2023-01-01 VITALS — DIASTOLIC BLOOD PRESSURE: 79 MMHG | SYSTOLIC BLOOD PRESSURE: 140 MMHG | RESPIRATION RATE: 20 BRPM | HEART RATE: 80 BPM

## 2023-01-01 VITALS
HEIGHT: 75 IN | WEIGHT: 288 LBS | RESPIRATION RATE: 20 BRPM | SYSTOLIC BLOOD PRESSURE: 118 MMHG | HEART RATE: 79 BPM | HEART RATE: 91 BPM | TEMPERATURE: 99 F | RESPIRATION RATE: 20 BRPM | DIASTOLIC BLOOD PRESSURE: 68 MMHG | BODY MASS INDEX: 35.81 KG/M2 | SYSTOLIC BLOOD PRESSURE: 126 MMHG | DIASTOLIC BLOOD PRESSURE: 71 MMHG

## 2023-01-01 VITALS
HEART RATE: 75 BPM | SYSTOLIC BLOOD PRESSURE: 122 MMHG | RESPIRATION RATE: 20 BRPM | DIASTOLIC BLOOD PRESSURE: 74 MMHG | TEMPERATURE: 99 F

## 2023-01-01 VITALS
DIASTOLIC BLOOD PRESSURE: 84 MMHG | DIASTOLIC BLOOD PRESSURE: 82 MMHG | TEMPERATURE: 98 F | SYSTOLIC BLOOD PRESSURE: 132 MMHG | TEMPERATURE: 98 F | SYSTOLIC BLOOD PRESSURE: 132 MMHG | DIASTOLIC BLOOD PRESSURE: 84 MMHG | HEART RATE: 82 BPM | HEART RATE: 77 BPM | TEMPERATURE: 98 F | HEART RATE: 95 BPM | RESPIRATION RATE: 20 BRPM | RESPIRATION RATE: 17 BRPM | SYSTOLIC BLOOD PRESSURE: 130 MMHG | RESPIRATION RATE: 20 BRPM

## 2023-01-01 VITALS — RESPIRATION RATE: 20 BRPM | SYSTOLIC BLOOD PRESSURE: 152 MMHG | HEART RATE: 75 BPM | DIASTOLIC BLOOD PRESSURE: 82 MMHG

## 2023-01-01 VITALS
TEMPERATURE: 98 F | TEMPERATURE: 97 F | SYSTOLIC BLOOD PRESSURE: 128 MMHG | HEART RATE: 80 BPM | SYSTOLIC BLOOD PRESSURE: 110 MMHG | DIASTOLIC BLOOD PRESSURE: 78 MMHG | RESPIRATION RATE: 20 BRPM | HEART RATE: 85 BPM | RESPIRATION RATE: 18 BRPM | DIASTOLIC BLOOD PRESSURE: 71 MMHG

## 2023-01-01 VITALS
RESPIRATION RATE: 20 BRPM | RESPIRATION RATE: 20 BRPM | TEMPERATURE: 99 F | HEART RATE: 73 BPM | DIASTOLIC BLOOD PRESSURE: 78 MMHG | SYSTOLIC BLOOD PRESSURE: 140 MMHG | TEMPERATURE: 99 F | SYSTOLIC BLOOD PRESSURE: 122 MMHG | DIASTOLIC BLOOD PRESSURE: 74 MMHG | HEART RATE: 69 BPM

## 2023-01-01 VITALS — SYSTOLIC BLOOD PRESSURE: 130 MMHG | RESPIRATION RATE: 18 BRPM | HEART RATE: 75 BPM | DIASTOLIC BLOOD PRESSURE: 82 MMHG

## 2023-01-01 VITALS
SYSTOLIC BLOOD PRESSURE: 124 MMHG | HEART RATE: 78 BPM | SYSTOLIC BLOOD PRESSURE: 137 MMHG | TEMPERATURE: 100 F | TEMPERATURE: 98 F | DIASTOLIC BLOOD PRESSURE: 88 MMHG | RESPIRATION RATE: 20 BRPM | HEART RATE: 77 BPM | RESPIRATION RATE: 20 BRPM | DIASTOLIC BLOOD PRESSURE: 79 MMHG

## 2023-01-01 VITALS
SYSTOLIC BLOOD PRESSURE: 131 MMHG | HEART RATE: 92 BPM | DIASTOLIC BLOOD PRESSURE: 82 MMHG | RESPIRATION RATE: 20 BRPM | TEMPERATURE: 98 F

## 2023-01-01 VITALS
RESPIRATION RATE: 20 BRPM | DIASTOLIC BLOOD PRESSURE: 78 MMHG | TEMPERATURE: 98 F | SYSTOLIC BLOOD PRESSURE: 139 MMHG | HEART RATE: 83 BPM

## 2023-01-01 VITALS
RESPIRATION RATE: 15 BRPM | OXYGEN SATURATION: 94 % | DIASTOLIC BLOOD PRESSURE: 54 MMHG | DIASTOLIC BLOOD PRESSURE: 78 MMHG | SYSTOLIC BLOOD PRESSURE: 140 MMHG | HEART RATE: 82 BPM | BODY MASS INDEX: 37.3 KG/M2 | HEIGHT: 75 IN | WEIGHT: 300 LBS | HEART RATE: 66 BPM | RESPIRATION RATE: 20 BRPM | TEMPERATURE: 98 F | SYSTOLIC BLOOD PRESSURE: 112 MMHG

## 2023-01-01 VITALS — RESPIRATION RATE: 18 BRPM | HEART RATE: 70 BPM | DIASTOLIC BLOOD PRESSURE: 67 MMHG | SYSTOLIC BLOOD PRESSURE: 110 MMHG

## 2023-01-01 VITALS
RESPIRATION RATE: 20 BRPM | TEMPERATURE: 99 F | SYSTOLIC BLOOD PRESSURE: 122 MMHG | TEMPERATURE: 99 F | HEART RATE: 65 BPM | HEART RATE: 72 BPM | DIASTOLIC BLOOD PRESSURE: 76 MMHG | SYSTOLIC BLOOD PRESSURE: 127 MMHG | RESPIRATION RATE: 20 BRPM | DIASTOLIC BLOOD PRESSURE: 76 MMHG

## 2023-01-01 VITALS
HEIGHT: 75 IN | HEART RATE: 105 BPM | RESPIRATION RATE: 18 BRPM | WEIGHT: 295 LBS | DIASTOLIC BLOOD PRESSURE: 61 MMHG | SYSTOLIC BLOOD PRESSURE: 119 MMHG | BODY MASS INDEX: 36.68 KG/M2

## 2023-01-01 VITALS
TEMPERATURE: 99 F | RESPIRATION RATE: 20 BRPM | DIASTOLIC BLOOD PRESSURE: 69 MMHG | SYSTOLIC BLOOD PRESSURE: 120 MMHG | HEART RATE: 69 BPM

## 2023-01-01 VITALS
TEMPERATURE: 99 F | DIASTOLIC BLOOD PRESSURE: 84 MMHG | HEART RATE: 82 BPM | SYSTOLIC BLOOD PRESSURE: 137 MMHG | RESPIRATION RATE: 20 BRPM

## 2023-01-01 VITALS
DIASTOLIC BLOOD PRESSURE: 84 MMHG | HEART RATE: 84 BPM | TEMPERATURE: 99 F | RESPIRATION RATE: 20 BRPM | SYSTOLIC BLOOD PRESSURE: 141 MMHG

## 2023-01-01 VITALS
HEART RATE: 72 BPM | TEMPERATURE: 99 F | RESPIRATION RATE: 18 BRPM | RESPIRATION RATE: 18 BRPM | DIASTOLIC BLOOD PRESSURE: 72 MMHG | SYSTOLIC BLOOD PRESSURE: 132 MMHG | DIASTOLIC BLOOD PRESSURE: 74 MMHG | SYSTOLIC BLOOD PRESSURE: 130 MMHG | HEART RATE: 76 BPM

## 2023-01-01 VITALS
SYSTOLIC BLOOD PRESSURE: 117 MMHG | HEART RATE: 98 BPM | HEIGHT: 75 IN | WEIGHT: 290 LBS | BODY MASS INDEX: 36.06 KG/M2 | TEMPERATURE: 99 F | RESPIRATION RATE: 20 BRPM | OXYGEN SATURATION: 94 % | DIASTOLIC BLOOD PRESSURE: 68 MMHG

## 2023-01-01 VITALS
HEART RATE: 69 BPM | RESPIRATION RATE: 20 BRPM | SYSTOLIC BLOOD PRESSURE: 112 MMHG | TEMPERATURE: 99 F | DIASTOLIC BLOOD PRESSURE: 68 MMHG

## 2023-01-01 VITALS
OXYGEN SATURATION: 89 % | HEART RATE: 88 BPM | SYSTOLIC BLOOD PRESSURE: 123 MMHG | BODY MASS INDEX: 36.83 KG/M2 | TEMPERATURE: 98 F | WEIGHT: 299.38 LBS | DIASTOLIC BLOOD PRESSURE: 73 MMHG | RESPIRATION RATE: 18 BRPM

## 2023-01-01 VITALS
RESPIRATION RATE: 18 BRPM | DIASTOLIC BLOOD PRESSURE: 91 MMHG | TEMPERATURE: 98 F | SYSTOLIC BLOOD PRESSURE: 155 MMHG | HEART RATE: 80 BPM

## 2023-01-01 VITALS
TEMPERATURE: 99 F | HEART RATE: 85 BPM | TEMPERATURE: 100 F | DIASTOLIC BLOOD PRESSURE: 87 MMHG | SYSTOLIC BLOOD PRESSURE: 131 MMHG | HEART RATE: 78 BPM | RESPIRATION RATE: 20 BRPM | DIASTOLIC BLOOD PRESSURE: 80 MMHG | SYSTOLIC BLOOD PRESSURE: 134 MMHG

## 2023-01-01 VITALS
HEIGHT: 73 IN | BODY MASS INDEX: 40.16 KG/M2 | DIASTOLIC BLOOD PRESSURE: 73 MMHG | WEIGHT: 303 LBS | SYSTOLIC BLOOD PRESSURE: 134 MMHG | HEART RATE: 73 BPM

## 2023-01-01 VITALS
SYSTOLIC BLOOD PRESSURE: 129 MMHG | WEIGHT: 289 LBS | HEART RATE: 72 BPM | OXYGEN SATURATION: 95 % | RESPIRATION RATE: 18 BRPM | TEMPERATURE: 99 F | DIASTOLIC BLOOD PRESSURE: 72 MMHG | HEIGHT: 76 IN | BODY MASS INDEX: 35.19 KG/M2

## 2023-01-01 VITALS — RESPIRATION RATE: 18 BRPM | HEART RATE: 69 BPM | DIASTOLIC BLOOD PRESSURE: 85 MMHG | SYSTOLIC BLOOD PRESSURE: 150 MMHG

## 2023-01-01 VITALS
HEART RATE: 90 BPM | WEIGHT: 300.25 LBS | TEMPERATURE: 98 F | RESPIRATION RATE: 20 BRPM | RESPIRATION RATE: 20 BRPM | SYSTOLIC BLOOD PRESSURE: 138 MMHG | BODY MASS INDEX: 37.33 KG/M2 | SYSTOLIC BLOOD PRESSURE: 136 MMHG | TEMPERATURE: 98 F | OXYGEN SATURATION: 96 % | DIASTOLIC BLOOD PRESSURE: 89 MMHG | DIASTOLIC BLOOD PRESSURE: 80 MMHG | HEART RATE: 94 BPM | HEIGHT: 75 IN

## 2023-01-01 VITALS — DIASTOLIC BLOOD PRESSURE: 84 MMHG | RESPIRATION RATE: 20 BRPM | HEART RATE: 77 BPM | SYSTOLIC BLOOD PRESSURE: 135 MMHG

## 2023-01-01 VITALS
RESPIRATION RATE: 18 BRPM | TEMPERATURE: 98 F | SYSTOLIC BLOOD PRESSURE: 157 MMHG | DIASTOLIC BLOOD PRESSURE: 92 MMHG | HEART RATE: 88 BPM

## 2023-01-01 VITALS
HEIGHT: 75 IN | HEART RATE: 100 BPM | WEIGHT: 295 LBS | BODY MASS INDEX: 36.68 KG/M2 | RESPIRATION RATE: 18 BRPM | DIASTOLIC BLOOD PRESSURE: 78 MMHG | SYSTOLIC BLOOD PRESSURE: 140 MMHG

## 2023-01-01 VITALS
DIASTOLIC BLOOD PRESSURE: 94 MMHG | TEMPERATURE: 99 F | RESPIRATION RATE: 20 BRPM | SYSTOLIC BLOOD PRESSURE: 160 MMHG | HEART RATE: 73 BPM

## 2023-01-01 VITALS
TEMPERATURE: 98 F | DIASTOLIC BLOOD PRESSURE: 80 MMHG | SYSTOLIC BLOOD PRESSURE: 154 MMHG | HEART RATE: 80 BPM | RESPIRATION RATE: 20 BRPM

## 2023-01-01 VITALS
HEART RATE: 81 BPM | RESPIRATION RATE: 20 BRPM | SYSTOLIC BLOOD PRESSURE: 123 MMHG | DIASTOLIC BLOOD PRESSURE: 74 MMHG | TEMPERATURE: 99 F

## 2023-01-01 VITALS — TEMPERATURE: 99 F | SYSTOLIC BLOOD PRESSURE: 110 MMHG | HEART RATE: 77 BPM | DIASTOLIC BLOOD PRESSURE: 86 MMHG

## 2023-01-01 VITALS
SYSTOLIC BLOOD PRESSURE: 115 MMHG | DIASTOLIC BLOOD PRESSURE: 72 MMHG | WEIGHT: 288 LBS | HEART RATE: 78 BPM | TEMPERATURE: 99 F | HEIGHT: 76 IN | BODY MASS INDEX: 35.07 KG/M2 | DIASTOLIC BLOOD PRESSURE: 77 MMHG | HEART RATE: 70 BPM | RESPIRATION RATE: 20 BRPM | SYSTOLIC BLOOD PRESSURE: 147 MMHG

## 2023-01-01 VITALS — SYSTOLIC BLOOD PRESSURE: 133 MMHG | HEART RATE: 77 BPM | RESPIRATION RATE: 20 BRPM | DIASTOLIC BLOOD PRESSURE: 74 MMHG

## 2023-01-01 VITALS
SYSTOLIC BLOOD PRESSURE: 125 MMHG | TEMPERATURE: 98 F | RESPIRATION RATE: 20 BRPM | HEART RATE: 72 BPM | DIASTOLIC BLOOD PRESSURE: 74 MMHG

## 2023-01-01 VITALS
DIASTOLIC BLOOD PRESSURE: 84 MMHG | TEMPERATURE: 98 F | HEART RATE: 75 BPM | OXYGEN SATURATION: 94 % | HEIGHT: 75 IN | SYSTOLIC BLOOD PRESSURE: 165 MMHG | RESPIRATION RATE: 20 BRPM | WEIGHT: 300.63 LBS | BODY MASS INDEX: 37.38 KG/M2

## 2023-01-01 VITALS — HEART RATE: 74 BPM | RESPIRATION RATE: 18 BRPM | DIASTOLIC BLOOD PRESSURE: 78 MMHG | SYSTOLIC BLOOD PRESSURE: 130 MMHG

## 2023-01-01 VITALS
TEMPERATURE: 100 F | DIASTOLIC BLOOD PRESSURE: 90 MMHG | HEART RATE: 71 BPM | SYSTOLIC BLOOD PRESSURE: 133 MMHG | RESPIRATION RATE: 20 BRPM

## 2023-01-01 DIAGNOSIS — E13.621 DIABETIC ULCER OF LEFT FOOT ASSOCIATED WITH DIABETES MELLITUS OF OTHER TYPE, WITH FAT LAYER EXPOSED, UNSPECIFIED PART OF FOOT: Primary | ICD-10-CM

## 2023-01-01 DIAGNOSIS — L97.522 ULCER OF LEFT FOOT, WITH FAT LAYER EXPOSED: ICD-10-CM

## 2023-01-01 DIAGNOSIS — E11.621 TYPE 2 DIABETES MELLITUS WITH LEFT DIABETIC FOOT ULCER: Primary | ICD-10-CM

## 2023-01-01 DIAGNOSIS — I87.332 IDIOPATHIC CHRONIC VENOUS HYPERTENSION OF LEFT LOWER EXTREMITY WITH ULCER AND INFLAMMATION: ICD-10-CM

## 2023-01-01 DIAGNOSIS — L97.322 VENOUS STASIS ULCER OF LEFT ANKLE WITH FAT LAYER EXPOSED WITH VARICOSE VEINS: ICD-10-CM

## 2023-01-01 DIAGNOSIS — J01.01 ACUTE RECURRENT MAXILLARY SINUSITIS: ICD-10-CM

## 2023-01-01 DIAGNOSIS — Z22.322 MRSA (METHICILLIN RESISTANT STAPH AUREUS) CULTURE POSITIVE: ICD-10-CM

## 2023-01-01 DIAGNOSIS — I83.023 VENOUS STASIS ULCER OF LEFT ANKLE WITH FAT LAYER EXPOSED WITH VARICOSE VEINS: ICD-10-CM

## 2023-01-01 DIAGNOSIS — M47.817 LUMBOSACRAL SPONDYLOSIS WITHOUT MYELOPATHY: Primary | Chronic | ICD-10-CM

## 2023-01-01 DIAGNOSIS — L97.522 DIABETIC ULCER OF LEFT FOOT ASSOCIATED WITH DIABETES MELLITUS OF OTHER TYPE, WITH FAT LAYER EXPOSED, UNSPECIFIED PART OF FOOT: Primary | ICD-10-CM

## 2023-01-01 DIAGNOSIS — M79.604 PAIN OF RIGHT LOWER EXTREMITY DUE TO INJURY: ICD-10-CM

## 2023-01-01 DIAGNOSIS — E11.42 DIABETIC POLYNEUROPATHY ASSOCIATED WITH TYPE 2 DIABETES MELLITUS: Chronic | ICD-10-CM

## 2023-01-01 DIAGNOSIS — L97.529 TYPE 2 DIABETES MELLITUS WITH LEFT DIABETIC FOOT ULCER: Primary | ICD-10-CM

## 2023-01-01 DIAGNOSIS — E13.621 DIABETIC ULCER OF LEFT FOOT ASSOCIATED WITH DIABETES MELLITUS OF OTHER TYPE, WITH FAT LAYER EXPOSED, UNSPECIFIED PART OF FOOT: ICD-10-CM

## 2023-01-01 DIAGNOSIS — R05.9 COUGH, UNSPECIFIED TYPE: ICD-10-CM

## 2023-01-01 DIAGNOSIS — Z79.899 ENCOUNTER FOR LONG-TERM (CURRENT) USE OF OTHER MEDICATIONS: ICD-10-CM

## 2023-01-01 DIAGNOSIS — L97.412 DIABETIC ULCER OF RIGHT HEEL ASSOCIATED WITH DIABETES MELLITUS OF OTHER TYPE, WITH FAT LAYER EXPOSED: ICD-10-CM

## 2023-01-01 DIAGNOSIS — Z71.89 COMPLEX CARE COORDINATION: ICD-10-CM

## 2023-01-01 DIAGNOSIS — E11.621 TYPE 2 DIABETES MELLITUS WITH FOOT ULCER, WITH LONG-TERM CURRENT USE OF INSULIN: ICD-10-CM

## 2023-01-01 DIAGNOSIS — Z23 ENCOUNTER FOR IMMUNIZATION: ICD-10-CM

## 2023-01-01 DIAGNOSIS — Z22.322 MRSA (METHICILLIN RESISTANT STAPH AUREUS) CULTURE POSITIVE: Primary | ICD-10-CM

## 2023-01-01 DIAGNOSIS — Z79.4 TYPE 2 DIABETES MELLITUS WITH FOOT ULCER, WITH LONG-TERM CURRENT USE OF INSULIN: ICD-10-CM

## 2023-01-01 DIAGNOSIS — L97.522 ULCER OF LEFT FOOT, WITH FAT LAYER EXPOSED: Primary | ICD-10-CM

## 2023-01-01 DIAGNOSIS — L97.509 TYPE 2 DIABETES MELLITUS WITH FOOT ULCER, WITH LONG-TERM CURRENT USE OF INSULIN: ICD-10-CM

## 2023-01-01 DIAGNOSIS — E13.621 DIABETIC ULCER OF RIGHT HEEL ASSOCIATED WITH DIABETES MELLITUS OF OTHER TYPE, WITH FAT LAYER EXPOSED: ICD-10-CM

## 2023-01-01 DIAGNOSIS — G62.9 NEUROPATHY: Chronic | ICD-10-CM

## 2023-01-01 DIAGNOSIS — L97.522 DIABETIC ULCER OF LEFT FOOT ASSOCIATED WITH DIABETES MELLITUS OF OTHER TYPE, WITH FAT LAYER EXPOSED, UNSPECIFIED PART OF FOOT: ICD-10-CM

## 2023-01-01 DIAGNOSIS — L08.9 WOUND INFECTION: Primary | ICD-10-CM

## 2023-01-01 DIAGNOSIS — I10 ESSENTIAL HYPERTENSION, BENIGN: ICD-10-CM

## 2023-01-01 DIAGNOSIS — J01.00 ACUTE NON-RECURRENT MAXILLARY SINUSITIS: ICD-10-CM

## 2023-01-01 DIAGNOSIS — E11.621 TYPE 2 DIABETES MELLITUS WITH LEFT DIABETIC FOOT ULCER: ICD-10-CM

## 2023-01-01 DIAGNOSIS — E66.01 SEVERE OBESITY (BMI 35.0-39.9) WITH COMORBIDITY: ICD-10-CM

## 2023-01-01 DIAGNOSIS — L03.116 CELLULITIS OF LEFT FOOT: ICD-10-CM

## 2023-01-01 DIAGNOSIS — T14.8XXA WOUND INFECTION: Primary | ICD-10-CM

## 2023-01-01 DIAGNOSIS — E11.42 DIABETIC POLYNEUROPATHY ASSOCIATED WITH TYPE 2 DIABETES MELLITUS: Primary | Chronic | ICD-10-CM

## 2023-01-01 DIAGNOSIS — L97.811 NON-PRESSURE CHRONIC ULCER OF OTHER PART OF RIGHT LOWER LEG LIMITED TO BREAKDOWN OF SKIN: ICD-10-CM

## 2023-01-01 DIAGNOSIS — B35.3 TINEA PEDIS OF BOTH FEET: ICD-10-CM

## 2023-01-01 DIAGNOSIS — L97.529 TYPE 2 DIABETES MELLITUS WITH LEFT DIABETIC FOOT ULCER: ICD-10-CM

## 2023-01-01 DIAGNOSIS — E11.621 TYPE 2 DIABETES MELLITUS WITH FOOT ULCER, WITH LONG-TERM CURRENT USE OF INSULIN: Primary | ICD-10-CM

## 2023-01-01 DIAGNOSIS — Z79.4 TYPE 2 DIABETES MELLITUS WITH FOOT ULCER, WITH LONG-TERM CURRENT USE OF INSULIN: Primary | ICD-10-CM

## 2023-01-01 DIAGNOSIS — L97.521 ULCER OF LEFT FOOT, LIMITED TO BREAKDOWN OF SKIN: ICD-10-CM

## 2023-01-01 DIAGNOSIS — L97.509 TYPE 2 DIABETES MELLITUS WITH FOOT ULCER, WITH LONG-TERM CURRENT USE OF INSULIN: Primary | ICD-10-CM

## 2023-01-01 DIAGNOSIS — J01.01 ACUTE RECURRENT MAXILLARY SINUSITIS: Primary | ICD-10-CM

## 2023-01-01 DIAGNOSIS — S89.91XA INJURY OF RIGHT LOWER EXTREMITY, INITIAL ENCOUNTER: Primary | ICD-10-CM

## 2023-01-01 DIAGNOSIS — L03.116 CELLULITIS OF LEFT FOOT: Primary | ICD-10-CM

## 2023-01-01 DIAGNOSIS — M54.2 NECK PAIN: ICD-10-CM

## 2023-01-01 LAB
6-ACETYLMORPHINE, URINE (RUSH): ABNORMAL
7-AMINOCLONAZEPAM, URINE (RUSH): NEGATIVE 25 NG/ML
A-HYDROXYALPRAZOLAM, URINE (RUSH): NEGATIVE 25 NG/ML
ACETYL FENTANYL, URINE (RUSH): NEGATIVE 2.5 NG/ML
ACETYL NORFENTANYL OXALATE, URINE (RUSH): NEGATIVE 5 NG/ML
ALBUMIN SERPL BCP-MCNC: 3 G/DL (ref 3.5–5)
ALBUMIN SERPL BCP-MCNC: 3.4 G/DL (ref 3.5–5)
ALBUMIN/GLOB SERPL: 0.7 {RATIO}
ALBUMIN/GLOB SERPL: 0.8 {RATIO}
ALP SERPL-CCNC: 108 U/L (ref 45–115)
ALP SERPL-CCNC: 88 U/L (ref 45–115)
ALT SERPL W P-5'-P-CCNC: 11 U/L (ref 16–61)
ALT SERPL W P-5'-P-CCNC: 25 U/L (ref 16–61)
AMPHET UR QL SCN: NEGATIVE
ANION GAP SERPL CALCULATED.3IONS-SCNC: 10 MMOL/L (ref 7–16)
ANION GAP SERPL CALCULATED.3IONS-SCNC: 12 MMOL/L (ref 7–16)
AST SERPL W P-5'-P-CCNC: 11 U/L (ref 15–37)
AST SERPL W P-5'-P-CCNC: 14 U/L (ref 15–37)
BACTERIA TISS AEROBE CULT: ABNORMAL
BASOPHILS # BLD AUTO: 0.02 K/UL (ref 0–0.2)
BASOPHILS # BLD AUTO: 0.03 K/UL (ref 0–0.2)
BASOPHILS NFR BLD AUTO: 0.3 % (ref 0–1)
BASOPHILS NFR BLD AUTO: 0.4 % (ref 0–1)
BENZOYLECGONINE, URINE (RUSH): NEGATIVE 100 NG/ML
BILIRUB SERPL-MCNC: 0.4 MG/DL (ref ?–1.2)
BILIRUB SERPL-MCNC: 0.5 MG/DL (ref ?–1.2)
BUN SERPL-MCNC: 12 MG/DL (ref 7–18)
BUN SERPL-MCNC: 12 MG/DL (ref 7–18)
BUN/CREAT SERPL: 11 (ref 6–20)
BUN/CREAT SERPL: 11 (ref 6–20)
BUPRENORPHINE UR QL SCN: NEGATIVE 25 NG/ML
CALCIUM SERPL-MCNC: 8.8 MG/DL (ref 8.5–10.1)
CALCIUM SERPL-MCNC: 9.4 MG/DL (ref 8.5–10.1)
CHLORIDE SERPL-SCNC: 102 MMOL/L (ref 98–107)
CHLORIDE SERPL-SCNC: 103 MMOL/L (ref 98–107)
CHOLEST SERPL-MCNC: 113 MG/DL (ref 0–200)
CHOLEST/HDLC SERPL: 3.1 {RATIO}
CO2 SERPL-SCNC: 27 MMOL/L (ref 21–32)
CO2 SERPL-SCNC: 32 MMOL/L (ref 21–32)
CODEINE, URINE (RUSH): NEGATIVE 25 NG/ML
CREAT SERPL-MCNC: 1.11 MG/DL (ref 0.7–1.3)
CREAT SERPL-MCNC: 1.14 MG/DL (ref 0.7–1.3)
CREAT UR-MCNC: 56 MG/DL (ref 39–259)
CREAT UR-MCNC: 65 MG/DL (ref 39–259)
CTP QC/QA: YES
DIFFERENTIAL METHOD BLD: ABNORMAL
DIFFERENTIAL METHOD BLD: ABNORMAL
EDDP, URINE (RUSH): NEGATIVE 25 NG/ML
EGFR (NO RACE VARIABLE) (RUSH/TITUS): 69 ML/MIN/1.73M²
EGFR (NO RACE VARIABLE) (RUSH/TITUS): 71 ML/MIN/1.73M2
EOSINOPHIL # BLD AUTO: 0.09 K/UL (ref 0–0.5)
EOSINOPHIL # BLD AUTO: 0.13 K/UL (ref 0–0.5)
EOSINOPHIL NFR BLD AUTO: 1.3 % (ref 1–4)
EOSINOPHIL NFR BLD AUTO: 1.8 % (ref 1–4)
ERYTHROCYTE [DISTWIDTH] IN BLOOD BY AUTOMATED COUNT: 13.9 % (ref 11.5–14.5)
ERYTHROCYTE [DISTWIDTH] IN BLOOD BY AUTOMATED COUNT: 14.3 % (ref 11.5–14.5)
ERYTHROCYTE [SEDIMENTATION RATE] IN BLOOD BY WESTERGREN METHOD: 2 MM/HR (ref 0–20)
EST. AVERAGE GLUCOSE BLD GHB EST-MCNC: 170 MG/DL
FENTANYL, URINE (RUSH): NEGATIVE 2.5 NG/ML
GLOBULIN SER-MCNC: 4.2 G/DL (ref 2–4)
GLOBULIN SER-MCNC: 4.3 G/DL (ref 2–4)
GLUCOSE SERPL-MCNC: 121 MG/DL (ref 74–106)
GLUCOSE SERPL-MCNC: 148 MG/DL (ref 74–106)
HBA1C MFR BLD HPLC: 7.7 % (ref 4.5–6.6)
HCT VFR BLD AUTO: 52.8 % (ref 40–54)
HCT VFR BLD AUTO: 54.3 % (ref 40–54)
HDLC SERPL-MCNC: 36 MG/DL (ref 40–60)
HGB BLD-MCNC: 16.7 G/DL (ref 13.5–18)
HGB BLD-MCNC: 17.5 G/DL (ref 13.5–18)
HYDROCODONE, URINE (RUSH): 299.3 25 NG/ML
HYDROMORPHONE, URINE (RUSH): 50.5 25 NG/ML
IMM GRANULOCYTES # BLD AUTO: 0.03 K/UL (ref 0–0.04)
IMM GRANULOCYTES NFR BLD: 0.4 % (ref 0–0.4)
LDLC SERPL CALC-MCNC: 34 MG/DL
LORAZEPAM, URINE (RUSH): NEGATIVE 25 NG/ML
LYMPHOCYTES # BLD AUTO: 1.93 K/UL (ref 1–4.8)
LYMPHOCYTES # BLD AUTO: 2.08 K/UL (ref 1–4.8)
LYMPHOCYTES NFR BLD AUTO: 27.4 % (ref 27–41)
LYMPHOCYTES NFR BLD AUTO: 30 % (ref 27–41)
MCH RBC QN AUTO: 31.7 PG (ref 27–31)
MCH RBC QN AUTO: 32.1 PG (ref 27–31)
MCHC RBC AUTO-ENTMCNC: 31.6 G/DL (ref 32–36)
MCHC RBC AUTO-ENTMCNC: 32.2 G/DL (ref 32–36)
MCV RBC AUTO: 101.5 FL (ref 80–96)
MCV RBC AUTO: 98.4 FL (ref 80–96)
METHADONE UR QL SCN: NEGATIVE 25 NG/ML
METHAMPHET UR QL SCN: NEGATIVE
MICROALBUMIN UR-MCNC: 2.8 MG/DL (ref 0–2.8)
MICROALBUMIN/CREAT RATIO PNL UR: 43.1 MG/G (ref 0–30)
MICROORGANISM SPEC CULT: ABNORMAL
MONOCYTES # BLD AUTO: 0.47 K/UL (ref 0–0.8)
MONOCYTES # BLD AUTO: 0.66 K/UL (ref 0–0.8)
MONOCYTES NFR BLD AUTO: 6.8 % (ref 2–6)
MONOCYTES NFR BLD AUTO: 9.4 % (ref 2–6)
MORPHINE, URINE (RUSH): NEGATIVE 25 NG/ML
MPC BLD CALC-MCNC: 10.9 FL (ref 9.4–12.4)
MPC BLD CALC-MCNC: 9.9 FL (ref 9.4–12.4)
NEUTROPHILS # BLD AUTO: 4.24 K/UL (ref 1.8–7.7)
NEUTROPHILS # BLD AUTO: 4.3 K/UL (ref 1.8–7.7)
NEUTROPHILS NFR BLD AUTO: 61.1 % (ref 53–65)
NEUTROPHILS NFR BLD AUTO: 61.1 % (ref 53–65)
NONHDLC SERPL-MCNC: 77 MG/DL
NORBUPRENORPHINE, URINE (RUSH): NEGATIVE 25 NG/ML
NORDIAZEPAM, URINE (RUSH): NEGATIVE 25 NG/ML
NORFENTANYL OXALATE, URINE (RUSH): NEGATIVE 5 NG/ML
NORHYDROCODONE, URINE (RUSH): 442.1 50 NG/ML
NOROXYCODONE HCL, URINE (RUSH): NEGATIVE 50 NG/ML
NRBC # BLD AUTO: 0 X10E3/UL
NRBC, AUTO (.00): 0 %
OXAZEPAM, URINE (RUSH): NEGATIVE 25 NG/ML
OXYCODONE UR QL SCN: NEGATIVE 25 NG/ML
OXYMORPHONE, URINE (RUSH): NEGATIVE 25 NG/ML
PH UR STRIP: 6.5 PH UNITS
PLATELET # BLD AUTO: 185 K/UL (ref 150–400)
PLATELET # BLD AUTO: 209 K/UL (ref 150–400)
POC (AMP) AMPHETAMINE: NEGATIVE
POC (BAR) BARBITURATES: NEGATIVE
POC (BUP) BUPRENORPHINE: NEGATIVE
POC (BZO) BENZODIAZEPINES: NEGATIVE
POC (COC) COCAINE: NEGATIVE
POC (MDMA) METHYLENEDIOXYMETHAMPHETAMINE 3,4: NEGATIVE
POC (MET) METHAMPHETAMINE: NEGATIVE
POC (MOP) OPIATES: ABNORMAL
POC (MOP) OPIATES: NEGATIVE
POC (MTD) METHADONE: NEGATIVE
POC (OXY) OXYCODONE: NEGATIVE
POC (PCP) PHENCYCLIDINE: NEGATIVE
POC (TCA) TRICYCLIC ANTIDEPRESSANTS: NEGATIVE
POC TEMPERATURE (URINE): 92
POC TEMPERATURE (URINE): 94
POC THC: NEGATIVE
POTASSIUM SERPL-SCNC: 3.9 MMOL/L (ref 3.5–5.1)
POTASSIUM SERPL-SCNC: 4.3 MMOL/L (ref 3.5–5.1)
PROT SERPL-MCNC: 7.2 G/DL (ref 6.4–8.2)
PROT SERPL-MCNC: 7.7 G/DL (ref 6.4–8.2)
RBC # BLD AUTO: 5.2 M/UL (ref 4.6–6.2)
RBC # BLD AUTO: 5.52 M/UL (ref 4.6–6.2)
SODIUM SERPL-SCNC: 138 MMOL/L (ref 136–145)
SODIUM SERPL-SCNC: 140 MMOL/L (ref 136–145)
SP GR UR STRIP: 1.03
TAPENTADOL, URINE (RUSH): NEGATIVE 25 NG/ML
TEMAZEPAM, URINE (RUSH): NEGATIVE 25 NG/ML
THC-COOH, URINE (RUSH): NEGATIVE 25 NG/ML
TRAMADOL, URINE (RUSH): NEGATIVE 100 NG/ML
TRIGL SERPL-MCNC: 216 MG/DL (ref 35–150)
VLDLC SERPL-MCNC: 43 MG/DL
WBC # BLD AUTO: 6.94 K/UL (ref 4.5–11)
WBC # BLD AUTO: 7.04 K/UL (ref 4.5–11)

## 2023-01-01 PROCEDURE — 96372 PR INJECTION,THERAP/PROPH/DIAG2ST, IM OR SUBCUT: ICD-10-PCS | Mod: ,,, | Performed by: NURSE PRACTITIONER

## 2023-01-01 PROCEDURE — 4010F PR ACE/ARB THEARPY RXD/TAKEN: ICD-10-PCS | Mod: ,,, | Performed by: FAMILY MEDICINE

## 2023-01-01 PROCEDURE — 3052F PR MOST RECENT HEMOGLOBIN A1C LEVEL 8.0 - < 9.0%: ICD-10-PCS | Mod: CPTII,,, | Performed by: PHYSICIAN ASSISTANT

## 2023-01-01 PROCEDURE — 73620 X-RAY EXAM OF FOOT: CPT | Mod: TC,LT

## 2023-01-01 PROCEDURE — 3288F FALL RISK ASSESSMENT DOCD: CPT | Mod: CPTII,,, | Performed by: PAIN MEDICINE

## 2023-01-01 PROCEDURE — 3078F DIAST BP <80 MM HG: CPT | Mod: ,,, | Performed by: NURSE PRACTITIONER

## 2023-01-01 PROCEDURE — 99214 OFFICE O/P EST MOD 30 MIN: CPT | Mod: S$PBB,,, | Performed by: PHYSICIAN ASSISTANT

## 2023-01-01 PROCEDURE — 3074F SYST BP LT 130 MM HG: CPT | Mod: ,,, | Performed by: NURSE PRACTITIONER

## 2023-01-01 PROCEDURE — 99214 PR OFFICE/OUTPT VISIT, EST, LEVL IV, 30-39 MIN: ICD-10-PCS | Mod: 25,,, | Performed by: NURSE PRACTITIONER

## 2023-01-01 PROCEDURE — 11042 DBRDMT SUBQ TIS 1ST 20SQCM/<: CPT

## 2023-01-01 PROCEDURE — 3066F NEPHROPATHY DOC TX: CPT | Mod: ,,, | Performed by: FAMILY MEDICINE

## 2023-01-01 PROCEDURE — 1159F MED LIST DOCD IN RCRD: CPT | Mod: CPTII,,, | Performed by: PHYSICIAN ASSISTANT

## 2023-01-01 PROCEDURE — 3052F PR MOST RECENT HEMOGLOBIN A1C LEVEL 8.0 - < 9.0%: ICD-10-PCS | Mod: ,,, | Performed by: NURSE PRACTITIONER

## 2023-01-01 PROCEDURE — 1125F AMNT PAIN NOTED PAIN PRSNT: CPT | Mod: CPTII,,, | Performed by: PHYSICIAN ASSISTANT

## 2023-01-01 PROCEDURE — 3078F DIAST BP <80 MM HG: CPT | Mod: CPTII,,, | Performed by: PHYSICIAN ASSISTANT

## 2023-01-01 PROCEDURE — 99214 OFFICE O/P EST MOD 30 MIN: CPT | Mod: ,,, | Performed by: NURSE PRACTITIONER

## 2023-01-01 PROCEDURE — 3052F HG A1C>EQUAL 8.0%<EQUAL 9.0%: CPT | Mod: ,,, | Performed by: NURSE PRACTITIONER

## 2023-01-01 PROCEDURE — 3288F FALL RISK ASSESSMENT DOCD: CPT | Mod: ,,, | Performed by: NURSE PRACTITIONER

## 2023-01-01 PROCEDURE — 3060F PR POS MICROALBUMINURIA RESULT DOCUMENTED/REVIEW: ICD-10-PCS | Mod: ,,, | Performed by: NURSE PRACTITIONER

## 2023-01-01 PROCEDURE — 1126F AMNT PAIN NOTED NONE PRSNT: CPT | Mod: ,,, | Performed by: NURSE PRACTITIONER

## 2023-01-01 PROCEDURE — 3077F SYST BP >= 140 MM HG: CPT | Mod: CPTII,,, | Performed by: PHYSICIAN ASSISTANT

## 2023-01-01 PROCEDURE — 99214 OFFICE O/P EST MOD 30 MIN: CPT | Mod: 25,,, | Performed by: NURSE PRACTITIONER

## 2023-01-01 PROCEDURE — 3052F HG A1C>EQUAL 8.0%<EQUAL 9.0%: CPT | Mod: CPTII,,, | Performed by: PHYSICIAN ASSISTANT

## 2023-01-01 PROCEDURE — 4010F ACE/ARB THERAPY RXD/TAKEN: CPT | Mod: ,,, | Performed by: NURSE PRACTITIONER

## 2023-01-01 PROCEDURE — 82043 MICROALBUMIN / CREATININE RATIO URINE: ICD-10-PCS | Mod: ,,, | Performed by: CLINICAL MEDICAL LABORATORY

## 2023-01-01 PROCEDURE — 4010F PR ACE/ARB THEARPY RXD/TAKEN: ICD-10-PCS | Mod: CPTII,,, | Performed by: PHYSICIAN ASSISTANT

## 2023-01-01 PROCEDURE — 3075F SYST BP GE 130 - 139MM HG: CPT | Mod: CPTII,,, | Performed by: PHYSICIAN ASSISTANT

## 2023-01-01 PROCEDURE — 3008F BODY MASS INDEX DOCD: CPT | Mod: CPTII,,, | Performed by: PHYSICIAN ASSISTANT

## 2023-01-01 PROCEDURE — 99213 OFFICE O/P EST LOW 20 MIN: CPT | Mod: ,,, | Performed by: FAMILY MEDICINE

## 2023-01-01 PROCEDURE — 1160F PR REVIEW ALL MEDS BY PRESCRIBER/CLIN PHARMACIST DOCUMENTED: ICD-10-PCS | Mod: ,,, | Performed by: FAMILY MEDICINE

## 2023-01-01 PROCEDURE — 3051F PR MOST RECENT HEMOGLOBIN A1C LEVEL 7.0 - < 8.0%: ICD-10-PCS | Mod: CPTII,,, | Performed by: PHYSICIAN ASSISTANT

## 2023-01-01 PROCEDURE — 3008F PR BODY MASS INDEX (BMI) DOCUMENTED: ICD-10-PCS | Mod: CPTII,,, | Performed by: PHYSICIAN ASSISTANT

## 2023-01-01 PROCEDURE — 80061 LIPID PANEL: CPT | Mod: ,,, | Performed by: CLINICAL MEDICAL LABORATORY

## 2023-01-01 PROCEDURE — 3077F PR MOST RECENT SYSTOLIC BLOOD PRESSURE >= 140 MM HG: ICD-10-PCS | Mod: CPTII,,, | Performed by: PHYSICIAN ASSISTANT

## 2023-01-01 PROCEDURE — 99214 PR OFFICE/OUTPT VISIT, EST, LEVL IV, 30-39 MIN: ICD-10-PCS | Mod: S$PBB,,, | Performed by: PHYSICIAN ASSISTANT

## 2023-01-01 PROCEDURE — 3078F PR MOST RECENT DIASTOLIC BLOOD PRESSURE < 80 MM HG: ICD-10-PCS | Mod: CPTII,,, | Performed by: PHYSICIAN ASSISTANT

## 2023-01-01 PROCEDURE — 3288F FALL RISK ASSESSMENT DOCD: CPT | Mod: CPTII,,, | Performed by: PHYSICIAN ASSISTANT

## 2023-01-01 PROCEDURE — 1101F PR PT FALLS ASSESS DOC 0-1 FALLS W/OUT INJ PAST YR: ICD-10-PCS | Mod: ,,, | Performed by: NURSE PRACTITIONER

## 2023-01-01 PROCEDURE — 1159F MED LIST DOCD IN RCRD: CPT | Mod: ,,, | Performed by: NURSE PRACTITIONER

## 2023-01-01 PROCEDURE — 1125F PR PAIN SEVERITY QUANTIFIED, PAIN PRESENT: ICD-10-PCS | Mod: CPTII,,, | Performed by: PHYSICIAN ASSISTANT

## 2023-01-01 PROCEDURE — 1159F PR MEDICATION LIST DOCUMENTED IN MEDICAL RECORD: ICD-10-PCS | Mod: ,,, | Performed by: NURSE PRACTITIONER

## 2023-01-01 PROCEDURE — 96372 THER/PROPH/DIAG INJ SC/IM: CPT | Mod: ,,, | Performed by: NURSE PRACTITIONER

## 2023-01-01 PROCEDURE — 1101F PR PT FALLS ASSESS DOC 0-1 FALLS W/OUT INJ PAST YR: ICD-10-PCS | Mod: ,,, | Performed by: FAMILY MEDICINE

## 2023-01-01 PROCEDURE — 87070 CULTURE OTHR SPECIMN AEROBIC: CPT

## 2023-01-01 PROCEDURE — 63600175 PHARM REV CODE 636 W HCPCS: Performed by: NURSE PRACTITIONER

## 2023-01-01 PROCEDURE — 83036 HEMOGLOBIN GLYCOSYLATED A1C: CPT | Mod: ,,, | Performed by: CLINICAL MEDICAL LABORATORY

## 2023-01-01 PROCEDURE — 1126F AMNT PAIN NOTED NONE PRSNT: CPT | Mod: ,,, | Performed by: FAMILY MEDICINE

## 2023-01-01 PROCEDURE — 3288F PR FALLS RISK ASSESSMENT DOCUMENTED: ICD-10-PCS | Mod: CPTII,,, | Performed by: PHYSICIAN ASSISTANT

## 2023-01-01 PROCEDURE — 85651 RBC SED RATE NONAUTOMATED: CPT | Performed by: NURSE PRACTITIONER

## 2023-01-01 PROCEDURE — 3066F PR DOCUMENTATION OF TREATMENT FOR NEPHROPATHY: ICD-10-PCS | Mod: ,,, | Performed by: FAMILY MEDICINE

## 2023-01-01 PROCEDURE — 3066F PR DOCUMENTATION OF TREATMENT FOR NEPHROPATHY: ICD-10-PCS | Mod: ,,, | Performed by: NURSE PRACTITIONER

## 2023-01-01 PROCEDURE — 3060F POS MICROALBUMINURIA REV: CPT | Mod: ,,, | Performed by: FAMILY MEDICINE

## 2023-01-01 PROCEDURE — 1126F PR PAIN SEVERITY QUANTIFIED, NO PAIN PRESENT: ICD-10-PCS | Mod: ,,, | Performed by: NURSE PRACTITIONER

## 2023-01-01 PROCEDURE — 3074F SYST BP LT 130 MM HG: CPT | Mod: CPTII,,, | Performed by: PHYSICIAN ASSISTANT

## 2023-01-01 PROCEDURE — 3074F PR MOST RECENT SYSTOLIC BLOOD PRESSURE < 130 MM HG: ICD-10-PCS | Mod: ,,, | Performed by: NURSE PRACTITIONER

## 2023-01-01 PROCEDURE — 3077F PR MOST RECENT SYSTOLIC BLOOD PRESSURE >= 140 MM HG: ICD-10-PCS | Mod: ,,, | Performed by: FAMILY MEDICINE

## 2023-01-01 PROCEDURE — 3077F SYST BP >= 140 MM HG: CPT | Mod: ,,, | Performed by: FAMILY MEDICINE

## 2023-01-01 PROCEDURE — G0511 CCM/BHI BY RHC/FQHC 20MIN MO: HCPCS | Mod: ,,, | Performed by: NURSE PRACTITIONER

## 2023-01-01 PROCEDURE — 3008F BODY MASS INDEX DOCD: CPT | Mod: ,,, | Performed by: FAMILY MEDICINE

## 2023-01-01 PROCEDURE — 99214 PR OFFICE/OUTPT VISIT, EST, LEVL IV, 30-39 MIN: ICD-10-PCS | Mod: ,,, | Performed by: NURSE PRACTITIONER

## 2023-01-01 PROCEDURE — 3051F HG A1C>EQUAL 7.0%<8.0%: CPT | Mod: ,,, | Performed by: FAMILY MEDICINE

## 2023-01-01 PROCEDURE — 1101F PT FALLS ASSESS-DOCD LE1/YR: CPT | Mod: CPTII,,, | Performed by: PHYSICIAN ASSISTANT

## 2023-01-01 PROCEDURE — 1101F PT FALLS ASSESS-DOCD LE1/YR: CPT | Mod: ,,, | Performed by: FAMILY MEDICINE

## 2023-01-01 PROCEDURE — 3079F PR MOST RECENT DIASTOLIC BLOOD PRESSURE 80-89 MM HG: ICD-10-PCS | Mod: ,,, | Performed by: NURSE PRACTITIONER

## 2023-01-01 PROCEDURE — 1101F PR PT FALLS ASSESS DOC 0-1 FALLS W/OUT INJ PAST YR: ICD-10-PCS | Mod: CPTII,,, | Performed by: PHYSICIAN ASSISTANT

## 2023-01-01 PROCEDURE — 3288F PR FALLS RISK ASSESSMENT DOCUMENTED: ICD-10-PCS | Mod: ,,, | Performed by: NURSE PRACTITIONER

## 2023-01-01 PROCEDURE — 97597 DBRDMT OPN WND 1ST 20 CM/<: CPT

## 2023-01-01 PROCEDURE — 3060F PR POS MICROALBUMINURIA RESULT DOCUMENTED/REVIEW: ICD-10-PCS | Mod: ,,, | Performed by: FAMILY MEDICINE

## 2023-01-01 PROCEDURE — 80305 DRUG TEST PRSMV DIR OPT OBS: CPT | Mod: PBBFAC | Performed by: PHYSICIAN ASSISTANT

## 2023-01-01 PROCEDURE — 4010F PR ACE/ARB THEARPY RXD/TAKEN: ICD-10-PCS | Mod: ,,, | Performed by: NURSE PRACTITIONER

## 2023-01-01 PROCEDURE — 80053 COMPREHENSIVE METABOLIC PANEL: ICD-10-PCS | Mod: ,,, | Performed by: CLINICAL MEDICAL LABORATORY

## 2023-01-01 PROCEDURE — 1159F MED LIST DOCD IN RCRD: CPT | Mod: ,,, | Performed by: FAMILY MEDICINE

## 2023-01-01 PROCEDURE — 1101F PT FALLS ASSESS-DOCD LE1/YR: CPT | Mod: CPTII,,, | Performed by: PAIN MEDICINE

## 2023-01-01 PROCEDURE — 96365 THER/PROPH/DIAG IV INF INIT: CPT

## 2023-01-01 PROCEDURE — 25000003 PHARM REV CODE 250: Performed by: NURSE PRACTITIONER

## 2023-01-01 PROCEDURE — 4010F ACE/ARB THERAPY RXD/TAKEN: CPT | Mod: ,,, | Performed by: FAMILY MEDICINE

## 2023-01-01 PROCEDURE — 4010F ACE/ARB THERAPY RXD/TAKEN: CPT | Mod: CPTII,,, | Performed by: PHYSICIAN ASSISTANT

## 2023-01-01 PROCEDURE — 3078F DIAST BP <80 MM HG: CPT | Mod: CPTII,,, | Performed by: PAIN MEDICINE

## 2023-01-01 PROCEDURE — 3078F DIAST BP <80 MM HG: CPT | Mod: ,,, | Performed by: FAMILY MEDICINE

## 2023-01-01 PROCEDURE — 3008F PR BODY MASS INDEX (BMI) DOCUMENTED: ICD-10-PCS | Mod: ,,, | Performed by: FAMILY MEDICINE

## 2023-01-01 PROCEDURE — 3288F PR FALLS RISK ASSESSMENT DOCUMENTED: ICD-10-PCS | Mod: CPTII,,, | Performed by: PAIN MEDICINE

## 2023-01-01 PROCEDURE — 1159F PR MEDICATION LIST DOCUMENTED IN MEDICAL RECORD: ICD-10-PCS | Mod: CPTII,,, | Performed by: PHYSICIAN ASSISTANT

## 2023-01-01 PROCEDURE — 99213 PR OFFICE/OUTPT VISIT, EST, LEVL III, 20-29 MIN: ICD-10-PCS | Mod: ,,, | Performed by: FAMILY MEDICINE

## 2023-01-01 PROCEDURE — 85025 COMPLETE CBC W/AUTO DIFF WBC: CPT | Performed by: NURSE PRACTITIONER

## 2023-01-01 PROCEDURE — 82570 MICROALBUMIN / CREATININE RATIO URINE: ICD-10-PCS | Mod: ,,, | Performed by: CLINICAL MEDICAL LABORATORY

## 2023-01-01 PROCEDURE — 3060F POS MICROALBUMINURIA REV: CPT | Mod: ,,, | Performed by: NURSE PRACTITIONER

## 2023-01-01 PROCEDURE — 4010F PR ACE/ARB THEARPY RXD/TAKEN: ICD-10-PCS | Mod: CPTII,,, | Performed by: PAIN MEDICINE

## 2023-01-01 PROCEDURE — 3078F PR MOST RECENT DIASTOLIC BLOOD PRESSURE < 80 MM HG: ICD-10-PCS | Mod: ,,, | Performed by: NURSE PRACTITIONER

## 2023-01-01 PROCEDURE — 3288F PR FALLS RISK ASSESSMENT DOCUMENTED: ICD-10-PCS | Mod: ,,, | Performed by: FAMILY MEDICINE

## 2023-01-01 PROCEDURE — 3074F PR MOST RECENT SYSTOLIC BLOOD PRESSURE < 130 MM HG: ICD-10-PCS | Mod: CPTII,,, | Performed by: PAIN MEDICINE

## 2023-01-01 PROCEDURE — 3060F PR POS MICROALBUMINURIA RESULT DOCUMENTED/REVIEW: ICD-10-PCS | Mod: CPTII,,, | Performed by: PHYSICIAN ASSISTANT

## 2023-01-01 PROCEDURE — 3288F FALL RISK ASSESSMENT DOCD: CPT | Mod: ,,, | Performed by: FAMILY MEDICINE

## 2023-01-01 PROCEDURE — 3079F DIAST BP 80-89 MM HG: CPT | Mod: ,,, | Performed by: NURSE PRACTITIONER

## 2023-01-01 PROCEDURE — 99214 OFFICE O/P EST MOD 30 MIN: CPT | Mod: S$PBB,,, | Performed by: PAIN MEDICINE

## 2023-01-01 PROCEDURE — 82570 ASSAY OF URINE CREATININE: CPT | Mod: ,,, | Performed by: CLINICAL MEDICAL LABORATORY

## 2023-01-01 PROCEDURE — 80053 COMPREHEN METABOLIC PANEL: CPT | Performed by: NURSE PRACTITIONER

## 2023-01-01 PROCEDURE — 99214 PR OFFICE/OUTPT VISIT, EST, LEVL IV, 30-39 MIN: ICD-10-PCS | Mod: S$PBB,,, | Performed by: PAIN MEDICINE

## 2023-01-01 PROCEDURE — 1160F RVW MEDS BY RX/DR IN RCRD: CPT | Mod: ,,, | Performed by: FAMILY MEDICINE

## 2023-01-01 PROCEDURE — 1159F MED LIST DOCD IN RCRD: CPT | Mod: CPTII,,, | Performed by: PAIN MEDICINE

## 2023-01-01 PROCEDURE — 80061 LIPID PANEL: ICD-10-PCS | Mod: ,,, | Performed by: CLINICAL MEDICAL LABORATORY

## 2023-01-01 PROCEDURE — 99215 OFFICE O/P EST HI 40 MIN: CPT | Mod: PBBFAC | Performed by: PHYSICIAN ASSISTANT

## 2023-01-01 PROCEDURE — 99212 OFFICE O/P EST SF 10 MIN: CPT

## 2023-01-01 PROCEDURE — 1125F AMNT PAIN NOTED PAIN PRSNT: CPT | Mod: CPTII,,, | Performed by: PAIN MEDICINE

## 2023-01-01 PROCEDURE — 3008F PR BODY MASS INDEX (BMI) DOCUMENTED: ICD-10-PCS | Mod: ,,, | Performed by: NURSE PRACTITIONER

## 2023-01-01 PROCEDURE — 3008F BODY MASS INDEX DOCD: CPT | Mod: ,,, | Performed by: NURSE PRACTITIONER

## 2023-01-01 PROCEDURE — 3078F PR MOST RECENT DIASTOLIC BLOOD PRESSURE < 80 MM HG: ICD-10-PCS | Mod: ,,, | Performed by: FAMILY MEDICINE

## 2023-01-01 PROCEDURE — 36415 COLL VENOUS BLD VENIPUNCTURE: CPT | Performed by: NURSE PRACTITIONER

## 2023-01-01 PROCEDURE — G0009 PNEUMOCOCCAL CONJUGATE VACCINE 20-VALENT: ICD-10-PCS | Mod: ,,, | Performed by: FAMILY MEDICINE

## 2023-01-01 PROCEDURE — 85025 COMPLETE CBC W/AUTO DIFF WBC: CPT | Mod: ,,, | Performed by: CLINICAL MEDICAL LABORATORY

## 2023-01-01 PROCEDURE — 3051F PR MOST RECENT HEMOGLOBIN A1C LEVEL 7.0 - < 8.0%: ICD-10-PCS | Mod: ,,, | Performed by: FAMILY MEDICINE

## 2023-01-01 PROCEDURE — 3078F PR MOST RECENT DIASTOLIC BLOOD PRESSURE < 80 MM HG: ICD-10-PCS | Mod: CPTII,,, | Performed by: PAIN MEDICINE

## 2023-01-01 PROCEDURE — 1159F PR MEDICATION LIST DOCUMENTED IN MEDICAL RECORD: ICD-10-PCS | Mod: CPTII,,, | Performed by: PAIN MEDICINE

## 2023-01-01 PROCEDURE — 3052F HG A1C>EQUAL 8.0%<EQUAL 9.0%: CPT | Mod: CPTII,,, | Performed by: PAIN MEDICINE

## 2023-01-01 PROCEDURE — 3052F PR MOST RECENT HEMOGLOBIN A1C LEVEL 8.0 - < 9.0%: ICD-10-PCS | Mod: CPTII,,, | Performed by: PAIN MEDICINE

## 2023-01-01 PROCEDURE — 73630 X-RAY EXAM OF FOOT: CPT | Mod: TC,LT

## 2023-01-01 PROCEDURE — 3066F NEPHROPATHY DOC TX: CPT | Mod: ,,, | Performed by: NURSE PRACTITIONER

## 2023-01-01 PROCEDURE — 3060F POS MICROALBUMINURIA REV: CPT | Mod: CPTII,,, | Performed by: PHYSICIAN ASSISTANT

## 2023-01-01 PROCEDURE — 3074F PR MOST RECENT SYSTOLIC BLOOD PRESSURE < 130 MM HG: ICD-10-PCS | Mod: ,,, | Performed by: FAMILY MEDICINE

## 2023-01-01 PROCEDURE — 85025 CBC WITH DIFFERENTIAL: ICD-10-PCS | Mod: ,,, | Performed by: CLINICAL MEDICAL LABORATORY

## 2023-01-01 PROCEDURE — 3008F BODY MASS INDEX DOCD: CPT | Mod: CPTII,,, | Performed by: PAIN MEDICINE

## 2023-01-01 PROCEDURE — 1125F PR PAIN SEVERITY QUANTIFIED, PAIN PRESENT: ICD-10-PCS | Mod: CPTII,,, | Performed by: PAIN MEDICINE

## 2023-01-01 PROCEDURE — 93926 LOWER EXTREMITY STUDY: CPT | Mod: TC,LT

## 2023-01-01 PROCEDURE — 1101F PT FALLS ASSESS-DOCD LE1/YR: CPT | Mod: ,,, | Performed by: NURSE PRACTITIONER

## 2023-01-01 PROCEDURE — G0481 DRUG TEST DEF 8-14 CLASSES: HCPCS | Mod: ,,, | Performed by: CLINICAL MEDICAL LABORATORY

## 2023-01-01 PROCEDURE — 3074F SYST BP LT 130 MM HG: CPT | Mod: CPTII,,, | Performed by: PAIN MEDICINE

## 2023-01-01 PROCEDURE — 3075F PR MOST RECENT SYSTOLIC BLOOD PRESS GE 130-139MM HG: ICD-10-PCS | Mod: ,,, | Performed by: NURSE PRACTITIONER

## 2023-01-01 PROCEDURE — 3066F PR DOCUMENTATION OF TREATMENT FOR NEPHROPATHY: ICD-10-PCS | Mod: CPTII,,, | Performed by: PHYSICIAN ASSISTANT

## 2023-01-01 PROCEDURE — G0009 ADMIN PNEUMOCOCCAL VACCINE: HCPCS | Mod: ,,, | Performed by: FAMILY MEDICINE

## 2023-01-01 PROCEDURE — 1159F PR MEDICATION LIST DOCUMENTED IN MEDICAL RECORD: ICD-10-PCS | Mod: ,,, | Performed by: FAMILY MEDICINE

## 2023-01-01 PROCEDURE — 3075F PR MOST RECENT SYSTOLIC BLOOD PRESS GE 130-139MM HG: ICD-10-PCS | Mod: CPTII,,, | Performed by: PHYSICIAN ASSISTANT

## 2023-01-01 PROCEDURE — 82043 UR ALBUMIN QUANTITATIVE: CPT | Mod: ,,, | Performed by: CLINICAL MEDICAL LABORATORY

## 2023-01-01 PROCEDURE — 1125F PR PAIN SEVERITY QUANTIFIED, PAIN PRESENT: ICD-10-PCS | Mod: ,,, | Performed by: FAMILY MEDICINE

## 2023-01-01 PROCEDURE — 3066F NEPHROPATHY DOC TX: CPT | Mod: CPTII,,, | Performed by: PAIN MEDICINE

## 2023-01-01 PROCEDURE — 99214 PR OFFICE/OUTPT VISIT, EST, LEVL IV, 30-39 MIN: ICD-10-PCS | Mod: ,,, | Performed by: FAMILY MEDICINE

## 2023-01-01 PROCEDURE — 90677 PCV20 VACCINE IM: CPT | Mod: ,,, | Performed by: FAMILY MEDICINE

## 2023-01-01 PROCEDURE — 3066F NEPHROPATHY DOC TX: CPT | Mod: CPTII,,, | Performed by: PHYSICIAN ASSISTANT

## 2023-01-01 PROCEDURE — 3066F PR DOCUMENTATION OF TREATMENT FOR NEPHROPATHY: ICD-10-PCS | Mod: CPTII,,, | Performed by: PAIN MEDICINE

## 2023-01-01 PROCEDURE — 3008F PR BODY MASS INDEX (BMI) DOCUMENTED: ICD-10-PCS | Mod: CPTII,,, | Performed by: PAIN MEDICINE

## 2023-01-01 PROCEDURE — 80053 COMPREHEN METABOLIC PANEL: CPT | Mod: ,,, | Performed by: CLINICAL MEDICAL LABORATORY

## 2023-01-01 PROCEDURE — 1126F PR PAIN SEVERITY QUANTIFIED, NO PAIN PRESENT: ICD-10-PCS | Mod: ,,, | Performed by: FAMILY MEDICINE

## 2023-01-01 PROCEDURE — 90677 PNEUMOCOCCAL CONJUGATE VACCINE 20-VALENT: ICD-10-PCS | Mod: ,,, | Performed by: FAMILY MEDICINE

## 2023-01-01 PROCEDURE — 3079F PR MOST RECENT DIASTOLIC BLOOD PRESSURE 80-89 MM HG: ICD-10-PCS | Mod: ,,, | Performed by: FAMILY MEDICINE

## 2023-01-01 PROCEDURE — 1101F PR PT FALLS ASSESS DOC 0-1 FALLS W/OUT INJ PAST YR: ICD-10-PCS | Mod: CPTII,,, | Performed by: PAIN MEDICINE

## 2023-01-01 PROCEDURE — 1125F AMNT PAIN NOTED PAIN PRSNT: CPT | Mod: ,,, | Performed by: FAMILY MEDICINE

## 2023-01-01 PROCEDURE — 80305 DRUG TEST PRSMV DIR OPT OBS: CPT | Mod: PBBFAC | Performed by: PAIN MEDICINE

## 2023-01-01 PROCEDURE — 99214 OFFICE O/P EST MOD 30 MIN: CPT | Mod: ,,, | Performed by: FAMILY MEDICINE

## 2023-01-01 PROCEDURE — 99999PBSHW POCT URINE DRUG SCREEN PRESUMP: ICD-10-PCS | Mod: PBBFAC,,,

## 2023-01-01 PROCEDURE — G0481 DRUG SCREEN DEFINITIVE 14, URINE: ICD-10-PCS | Mod: ,,, | Performed by: CLINICAL MEDICAL LABORATORY

## 2023-01-01 PROCEDURE — 4010F ACE/ARB THERAPY RXD/TAKEN: CPT | Mod: CPTII,,, | Performed by: PAIN MEDICINE

## 2023-01-01 PROCEDURE — 99213 OFFICE O/P EST LOW 20 MIN: CPT | Mod: PBBFAC | Performed by: PAIN MEDICINE

## 2023-01-01 PROCEDURE — 83036 HEMOGLOBIN A1C: ICD-10-PCS | Mod: ,,, | Performed by: CLINICAL MEDICAL LABORATORY

## 2023-01-01 PROCEDURE — 3060F POS MICROALBUMINURIA REV: CPT | Mod: CPTII,,, | Performed by: PAIN MEDICINE

## 2023-01-01 PROCEDURE — G0511 PR CHRONIC CARE MGMT, RHC OR FQHC ONLY, 20 MINS OR MORE: ICD-10-PCS | Mod: ,,, | Performed by: NURSE PRACTITIONER

## 2023-01-01 PROCEDURE — 99999PBSHW POCT URINE DRUG SCREEN PRESUMP: Mod: PBBFAC,,,

## 2023-01-01 PROCEDURE — 3074F PR MOST RECENT SYSTOLIC BLOOD PRESSURE < 130 MM HG: ICD-10-PCS | Mod: CPTII,,, | Performed by: PHYSICIAN ASSISTANT

## 2023-01-01 PROCEDURE — 3074F SYST BP LT 130 MM HG: CPT | Mod: ,,, | Performed by: FAMILY MEDICINE

## 2023-01-01 PROCEDURE — 3075F SYST BP GE 130 - 139MM HG: CPT | Mod: ,,, | Performed by: NURSE PRACTITIONER

## 2023-01-01 PROCEDURE — 17250 CHEM CAUT OF GRANLTJ TISSUE: CPT | Mod: 59

## 2023-01-01 PROCEDURE — 3051F HG A1C>EQUAL 7.0%<8.0%: CPT | Mod: CPTII,,, | Performed by: PHYSICIAN ASSISTANT

## 2023-01-01 PROCEDURE — 3060F PR POS MICROALBUMINURIA RESULT DOCUMENTED/REVIEW: ICD-10-PCS | Mod: CPTII,,, | Performed by: PAIN MEDICINE

## 2023-01-01 PROCEDURE — 3079F DIAST BP 80-89 MM HG: CPT | Mod: ,,, | Performed by: FAMILY MEDICINE

## 2023-01-01 RX ORDER — TIZANIDINE 4 MG/1
TABLET ORAL
Qty: 56 TABLET | Refills: 1 | Status: SHIPPED | OUTPATIENT
Start: 2023-01-01 | End: 2023-01-01

## 2023-01-01 RX ORDER — OMEPRAZOLE 20 MG/1
20 CAPSULE, DELAYED RELEASE ORAL DAILY
Qty: 90 CAPSULE | Refills: 1 | Status: SHIPPED | OUTPATIENT
Start: 2023-01-01

## 2023-01-01 RX ORDER — KETOCONAZOLE 20 MG/G
CREAM TOPICAL DAILY
Qty: 120 G | Refills: 5 | Status: SHIPPED | OUTPATIENT
Start: 2023-01-01

## 2023-01-01 RX ORDER — SIMVASTATIN 40 MG/1
40 TABLET, FILM COATED ORAL NIGHTLY
Qty: 90 TABLET | Refills: 1 | Status: SHIPPED | OUTPATIENT
Start: 2023-01-01

## 2023-01-01 RX ORDER — HYDROCODONE BITARTRATE AND ACETAMINOPHEN 10; 325 MG/1; MG/1
1 TABLET ORAL EVERY 8 HOURS PRN
Qty: 90 TABLET | Refills: 0 | Status: SHIPPED | OUTPATIENT
Start: 2023-01-01 | End: 2023-01-01

## 2023-01-01 RX ORDER — DICLOFENAC SODIUM 75 MG/1
75 TABLET, DELAYED RELEASE ORAL 2 TIMES DAILY
Qty: 30 TABLET | Refills: 0 | Status: SHIPPED | OUTPATIENT
Start: 2023-01-01 | End: 2023-01-01

## 2023-01-01 RX ORDER — TIZANIDINE 4 MG/1
TABLET ORAL
Qty: 56 TABLET | Refills: 1 | Status: SHIPPED | OUTPATIENT
Start: 2023-01-01 | End: 2023-01-01 | Stop reason: SDUPTHER

## 2023-01-01 RX ORDER — MEMANTINE HYDROCHLORIDE 10 MG/1
10 TABLET ORAL 2 TIMES DAILY
Qty: 180 TABLET | Refills: 1 | Status: SHIPPED | OUTPATIENT
Start: 2023-01-01

## 2023-01-01 RX ORDER — ISOSORBIDE MONONITRATE 30 MG/1
30 TABLET, EXTENDED RELEASE ORAL DAILY
Qty: 90 TABLET | Refills: 1 | Status: SHIPPED | OUTPATIENT
Start: 2023-01-01

## 2023-01-01 RX ORDER — NALOXONE HYDROCHLORIDE 4 MG/.1ML
1 SPRAY NASAL ONCE
Qty: 1 EACH | Refills: 0 | Status: SHIPPED | OUTPATIENT
Start: 2023-01-01 | End: 2023-01-01

## 2023-01-01 RX ORDER — DICLOFENAC SODIUM 10 MG/G
2 GEL TOPICAL DAILY
Qty: 50 G | Refills: 0 | Status: SHIPPED | OUTPATIENT
Start: 2023-01-01 | End: 2023-01-01

## 2023-01-01 RX ORDER — LISINOPRIL 5 MG/1
5 TABLET ORAL DAILY
Qty: 90 TABLET | Refills: 1 | Status: SHIPPED | OUTPATIENT
Start: 2023-01-01 | End: 2023-01-01 | Stop reason: SDUPTHER

## 2023-01-01 RX ORDER — GABAPENTIN 600 MG/1
600 TABLET ORAL 2 TIMES DAILY
Qty: 180 TABLET | Refills: 1 | Status: SHIPPED | OUTPATIENT
Start: 2023-01-01

## 2023-01-01 RX ORDER — EMPAGLIFLOZIN, METFORMIN HYDROCHLORIDE 12.5; 1 MG/1; MG/1
2 TABLET, EXTENDED RELEASE ORAL EVERY MORNING
Qty: 180 TABLET | Refills: 1 | Status: SHIPPED | OUTPATIENT
Start: 2023-01-01 | End: 2023-01-01 | Stop reason: SDUPTHER

## 2023-01-01 RX ORDER — HYDROCODONE BITARTRATE AND ACETAMINOPHEN 10; 325 MG/1; MG/1
1 TABLET ORAL EVERY 8 HOURS PRN
Qty: 90 TABLET | Refills: 0 | Status: SHIPPED | OUTPATIENT
Start: 2023-01-01 | End: 2023-01-01 | Stop reason: SDUPTHER

## 2023-01-01 RX ORDER — CEPHALEXIN 500 MG/1
500 CAPSULE ORAL EVERY 8 HOURS
Qty: 30 CAPSULE | Refills: 0 | Status: SHIPPED | OUTPATIENT
Start: 2023-01-01 | End: 2023-01-01

## 2023-01-01 RX ORDER — HYDROCODONE BITARTRATE AND ACETAMINOPHEN 10; 325 MG/1; MG/1
1 TABLET ORAL EVERY 8 HOURS PRN
Qty: 90 TABLET | Refills: 0 | Status: SHIPPED | OUTPATIENT
Start: 2023-01-01 | End: 2024-01-12

## 2023-01-01 RX ORDER — CHLORPHENIRAMINE MALEATE AND PHENYLEPHRINE HYDROCHLORIDE 4; 10 MG/1; MG/1
1 TABLET, COATED ORAL EVERY 6 HOURS PRN
Qty: 30 TABLET | Refills: 0 | Status: SHIPPED | OUTPATIENT
Start: 2023-01-01 | End: 2023-01-01

## 2023-01-01 RX ORDER — HYDROCODONE BITARTRATE AND ACETAMINOPHEN 10; 325 MG/1; MG/1
1 TABLET ORAL EVERY 8 HOURS PRN
Qty: 90 TABLET | Refills: 0 | Status: SHIPPED | OUTPATIENT
Start: 2024-01-12 | End: 2024-02-11

## 2023-01-01 RX ORDER — INSULIN ASPART 100 [IU]/ML
INJECTION, SUSPENSION SUBCUTANEOUS
Qty: 90 ML | Refills: 5 | Status: SHIPPED | OUTPATIENT
Start: 2023-01-01

## 2023-01-01 RX ORDER — TOBRAMYCIN 3 MG/ML
SOLUTION/ DROPS OPHTHALMIC
COMMUNITY
Start: 2023-01-01

## 2023-01-01 RX ORDER — HYDROCHLOROTHIAZIDE 12.5 MG/1
12.5 CAPSULE ORAL DAILY
Qty: 90 CAPSULE | Refills: 1 | Status: SHIPPED | OUTPATIENT
Start: 2023-01-01

## 2023-01-01 RX ORDER — OMEPRAZOLE 20 MG/1
CAPSULE, DELAYED RELEASE ORAL
Qty: 28 CAPSULE | Refills: 1 | Status: SHIPPED | OUTPATIENT
Start: 2023-01-01 | End: 2023-01-01 | Stop reason: SDUPTHER

## 2023-01-01 RX ORDER — GABAPENTIN 600 MG/1
600 TABLET ORAL 2 TIMES DAILY
Qty: 180 TABLET | Refills: 1 | Status: SHIPPED | OUTPATIENT
Start: 2023-01-01 | End: 2023-01-01 | Stop reason: SDUPTHER

## 2023-01-01 RX ORDER — CEFTRIAXONE 1 G/1
1 INJECTION, POWDER, FOR SOLUTION INTRAMUSCULAR; INTRAVENOUS
Status: COMPLETED | OUTPATIENT
Start: 2023-01-01 | End: 2023-01-01

## 2023-01-01 RX ORDER — OMEPRAZOLE 20 MG/1
20 CAPSULE, DELAYED RELEASE ORAL DAILY
Qty: 90 CAPSULE | Refills: 1 | Status: SHIPPED | OUTPATIENT
Start: 2023-01-01 | End: 2023-01-01 | Stop reason: SDUPTHER

## 2023-01-01 RX ORDER — TIZANIDINE 4 MG/1
TABLET ORAL
Qty: 56 TABLET | Refills: 1 | Status: SHIPPED | OUTPATIENT
Start: 2023-01-01

## 2023-01-01 RX ORDER — NALOXONE HYDROCHLORIDE 4 MG/.1ML
SPRAY NASAL
COMMUNITY
Start: 2023-01-01

## 2023-01-01 RX ORDER — GEMFIBROZIL 600 MG/1
600 TABLET, FILM COATED ORAL 2 TIMES DAILY
Qty: 180 TABLET | Refills: 1 | Status: SHIPPED | OUTPATIENT
Start: 2023-01-01 | End: 2023-01-01 | Stop reason: SDUPTHER

## 2023-01-01 RX ORDER — GEMFIBROZIL 600 MG/1
600 TABLET, FILM COATED ORAL 2 TIMES DAILY
Qty: 180 TABLET | Refills: 1 | Status: SHIPPED | OUTPATIENT
Start: 2023-01-01

## 2023-01-01 RX ORDER — EMPAGLIFLOZIN, METFORMIN HYDROCHLORIDE 12.5; 1 MG/1; MG/1
2 TABLET, EXTENDED RELEASE ORAL EVERY MORNING
Qty: 180 TABLET | Refills: 1 | Status: SHIPPED | OUTPATIENT
Start: 2023-01-01

## 2023-01-01 RX ORDER — MEMANTINE HYDROCHLORIDE 10 MG/1
10 TABLET ORAL 2 TIMES DAILY
Qty: 180 TABLET | Refills: 1 | Status: SHIPPED | OUTPATIENT
Start: 2023-01-01 | End: 2023-01-01 | Stop reason: SDUPTHER

## 2023-01-01 RX ORDER — AMOXICILLIN 500 MG/1
500 CAPSULE ORAL 3 TIMES DAILY
Qty: 30 CAPSULE | Refills: 0 | Status: SHIPPED | OUTPATIENT
Start: 2023-01-01 | End: 2023-01-01 | Stop reason: ALTCHOICE

## 2023-01-01 RX ORDER — NITROGLYCERIN 0.4 MG/1
TABLET SUBLINGUAL
Qty: 30 TABLET | Refills: 5 | Status: SHIPPED | OUTPATIENT
Start: 2023-01-01

## 2023-01-01 RX ORDER — HYDROCHLOROTHIAZIDE 12.5 MG/1
12.5 CAPSULE ORAL DAILY
Qty: 90 CAPSULE | Refills: 1 | Status: SHIPPED | OUTPATIENT
Start: 2023-01-01 | End: 2023-01-01 | Stop reason: SDUPTHER

## 2023-01-01 RX ORDER — HYDROCODONE BITARTRATE AND ACETAMINOPHEN 10; 325 MG/1; MG/1
1 TABLET ORAL EVERY 8 HOURS PRN
Qty: 90 TABLET | Refills: 0 | Status: SHIPPED | OUTPATIENT
Start: 2024-02-09 | End: 2024-03-10

## 2023-01-01 RX ORDER — ORAL SEMAGLUTIDE 14 MG/1
14 TABLET ORAL DAILY
Qty: 90 TABLET | Refills: 1 | Status: SHIPPED | OUTPATIENT
Start: 2023-01-01

## 2023-01-01 RX ORDER — SIMVASTATIN 40 MG/1
40 TABLET, FILM COATED ORAL NIGHTLY
Qty: 90 TABLET | Refills: 1 | Status: SHIPPED | OUTPATIENT
Start: 2023-01-01 | End: 2023-01-01 | Stop reason: SDUPTHER

## 2023-01-01 RX ORDER — FLUTICASONE PROPIONATE 50 MCG
1 SPRAY, SUSPENSION (ML) NASAL DAILY
Qty: 15.8 ML | Refills: 0 | Status: SHIPPED | OUTPATIENT
Start: 2023-01-01

## 2023-01-01 RX ORDER — PREDNISOLONE ACETATE 10 MG/ML
SUSPENSION/ DROPS OPHTHALMIC
COMMUNITY
Start: 2023-01-01

## 2023-01-01 RX ORDER — GEMFIBROZIL 600 MG/1
TABLET, FILM COATED ORAL
Qty: 180 TABLET | Refills: 1 | Status: SHIPPED | OUTPATIENT
Start: 2023-01-01 | End: 2023-01-01 | Stop reason: SDUPTHER

## 2023-01-01 RX ORDER — HYDROCODONE BITARTRATE AND ACETAMINOPHEN 10; 325 MG/1; MG/1
1 TABLET ORAL EVERY 8 HOURS PRN
Qty: 90 TABLET | Refills: 0 | Status: CANCELLED | OUTPATIENT
Start: 2023-01-01 | End: 2023-01-01

## 2023-01-01 RX ORDER — LISINOPRIL 5 MG/1
5 TABLET ORAL DAILY
Qty: 90 TABLET | Refills: 1 | Status: SHIPPED | OUTPATIENT
Start: 2023-01-01

## 2023-01-01 RX ADMIN — DALBAVANCIN 1500 MG: 500 INJECTION, POWDER, FOR SOLUTION INTRAVENOUS at 01:01

## 2023-01-01 RX ADMIN — CEFTRIAXONE 1 G: 1 INJECTION, POWDER, FOR SOLUTION INTRAMUSCULAR; INTRAVENOUS at 12:02

## 2023-01-01 RX ADMIN — CEFTRIAXONE 1 G: 1 INJECTION, POWDER, FOR SOLUTION INTRAMUSCULAR; INTRAVENOUS at 04:08

## 2023-01-01 RX ADMIN — DALBAVANCIN 1500 MG: 500 INJECTION, POWDER, FOR SOLUTION INTRAVENOUS at 02:02

## 2023-01-05 NOTE — PROGRESS NOTES
Subjective:       Patient ID: René Moscoso is a 72 y.o. male.    Chief Complaint: No chief complaint on file.    Location: #12- L metatarsal head; 313- R lateral posterior foot  Duration: onset #12- 8/26/22; #13- 10/31/22  Timing: no pain reported  Context: diabetic  Modifying Factors: edema  Associated Signs and Symptoms: erythema, callus, edema  71 y/o WM seen today for wounds to bilateral lower extremities. He was started on Doxycycline and a C&S was collected at last visit. Culture results reviewed and indicate infection with MRSA and Enterococcus. Will add Ampicillin orally and he is to continue the Doxy for additional 3 days  Review of Systems   All other systems reviewed and are negative.      Objective:   Physical Exam  Constitutional  Respiration within normal limits. BMI within normal limits. Vital signs reviewed and noted. Blood pressure normal. Pulse rate and rhythm regular. Supine blood pressure normal. Afebrile. Height within normal limits. Weight WNL. Well developed, well nourished, and in no acute distress. Alert and oriented x3.    Eyes:  Conjunctiva without icterus. Pupils are equal, round, and reactive to light. Symmetric gaze.    Ears, Nose, Mouth, and Throat:  Symmetric hearing.    Respiratory:  Even respirations without use of accessory muscles. No intercoastal retractions noted. Even and non labored respiration. Resonant to percussion. No abnornal tactile fremitus. Clear to auscultation.    Cardiovascular:  See lower extremity assessment when applicable.    Musculoskeletal:  Normal gait.    Integumentary (Hair, Skin)  adipose exposed.    Psychiatric:  Judgement and insight: Normal affect with normal thought pattern. Orientation to time, place and person: Normal affect with normal thought pattern. Recent and remote memory: Normal affect with normal thought pattern. Mood and affect: Normal affect with normal thought pattern.  Assessment:       Problem List Items Addressed This Visit    None       Plan:       Wound Orders:  Wound #12 Left Metatarsal head fifth    Anesthetic  2% Viscous Lidocaine to wound bed prior to procedure. - clinic use only    Hand Hygiene/Smoking Cessation  Wash hands before and after wound care. Call the Wound Center at [phone] if you have signs or symptoms of infection, increased drainage, increasing odor, or unusual redness. After Wound Care hours, please notify your PCP or go to the ER.    Cleanser  Cleanse Wound with Normal Saline  Cleanse wound with non-cytotoxic wound cleanser. - May use Anasept or Vashe to clean with prn odor  Other order: - Wash foot with warm water and mild soap and pat dry with dressing changes    Dressings  Apply dressing. - cover with silvercel, gauze /ABD pad and conform and tape and offloading felt or donut or peg assist darco shoe for offloadng Change qd and prn soiling or dislodgement  Other: - betadine around wounds on macerated skin and air dry    Compression/Edema Control  Elevate leg(s) as much as possible. Avoid standing in one position for more than 10 minutes. Avoid settings with legs down. Do not cross legs when sitting.  Apply Knee-high gradient compression stockings. - spandagrip size f-g    Off-Loading  Keep weight off: - offloading felt/donut pad/darco peg assist shoe    Follow-Up Appointments  Return Appointment 1 week - for wound care    Home Health  Home Health Care: If you have any questions or concerns please contact the wound center. - Please take supplies for wound care .  Other: - patient needs betadine for around wounds  1. Home Health-Skilled Nurse for dressing change______x on clinic wk_______X on Non-Clinic Wk. - Home Health-Skilled Nurse for dressing change__2____x on clinic wk_____3 x on non clinic week    3. If caregiver willing and able to perform-may teach dressing change procedure.  4. May add 2-3 additional skilled nurse visits prn for wound care.    Antibiotic Therapy  Take oral antibiotics as prescribed -  Doxycycline 100mg 1po bid x 10 days called to [facility]-continue    Scribing Attestation  I attest, as the nurse, that I scribed these orders for the physician.  I, as the physician, have reviewed the orders scribed by the center RN's and agree.    Wound #13 Right, Lateral, Posterior Foot    Cleanser  Cleanse Wound with Normal Saline  Cleanse wound with non-cytotoxic wound cleanser. - May use Anasept or Vashe to clean with prn odor  Other order: - Wash foot with warm water and mild soap and pat dry with dressing changes    Dressings  Apply dressing. - cover with silvercel, gauze /ABD pad and conform and tape and offloading felt or donut or peg assist darco shoe for offloadng Change qd and prn soiling or dislodgement  Other: - betadine around wounds on macerated skin and air dry    Off-Loading  Keep weight off: - offloading felt/donut pad/darco peg assist shoe    Scribing Attestation  I attest, as the nurse, that I scribed these orders for the physician.  I, as the physician, have reviewed the orders scribed by the center RN's and agree.    Additional Orders:    Dietary  Supplement with daily multivitamin.  Follow Diabetic diet.  Vitamin C 500 mg. twice a day.    Medications prescribed:  ampicillin - oral 500 mg 1 capsule 4 times per day for 10 days for infection starting 1/5/2023

## 2023-01-12 NOTE — PROGRESS NOTES
Subjective:       Patient ID: René Moscoso is a 72 y.o. male.    Chief Complaint: No chief complaint on file.    ocation: #12- L metatarsal head; 313- R lateral posterior foot  Duration: onset #12- 8/26/22; #13- 10/31/22  Timing: no pain reported  Context: diabetic  Modifying Factors: edema  Associated Signs and Symptoms: erythema, callus, edema  73 y/o WM seen today for wounds to bilateral lower extremities. He has recently completed Doxycycline and is currently taking Ampicillin for infection. Wound to his right foot is improved. However, wound to left MTH macerated, larger, erythemic. Will order labs and X-ray of left foot today.    Review of Systems (ROS)  This information was obtained from the patient    Complaints and Symptoms  Patient complains of:  Cardiovascular (Central/Peripheral): Edema, Lower extremity (leg) swelling  Integumentary (Hair/Skin/Nails): Calluses/Corns (left foot), Open Sore  Musculoskeletal: Assistive Devices (walking cane)  Psychiatric: Depression    Patient denies complaints or symptoms related to:  Allergic/Immunologic: Hay Fever  Cardiovascular (Central/Peripheral): Lower extremity (leg) resting pain  Constitutional Symptoms (General Health): Chills, Fatigue, Fever, Loss of Appetite  Ear/Nose/Mouth/Throat: Current Infection, Hearing Loss / Aid  Endocrine: Cold Intolerance, Heat Intolerance  Eyes: Double Vision / Spots / Flashing Lights, Partial/Complete Blindness, Vision Changes  Gastrointestinal (GI): Nausea / Vomiting / Diarrhea (N/V/D)  Neurological: Abnormal Gait, Headaches, Weakness  Psychiatric: Anxiety  Respiratory        Objective:   Physical Exam  Constitutional  Pulse rate and rhythm regular. Afebrile. Height within normal limits. overweight. Well developed, well nourished, and in no acute distress. Alert and oriented x3.    Eyes:  Conjunctiva without icterus.    Ears, Nose, Mouth, and Throat:  Symmetric hearing.    Respiratory:  Even respirations without use of accessory  muscles. No intercoastal retractions noted. Even and non labored respiration.    Cardiovascular:  See lower extremity assessment when applicable. edema, erythema.    Musculoskeletal:  Normal gait.    Integumentary (Hair, Skin)  adipose exposed.    Psychiatric:  Judgement and insight: Normal affect with normal thought pattern. Orientation to time, place and person: Normal affect with normal thought pattern. Recent and remote memory: Normal affect with normal thought pattern. Mood and affect: Normal affect with normal thought pattern.  Assessment:       Problem List Items Addressed This Visit          ID    MRSA (methicillin resistant staph aureus) culture positive    Relevant Orders    X-Ray Foot Complete 3 view Left    CBC Auto Differential       Endocrine    Type 2 diabetes mellitus with foot ulcer, with long-term current use of insulin    Relevant Orders    X-Ray Foot Complete 3 view Left    Hemoglobin A1C     Other Visit Diagnoses       Cellulitis of left foot    -  Primary    Relevant Orders    X-Ray Foot Complete 3 view Left    Comprehensive Metabolic Panel    CBC Auto Differential    Sedimentation Rate              Plan:       Wound Orders:  Wound #12 Left Metatarsal head fifth    Anesthetic  2% Viscous Lidocaine to wound bed prior to procedure. - clinic use only    Hand Hygiene/Smoking Cessation  Wash hands before and after wound care. Call the Wound Center at [phone] if you have signs or symptoms of infection, increased drainage, increasing odor, or unusual redness. After Wound Care hours, please notify your PCP or go to the ER.    Cleanser  Cleanse Wound with Normal Saline  Cleanse wound with non-cytotoxic wound cleanser. - May use Anasept or Vashe to clean with prn odor  Other order: - Wash foot with warm water and mild soap and pat dry with dressing changes    Dressings  Apply dressing. - Pack undermining area cover with silvercel, gauze /ABD pad and conform and tape and offloading felt or donut or  peg assist darco shoe for offloadng Change qd and prn soiling or dislodgement  Other: - betadine around wounds on macerated skin and air dry    Compression/Edema Control  Elevate leg(s) as much as possible. Avoid standing in one position for more than 10 minutes. Avoid settings with legs down. Do not cross legs when sitting.  Apply Knee-high gradient compression stockings. - spandagrip size f-g    Off-Loading  Keep weight off: - offloading felt/donut pad/darco peg assist shoe    Cleanser  Cleanse Wound with Normal Saline  Cleanse wound with non-cytotoxic wound cleanser. - May use Anasept or Vashe to clean with prn odor  Other order: - Wash foot with warm water and mild soap and pat dry with dressing changes    Dressings  Apply dressing. - cover with silvercel, gauze /ABD pad and conform and tape and offloading felt or donut or peg assist darco shoe for offloadng Change qd and prn soiling or dislodgement  Other: - betadine around wounds on macerated skin and air dry    Off-Loading  Keep weight off: - offloading felt/donut pad/darco peg assist shoe

## 2023-01-19 NOTE — PROGRESS NOTES
Subjective:       Patient ID: René Moscoso is a 72 y.o. male.    Chief Complaint: No chief complaint on file.    HPI  This information was obtained from the patient  Location: #12- L metatarsal head; #13- R lateral posterior foot  Duration: onset #12- 8/26/22; #13- 10/31/22  Timing: no pain reported  Context: diabetic  Modifying Factors: edema  Associated Signs and Symptoms: erythema, callus, edema  71 y/o WM seen today for wounds to bilateral lower extremities. He is currently taking Ampicillin for infection and the erythema to left foot has improved. We are trying to get Dalvance approved for positive culture with several organisms including MRSA. Wound to his right foot nearly resolved.      Review of Systems (ROS)  This information was obtained from the patient    Complaints and Symptoms  Patient complains of:  Cardiovascular (Central/Peripheral): Edema, Lower extremity (leg) swelling  Integumentary (Hair/Skin/Nails): Calluses/Corns (left foot), Open Sore  Musculoskeletal: Assistive Devices (walking cane)  Psychiatric: Depression    Patient denies complaints or symptoms related to:  Allergic/Immunologic: Hay Fever  Cardiovascular (Central/Peripheral): Lower extremity (leg) resting pain  Constitutional Symptoms (General Health): Chills, Fatigue, Fever, Loss of Appetite  Ear/Nose/Mouth/Throat: Current Infection, Hearing Loss / Aid  Endocrine: Cold Intolerance, Heat Intolerance  Eyes: Double Vision / Spots / Flashing Lights, Partial/Complete Blindness, Vision Changes  Gastrointestinal (GI): Nausea / Vomiting / Diarrhea (N/V/D)  Musculoskeletal: Muscle Weakness  Neurological: Abnormal Gait, Headaches, Weakness  Psychiatric: Anxiety  Respiratory      Objective:      Physical Exam  Constitutional  Blood pressure normal. Pulse rate and rhythm regular. Afebrile. Height within normal limits. overweight. Well developed, well nourished, and in no acute distress. Alert and oriented x3.    Eyes:  Conjunctiva without  icterus.    Ears, Nose, Mouth, and Throat:  Symmetric hearing.    Respiratory:  Even respirations without use of accessory muscles. No intercoastal retractions noted. Even and non labored respiration.    Cardiovascular:  See lower extremity assessment when applicable. edema .    Musculoskeletal:  Normal gait.    Integumentary (Hair, Skin)  adipose exposed.    Psychiatric:  Judgement and insight: Normal affect with normal thought pattern. Orientation to time, place and person: Normal affect with normal thought pattern. Recent and remote memory: Normal affect with normal thought pattern. Mood and affect: Normal affect with normal thought pattern.    Assessment:       Problem List Items Addressed This Visit          ID    MRSA (methicillin resistant staph aureus) culture positive       Endocrine    Type 2 diabetes mellitus with left diabetic foot ulcer - Primary     Other Visit Diagnoses       Idiopathic chronic venous hypertension of left lower extremity with ulcer and inflammation        Non-pressure chronic ulcer of other part of right lower leg limited to breakdown of skin                  Plan:       Wound Orders:  Wound #12 Left Metatarsal head fifth      Hand Hygiene/Smoking Cessation  Wash hands before and after wound care. Call the Wound Center at [phone] if you have signs or symptoms of infection, increased drainage, increasing odor, or unusual redness. After Wound Care hours, please notify your PCP or go to the ER.    Cleanser  Cleanse Wound with Normal Saline  Cleanse wound with non-cytotoxic wound cleanser. - May use Anasept or Vashe to clean with prn odor  Other order: - Wash foot with warm water and mild soap and pat dry with dressing changes    Dressings  Apply dressing. - Pack undermining area cover with silvercel, gauze /ABD pad and conform and tape and offloading felt or donut or peg assist darco shoe for offloadng Change qd and prn soiling or dislodgement  Other: - betadine around wounds on  macerated skin and air dry    Compression/Edema Control  Elevate leg(s) as much as possible. Avoid standing in one position for more than 10 minutes. Avoid settings with legs down. Do not cross legs when sitting.  Apply Knee-high gradient compression stockings. - spandagrip size f-g    Off-Loading  Keep weight off: - offloading felt/donut pad/darco peg assist shoe    Follow-Up Appointments  Return Appointment 1 week - for wound care    Home Health  Home Health Care: If you have any questions or concerns please contact the wound center. - Dalvance IV has been orderd to be infused at hospital  Other: - PLEASE visit patient daily this week due to infection  1. Home Health-Skilled Nurse for dressing change______x on clinic wk_______X on Non-Clinic Wk. - Home Health-Skilled Nurse for dressing change_6___x on clinic wk_____7 x on non clinic week  x 1 week then 4 x week for 1 week then 3 x week for 1 week    3. If caregiver willing and able to perform-may teach dressing change procedure.  4. May add 2-3 additional skilled nurse visits prn for wound care.  6. If dressings not in stock - May use product equivalent until obtained.    Antibiotic Therapy  Take oral antibiotics as prescribed - Ampicillin complete  Patient to start Intravenous Antibiotic Therapy - Dalvance 1500mg infused over 30 minutes x 2 doses.  CMP, CBC, ESR prior to dose and call to provider-  to obtain today      Wound #13 Right, Lateral, Posterior Foot    Cleanser  Cleanse Wound with Normal Saline  Cleanse wound with non-cytotoxic wound cleanser. - May use Anasept or Vashe to clean with prn odor  Other order: - Wash foot with warm water and mild soap and pat dry with dressing changes    Dressings  Apply dressing. - cover with silvercel, gauze /ABD pad and conform and tape and offloading felt or donut or peg assist darco shoe for offloadng Change qd and prn soiling or dislodgement  Other: - betadine around wounds on macerated skin and air  dry    Off-Loading  Keep weight off: - offloading felt/donut pad/darco peg assist shoe      Additional Orders:    Dietary  Supplement with daily multivitamin.  Follow Diabetic diet.  Vitamin C 500 mg. twice a day.

## 2023-01-25 NOTE — PROGRESS NOTES
1230 Pt in room 2.  Lab staff notified of orders.    1238 Blood drawn per lab staff.     1330 IV infusion started as per order.  Pt tolerated well.    1400 No adverse reaction noted.  IV infusion complete.      1408  D/C home.

## 2023-01-26 NOTE — PROGRESS NOTES
Subjective:       Patient ID: René Moscoso is a 72 y.o. male.    Chief Complaint: Wound Care    HPI  This information was obtained from the patient  Location: #12- L metatarsal head; #13- R lateral posterior foot  Duration: onset #12- 8/26/22; #13- 10/31/22  Timing: no pain reported  Context: diabetic  Modifying Factors: edema  Associated Signs and Symptoms: erythema, callus, edema  Pt returns for wounds to bilateral lower extremities. He received one dose of Dalvance yesterday and is scheduled for second dose next week. Home health only coming once weekly    Review of Systems (ROS)  This information was obtained from the patient    Complaints and Symptoms  Patient complains of:  Cardiovascular (Central/Peripheral): Edema, Lower extremity (leg) swelling  Integumentary (Hair/Skin/Nails): Calluses/Corns (left foot), Open Sore  Musculoskeletal: Assistive Devices (walking cane)  Psychiatric: Depression    Patient denies complaints or symptoms related to:  Allergic/Immunologic: Hay Fever  Cardiovascular (Central/Peripheral): Lower extremity (leg) resting pain  Constitutional Symptoms (General Health): Chills, Fatigue, Fever, Loss of Appetite  Ear/Nose/Mouth/Throat: Current Infection, Hearing Loss / Aid  Endocrine: Cold Intolerance, Heat Intolerance  Eyes: Double Vision / Spots / Flashing Lights, Partial/Complete Blindness, Vision Changes  Gastrointestinal (GI): Nausea / Vomiting / Diarrhea (N/V/D)  Musculoskeletal: Muscle Weakness  Neurological: Abnormal Gait, Headaches, Weakness  Psychiatric: Anxiety  Respiratory      Objective:      Physical Exam  Constitutional  elevated blood pressure. Pulse rate and rhythm regular. Afebrile. Height within normal limits. Weight WNL. Well developed, well nourished, and in no acute distress. Alert and oriented x3.    Eyes:  Conjunctiva without icterus.    Ears, Nose, Mouth, and Throat:  Symmetric hearing.    Respiratory:  Even respirations without use of accessory muscles. No  intercoastal retractions noted. Even and non labored respiration.    Cardiovascular:  See lower extremity assessment when applicable. edema .    Musculoskeletal:  Normal gait.    Integumentary (Hair, Skin)  adipose exposed.    Psychiatric:  Judgement and insight: Normal affect with normal thought pattern. Orientation to time, place and person: Normal affect with normal thought pattern. Recent and remote memory: Normal affect with normal thought pattern. Mood and affect: Normal affect with normal thought pattern.  Assessment:       Problem List Items Addressed This Visit    None  Visit Diagnoses       Diabetic ulcer of left foot associated with diabetes mellitus of other type, with fat layer exposed, unspecified part of foot [E13.621, L97.522 (ICD-10-CM)]    -  Primary    Diabetic ulcer of right heel associated with diabetes mellitus of other type, with fat layer exposed [E13.621, L97.412 (ICD-10-CM)]                  Plan:       Plan    Wound Orders:  Wound #12 Left Metatarsal head fifth    Anesthetic  2% Viscous Lidocaine to wound bed prior to procedure. - clinic use only    Hand Hygiene/Smoking Cessation  Wash hands before and after wound care. Call the Wound Center at [phone] if you have signs or symptoms of infection, increased drainage, increasing odor, or unusual redness. After Wound Care hours, please notify your PCP or go to the ER.    Cleanser  Cleanse Wound with Normal Saline  Cleanse wound with non-cytotoxic wound cleanser. - May use Anasept or Vashe to clean with prn odor  Other order: - Wash foot with warm water and mild soap and pat dry with dressing changes    Dressings  Apply dressing. - Pack undermining area cover with silvercel, gauze /ABD pad and conform and tape and offloading felt or donut or peg assist darco shoe for offloadng Change qd and prn soiling or dislodgement  Other: - betadine around wounds on macerated skin and air dry    Compression/Edema Control  Elevate leg(s) as much as  possible. Avoid standing in one position for more than 10 minutes. Avoid settings with legs down. Do not cross legs when sitting.  Apply Knee-high gradient compression stockings. - spandagrip size f-g    Off-Loading  Keep weight off: - offloading felt/donut pad/darco peg assist shoe

## 2023-02-01 NOTE — PROGRESS NOTES
1400 Patient in room 1.  Iv started per protocol.  Patient tolerated well.    1417 Dalvance 1500mg Infusion started per protocol. Will monitor for adverse or allergic s/s.    1447  Infusion complete. No adverse or allergic reaction noted.   1455 Patient discharged to home ambulatory with no adverse reaction noted.

## 2023-02-02 PROBLEM — J01.01 ACUTE RECURRENT MAXILLARY SINUSITIS: Status: ACTIVE | Noted: 2023-01-01

## 2023-02-02 NOTE — PROGRESS NOTES
Olga Monterroso NP   [facility]  [address]  [place]     PATIENT NAME: René Moscoso  : 1950  DATE: 23  MRN: [id number]      Billing Provider: Olga Monterroso NP  Level of Service: CT OFFICE/OUTPT VISIT, EST, LEVL IV, 30-39 MIN  Patient PCP Information       Provider PCP Type    Olga Monterroso NP General            Reason for Visit / Chief Complaint: Sinus Problem (C/o symptoms x 2 wks), Cough (C/o wheezing), and Nasal Congestion       Update PCP  Update Chief Complaint         History of Present Illness / Problem Focused Workflow     René Moscoso presents to the clinic c/o sinus infection, cough, wheezing and nasal congestion 2 weeks, pt has been seeing dr broussard      Review of Systems     Review of Systems   Constitutional:  Negative for chills, fatigue and fever.   HENT:  Positive for nasal congestion and sinus pressure/congestion. Negative for ear pain, facial swelling, hearing loss, mouth dryness, mouth sores, postnasal drip, rhinorrhea and goiter.    Eyes:  Negative for discharge and itching.   Respiratory:  Negative for cough, shortness of breath and wheezing.    Cardiovascular:  Negative for chest pain and leg swelling.   Gastrointestinal:  Negative for abdominal pain, change in bowel habit and change in bowel habit.   Genitourinary:  Negative for difficulty urinating, dysuria, enuresis, frequency, hematuria and urgency.   Neurological:  Negative for dizziness, vertigo, syncope, weakness and headaches.   Psychiatric/Behavioral:  Negative for decreased concentration.    All other systems reviewed and are negative.     Medical / Social / Family History     Past Medical History:   Diagnosis Date    Arthritis     Chronic pain 2021    COPD (chronic obstructive pulmonary disease)     Diabetes type 2, controlled     Hyperlipidemia     Neuropathy        Past Surgical History:   Procedure Laterality Date    CARDIAC CATHETERIZATION      ESOPHAGOGASTRODUODENOSCOPY  10/22/2014    FLEXIBLE  SIGMOIDOSCOPY  10/23/2014    INJECTION OF FACET JOINT Bilateral 07/01/2014    Bilateral L3-S1 Facet Injection - 7/1/14 and 3/18/14    SINUS SURGERY         Social History    reports that he quit smoking about 12 years ago. His smoking use included cigarettes. He started smoking about 32 years ago. He has a 40.00 pack-year smoking history. His smokeless tobacco use includes snuff. He reports that he does not currently use alcohol. He reports current drug use. Drug: Hydrocodone.    Family History  's family history includes Arthritis in his mother; COPD in his brother; Cancer in his father, mother, and sister; Diabetes in his mother; Heart disease in his mother; Hypertension in his mother.    Medications and Allergies     Medications  Outpatient Medications Marked as Taking for the 2/2/23 encounter (Office Visit) with Olga Monterroso NP   Medication Sig Dispense Refill    aspirin 81 MG Chew Take 81 mg by mouth once daily.      blood-glucose meter Misc USE TO CHECK BLOOD SUGAR      gabapentin (NEURONTIN) 600 MG tablet TAKE ONE TABLET BY MOUTH TWICE DAILY 180 tablet 1    gemfibroziL (LOPID) 600 MG tablet TAKE ONE TABLET BY MOUTH TWICE DAILY 180 tablet 1    hydroCHLOROthiazide (MICROZIDE) 12.5 mg capsule TAKE ONE CAPSULE BY MOUTH EVERY DAY 90 capsule 1    HYDROcodone-acetaminophen (NORCO)  mg per tablet Take 1 tablet by mouth every 8 (eight) hours as needed for Pain (pain). 90 tablet 0    ipratropium (ATROVENT) 42 mcg (0.06 %) nasal spray 2 sprays by Nasal route 3 (three) times daily. 15 mL 2    ipratropium/albuterol sulfate (COMBIVENT INHL) Inhale into the lungs.      isosorbide mononitrate (IMDUR) 30 MG 24 hr tablet TAKE ONE TABLET BY MOUTH ONCE DAILY 90 tablet 1    LEVEMIR FLEXTOUCH U-100 INSULN 100 unit/mL (3 mL) InPn pen INJECT 92 units SUBCUTANEOUSLY AT BEDTIME 30 mL 5    linaGLIPtin (TRADJENTA) 5 mg Tab tablet Take 5 mg by mouth once daily.      linaGLIPtin (TRADJENTA) 5 mg Tab tablet Take 5 mg by  "mouth once daily.      lisinopriL (PRINIVIL,ZESTRIL) 5 MG tablet TAKE ONE TABLET BY MOUTH DAILY 90 tablet 1    memantine (NAMENDA) 10 MG Tab TAKE ONE TABLET BY MOUTH TWICE DAILY 180 tablet 1    NOVOLOG MIX 70-30FLEXPEN U-100 100 unit/mL (70-30) InPn pen INJECT 94 UNITS SUBCUTANEOUSLY EVERY MORNING AND 90 UNITS EVERY EVENING 90 mL 5    omeprazole (PRILOSEC) 20 MG capsule TAKE ONE CAPSULE BY MOUTH EVERY DAY 28 capsule 1    RYBELSUS 14 mg tablet TAKE ONE TABLET BY MOUTH ONCE DAILY 90 tablet 1    simvastatin (ZOCOR) 40 MG tablet TAKE ONE TABLET BY MOUTH AT BEDTIME 90 tablet 1    SYNJARDY XR 12.5-1,000 mg TBph TAKE TWO TABLETS BY MOUTH EVERY MORNING 180 tablet 1    tiZANidine (ZANAFLEX) 4 MG tablet TAKE 1 OR 2 TABLETS BY MOUTH AT BEDTIME AS NEEDED 56 tablet 1    TRUE METRIX GLUCOSE TEST STRIP Strp USE TO USE TO CHECK BLOOD SUGAR BLOOD GLUCOSE TWICE DAILY 50 each 11    VENTOLIN HFA 90 mcg/actuation inhaler INHALE TWO PUFFS BY MOUTH TWICE DAILY AS NEEDED 18 g 5     Current Facility-Administered Medications for the 2/2/23 encounter (Office Visit) with Olga Monterroso NP   Medication Dose Route Frequency Provider Last Rate Last Admin    [COMPLETED] cefTRIAXone injection 1 g  1 g Intramuscular 1 time in Clinic/HOD Olga Monterroso NP   1 g at 02/02/23 1201       Allergies  Review of patient's allergies indicates:  No Known Allergies    Physical Examination     Vitals:    02/02/23 1108   BP: 138/80   BP Location: Left arm   Patient Position: Sitting   BP Method: Medium (Manual)   Pulse: 90   Resp: 20   Temp: 98.2 °F (36.8 °C)   TempSrc: Oral   SpO2: 96%   Weight: (!) 136.2 kg (300 lb 4 oz)   Height: 6' 3" (1.905 m)      Physical Exam  Vitals and nursing note reviewed.   Constitutional:       Appearance: Normal appearance.   HENT:      Head: Normocephalic.      Right Ear: Tympanic membrane, ear canal and external ear normal.      Left Ear: Tympanic membrane, ear canal and external ear normal.      Nose: Congestion present.      " Comments: Mod amt thick yellow nasal secretion, pressure over max region     Mouth/Throat:      Mouth: Mucous membranes are moist.   Eyes:      Extraocular Movements: Extraocular movements intact.      Conjunctiva/sclera: Conjunctivae normal.      Pupils: Pupils are equal, round, and reactive to light.   Neck:      Comments: Leland ant cervical nodes  Cardiovascular:      Rate and Rhythm: Normal rate and regular rhythm.      Pulses: Normal pulses.      Heart sounds: Normal heart sounds.   Pulmonary:      Effort: Pulmonary effort is normal.      Breath sounds: Normal breath sounds.   Musculoskeletal:         General: Normal range of motion.      Cervical back: Normal range of motion.   Lymphadenopathy:      Cervical: Cervical adenopathy present.   Skin:     General: Skin is warm and dry.   Neurological:      General: No focal deficit present.      Mental Status: He is alert and oriented to person, place, and time.   Psychiatric:         Behavior: Behavior normal.        Assessment and Plan (including Health Maintenance)      Problem List  Smart Sets  Document Outside HM   :    Plan: meds as ordered, return to clinic as needed. Increase fluids    Acute recurrent maxillary sinusitis  -     X-Ray Chest PA And Lateral; Future; Expected date: 02/02/2023  -     cefTRIAXone injection 1 g  -     cephALEXin (KEFLEX) 500 MG capsule; Take 1 capsule (500 mg total) by mouth every 8 (eight) hours.  Dispense: 30 capsule; Refill: 0  -     chlorpheniramine-phenylephrine (ED A-HIST) 4-10 mg per tablet; Take 1 tablet by mouth every 6 (six) hours as needed for Congestion.  Dispense: 30 tablet; Refill: 0  -     fluticasone propionate (FLONASE) 50 mcg/actuation nasal spray; 1 spray (50 mcg total) by Each Nostril route once daily.  Dispense: 15.8 mL; Refill: 0    Cough, unspecified type  -     X-Ray Chest PA And Lateral; Future; Expected date: 02/02/2023            Health Maintenance Due   Topic Date Due    Hepatitis C Screening  Never done     Sign Pain Contract  Never done    LDCT Lung Screen  Never done    Shingles Vaccine (1 of 2) Never done    Colorectal Cancer Screening  10/03/2017    Abdominal Aortic Aneurysm Screening  Never done    Pneumococcal Vaccines (Age 65+) (3 - PPSV23 if available, else PCV20) 10/20/2020    COVID-19 Vaccine (3 - Booster) 04/07/2021    Foot Exam  08/12/2022    Influenza Vaccine (1) 09/01/2022    Eye Exam  12/29/2022    Lipid Panel  01/20/2023    Diabetes Urine Screening  01/20/2023       Problem List Items Addressed This Visit          ENT    Acute recurrent maxillary sinusitis - Primary    Relevant Medications    cefTRIAXone injection 1 g (Completed)    cephALEXin (KEFLEX) 500 MG capsule    chlorpheniramine-phenylephrine (ED A-HIST) 4-10 mg per tablet    fluticasone propionate (FLONASE) 50 mcg/actuation nasal spray    Other Relevant Orders    X-Ray Chest PA And Lateral (Completed)       Pulmonary    Cough    Relevant Orders    X-Ray Chest PA And Lateral (Completed)         Health Maintenance Topics with due status: Not Due       Topic Last Completion Date    TETANUS VACCINE 05/27/2021    Hemoglobin A1c 02/01/2023       Procedures     Future Appointments   Date Time Provider Department Center   2/13[facility]2023  [facility]   3/8/2023  8:15 AM Olga Monterroso NP [facility]        Follow up if symptoms worsen or fail to improve.       Signature:  Olga Monterroso NP    Date of encounter: 2/2/23

## 2023-02-02 NOTE — PROGRESS NOTES
Subjective:       Patient ID: René Moscoso is a 72 y.o. male.    Chief Complaint: Wound Care    HPI  This information was obtained from the patient  Location: #12- L metatarsal head; #13- R lateral posterior foot  Duration: onset #12- 8/26/22; #13- 10/31/22  Timing: no pain reported  Context: diabetic  Modifying Factors: edema  Associated Signs and Symptoms: erythema, callus, edema  Pt returns for wounds to bilateral lower extremities. He received his 2nd dose of Dalvance yesterday. Wounds measure smaller. Hypergranulation remains to left foot wound, large amount of callus noted to periwound    Review of Systems (ROS)  This information was obtained from the patient    Complaints and Symptoms  Patient complains of:  Cardiovascular (Central/Peripheral): Edema, Lower extremity (leg) swelling  Ear/Nose/Mouth/Throat: Nasal congestion  Integumentary (Hair/Skin/Nails): Calluses/Corns (left foot), Open Sore  Musculoskeletal: Assistive Devices (walking cane)  Psychiatric: Depression  Respiratory: Cough    Patient denies complaints or symptoms related to:  Allergic/Immunologic: Hay Fever  Cardiovascular (Central/Peripheral): Lower extremity (leg) resting pain  Constitutional Symptoms (General Health): Chills, Fatigue, Fever, Loss of Appetite  Ear/Nose/Mouth/Throat: Hearing Loss / Aid  Endocrine: Cold Intolerance, Heat Intolerance  Eyes: Double Vision / Spots / Flashing Lights, Partial/Complete Blindness, Vision Changes  Gastrointestinal (GI): Nausea / Vomiting / Diarrhea (N/V/D)  Musculoskeletal: Muscle Weakness  Neurological: Abnormal Gait, Headaches, Weakness  Psychiatric: Anxiety  Respiratory: Oxygen Use, Shortness of Breath, Wheezing        Objective:   Physical Exam  Constitutional  Blood pressure normal. Pulse rate and rhythm regular. Afebrile. Height within normal limits. Weight WNL. Well developed, well nourished, and in no acute distress. Alert and oriented x3.    Eyes:  Conjunctiva without icterus.    Ears, Nose,  Mouth, and Throat:  Symmetric hearing.    Respiratory:  Even respirations without use of accessory muscles. No intercoastal retractions noted. Even and non labored respiration.    Cardiovascular:  See lower extremity assessment when applicable. edema .    Musculoskeletal:  Normal gait.    Integumentary (Hair, Skin)  adipose exposed.    Psychiatric:  Judgement and insight: Normal affect with normal thought pattern. Orientation to time, place and person: Normal affect with normal thought pattern. Recent and remote memory: Normal affect with normal thought pattern. Mood and affect: Normal affect with normal thought pattern.    Assessment:       Problem List Items Addressed This Visit          Endocrine    Type 2 diabetes mellitus with left diabetic foot ulcer - Primary    Type 2 diabetes mellitus with foot ulcer, with long-term current use of insulin         Plan:   Hand Hygiene/Smoking Cessation  Wash hands before and after wound care. Call the Wound Center at [phone] if you have signs or symptoms of infection, increased drainage, increasing odor, or unusual redness. After Wound Care hours, please notify your PCP or go to the ER.    Cleanser  Cleanse Wound with Normal Saline  Cleanse wound with non-cytotoxic wound cleanser. - May use Anasept or Vashe to clean with prn odor  Other order: - Wash foot with warm water and mild soap and pat dry with dressing changes    Dressings  Apply dressing. - Pack undermining area cover with silvercel, gauze /ABD pad and conform and tape and offloading felt or donut or peg assist darco shoe for offloadng Change qd and prn soiling or dislodgement  Other: - betadine around wounds on macerated skin and air dry

## 2023-02-09 NOTE — PROGRESS NOTES
Subjective:         Patient ID: René Moscoso is a 72 y.o. male.    Chief Complaint: No chief complaint on file.      Pain  This is a chronic problem. The current episode started more than 1 year ago. The problem occurs daily. The problem has been waxing and waning. Associated symptoms include arthralgias. Pertinent negatives include no anorexia, chest pain, chills, coughing, diaphoresis, fatigue, fever, neck pain, rash, sore throat, vertigo or vomiting.   Review of Systems   Constitutional:  Negative for activity change, appetite change, chills, diaphoresis, fatigue and fever.   HENT:  Negative for drooling, ear discharge, ear pain, facial swelling, nosebleeds, sore throat, trouble swallowing, voice change and goiter.    Eyes:  Negative for photophobia, pain, discharge, redness and visual disturbance.   Respiratory:  Negative for apnea, cough, choking, chest tightness, shortness of breath, wheezing and stridor.    Cardiovascular:  Negative for chest pain, palpitations and leg swelling.   Gastrointestinal:  Negative for abdominal distention, anorexia, diarrhea, rectal pain, vomiting and fecal incontinence.   Endocrine: Negative for cold intolerance, heat intolerance, polydipsia, polyphagia and polyuria.   Genitourinary:  Negative for flank pain and frequency.   Musculoskeletal:  Positive for arthralgias and back pain. Negative for neck pain and neck stiffness.   Integumentary:  Negative for color change, pallor and rash.   Neurological:  Negative for dizziness, vertigo, seizures, syncope, facial asymmetry, speech difficulty, light-headedness, coordination difficulties, memory loss and coordination difficulties.   Hematological:  Negative for adenopathy. Does not bruise/bleed easily.   Psychiatric/Behavioral:  Negative for agitation, behavioral problems, confusion, decreased concentration, dysphoric mood, hallucinations, self-injury and suicidal ideas. The patient is not nervous/anxious and is not hyperactive.           Past Medical History:   Diagnosis Date    Arthritis     Chronic pain 2021    COPD (chronic obstructive pulmonary disease)     Diabetes type 2, controlled     Hyperlipidemia     Neuropathy      Past Surgical History:   Procedure Laterality Date    CARDIAC CATHETERIZATION      ESOPHAGOGASTRODUODENOSCOPY  10/22/2014    FLEXIBLE SIGMOIDOSCOPY  10/23/2014    INJECTION OF FACET JOINT Bilateral 2014    Bilateral L3-S1 Facet Injection - 14 and 3/18/14    SINUS SURGERY       Social History     Socioeconomic History    Marital status: Single   Tobacco Use    Smoking status: Former     Packs/day: 2.00     Years: 20.00     Pack years: 40.00     Types: Cigarettes     Start date:      Quit date: 2011     Years since quittin.0    Smokeless tobacco: Current     Types: Snuff   Substance and Sexual Activity    Alcohol use: Not Currently    Drug use: Yes     Types: Hydrocodone    Sexual activity: Not Currently     Family History   Problem Relation Age of Onset    Diabetes Mother     Heart disease Mother     Hypertension Mother     Arthritis Mother     Cancer Mother     Cancer Father     Cancer Sister     COPD Brother      Review of patient's allergies indicates:  No Known Allergies     Objective:  There were no vitals filed for this visit.        Physical Exam  Vitals and nursing note reviewed. Exam conducted with a chaperone present.   Constitutional:       General: He is awake. He is not in acute distress.     Appearance: Normal appearance. He is not ill-appearing or diaphoretic.   HENT:      Head: Normocephalic and atraumatic.      Nose: Nose normal.      Mouth/Throat:      Mouth: Mucous membranes are moist.      Pharynx: Oropharynx is clear.   Eyes:      Conjunctiva/sclera: Conjunctivae normal.      Pupils: Pupils are equal, round, and reactive to light.   Cardiovascular:      Rate and Rhythm: Normal rate.   Pulmonary:      Effort: Pulmonary effort is normal. No respiratory distress.   Abdominal:       Palpations: Abdomen is soft.      Tenderness: There is no guarding.   Musculoskeletal:         General: Normal range of motion.      Cervical back: Normal range of motion and neck supple. No rigidity.      Thoracic back: Tenderness present.      Lumbar back: Tenderness present.   Skin:     General: Skin is warm and dry.      Coloration: Skin is not jaundiced or pale.   Neurological:      General: No focal deficit present.      Mental Status: He is alert and oriented to person, place, and time. Mental status is at baseline.      Cranial Nerves: No cranial nerve deficit (II-XII).   Psychiatric:         Mood and Affect: Mood normal.         Behavior: Behavior normal. Behavior is cooperative.         Thought Content: Thought content normal.         X-Ray Chest PA And Lateral  Narrative: EXAMINATION:  XR CHEST PA AND LATERAL    CLINICAL HISTORY:  Cough, unspecified    COMPARISON:  12/02/2013, 10/27/2016, 11/21/2017, and 08/25/2021    FINDINGS:  PA and lateral chest:    There is mild cardiomegaly but no suggestion of failure.  No acute infiltrates or pleural effusions are seen.  Mild degenerative changes are present in the thoracic spine.  Impression: Chronic changes but no evidence of acute disease.    Place of service: [facility]    Electronically signed by: Carrol Carrasco  Date:    02/02/2023  Time:    12:01       Lab Requisition on 02/01/2023   Component Date Value Ref Range Status    Hemoglobin A1C 02/01/2023 8.2 (H)  4.5 - 6.6 % Final    Estimated Average Glucose 02/01/2023 187  mg/dL Final   Infusion on 01/25/2023   Component Date Value Ref Range Status    Sodium 01/25/2023 140  136 - 145 mmol/L Final    Potassium 01/25/2023 3.9  3.5 - 5.1 mmol/L Final    Chloride 01/25/2023 102  98 - 107 mmol/L Final    CO2 01/25/2023 32  21 - 32 mmol/L Final    Anion Gap 01/25/2023 10  7 - 16 mmol/L Final    Glucose 01/25/2023 148 (H)  74 - 106 mg/dL Final    BUN 01/25/2023 12  7 - 18 mg/dL Final    Creatinine  01/25/2023 1.14  0.70 - 1.30 mg/dL Final    BUN/Creatinine Ratio 01/25/2023 11  6 - 20 Final    Calcium 01/25/2023 8.8  8.5 - 10.1 mg/dL Final    Total Protein 01/25/2023 7.2  6.4 - 8.2 g/dL Final    Albumin 01/25/2023 3.0 (L)  3.5 - 5.0 g/dL Final    Globulin 01/25/2023 4.2 (H)  2.0 - 4.0 g/dL Final    A/G Ratio 01/25/2023 0.7   Final    Bilirubin, Total 01/25/2023 0.4  >0.0 - 1.2 mg/dL Final    Alk Phos 01/25/2023 108  45 - 115 U/L Final    ALT 01/25/2023 11 (L)  16 - 61 U/L Final    AST 01/25/2023 11 (L)  15 - 37 U/L Final    eGFR 01/25/2023 69  >=60 mL/min/1.73m² Final    ESR Westergren 01/25/2023 2  0 - 20 mm/Hr Final    WBC 01/25/2023 7.04  4.50 - 11.00 K/uL Final    RBC 01/25/2023 5.20  4.60 - 6.20 M/uL Final    Hemoglobin 01/25/2023 16.7  13.5 - 18.0 g/dL Final    Hematocrit 01/25/2023 52.8  40.0 - 54.0 % Final    MCV 01/25/2023 101.5 (H)  80.0 - 96.0 fL Final    MCH 01/25/2023 32.1 (H)  27.0 - 31.0 pg Final    MCHC 01/25/2023 31.6 (L)  32.0 - 36.0 g/dL Final    RDW 01/25/2023 14.3  11.5 - 14.5 % Final    Platelet Count 01/25/2023 209  150 - 400 K/uL Final    MPV 01/25/2023 9.9  9.4 - 12.4 fL Final    Neutrophils % 01/25/2023 61.1  53.0 - 65.0 % Final    Lymphocytes % 01/25/2023 27.4  27.0 - 41.0 % Final    Neutrophils, Abs 01/25/2023 4.30  1.80 - 7.70 K/uL Final    Lymphocytes, Absolute 01/25/2023 1.93  1.00 - 4.80 K/uL Final    Diff Type 01/25/2023 Auto   Final    Monocytes % 01/25/2023 9.4 (H)  2.0 - 6.0 % Final    Eosinophils % 01/25/2023 1.8  1.0 - 4.0 % Final    Basophils % 01/25/2023 0.3  0.0 - 1.0 % Final    Monocytes, Absolute 01/25/2023 0.66  0.00 - 0.80 K/uL Final    Eosinophils, Absolute 01/25/2023 0.13  0.00 - 0.50 K/uL Final    Basophils, Absolute 01/25/2023 0.02  0.00 - 0.20 K/uL Final   Lab Requisition on 01/12/2023   Component Date Value Ref Range Status    Sodium 01/12/2023 140  136 - 145 mmol/L Final    Potassium 01/12/2023 4.3  3.5 - 5.1 mmol/L Final    Chloride 01/12/2023 99  98 - 107  mmol/L Final    CO2 01/12/2023 32  21 - 32 mmol/L Final    Anion Gap 01/12/2023 13  7 - 16 mmol/L Final    Glucose 01/12/2023 190 (H)  74 - 106 mg/dL Final    BUN 01/12/2023 12  7 - 18 mg/dL Final    Creatinine 01/12/2023 1.02  0.70 - 1.30 mg/dL Final    BUN/Creatinine Ratio 01/12/2023 12  6 - 20 Final    Calcium 01/12/2023 9.2  8.5 - 10.1 mg/dL Final    Total Protein 01/12/2023 7.4  6.4 - 8.2 g/dL Final    Albumin 01/12/2023 3.2 (L)  3.5 - 5.0 g/dL Final    Globulin 01/12/2023 4.2 (H)  2.0 - 4.0 g/dL Final    A/G Ratio 01/12/2023 0.8   Final    Bilirubin, Total 01/12/2023 0.8  >0.0 - 1.2 mg/dL Final    Alk Phos 01/12/2023 117 (H)  45 - 115 U/L Final    ALT 01/12/2023 16  16 - 61 U/L Final    AST 01/12/2023 13 (L)  15 - 37 U/L Final    eGFR 01/12/2023 79  >=60 mL/min/1.73m² Final    ESR Westergren 01/12/2023 18  0 - 20 mm/Hr Final    WBC 01/12/2023 8.41  4.50 - 11.00 K/uL Final    RBC 01/12/2023 5.21  4.60 - 6.20 M/uL Final    Hemoglobin 01/12/2023 16.7  13.5 - 18.0 g/dL Final    Hematocrit 01/12/2023 51.6  40.0 - 54.0 % Final    MCV 01/12/2023 99.0 (H)  80.0 - 96.0 fL Final    MCH 01/12/2023 32.1 (H)  27.0 - 31.0 pg Final    MCHC 01/12/2023 32.4  32.0 - 36.0 g/dL Final    RDW 01/12/2023 14.1  11.5 - 14.5 % Final    Platelet Count 01/12/2023 199  150 - 400 K/uL Final    MPV 01/12/2023 10.2  9.4 - 12.4 fL Final    Neutrophils % 01/12/2023 64.8  53.0 - 65.0 % Final    Lymphocytes % 01/12/2023 25.8 (L)  27.0 - 41.0 % Final    Neutrophils, Abs 01/12/2023 5.45  1.80 - 7.70 K/uL Final    Lymphocytes, Absolute 01/12/2023 2.17  1.00 - 4.80 K/uL Final    Diff Type 01/12/2023 Auto   Final    Monocytes % 01/12/2023 8.2 (H)  2.0 - 6.0 % Final    Eosinophils % 01/12/2023 1.0  1.0 - 4.0 % Final    Basophils % 01/12/2023 0.2  0.0 - 1.0 % Final    Monocytes, Absolute 01/12/2023 0.69  0.00 - 0.80 K/uL Final    Eosinophils, Absolute 01/12/2023 0.08  0.00 - 0.50 K/uL Final    Basophils, Absolute 01/12/2023 0.02  0.00 - 0.20 K/uL Final    Clinical Support on 12/30/2022   Component Date Value Ref Range Status    Culture, Tissue 12/30/2022 Heavy Growth Methicillin resistant Staphylococcus aureus (A)   Final    Culture, Tissue 12/30/2022 Heavy Growth Enterococcus faecalis (A)   Final   Clinical Support on 11/17/2022   Component Date Value Ref Range Status    Culture, Tissue 11/17/2022 Moderate Growth Staphylococcus aureus (A)   Final   Office Visit on 11/16/2022   Component Date Value Ref Range Status    POC Amphetamines 11/16/2022 Negative  Negative, Inconclusive Final    POC Barbiturates 11/16/2022 Negative  Negative, Inconclusive Final    POC Benzodiazepines 11/16/2022 Negative  Negative, Inconclusive Final    POC Cocaine 11/16/2022 Negative  Negative, Inconclusive Final    POC THC 11/16/2022 Negative  Negative, Inconclusive Final    POC Methadone 11/16/2022 Negative  Negative, Inconclusive Final    POC Methamphetamine 11/16/2022 Negative  Negative, Inconclusive Final    POC Opiates 11/16/2022 Negative  Negative, Inconclusive Final    POC Oxycodone 11/16/2022 Negative  Negative, Inconclusive Final    POC Phencyclidine 11/16/2022 Negative  Negative, Inconclusive Final    POC Methylenedioxymethamphetamine * 11/16/2022 Negative  Negative, Inconclusive Final    POC Tricyclic Antidepressants 11/16/2022 Negative  Negative, Inconclusive Final    POC Buprenorphine 11/16/2022 Negative   Final     Acceptable 11/16/2022 Yes   Final    POC Temperature (Urine) 11/16/2022 92   Final   Clinical Support on 11/03/2022   Component Date Value Ref Range Status    Culture, Tissue 11/03/2022 Heavy Growth Methicillin resistant Staphylococcus aureus (A)   Final   Clinical Support on 09/01/2022   Component Date Value Ref Range Status    Culture, Tissue 09/01/2022 Heavy Growth Klebsiella pneumoniae (A)   Final    Culture, Tissue 09/01/2022 Heavy Growth Enterococcus faecalis (A)   Final    Culture, Tissue 09/01/2022 Heavy Growth Staphylococcus aureus (A)    Final         No orders of the defined types were placed in this encounter.      Requested Prescriptions      No prescriptions requested or ordered in this encounter       Assessment:     No diagnosis found.       A's of Opioid Risk Assessment  Activity:Patient can perform ADL.   Analgesia:Patients pain is partially controlled by current medication. Patient has tried OTC medications such as Tylenol and Ibuprofen with out relief.   Adverse Effects: Patient denies constipation or sedation.  Aberrant Behavior:  reviewed with no aberrant drug seeking/taking behavior.  Overdose reversal drug naloxone discussed    Drug screen reviewed      Plan:    Narcan February 2023    Pharmacy closed on weekends    Continues following wound management chronic left lower extremity diabetic ulcers     Complaint back pain neck and joint pain    Physical therapy prescription given to the patient    X-ray lumbar spine    Considering lumbar medial branch block    He would like to continue conservative management    Continue home exercise program as directed    Follow-up 3 months sooner if needed     Dr. Asif, October 2022    Bring original prescription medication bottles/container/box with labels to each visit

## 2023-02-09 NOTE — PROGRESS NOTES
Subjective:       Patient ID: René Moscoso is a 72 y.o. male.    Chief Complaint: Wound Care    HPI  This information was obtained from the patient  Location: #12- L metatarsal head; #13- R lateral posterior foot  Duration: onset #12- 8/26/22; #13- 10/31/22  Timing: no pain reported  Context: diabetic  Modifying Factors: edema  Associated Signs and Symptoms: erythema, callus, edema  Pt returns for wounds to bilateral lower extremities. Hypergranulation to left MTH, Wound measures smaller    Review of Systems (ROS)  This information was obtained from the patient    Complaints and Symptoms  Patient complains of:  Cardiovascular (Central/Peripheral): Edema, Lower extremity (leg) swelling  Ear/Nose/Mouth/Throat: Nasal congestion  Integumentary (Hair/Skin/Nails): Calluses/Corns (left foot), Open Sore  Musculoskeletal: Assistive Devices (walking cane)  Psychiatric: Depression  Respiratory: Cough    Patient denies complaints or symptoms related to:  Allergic/Immunologic: Hay Fever  Cardiovascular (Central/Peripheral): Lower extremity (leg) resting pain  Constitutional Symptoms (General Health): Chills, Fatigue, Fever, Loss of Appetite  Ear/Nose/Mouth/Throat: Hearing Loss / Aid  Endocrine: Cold Intolerance, Heat Intolerance  Eyes: Double Vision / Spots / Flashing Lights, Partial/Complete Blindness, Vision Changes  Gastrointestinal (GI): Nausea / Vomiting / Diarrhea (N/V/D)  Musculoskeletal: Muscle Weakness  Neurological: Abnormal Gait, Headaches, Weakness  Psychiatric: Anxiety  Respiratory: Oxygen Use, Shortness of Breath, Wheezing        Objective:      Physical Exam  Constitutional  Blood pressure normal. Pulse rate and rhythm regular. Afebrile. Height within normal limits. overweight. Well developed, well nourished, and in no acute distress. Alert and oriented x3.    Eyes:  Conjunctiva without icterus.    Ears, Nose, Mouth, and Throat:  Symmetric hearing.    Respiratory:  Even respirations without use of accessory  muscles. No intercoastal retractions noted. Even and non labored respiration.    Cardiovascular:  See lower extremity assessment when applicable. edema .    Musculoskeletal:  Normal gait.    Integumentary (Hair, Skin)  See wound description.    Psychiatric:  Judgement and insight: Normal affect with normal thought pattern. Orientation to time, place and person: Normal affect with normal thought pattern. Recent and remote memory: Normal affect with normal thought pattern. Mood and affect: Normal affect with normal thought pattern.  Assessment:       Problem List Items Addressed This Visit          Endocrine    Type 2 diabetes mellitus with left diabetic foot ulcer - Primary         Plan:       Wound Orders:  Wound #12 Left Metatarsal head fifth    Anesthetic  2% Viscous Lidocaine to wound bed prior to procedure. - clinic use only    Hand Hygiene/Smoking Cessation  Wash hands before and after wound care. Call the Wound Center at [phone] if you have signs or symptoms of infection, increased drainage, increasing odor, or unusual redness. After Wound Care hours, please notify your PCP or go to the ER.    Cleanser  Cleanse Wound with Normal Saline  Cleanse wound with non-cytotoxic wound cleanser. - May use Anasept or Vashe to clean with prn odor  Other order: - Wash foot with warm water and mild soap and pat dry with dressing changes    Dressings  Apply dressing. - Pack undermining area cover with silvercel, gauze /ABD pad and conform and tape and offloading felt or donut or peg assist darco shoe for offloadng Change qd and prn soiling or dislodgement  Other: - betadine around wounds on macerated skin and air dry    Compression/Edema Control  Elevate leg(s) as much as possible. Avoid standing in one position for more than 10 minutes. Avoid settings with legs down. Do not cross legs when sitting.  Apply Knee-high gradient compression stockings. - spandagrip size f-g    Off-Loading  Keep weight off: - offloading  felt/donut pad/darco peg assist shoe

## 2023-02-13 NOTE — PROGRESS NOTES
Subjective:         Patient ID: René Moscoso is a 72 y.o. male.    Chief Complaint: Low-back Pain      Pain  This is a chronic problem. The current episode started more than 1 year ago. The problem occurs daily. The problem has been waxing and waning. Associated symptoms include arthralgias. Pertinent negatives include no anorexia, chest pain, chills, coughing, diaphoresis, fatigue, fever, neck pain, rash, sore throat, vertigo or vomiting.   Review of Systems   Constitutional:  Negative for activity change, appetite change, chills, diaphoresis, fatigue and fever.   HENT:  Negative for drooling, ear discharge, ear pain, facial swelling, nosebleeds, sore throat, trouble swallowing, voice change and goiter.    Eyes:  Negative for photophobia, pain, discharge, redness and visual disturbance.   Respiratory:  Negative for apnea, cough, choking, chest tightness, shortness of breath, wheezing and stridor.    Cardiovascular:  Negative for chest pain, palpitations and leg swelling.   Gastrointestinal:  Negative for abdominal distention, anorexia, diarrhea, rectal pain, vomiting and fecal incontinence.   Endocrine: Negative for cold intolerance, heat intolerance, polydipsia, polyphagia and polyuria.   Genitourinary:  Negative for flank pain and frequency.   Musculoskeletal:  Positive for arthralgias and back pain. Negative for neck pain and neck stiffness.   Integumentary:  Negative for color change, pallor and rash.   Neurological:  Negative for dizziness, vertigo, seizures, syncope, facial asymmetry, speech difficulty, light-headedness, coordination difficulties, memory loss and coordination difficulties.   Hematological:  Negative for adenopathy. Does not bruise/bleed easily.   Psychiatric/Behavioral:  Negative for agitation, behavioral problems, confusion, decreased concentration, dysphoric mood, hallucinations, self-injury and suicidal ideas. The patient is not nervous/anxious and is not hyperactive.          Past  "Medical History:   Diagnosis Date    Arthritis     Chronic pain 2021    COPD (chronic obstructive pulmonary disease)     Diabetes type 2, controlled     Hyperlipidemia     Neuropathy      Past Surgical History:   Procedure Laterality Date    CARDIAC CATHETERIZATION      ESOPHAGOGASTRODUODENOSCOPY  10/22/2014    FLEXIBLE SIGMOIDOSCOPY  10/23/2014    INJECTION OF FACET JOINT Bilateral 2014    Bilateral L3-S1 Facet Injection - 14 and 3/18/14    SINUS SURGERY       Social History     Socioeconomic History    Marital status: Single   Tobacco Use    Smoking status: Former     Packs/day: 2.00     Years: 20.00     Pack years: 40.00     Types: Cigarettes     Start date:      Quit date: 2011     Years since quittin.0    Smokeless tobacco: Current     Types: Snuff   Substance and Sexual Activity    Alcohol use: Not Currently    Drug use: Yes     Types: Hydrocodone    Sexual activity: Not Currently     Family History   Problem Relation Age of Onset    Diabetes Mother     Heart disease Mother     Hypertension Mother     Arthritis Mother     Cancer Mother     Cancer Father     Cancer Sister     COPD Brother      Review of patient's allergies indicates:  No Known Allergies     Objective:  Vitals:    23 0811   BP: (!) 140/78   Pulse: 100   Resp: 18   Weight: 133.8 kg (295 lb)   Height: 6' 3" (1.905 m)   PainSc:   4         Physical Exam  Vitals and nursing note reviewed. Exam conducted with a chaperone present.   Constitutional:       General: He is awake. He is not in acute distress.     Appearance: Normal appearance. He is not ill-appearing or diaphoretic.   HENT:      Head: Normocephalic and atraumatic.      Nose: Nose normal.      Mouth/Throat:      Mouth: Mucous membranes are moist.      Pharynx: Oropharynx is clear.   Eyes:      Conjunctiva/sclera: Conjunctivae normal.      Pupils: Pupils are equal, round, and reactive to light.   Cardiovascular:      Rate and " Rhythm: Normal rate.   Pulmonary:      Effort: Pulmonary effort is normal. No respiratory distress.   Abdominal:      Palpations: Abdomen is soft.      Tenderness: There is no guarding.   Musculoskeletal:         General: Normal range of motion.      Cervical back: Normal range of motion and neck supple. No rigidity.      Thoracic back: Tenderness present.      Lumbar back: Tenderness present.   Skin:     General: Skin is warm and dry.      Coloration: Skin is not jaundiced or pale.   Neurological:      General: No focal deficit present.      Mental Status: He is alert and oriented to person, place, and time. Mental status is at baseline.      Cranial Nerves: No cranial nerve deficit (II-XII).   Psychiatric:         Mood and Affect: Mood normal.         Behavior: Behavior normal. Behavior is cooperative.         Thought Content: Thought content normal.         X-Ray Chest PA And Lateral  Narrative: EXAMINATION:  XR CHEST PA AND LATERAL    CLINICAL HISTORY:  Cough, unspecified    COMPARISON:  12/02/2013, 10/27/2016, 11/21/2017, and 08/25/2021    FINDINGS:  PA and lateral chest:    There is mild cardiomegaly but no suggestion of failure.  No acute infiltrates or pleural effusions are seen.  Mild degenerative changes are present in the thoracic spine.  Impression: Chronic changes but no evidence of acute disease.    Place of service: [facility]    Electronically signed by: Carrol Carrasco  Date:    02/02/2023  Time:    12:01       Lab Requisition on 02/01/2023   Component Date Value Ref Range Status    Hemoglobin A1C 02/01/2023 8.2 (H)  4.5 - 6.6 % Final    Estimated Average Glucose 02/01/2023 187  mg/dL Final   Infusion on 01/25/2023   Component Date Value Ref Range Status    Sodium 01/25/2023 140  136 - 145 mmol/L Final    Potassium 01/25/2023 3.9  3.5 - 5.1 mmol/L Final    Chloride 01/25/2023 102  98 - 107 mmol/L Final    CO2 01/25/2023 32  21 - 32 mmol/L Final    Anion Gap 01/25/2023 10  7 - 16  mmol/L Final    Glucose 01/25/2023 148 (H)  74 - 106 mg/dL Final    BUN 01/25/2023 12  7 - 18 mg/dL Final    Creatinine 01/25/2023 1.14  0.70 - 1.30 mg/dL Final    BUN/Creatinine Ratio 01/25/2023 11  6 - 20 Final    Calcium 01/25/2023 8.8  8.5 - 10.1 mg/dL Final    Total Protein 01/25/2023 7.2  6.4 - 8.2 g/dL Final    Albumin 01/25/2023 3.0 (L)  3.5 - 5.0 g/dL Final    Globulin 01/25/2023 4.2 (H)  2.0 - 4.0 g/dL Final    A/G Ratio 01/25/2023 0.7   Final    Bilirubin, Total 01/25/2023 0.4  >0.0 - 1.2 mg/dL Final    Alk Phos 01/25/2023 108  45 - 115 U/L Final    ALT 01/25/2023 11 (L)  16 - 61 U/L Final    AST 01/25/2023 11 (L)  15 - 37 U/L Final    eGFR 01/25/2023 69  >=60 mL/min/1.73m² Final    ESR Westergren 01/25/2023 2  0 - 20 mm/Hr Final    WBC 01/25/2023 7.04  4.50 - 11.00 K/uL Final    RBC 01/25/2023 5.20  4.60 - 6.20 M/uL Final    Hemoglobin 01/25/2023 16.7  13.5 - 18.0 g/dL Final    Hematocrit 01/25/2023 52.8  40.0 - 54.0 % Final    MCV 01/25/2023 101.5 (H)  80.0 - 96.0 fL Final    MCH 01/25/2023 32.1 (H)  27.0 - 31.0 pg Final    MCHC 01/25/2023 31.6 (L)  32.0 - 36.0 g/dL Final    RDW 01/25/2023 14.3  11.5 - 14.5 % Final    Platelet Count 01/25/2023 209  150 - 400 K/uL Final    MPV 01/25/2023 9.9  9.4 - 12.4 fL Final    Neutrophils % 01/25/2023 61.1  53.0 - 65.0 % Final    Lymphocytes % 01/25/2023 27.4  27.0 - 41.0 % Final    Neutrophils, Abs 01/25/2023 4.30  1.80 - 7.70 K/uL Final    Lymphocytes, Absolute 01/25/2023 1.93  1.00 - 4.80 K/uL Final    Diff Type 01/25/2023 Auto   Final    Monocytes % 01/25/2023 9.4 (H)  2.0 - 6.0 % Final    Eosinophils % 01/25/2023 1.8  1.0 - 4.0 % Final    Basophils % 01/25/2023 0.3  0.0 - 1.0 % Final    Monocytes, Absolute 01/25/2023 0.66  0.00 - 0.80 K/uL Final    Eosinophils, Absolute 01/25/2023 0.13  0.00 - 0.50 K/uL Final    Basophils, Absolute 01/25/2023 0.02  0.00 - 0.20 K/uL Final   Lab Requisition on 01/12/2023   Component Date Value  Ref Range Status    Sodium 01/12/2023 140  136 - 145 mmol/L Final    Potassium 01/12/2023 4.3  3.5 - 5.1 mmol/L Final    Chloride 01/12/2023 99  98 - 107 mmol/L Final    CO2 01/12/2023 32  21 - 32 mmol/L Final    Anion Gap 01/12/2023 13  7 - 16 mmol/L Final    Glucose 01/12/2023 190 (H)  74 - 106 mg/dL Final    BUN 01/12/2023 12  7 - 18 mg/dL Final    Creatinine 01/12/2023 1.02  0.70 - 1.30 mg/dL Final    BUN/Creatinine Ratio 01/12/2023 12  6 - 20 Final    Calcium 01/12/2023 9.2  8.5 - 10.1 mg/dL Final    Total Protein 01/12/2023 7.4  6.4 - 8.2 g/dL Final    Albumin 01/12/2023 3.2 (L)  3.5 - 5.0 g/dL Final    Globulin 01/12/2023 4.2 (H)  2.0 - 4.0 g/dL Final    A/G Ratio 01/12/2023 0.8   Final    Bilirubin, Total 01/12/2023 0.8  >0.0 - 1.2 mg/dL Final    Alk Phos 01/12/2023 117 (H)  45 - 115 U/L Final    ALT 01/12/2023 16  16 - 61 U/L Final    AST 01/12/2023 13 (L)  15 - 37 U/L Final    eGFR 01/12/2023 79  >=60 mL/min/1.73m² Final    ESR Westergren 01/12/2023 18  0 - 20 mm/Hr Final    WBC 01/12/2023 8.41  4.50 - 11.00 K/uL Final    RBC 01/12/2023 5.21  4.60 - 6.20 M/uL Final    Hemoglobin 01/12/2023 16.7  13.5 - 18.0 g/dL Final    Hematocrit 01/12/2023 51.6  40.0 - 54.0 % Final    MCV 01/12/2023 99.0 (H)  80.0 - 96.0 fL Final    MCH 01/12/2023 32.1 (H)  27.0 - 31.0 pg Final    MCHC 01/12/2023 32.4  32.0 - 36.0 g/dL Final    RDW 01/12/2023 14.1  11.5 - 14.5 % Final    Platelet Count 01/12/2023 199  150 - 400 K/uL Final    MPV 01/12/2023 10.2  9.4 - 12.4 fL Final    Neutrophils % 01/12/2023 64.8  53.0 - 65.0 % Final    Lymphocytes % 01/12/2023 25.8 (L)  27.0 - 41.0 % Final    Neutrophils, Abs 01/12/2023 5.45  1.80 - 7.70 K/uL Final    Lymphocytes, Absolute 01/12/2023 2.17  1.00 - 4.80 K/uL Final    Diff Type 01/12/2023 Auto   Final    Monocytes % 01/12/2023 8.2 (H)  2.0 - 6.0 % Final    Eosinophils % 01/12/2023 1.0  1.0 - 4.0 % Final    Basophils % 01/12/2023 0.2  0.0 - 1.0 %  Final    Monocytes, Absolute 01/12/2023 0.69  0.00 - 0.80 K/uL Final    Eosinophils, Absolute 01/12/2023 0.08  0.00 - 0.50 K/uL Final    Basophils, Absolute 01/12/2023 0.02  0.00 - 0.20 K/uL Final   Clinical Support on 12/30/2022   Component Date Value Ref Range Status    Culture, Tissue 12/30/2022 Heavy Growth Methicillin resistant Staphylococcus aureus (A)   Final    Culture, Tissue 12/30/2022 Heavy Growth Enterococcus faecalis (A)   Final   Clinical Support on 11/17/2022   Component Date Value Ref Range Status    Culture, Tissue 11/17/2022 Moderate Growth Staphylococcus aureus (A)   Final   Office Visit on 11/16/2022   Component Date Value Ref Range Status    POC Amphetamines 11/16/2022 Negative  Negative, Inconclusive Final    POC Barbiturates 11/16/2022 Negative  Negative, Inconclusive Final    POC Benzodiazepines 11/16/2022 Negative  Negative, Inconclusive Final    POC Cocaine 11/16/2022 Negative  Negative, Inconclusive Final    POC THC 11/16/2022 Negative  Negative, Inconclusive Final    POC Methadone 11/16/2022 Negative  Negative, Inconclusive Final    POC Methamphetamine 11/16/2022 Negative  Negative, Inconclusive Final    POC Opiates 11/16/2022 Negative  Negative, Inconclusive Final    POC Oxycodone 11/16/2022 Negative  Negative, Inconclusive Final    POC Phencyclidine 11/16/2022 Negative  Negative, Inconclusive Final    POC Methylenedioxymethamphetamine * 11/16/2022 Negative  Negative, Inconclusive Final    POC Tricyclic Antidepressants 11/16/2022 Negative  Negative, Inconclusive Final    POC Buprenorphine 11/16/2022 Negative   Final     Acceptable 11/16/2022 Yes   Final    POC Temperature (Urine) 11/16/2022 92   Final   Clinical Support on 11/03/2022   Component Date Value Ref Range Status    Culture, Tissue 11/03/2022 Heavy Growth Methicillin resistant Staphylococcus aureus (A)   Final   Clinical Support on 09/01/2022   Component Date Value Ref Range Status     Culture, Tissue 2022 Heavy Growth Klebsiella pneumoniae (A)   Final    Culture, Tissue 2022 Heavy Growth Enterococcus faecalis (A)   Final    Culture, Tissue 2022 Heavy Growth Staphylococcus aureus (A)   Final         Orders Placed This Encounter   Procedures    POCT Urine Drug Screen Presump     Interpretive Information:     Negative:  No drug detected at the cut off level.   Positive:  This result represents presumptive positive for the   tested drug, other substances may yield a positive response other   than the analyte of interest. This result should be utilized for   diagnostic purpose only. Confirmation testing will be performed upon physician request only.          Requested Prescriptions     Signed Prescriptions Disp Refills    naloxone (NARCAN) 4 mg/actuation Spry 1 each 0     Si spray (4 mg total) by Nasal route once. Call 911, One sprays alternate nostril, may repeat 2-3 minutes as needed for 1 dose    HYDROcodone-acetaminophen (NORCO)  mg per tablet 90 tablet 0     Sig: Take 1 tablet by mouth every 8 (eight) hours as needed for Pain (pain).    HYDROcodone-acetaminophen (NORCO)  mg per tablet 90 tablet 0     Sig: Take 1 tablet by mouth every 8 (eight) hours as needed for Pain (pain).    HYDROcodone-acetaminophen (NORCO)  mg per tablet 90 tablet 0     Sig: Take 1 tablet by mouth every 8 (eight) hours as needed for Pain (pain).       Assessment:     1. Lumbosacral spondylosis without myelopathy    2. Diabetic polyneuropathy associated with type 2 diabetes mellitus    3. Encounter for long-term (current) use of other medications         A's of Opioid Risk Assessment  Activity:Patient can perform ADL.   Analgesia:Patients pain is partially controlled by current medication. Patient has tried OTC medications such as Tylenol and Ibuprofen with out relief.   Adverse Effects: Patient denies constipation or sedation.  Aberrant Behavior:  reviewed with no aberrant  drug seeking/taking behavior.  Overdose reversal drug naloxone discussed    Drug screen reviewed      Plan:    Narcan February 2023    Pharmacy closed on weekends    Continues following wound management chronic left lower extremity diabetic ulcers    Now with blisters right foot    He states has severe pain with these chronic nonhealing lesions     Complaint back pain neck and joint pain    He would like to continue conservative management for now given that the lower extremity chronic nonhealing wounds are worsening    Continue current medication    Continue home exercise program as directed    Follow-up 3 months sooner if needed     Dr. Asif, November 2023    Bring original prescription medication bottles/container/box with labels to each visit

## 2023-02-16 NOTE — PROGRESS NOTES
Subjective:       Patient ID: René Moscoso is a 72 y.o. male.    Chief Complaint: Wound Care    HPI  This information was obtained from the patient  Location: #12- L metatarsal head; #13- R lateral posterior foot  Duration: onset #12- 8/26/22; #13- 10/31/22  Timing: no pain reported  Context: diabetic  Modifying Factors: edema  Associated Signs and Symptoms: erythema, callus, edema  Pt returns for wounds to bilateral lower extremities. He is still on oral abx for URI. No S/S wound infection    Review of Systems (ROS)  This information was obtained from the patient    Complaints and Symptoms  Patient complains of:  Cardiovascular (Central/Peripheral): Edema, Lower extremity (leg) swelling  Ear/Nose/Mouth/Throat: Nasal congestion  Integumentary (Hair/Skin/Nails): Open Sore, Calluses/Corns  Musculoskeletal: Assistive Devices (walking cane)  Psychiatric: Depression  Respiratory: Cough    Patient denies complaints or symptoms related to:  Allergic/Immunologic: Hay Fever  Cardiovascular (Central/Peripheral): Lower extremity (leg) resting pain  Constitutional Symptoms (General Health): Chills, Fatigue, Fever, Loss of Appetite  Ear/Nose/Mouth/Throat: Hearing Loss / Aid  Endocrine: Cold Intolerance, Heat Intolerance  Eyes: Double Vision / Spots / Flashing Lights, Partial/Complete Blindness, Vision Changes  Gastrointestinal (GI): Nausea / Vomiting / Diarrhea (N/V/D)  Musculoskeletal: Muscle Weakness  Neurological: Abnormal Gait, Headaches, Weakness  Psychiatric: Anxiety  Respiratory: Oxygen Use, Shortness of Breath, Wheezing          Objective:      Physical Exam  Constitutional  Pulse rate and rhythm regular. Afebrile. Height within normal limits. Weight WNL. Well developed, well nourished, and in no acute distress. Alert and oriented x3.    Eyes:  Conjunctiva without icterus.    Ears, Nose, Mouth, and Throat:  Symmetric hearing.    Respiratory:  Even respirations without use of accessory muscles. No intercoastal  retractions noted. Even and non labored respiration.    Cardiovascular:  See lower extremity assessment when applicable. edema .    Musculoskeletal:  Normal gait.    Integumentary (Hair, Skin)  adipose exposed.    Psychiatric:  Judgement and insight: Normal affect with normal thought pattern. Orientation to time, place and person: Normal affect with normal thought pattern. Recent and remote memory: Normal affect with normal thought pattern. Mood and affect: Normal affect with normal thought pattern.    Assessment:       Problem List Items Addressed This Visit          Endocrine    Type 2 diabetes mellitus with left diabetic foot ulcer - Primary    Type 2 diabetes mellitus with foot ulcer, with long-term current use of insulin         Plan:       Wound Orders:  Wound #12 Left Metatarsal head fifth      Hand Hygiene/Smoking Cessation  Wash hands before and after wound care. Call the Wound Center at [phone] if you have signs or symptoms of infection, increased drainage, increasing odor, or unusual redness. After Wound Care hours, please notify your PCP or go to the ER.    Cleanser  Cleanse Wound with Normal Saline  Cleanse wound with non-cytotoxic wound cleanser. - May use Anasept or Vashe to clean with prn odor  Other order: - Wash foot with warm water and mild soap and pat dry with dressing changes    Dressings  Apply dressing. - Pack undermining area cover with silvercel, gauze /ABD pad and conform and tape and offloading felt or donut or peg assist darco shoe for offloadng Change qd and prn soiling or dislodgement  Other: - betadine around wounds on macerated skin and air dry    Compression/Edema Control  Elevate leg(s) as much as possible. Avoid standing in one position for more than 10 minutes. Avoid settings with legs down. Do not cross legs when sitting.  Apply Knee-high gradient compression stockings. - spandagrip size f-g    Off-Loading  Keep weight off: - offloading felt/donut pad/darco peg assist  shoe

## 2023-02-23 NOTE — PROGRESS NOTES
Subjective:       Patient ID: René Moscoso is a 72 y.o. male.    Chief Complaint: Wound Care    HPI  This information was obtained from the patient  Location: #12- L metatarsal head; #13- R lateral posterior foot  Duration: onset #12- 8/26/22; #13- 10/31/22  Timing: no pain reported  Context: diabetic  Modifying Factors: edema  Associated Signs and Symptoms: erythema, callus, edema  Pt returns for wounds to bilateral lower extremities. Both wounds are substantially improved. Compliance with current POC appears good    Review of Systems (ROS)  This information was obtained from the patient    Complaints and Symptoms  Patient complains of:  Cardiovascular (Central/Peripheral): Edema, Lower extremity (leg) swelling  Ear/Nose/Mouth/Throat: Nasal congestion  Integumentary (Hair/Skin/Nails): Open Sore, Calluses/Corns  Musculoskeletal: Assistive Devices (walking cane)  Psychiatric: Depression  Respiratory: Cough    Patient denies complaints or symptoms related to:  Allergic/Immunologic: Hay Fever  Cardiovascular (Central/Peripheral): Lower extremity (leg) resting pain  Constitutional Symptoms (General Health): Chills, Fatigue, Fever, Loss of Appetite  Ear/Nose/Mouth/Throat: Hearing Loss / Aid  Endocrine: Cold Intolerance, Heat Intolerance  Eyes: Double Vision / Spots / Flashing Lights, Partial/Complete Blindness, Vision Changes  Gastrointestinal (GI): Nausea / Vomiting / Diarrhea (N/V/D)  Musculoskeletal: Muscle Weakness  Neurological: Abnormal Gait, Headaches, Weakness  Psychiatric: Anxiety  Respiratory: Oxygen Use, Shortness of Breath, Wheezing      Objective:      Physical Exam  Constitutional  Pulse rate and rhythm regular. Afebrile. Height within normal limits. overweight. Well developed, well nourished, and in no acute distress. Alert and oriented x3.    Eyes:  Conjunctiva without icterus.    Ears, Nose, Mouth, and Throat:  Symmetric hearing.    Respiratory:  Even respirations without use of accessory muscles. No  intercoastal retractions noted. Even and non labored respiration.    Cardiovascular:  See lower extremity assessment when applicable. edema .    Musculoskeletal:  Normal gait.    Integumentary (Hair, Skin)  adipose exposed.    Psychiatric:  Judgement and insight: Normal affect with normal thought pattern. Orientation to time, place and person: Normal affect with normal thought pattern. Recent and remote memory: Normal affect with normal thought pattern. Mood and affect: Normal affect with normal thought pattern.      Assessment:       Problem List Items Addressed This Visit          Endocrine    Type 2 diabetes mellitus with left diabetic foot ulcer    Type 2 diabetes mellitus with foot ulcer, with long-term current use of insulin - Primary         Plan:       Wound Orders:  Wound #12 Left Metatarsal head fifth    Anesthetic  2% Viscous Lidocaine to wound bed prior to procedure. - clinic use only    Hand Hygiene/Smoking Cessation  Wash hands before and after wound care. Call the Wound Center at [phone] if you have signs or symptoms of infection, increased drainage, increasing odor, or unusual redness. After Wound Care hours, please notify your PCP or go to the ER.    Cleanser  Cleanse Wound with Normal Saline  Cleanse wound with non-cytotoxic wound cleanser. - May use Anasept or Vashe to clean with prn odor  Other order: - Wash foot with warm water and mild soap and pat dry with dressing changes    Dressings  Apply dressing. - Pack undermining area cover with silvercel, gauze /ABD pad and conform and tape and offloading felt or donut or peg assist darco shoe for offloadng Change qd and prn soiling or dislodgement  Other: - betadine around wounds on macerated skin and air dry    Compression/Edema Control  Elevate leg(s) as much as possible. Avoid standing in one position for more than 10 minutes. Avoid settings with legs down. Do not cross legs when sitting.  Apply Knee-high gradient compression stockings. -  spandagrip size f-g

## 2023-03-02 NOTE — PROGRESS NOTES
Subjective:       Patient ID: René Moscoso is a 72 y.o. male.    Chief Complaint: No chief complaint on file.    HPI  This information was obtained from the patient  Location: #12- L metatarsal head; #13- R lateral posterior foot  Duration: onset #12- 8/26/22; #13- 10/31/22  Timing: no pain reported  Context: diabetic  Modifying Factors: edema  Associated Signs and Symptoms: erythema, callus, edema  Pt returns for wounds to bilateral lower extremities. Improvement continues, wounds measure smaller. No S/S infection      Objective:      Physical Exam  Constitutional  Pulse rate and rhythm regular. Afebrile. Height within normal limits. Weight WNL. Well developed, well nourished, and in no acute distress. Alert and oriented x3.    Eyes:  Conjunctiva without icterus.    Ears, Nose, Mouth, and Throat:  Symmetric hearing.    Respiratory:  Even respirations without use of accessory muscles. No intercoastal retractions noted. Even and non labored respiration.    Cardiovascular:  See lower extremity assessment when applicable. edema .    Musculoskeletal:  Normal gait.    Integumentary (Hair, Skin)  adipose exposed.    Psychiatric:  Judgement and insight: Normal affect with normal thought pattern. Orientation to time, place and person: Normal affect with normal thought pattern. Recent and remote memory: Normal affect with normal thought pattern. Mood and affect: Normal affect with normal thought pattern.  Assessment:       Problem List Items Addressed This Visit          Endocrine    Type 2 diabetes mellitus with left diabetic foot ulcer - Primary     Other Visit Diagnoses       Diabetic ulcer of right heel associated with diabetes mellitus of other type, with fat layer exposed [E13.621, L97.412 (ICD-10-CM)]                  Plan:       Wound Orders:  Wound #12 Left Metatarsal head fifth    Hand Hygiene/Smoking Cessation  Wash hands before and after wound care. Call the Wound Center at [phone] if you have signs or  symptoms of infection, increased drainage, increasing odor, or unusual redness. After Wound Care hours, please notify your PCP or go to the ER.    Cleanser  Cleanse Wound with Normal Saline  Cleanse wound with non-cytotoxic wound cleanser. - May use Anasept or Vashe to clean with prn odor  Other order: - Wash foot with warm water and mild soap and pat dry with dressing changes    Dressings  Apply dressing. - Pack undermining area cover with silvercel, gauze /ABD pad and conform and tape and offloading felt or donut or peg assist darco shoe for offloadng Change qd and prn soiling or dislodgement  Other: - betadine around wounds on macerated skin and air dry    Compression/Edema Control  Elevate leg(s) as much as possible. Avoid standing in one position for more than 10 minutes. Avoid settings with legs down. Do not cross legs when sitting.  Apply Knee-high gradient compression stockings. - spandagrip size f-g    Off-Loading  Keep weight off: - offloading felt/donut pad/darco peg assist shoe    Follow-Up Appointments  Return Appointment 1 week - for wound care    Home Health  Home Health Care: If you have any questions or concerns please contact the wound center. - Dalvance IV has been orderd to be infused at hospital  First dose given 1/25/23  next dose scheduled for next wednesday-done  1. Home Health-Skilled Nurse for dressing change______x on clinic wk_______X on Non-Clinic Wk. - Home Health-Skilled Nurse for dressing change_3__x on clinic wk____4 x on non clinic week  x 1 week then 4 x week for 1 week then 3 x week for 1 week    3. If caregiver willing and able to perform-may teach dressing change procedure.  4. May add 2-3 additional skilled nurse visits prn for wound care.  6. If dressings not in stock - May use product equivalent until obtained.        Wound #14 Right, Plantar Toe - Fifth    Dressings  Apply dressing. - betadine and gauze and tape daily    Additional Orders:    Dietary  Supplement with daily  multivitamin.  Follow Diabetic diet.  Vitamin C 500 mg. twice a day.

## 2023-03-08 PROBLEM — J32.0 CHRONIC MAXILLARY SINUSITIS: Status: RESOLVED | Noted: 2021-12-20 | Resolved: 2023-01-01

## 2023-03-08 NOTE — PROGRESS NOTES
Olga Monterroso NP   [facility]  [address]  [place]     PATIENT NAME: René Moscoso  : 1950  DATE: 3/8/23  MRN: [id number]      Billing Provider: Olga Monterroso NP  Level of Service:   Patient PCP Information       Provider PCP Type    Olga Monterroso NP General            Reason for Visit / Chief Complaint: Nasal Congestion (Follow up on sinusitis x1 month ago.) and Cough       Update PCP  Update Chief Complaint         History of Present Illness / Problem Focused Workflow     René Moscoso presents to the clinic c/o nasal congestion and cough, also eval for home heatlh, dm with left foot ulcer, long term insulin use, copd, hyperlipidemia, chronic pain, neurpath;y      Review of Systems     Review of Systems   Constitutional:  Negative for chills, fatigue and fever.   HENT:  Positive for nasal congestion and sinus pressure/congestion. Negative for ear pain, facial swelling, hearing loss, mouth dryness, mouth sores, postnasal drip, rhinorrhea and goiter.    Eyes:  Negative for discharge and itching.   Respiratory:  Positive for cough. Negative for shortness of breath and wheezing.    Cardiovascular:  Negative for chest pain and leg swelling.   Gastrointestinal:  Negative for abdominal pain, change in bowel habit and change in bowel habit.   Genitourinary:  Negative for difficulty urinating, dysuria, enuresis, frequency, hematuria and urgency.   Integumentary:         Foot ulcer, left foot, goes to wound care   Neurological:  Negative for dizziness, vertigo, syncope, weakness and headaches.   Psychiatric/Behavioral:  Negative for decreased concentration.    All other systems reviewed and are negative.     Medical / Social / Family History     Past Medical History:   Diagnosis Date    Arthritis     Chronic pain 2021    COPD (chronic obstructive pulmonary disease)     Diabetes type 2, controlled     Hyperlipidemia     Neuropathy        Past Surgical History:   Procedure Laterality Date     CARDIAC CATHETERIZATION      ESOPHAGOGASTRODUODENOSCOPY  10/22/2014    FLEXIBLE SIGMOIDOSCOPY  10/23/2014    INJECTION OF FACET JOINT Bilateral 07/01/2014    Bilateral L3-S1 Facet Injection - 7/1/14 and 3/18/14    SINUS SURGERY         Social History    reports that he quit smoking about 12 years ago. His smoking use included cigarettes. He started smoking about 32 years ago. He has a 40.00 pack-year smoking history. His smokeless tobacco use includes snuff. He reports that he does not currently use alcohol. He reports current drug use. Drug: Hydrocodone.    Family History  's family history includes Arthritis in his mother; COPD in his brother; Cancer in his father, mother, and sister; Diabetes in his mother; Heart disease in his mother; Hypertension in his mother.    Medications and Allergies     Medications  Outpatient Medications Marked as Taking for the 3/8/23 encounter (Office Visit) with Olga Monterroso NP   Medication Sig Dispense Refill    albuterol (ACCUNEB) 0.63 mg/3 mL Nebu Take 0.63 mg by nebulization every 6 (six) hours as needed. Rescue      aspirin 81 MG Chew Take 81 mg by mouth once daily.      blood-glucose meter Misc USE TO CHECK BLOOD SUGAR      ciprofloxacin HCl (CIPRO) 500 MG tablet Take 1 tablet (500 mg total) by mouth 2 (two) times daily. 14 tablet 0    fluticasone propionate (FLONASE) 50 mcg/actuation nasal spray 1 spray (50 mcg total) by Each Nostril route once daily. 15.8 mL 0    gabapentin (NEURONTIN) 600 MG tablet TAKE ONE TABLET BY MOUTH TWICE DAILY 180 tablet 1    gemfibroziL (LOPID) 600 MG tablet TAKE ONE TABLET BY MOUTH TWICE DAILY 180 tablet 1    hydroCHLOROthiazide (MICROZIDE) 12.5 mg capsule TAKE ONE CAPSULE BY MOUTH EVERY DAY 90 capsule 1    HYDROcodone-acetaminophen (NORCO)  mg per tablet Take 1 tablet by mouth every 8 (eight) hours as needed for Pain (pain). 90 tablet 0    [START ON 3/17/2023] HYDROcodone-acetaminophen (NORCO)  mg per tablet Take 1 tablet by  mouth every 8 (eight) hours as needed for Pain (pain). 90 tablet 0    [START ON 4/14/2023] HYDROcodone-acetaminophen (NORCO)  mg per tablet Take 1 tablet by mouth every 8 (eight) hours as needed for Pain (pain). 90 tablet 0    ipratropium (ATROVENT) 42 mcg (0.06 %) nasal spray 2 sprays by Nasal route 3 (three) times daily. 15 mL 2    ipratropium/albuterol sulfate (COMBIVENT INHL) Inhale into the lungs.      isosorbide mononitrate (IMDUR) 30 MG 24 hr tablet TAKE ONE TABLET BY MOUTH ONCE DAILY 90 tablet 1    LEVEMIR FLEXTOUCH U-100 INSULN 100 unit/mL (3 mL) InPn pen INJECT 92 units SUBCUTANEOUSLY AT BEDTIME 30 mL 5    linaGLIPtin (TRADJENTA) 5 mg Tab tablet Take 5 mg by mouth once daily.      linaGLIPtin (TRADJENTA) 5 mg Tab tablet Take 5 mg by mouth once daily.      lisinopriL (PRINIVIL,ZESTRIL) 5 MG tablet TAKE ONE TABLET BY MOUTH DAILY 90 tablet 1    memantine (NAMENDA) 10 MG Tab TAKE ONE TABLET BY MOUTH TWICE DAILY 180 tablet 1    naloxone (NARCAN) 4 mg/actuation Spry if used, call 911,instill one SPRAYS alternate nostril, MAY REPEAT EVERY 2-3 minutes AS NEEDED.      NOVOLOG MIX 70-30FLEXPEN U-100 100 unit/mL (70-30) InPn pen INJECT 94 UNITS SUBCUTANEOUSLY EVERY MORNING AND 90 UNITS EVERY EVENING 90 mL 5    omeprazole (PRILOSEC) 20 MG capsule TAKE ONE CAPSULE BY MOUTH EVERY DAY 28 capsule 1    RYBELSUS 14 mg tablet TAKE ONE TABLET BY MOUTH ONCE DAILY 90 tablet 1    simvastatin (ZOCOR) 40 MG tablet TAKE ONE TABLET BY MOUTH AT BEDTIME 90 tablet 1    SYNJARDY XR 12.5-1,000 mg TBph TAKE TWO TABLETS BY MOUTH EVERY MORNING 180 tablet 1    tiZANidine (ZANAFLEX) 4 MG tablet TAKE 1 OR 2 TABLETS BY MOUTH AT BEDTIME AS NEEDED 56 tablet 1    TRUE METRIX GLUCOSE TEST STRIP Strp USE TO USE TO CHECK BLOOD SUGAR BLOOD GLUCOSE TWICE DAILY 50 each 11    VENTOLIN HFA 90 mcg/actuation inhaler INHALE TWO PUFFS BY MOUTH TWICE DAILY AS NEEDED 18 g 5    [DISCONTINUED] cephALEXin (KEFLEX) 500 MG capsule Take 1 capsule (500 mg total)  "by mouth every 8 (eight) hours. 30 capsule 0       Allergies  Review of patient's allergies indicates:  No Known Allergies    Physical Examination     Vitals:    03/08/23 0842   BP: (!) 112/54   BP Location: Left arm   Patient Position: Sitting   BP Method: Medium (Manual)   Pulse: 66   Resp: 15   Temp: 97.7 °F (36.5 °C)   TempSrc: Oral   SpO2: (!) 94%   Weight: 136.1 kg (300 lb)   Height: 6' 3" (1.905 m)      Physical Exam  Constitutional:       Appearance: Normal appearance.   HENT:      Head: Normocephalic.      Right Ear: Tympanic membrane, ear canal and external ear normal.      Left Ear: Tympanic membrane, ear canal and external ear normal.      Nose: Congestion present.      Comments: Mod amt thick yellow nasal secretion, pressure over max region     Mouth/Throat:      Mouth: Mucous membranes are moist.   Eyes:      Extraocular Movements: Extraocular movements intact.      Conjunctiva/sclera: Conjunctivae normal.      Pupils: Pupils are equal, round, and reactive to light.   Neck:      Comments: Leland ant cervical nodes  Cardiovascular:      Rate and Rhythm: Normal rate and regular rhythm.      Pulses: Normal pulses.      Heart sounds: Normal heart sounds.   Pulmonary:      Effort: Pulmonary effort is normal.      Breath sounds: Normal breath sounds.   Musculoskeletal:         General: Normal range of motion.      Cervical back: Normal range of motion.        Feet:    Feet:      Right foot:      Protective Sensation: 10 sites tested.  10 sites sensed.      Skin integrity: Callus present.      Toenail Condition: Right toenails are normal.      Left foot:      Protective Sensation: 10 sites tested.  10 sites sensed.      Skin integrity: Callus present. No ulcer (maria elena 1/2 cm ulcer on plantar surface of foot, see pic.).      Toenail Condition: Left toenails are normal.   Lymphadenopathy:      Cervical: Cervical adenopathy present.   Skin:     General: Skin is warm and dry.      Comments: Maria Elena 1/2 cm diabetic ulcer " left foot, almost healed. No signs of infection   Neurological:      General: No focal deficit present.      Mental Status: He is alert and oriented to person, place, and time.   Psychiatric:         Behavior: Behavior normal.        Assessment and Plan (including Health Maintenance)      Problem List  Smart Sets  Document Outside HM   :    Plan:     Type 2 diabetes mellitus with left diabetic foot ulcer    Acute recurrent maxillary sinusitis  -     amoxicillin (AMOXIL) 500 MG capsule; Take 1 capsule (500 mg total) by mouth 3 (three) times daily.  Dispense: 30 capsule; Refill: 0            Health Maintenance Due   Topic Date Due    Hepatitis C Screening  Never done    Sign Pain Contract  Never done    LDCT Lung Screen  Never done    Shingles Vaccine (1 of 2) Never done    Colorectal Cancer Screening  10/03/2017    Abdominal Aortic Aneurysm Screening  Never done    Pneumococcal Vaccines (Age 65+) (3 - PPSV23 if available, else PCV20) 10/20/2020    COVID-19 Vaccine (3 - Booster) 04/07/2021    Foot Exam  08/12/2022    Influenza Vaccine (1) 09/01/2022    Eye Exam  12/29/2022    Lipid Panel  01/20/2023    Diabetes Urine Screening  01/20/2023       Problem List Items Addressed This Visit          ENT    Acute recurrent maxillary sinusitis    Current Assessment & Plan     Avoid irritants,such as strong perfumes, pian, cig smoke  Meds as ordered,   Increase fuids,   Return to clinic as needed and as scheudled           Relevant Medications    amoxicillin (AMOXIL) 500 MG capsule       Endocrine    Type 2 diabetes mellitus with left diabetic foot ulcer - Primary    Current Assessment & Plan     meds as ordered, eat low carb diet, exericse daily, keep matt with wound center and with pcp              Health Maintenance Topics with due status: Not Due       Topic Last Completion Date    TETANUS VACCINE 05/27/2021    Hemoglobin A1c 02/01/2023       Procedures     Future Appointments   Date Time Provider Department Center    5/8/2023  9:00 AM [facility]   6/14/2023 11:00 AM Olga Monterroso NP [facility]        Follow up in about 3 months (around 6/8/2023) for wed .       Signature:  Olga Monterroso NP    Date of encounter: 3/8/23

## 2023-03-08 NOTE — ASSESSMENT & PLAN NOTE
Avoid irritants,such as strong perfumes, pian, cig smoke  Meds as ordered,   Increase fuids,   Return to clinic as needed and as scheudled

## 2023-03-09 NOTE — PROGRESS NOTES
Subjective:       Patient ID: René Moscoso is a 72 y.o. male.    Chief Complaint: No chief complaint on file.    HPI  This information was obtained from the patient  Location: #12- L metatarsal head; #13- R lateral posterior foot  Duration: onset #12- 8/26/22; #13- 10/31/22  Timing: no pain reported  Context: diabetic  Modifying Factors: edema  Associated Signs and Symptoms: erythema, callus, edema  Pt returns for wounds to bilateral lower extremities. Improvement continues, wounds measure smaller. No S/S infection        Objective:      Physical Exam  Constitutional  Pulse rate and rhythm regular. Afebrile. Height within normal limits. Weight WNL. Well developed, well nourished, and in no acute distress. Alert and oriented x3.    Eyes:  Conjunctiva without icterus.    Ears, Nose, Mouth, and Throat:  Symmetric hearing.    Respiratory:  Even respirations without use of accessory muscles. No intercoastal retractions noted. Even and non labored respiration.    Cardiovascular:  See lower extremity assessment when applicable. edema .    Musculoskeletal:  Normal gait.    Integumentary (Hair, Skin)  adipose exposed.    Psychiatric:  Judgement and insight: Normal affect with normal thought pattern. Orientation to time, place and person: Normal affect with normal thought pattern. Recent and remote memory: Normal affect with normal thought pattern. Mood and affect: Normal affect with normal thought pattern.  Assessment:       Problem List Items Addressed This Visit    None      Plan:       Wound Orders:  Wound #12 Left Metatarsal head fifth      Hand Hygiene/Smoking Cessation  Wash hands before and after wound care. Call the Wound Center at [phone] if you have signs or symptoms of infection, increased drainage, increasing odor, or unusual redness. After Wound Care hours, please notify your PCP or go to the ER.    Cleanser  Cleanse Wound with Normal Saline  Cleanse wound with non-cytotoxic wound cleanser. - May use  Anasept or Vashe to clean with prn odor  Other order: - Wash foot with warm water and mild soap and pat dry with dressing changes    Dressings  Apply dressing. - Pack undermining area cover with silvercel, gauze /ABD pad and conform and tape and offloading felt or donut or peg assist darco shoe for offloadng Change qd and prn soiling or dislodgement  Other: - betadine around wounds on macerated skin and air dry    Compression/Edema Control  Elevate leg(s) as much as possible. Avoid standing in one position for more than 10 minutes. Avoid settings with legs down. Do not cross legs when sitting.  Apply Knee-high gradient compression stockings. - spandagrip size f-g    Off-Loading  Keep weight off: - offloading felt/donut pad/darco peg assist shoe    Follow-Up Appointments  Return Appointment 1 week - for wound care    Home Health  1. Home Health-Skilled Nurse for dressing change______x on clinic wk_______X on Non-Clinic Wk. - Home Health-Skilled Nurse for dressing change_2_x on clinic wk__3x on non clinic week    3. If caregiver willing and able to perform-may teach dressing change procedure.  4. May add 2-3 additional skilled nurse visits prn for wound care.  6. If dressings not in stock - May use product equivalent until obtained.      Wound #14 Right, Plantar Toe - Fifth    Dressings  Apply dressing. - betadine and gauze and tape daily    Additional Orders:    Dietary  Supplement with daily multivitamin.  Follow Diabetic diet.  Vitamin C 500 mg. twice a day.

## 2023-03-16 NOTE — PROGRESS NOTES
Subjective:       Patient ID: René Moscoso is a 72 y.o. male.    Chief Complaint: Wound Care    HPI  This information was obtained from the patient  Location: #12- L metatarsal head; #14- R toe, 5th  Duration: onset #12- 8/26/22; #14- 1/27/23  Timing: no pain reported  Context: diabetic  Modifying Factors: edema  Associated Signs and Symptoms: callus  Pt returns for wounds to bilateral lower extremities. Wound to R 5th toe is now resolved. The wound to L MTH continues to improve. Compliance with POC remains acceptable    Review of Systems (ROS)  This information was obtained from the patient    Complaints and Symptoms  Patient complains of:  Cardiovascular (Central/Peripheral): Edema, Lower extremity (leg) swelling  Ear/Nose/Mouth/Throat: Nasal congestion  Integumentary (Hair/Skin/Nails): Open Sore, Calluses/Corns  Musculoskeletal: Assistive Devices (walking cane)  Psychiatric: Depression  Respiratory: Cough    Patient denies complaints or symptoms related to:  Allergic/Immunologic: Hay Fever  Cardiovascular (Central/Peripheral): Lower extremity (leg) resting pain  Constitutional Symptoms (General Health): Chills, Fatigue, Fever, Loss of Appetite  Ear/Nose/Mouth/Throat: Hearing Loss / Aid  Endocrine: Cold Intolerance, Heat Intolerance  Eyes: Double Vision / Spots / Flashing Lights, Partial/Complete Blindness, Vision Changes  Gastrointestinal (GI): Nausea / Vomiting / Diarrhea (N/V/D)  Musculoskeletal: Muscle Weakness  Neurological: Abnormal Gait, Headaches, Weakness  Psychiatric: Anxiety  Respiratory: Oxygen Use, Shortness of Breath, Wheezing        Objective:      Physical Exam  Constitutional  Pulse rate and rhythm regular. Afebrile. Height within normal limits. overweight. Well developed, well nourished, and in no acute distress. Alert and oriented x3.    Eyes:  Conjunctiva without icterus.    Ears, Nose, Mouth, and Throat:  Symmetric hearing.    Respiratory:  Even respirations without use of accessory muscles.  No intercoastal retractions noted. Even and non labored respiration.    Cardiovascular:  See lower extremity assessment when applicable. edema .    Musculoskeletal:  Normal gait.    Integumentary (Hair, Skin)  adipose exposed.    Psychiatric:  Judgement and insight: Normal affect with normal thought pattern. Orientation to time, place and person: Normal affect with normal thought pattern. Recent and remote memory: Normal affect with normal thought pattern. Mood and affect: Normal affect with normal thought pattern.  Assessment:       Problem List Items Addressed This Visit          Endocrine    Type 2 diabetes mellitus with left diabetic foot ulcer - Primary         Plan:        Wound Orders:  Wound #12 Left Metatarsal head fifth    Anesthetic  2% Viscous Lidocaine to wound bed prior to procedure. - clinic use only    Hand Hygiene/Smoking Cessation  Wash hands before and after wound care. Call the Wound Center at [phone] if you have signs or symptoms of infection, increased drainage, increasing odor, or unusual redness. After Wound Care hours, please notify your PCP or go to the ER.    Cleanser  Cleanse Wound with Normal Saline  Cleanse wound with non-cytotoxic wound cleanser. - May use Anasept or Vashe to clean with prn odor  Other order: - Wash foot with warm water and mild soap and pat dry with dressing changes    Dressings  Apply dressing. - cover with silvercel, gauze /ABD pad and conform and tape and offloading felt or donut or peg assist darco shoe for offloadng Change qd and prn soiling or dislodgement  Other: - betadine around wounds on macerated skin and air dry    Compression/Edema Control  Elevate leg(s) as much as possible. Avoid standing in one position for more than 10 minutes. Avoid settings with legs down. Do not cross legs when sitting.  Apply Knee-high gradient compression stockings. - spandagrip size f-g    Off-Loading  Keep weight off: - offloading felt/donut pad/darco peg assist  shoe    Follow-Up Appointments  Return Appointment 1 week - for wound care    Home Health  1. Home Health-Skilled Nurse for dressing change______x on clinic wk_______X on Non-Clinic Wk. - Home Health-Skilled Nurse for dressing change_2_x on clinic wk__3x on non clinic week    3. If caregiver willing and able to perform-may teach dressing change procedure.  4. May add 2-3 additional skilled nurse visits prn for wound care.  6. If dressings not in stock - May use product equivalent until obtained.      Wound #14 Right, Plantar Toe - Fifth    Dressings  Apply dressing. - betadine daily and air dry, socks    Additional Orders:    Dietary  Supplement with daily multivitamin.  Follow Diabetic diet.  Vitamin C 500 mg. twice a day.

## 2023-03-24 NOTE — PROGRESS NOTES
Subjective:       Patient ID: René Moscoso is a 72 y.o. male.    Chief Complaint: Wound Care    HPI  This information was obtained from the patient  Location: #12- L metatarsal head  Duration: onset #12- 8/26/22  Timing: no pain reported  Context: diabetic  Modifying Factors: edema  Associated Signs and Symptoms: callus  Pt returns for wounds to bilateral lower extremities. The wound to L MTH remains improved.  Compliance with POC remains acceptable    Review of Systems (ROS)  This information was obtained from the patient    Complaints and Symptoms  Patient complains of:  Cardiovascular (Central/Peripheral): Edema, Lower extremity (leg) swelling  Ear/Nose/Mouth/Throat: Nasal congestion  Integumentary (Hair/Skin/Nails): Open Sore, Calluses/Corns  Musculoskeletal: Assistive Devices (walking cane)  Psychiatric: Depression  Respiratory: Cough    Patient denies complaints or symptoms related to:  Allergic/Immunologic: Hay Fever  Cardiovascular (Central/Peripheral): Lower extremity (leg) resting pain  Constitutional Symptoms (General Health): Chills, Fatigue, Fever, Loss of Appetite  Ear/Nose/Mouth/Throat: Hearing Loss / Aid  Endocrine: Cold Intolerance, Heat Intolerance  Eyes: Double Vision / Spots / Flashing Lights, Partial/Complete Blindness, Vision Changes  Gastrointestinal (GI): Nausea / Vomiting / Diarrhea (N/V/D)  Musculoskeletal: Muscle Weakness  Neurological: Abnormal Gait, Headaches, Weakness  Psychiatric: Anxiety  Respiratory: Oxygen Use, Shortness of Breath, Wheezing      Objective:      Physical Exam  Constitutional  Pulse rate and rhythm regular. Afebrile. Height within normal limits. Weight WNL. Well developed, well nourished, and in no acute distress. Alert and oriented x3.    Eyes:  Conjunctiva without icterus.    Ears, Nose, Mouth, and Throat:  Symmetric hearing.    Respiratory:  Even respirations without use of accessory muscles. No intercoastal retractions noted. Even and non labored  respiration.    Cardiovascular:  See lower extremity assessment when applicable. edema .    Musculoskeletal:  Normal gait.    Integumentary (Hair, Skin)  adipose exposed.    Psychiatric:  Judgement and insight: Normal affect with normal thought pattern. Orientation to time, place and person: Normal affect with normal thought pattern. Recent and remote memory: Normal affect with normal thought pattern. Mood and affect: Normal affect with normal thought pattern.  Assessment:       Problem List Items Addressed This Visit          Endocrine    Type 2 diabetes mellitus with left diabetic foot ulcer - Primary         Plan:        Wound Orders:  Wound #12 Left Metatarsal head fifth    Anesthetic  2% Viscous Lidocaine to wound bed prior to procedure. - clinic use only    Hand Hygiene/Smoking Cessation  Wash hands before and after wound care. Call the Wound Center at [phone] if you have signs or symptoms of infection, increased drainage, increasing odor, or unusual redness. After Wound Care hours, please notify your PCP or go to the ER.    Cleanser  Cleanse Wound with Normal Saline  Cleanse wound with non-cytotoxic wound cleanser. - May use Anasept or Vashe to clean with prn odor  Other order: - Wash foot with warm water and mild soap and pat dry with dressing changes    Dressings  Apply dressing. - cover with silvercel, gauze /ABD pad and conform and tape and offloading felt or donut or peg assist darco shoe for offloadng Change qd and prn soiling or dislodgement  Other: - betadine around wounds on macerated skin and air dry    Compression/Edema Control  Elevate leg(s) as much as possible. Avoid standing in one position for more than 10 minutes. Avoid settings with legs down. Do not cross legs when sitting.  Apply Knee-high gradient compression stockings. - spandagrip size f-g

## 2023-03-30 NOTE — PROGRESS NOTES
Subjective     Patient ID: René Moscoso is a 72 y.o. male.    Chief Complaint: No chief complaint on file.    HPI  This information was obtained from the patient  Location: #12- L metatarsal head  Duration: onset #12- 8/26/22  Timing: no pain reported  Context: diabetic  Modifying Factors: edema  Associated Signs and Symptoms: callus  Pt returns for wounds to bilateral lower extremities. The wound to L MTH measures smaller. Edges appear to be approximating well.  Compliance with POC remains acceptable    Review of Systems (ROS)  This information was obtained from the patient    Complaints and Symptoms  Patient complains of:  Cardiovascular (Central/Peripheral): Edema, Lower extremity (leg) swelling  Ear/Nose/Mouth/Throat: Nasal congestion  Integumentary (Hair/Skin/Nails): Open Sore, Calluses/Corns  Musculoskeletal: Assistive Devices (walking cane)  Psychiatric: Depression  Respiratory: Cough    Patient denies complaints or symptoms related to:  Allergic/Immunologic: Hay Fever  Cardiovascular (Central/Peripheral): Lower extremity (leg) resting pain  Constitutional Symptoms (General Health): Chills, Fatigue, Fever, Loss of Appetite  Ear/Nose/Mouth/Throat: Hearing Loss / Aid  Endocrine: Cold Intolerance, Heat Intolerance  Eyes: Double Vision / Spots / Flashing Lights, Partial/Complete Blindness, Vision Changes  Gastrointestinal (GI): Nausea / Vomiting / Diarrhea (N/V/D)  Musculoskeletal: Muscle Weakness  Neurological: Abnormal Gait, Headaches, Weakness  Psychiatric: Anxiety  Respiratory: Oxygen Use, Shortness of Breath, Wheezing       Objective     Physical Exam  Constitutional  Pulse rate and rhythm regular. Afebrile. Height within normal limits. overweight. Well developed, well nourished, and in no acute distress. Alert and oriented x3.    Eyes:  Conjunctiva without icterus.    Ears, Nose, Mouth, and Throat:  Symmetric hearing.    Respiratory:  Even respirations without use of accessory muscles. No intercoastal  retractions noted. Even and non labored respiration.    Cardiovascular:  See lower extremity assessment when applicable. edema .    Musculoskeletal:  Normal gait.    Integumentary (Hair, Skin)  adipose exposed.    Psychiatric:  Judgement and insight: Normal affect with normal thought pattern. Orientation to time, place and person: Normal affect with normal thought pattern. Recent and remote memory: Normal affect with normal thought pattern. Mood and affect: Normal affect with normal thought pattern.         Assessment and Plan     Problem List Items Addressed This Visit    None  Visit Diagnoses       Diabetic ulcer of left foot associated with diabetes mellitus of other type, with fat layer exposed, unspecified part of foot [E13.621, L97.522 (ICD-10-CM)]    -  Primary            Wound Orders:  Wound #12 Left Metatarsal head fifth    Anesthetic  2% Viscous Lidocaine to wound bed prior to procedure. - clinic use only    Hand Hygiene/Smoking Cessation  Wash hands before and after wound care. Call the Wound Center at [phone] if you have signs or symptoms of infection, increased drainage, increasing odor, or unusual redness. After Wound Care hours, please notify your PCP or go to the ER.    Cleanser  Cleanse Wound with Normal Saline  Cleanse wound with non-cytotoxic wound cleanser. - May use Anasept or Vashe to clean with prn odor  Other order: - Wash foot with warm water and mild soap and pat dry with dressing changes    Dressings  Apply dressing. - cover with silvercel, gauze /ABD pad and conform and tape and offloading felt or donut or peg assist darco shoe for offloadng Change qd and prn soiling or dislodgement  Other: - betadine around wounds on macerated skin and air dry    Compression/Edema Control  Elevate leg(s) as much as possible. Avoid standing in one position for more than 10 minutes. Avoid settings with legs down. Do not cross legs when sitting.  Apply Knee-high gradient compression stockings. -  spandagrip size f-g    Off-Loading  Keep weight off: - offloading felt/donut pad/darco peg assist shoe

## 2023-04-06 NOTE — PROGRESS NOTES
HPI  This information was obtained from the patient  Location: #12- L metatarsal head  Duration: onset #12- 8/26/22  Timing: no pain reported  Context: diabetic  Modifying Factors: edema  Associated Signs and Symptoms: callus  Pt returns for wounds to bilateral lower extremities. Wound improved, measures smaller.  Compliance with POC remains acceptable    Review of Systems (ROS)  This information was obtained from the patient    Complaints and Symptoms  Patient complains of:  Cardiovascular (Central/Peripheral): Edema, Lower extremity (leg) swelling  Ear/Nose/Mouth/Throat: Nasal congestion  Integumentary (Hair/Skin/Nails): Open Sore, Calluses/Corns  Musculoskeletal: Assistive Devices (walking cane)  Psychiatric: Depression  Respiratory: Cough    Patient denies complaints or symptoms related to:  Allergic/Immunologic: Hay Fever  Cardiovascular (Central/Peripheral): Lower extremity (leg) resting pain  Constitutional Symptoms (General Health): Chills, Fatigue, Fever, Loss of Appetite  Ear/Nose/Mouth/Throat: Hearing Loss / Aid  Endocrine: Cold Intolerance, Heat Intolerance  Eyes: Double Vision / Spots / Flashing Lights, Partial/Complete Blindness, Vision Changes  Gastrointestinal (GI): Nausea / Vomiting / Diarrhea (N/V/D)  Musculoskeletal: Muscle Weakness  Neurological: Abnormal Gait, Headaches, Weakness  Psychiatric: Anxiety  Respiratory: Oxygen Use, Shortness of Breath, Wheezing    Physical Exam  Constitutional  Pulse rate and rhythm regular. Afebrile. Height within normal limits. overweight. Well developed, well nourished, and in no acute distress. Alert and oriented x3.    Eyes:  Conjunctiva without icterus.    Ears, Nose, Mouth, and Throat:  Symmetric hearing.    Respiratory:  Even respirations without use of accessory muscles. No intercoastal retractions noted. Even and non labored respiration.    Cardiovascular:  See lower extremity assessment when applicable. edema .    Musculoskeletal:  Normal  gait.    Integumentary (Hair, Skin)  adipose exposed.    Psychiatric:  Judgement and insight: Normal affect with normal thought pattern. Orientation to time, place and person: Normal affect with normal thought pattern. Recent and remote memory: Normal affect with normal thought pattern. Mood and affect: Normal affect with normal thought pattern.      Wound Orders:  Wound #12 Left Metatarsal head fifth    Anesthetic  2% Viscous Lidocaine to wound bed prior to procedure. - clinic use only    Hand Hygiene/Smoking Cessation  Wash hands before and after wound care. Call the Wound Center at [phone] if you have signs or symptoms of infection, increased drainage, increasing odor, or unusual redness. After Wound Care hours, please notify your PCP or go to the ER.    Cleanser  Cleanse Wound with Normal Saline  Cleanse wound with non-cytotoxic wound cleanser. - May use Anasept or Vashe to clean with prn odor  Other order: - Wash foot with warm water and mild soap and pat dry with dressing changes    Dressings  Apply dressing. - cover with silvercel, gauze /ABD pad and conform and tape and offloading felt or donut or peg assist darco shoe for offloadng Change qd and prn soiling or dislodgement  Other: - betadine around wounds on macerated skin and air dry    Compression/Edema Control  Elevate leg(s) as much as possible. Avoid standing in one position for more than 10 minutes. Avoid settings with legs down. Do not cross legs when sitting.  Apply Knee-high gradient compression stockings. - spandagrip size f-g    Off-Loading  Keep weight off: - offloading felt/donut pad/darco peg assist shoe

## 2023-04-13 NOTE — PROGRESS NOTES
Subjective     Patient ID: René Moscoso is a 72 y.o. male.    Chief Complaint: No chief complaint on file.    HPI  This information was obtained from the patient  Location: #12- L metatarsal head  Duration: onset #12- 8/26/22  Timing: no pain reported  Context: diabetic  Modifying Factors: edema  Associated Signs and Symptoms: callus  Pt returns for wounds to bilateral lower extremities. Wound continues to improve, is nearly resolved. He also has pressure area to right heel but no open wound at this time. Pt needs offloading both feet.  Compliance with POC remains acceptable       Objective     Physical Exam  Constitutional  Pulse rate and rhythm regular. Afebrile. Height within normal limits. overweight. Well developed, well nourished, and in no acute distress. Alert and oriented x3.    Eyes:  Conjunctiva without icterus.    Ears, Nose, Mouth, and Throat:  Symmetric hearing.    Respiratory:  Even respirations without use of accessory muscles. No intercoastal retractions noted. Even and non labored respiration.    Cardiovascular:  See lower extremity assessment when applicable. no edema.    Musculoskeletal:  Normal gait.    Integumentary (Hair, Skin)  adipose exposed.    Psychiatric:  Judgement and insight: Normal affect with normal thought pattern. Orientation to time, place and person: Normal affect with normal thought pattern. Recent and remote memory: Normal affect with normal thought pattern. Mood and affect: Normal affect with normal thought pattern.       Assessment and Plan     Problem List Items Addressed This Visit    None  Visit Diagnoses       Diabetic ulcer of left foot associated with diabetes mellitus of other type, with fat layer exposed, unspecified part of foot [E13.621, L97.522 (ICD-10-CM)]    -  Primary            Wound Orders:  Wound #12 Left Metatarsal head fifth    Anesthetic  2% Viscous Lidocaine to wound bed prior to procedure. - for proceedures only    Cleanser  Cleanse Wound with Normal  Saline    Moisture Barrier  Skin prep to periwound skin - left foot and right heel    Dressings  Apply dressing. - offloading felt, Promogran gauze and conform roll gauze./tape change 3 x week and prn  Other: - offloading felt to purple tender area right heel and border gauze qod to protect    Compression/Edema Control  Single Layer Compression Wrap to affected leg (s). Apply from toes to knee making sure to cover heel. - spandagrip size F ble    Follow-Up Appointments  Return Appointment 1 week - for wound care    Home Health  Home Health Care: If you have any questions or concerns please contact the wound center.  Other: - please monitor right heel purple area  1. Home Health-Skilled Nurse for dressing change______x on clinic wk_______X on Non-Clinic Wk. - Home Health-Skilled Nurse for dressing change__2____x on clinic wk___3____X on Non-Clinic Wk.

## 2023-04-21 NOTE — PROGRESS NOTES
Subjective     Patient ID: René Moscoso is a 72 y.o. male.    Chief Complaint: Wound Care    HPI  This information was obtained from the patient  Location: #12- L metatarsal head  Duration: onset #12- 8/26/22  Timing: no pain reported  Context: diabetic  Modifying Factors: edema  Associated Signs and Symptoms: callus  Pt returns for wound to [facility]. He states that he was up on his feet quite a bit over the last week as he was planting a garden. His wound is moist and callused. No S/S infection are evident    Review of Systems (ROS)  This information was obtained from the patient    Complaints and Symptoms  Patient complains of:  Cardiovascular (Central/Peripheral): Edema, Lower extremity (leg) swelling  Ear/Nose/Mouth/Throat: Nasal congestion  Integumentary (Hair/Skin/Nails): Open Sore, Calluses/Corns  Musculoskeletal: Assistive Devices (walking cane)  Psychiatric: Depression  Respiratory: Cough    Patient denies complaints or symptoms related to:  Allergic/Immunologic: Hay Fever  Cardiovascular (Central/Peripheral): Lower extremity (leg) resting pain  Constitutional Symptoms (General Health): Chills, Fatigue, Fever, Loss of Appetite  Ear/Nose/Mouth/Throat: Hearing Loss / Aid  Endocrine: Cold Intolerance, Heat Intolerance  Eyes: Double Vision / Spots / Flashing Lights, Partial/Complete Blindness, Vision Changes  Gastrointestinal (GI): Nausea / Vomiting / Diarrhea (N/V/D)  Musculoskeletal: Muscle Weakness  Neurological: Abnormal Gait, Headaches, Weakness  Psychiatric: Anxiety  Respiratory: Oxygen Use, Shortness of Breath, Wheezing       Objective     Physical Exam  Constitutional  Pulse rate and rhythm regular. Afebrile. Height within normal limits. overweight. Well developed, well nourished, and in no acute distress. Alert and oriented x3.    Eyes:  Conjunctiva without icterus.    Ears, Nose, Mouth, and Throat:  Symmetric hearing.    Respiratory:  Even respirations without use of accessory muscles. No intercoastal  retractions noted. Even and non labored respiration.    Cardiovascular:  See lower extremity assessment when applicable. edema .    Musculoskeletal:  Normal gait.    Integumentary (Hair, Skin)  adipose exposed.    Psychiatric:  Judgement and insight: Normal affect with normal thought pattern. Orientation to time, place and person: Normal affect with normal thought pattern. Recent and remote memory: Normal affect with normal thought pattern. Mood and affect: Normal affect with normal thought pattern.         Assessment and Plan     Problem List Items Addressed This Visit          Endocrine    Type 2 diabetes mellitus with left diabetic foot ulcer - Primary       Wound Orders:  Wound #12 Left Metatarsal head fifth    Anesthetic  2% Viscous Lidocaine to wound bed prior to procedure. - for proceedures only    Cleanser  Cleanse Wound with Normal Saline    Moisture Barrier  Skin prep to periwound skin - left foot and right heel    Dressings  Apply dressing. - offloading felt, Promogran gauze and conform roll gauze./tape change 3 x week and prn  Other: - offloading felt to purple tender area right heel and border gauze qod to protect    Compression/Edema Control  Single Layer Compression Wrap to affected leg (s). Apply from toes to knee making sure to cover heel. - spandagrip size F ble

## 2023-04-28 NOTE — PROGRESS NOTES
Subjective     Patient ID: René Moscoso is a 72 y.o. male.    Chief Complaint: Wound Care    HPI  This information was obtained from the patient  Location: #12- L metatarsal head  Duration: onset #12- 8/26/22  Timing: no pain reported  Context: diabetic  Modifying Factors: edema  Associated Signs and Symptoms: callus  Pt returns for wound to [facility]. Wound appears to be resolved at this time. Callus buildup still present but less this week. Will have him return in 2 wks    Review of Systems (ROS)  This information was obtained from the patient    Complaints and Symptoms  Patient complains of:  Cardiovascular (Central/Peripheral): Edema  Integumentary (Hair/Skin/Nails): Calluses/Corns  Musculoskeletal: Assistive Devices (walking cane)  Psychiatric: Depression    Patient denies complaints or symptoms related to:  Allergic/Immunologic: Hay Fever  Cardiovascular (Central/Peripheral): Lower extremity (leg) resting pain  Constitutional Symptoms (General Health): Chills, Fatigue, Fever, Loss of Appetite  Ear/Nose/Mouth/Throat: Hearing Loss / Aid  Endocrine: Cold Intolerance, Heat Intolerance  Eyes: Double Vision / Spots / Flashing Lights, Partial/Complete Blindness, Vision Changes  Gastrointestinal (GI): Nausea / Vomiting / Diarrhea (N/V/D)  Integumentary (Hair/Skin/Nails): Open Sore  Musculoskeletal: Muscle Weakness  Neurological: Abnormal Gait, Headaches, Weakness  Psychiatric: Anxiety  Respiratory       Objective     Physical Exam  Constitutional  Pulse rate and rhythm regular. Afebrile. Height within normal limits. Weight WNL. Well developed, well nourished, and in no acute distress. Alert and oriented x3.    Eyes:  Conjunctiva without icterus.    Ears, Nose, Mouth, and Throat:  Symmetric hearing.    Respiratory:  Even respirations without use of accessory muscles. No intercoastal retractions noted. Even and non labored respiration.    Cardiovascular:  See lower extremity assessment when applicable. edema  .    Musculoskeletal:  Normal gait.    Integumentary (Hair, Skin)  no adipose exposed, wound resolved.    Psychiatric:  Judgement and insight: Normal affect with normal thought pattern. Orientation to time, place and person: Normal affect with normal thought pattern. Recent and remote memory: Normal affect with normal thought pattern. Mood and affect: Normal affect with normal thought pattern.         Assessment and Plan     Problem List Items Addressed This Visit          Endocrine    Type 2 diabetes mellitus with left diabetic foot ulcer - Primary       Keep area clean and well moisturized to maintain new skin integrity  Prevent recurrent skin breakdown  Continue offloading  Prevent infection

## 2023-05-05 NOTE — PROGRESS NOTES
Subjective:         Patient ID: René Moscoso is a 72 y.o. male.    Chief Complaint: Low-back Pain      Pain  This is a chronic problem. The current episode started more than 1 year ago. The problem occurs daily. The problem has been unchanged. Associated symptoms include arthralgias. Pertinent negatives include no anorexia, chest pain, chills, coughing, diaphoresis, fever, neck pain, sore throat, vertigo or vomiting.   Review of Systems   Constitutional:  Negative for activity change, appetite change, chills, diaphoresis and fever.   HENT:  Negative for drooling, ear discharge, ear pain, facial swelling, nosebleeds, sore throat, trouble swallowing, voice change and goiter.    Eyes:  Negative for photophobia, pain, discharge, redness and visual disturbance.   Respiratory:  Negative for apnea, cough, choking, chest tightness, shortness of breath, wheezing and stridor.    Cardiovascular:  Negative for chest pain, palpitations and leg swelling.   Gastrointestinal:  Negative for abdominal distention, anorexia, diarrhea, rectal pain, vomiting and fecal incontinence.   Endocrine: Negative for cold intolerance, heat intolerance, polydipsia, polyphagia and polyuria.   Genitourinary:  Negative for flank pain and frequency.   Musculoskeletal:  Positive for arthralgias and back pain. Negative for neck pain and neck stiffness.   Integumentary:  Negative for color change and pallor.   Neurological:  Negative for dizziness, vertigo, seizures, syncope, facial asymmetry, speech difficulty, light-headedness, coordination difficulties, memory loss and coordination difficulties.   Hematological:  Negative for adenopathy. Does not bruise/bleed easily.   Psychiatric/Behavioral:  Negative for agitation, behavioral problems, confusion, decreased concentration, dysphoric mood, hallucinations, self-injury and suicidal ideas. The patient is not nervous/anxious and is not hyperactive.          Past Medical History:   Diagnosis Date     "Arthritis     Chronic pain 2021    COPD (chronic obstructive pulmonary disease)     Diabetes type 2, controlled     Hyperlipidemia     Neuropathy      Past Surgical History:   Procedure Laterality Date    CARDIAC CATHETERIZATION      ESOPHAGOGASTRODUODENOSCOPY  10/22/2014    FLEXIBLE SIGMOIDOSCOPY  10/23/2014    INJECTION OF FACET JOINT Bilateral 2014    Bilateral L3-S1 Facet Injection - 14 and 3/18/14    SINUS SURGERY       Social History     Socioeconomic History    Marital status: Single    Number of children: 3   Tobacco Use    Smoking status: Former     Packs/day: 2.00     Years: 20.00     Pack years: 40.00     Types: Cigarettes     Start date:      Quit date: 2011     Years since quittin.3    Smokeless tobacco: Current     Types: Snuff   Substance and Sexual Activity    Alcohol use: Not Currently    Drug use: Yes     Types: Hydrocodone    Sexual activity: Not Currently     Family History   Problem Relation Age of Onset    Diabetes Mother     Heart disease Mother     Hypertension Mother     Arthritis Mother     Cancer Mother     Cancer Father     Cancer Sister     COPD Brother      Review of patient's allergies indicates:  No Known Allergies     Objective:  Vitals:    23 0920   BP: 134/73   Pulse: 73   Weight: (!) 137.4 kg (303 lb)   Height: 6' 1" (1.854 m)   PainSc:   4         Physical Exam  Vitals and nursing note reviewed. Exam conducted with a chaperone present.   Constitutional:       General: He is awake. He is not in acute distress.     Appearance: Normal appearance. He is not ill-appearing, toxic-appearing or diaphoretic.   HENT:      Head: Normocephalic and atraumatic.      Nose: Nose normal.      Mouth/Throat:      Mouth: Mucous membranes are moist.      Pharynx: Oropharynx is clear.   Eyes:      Conjunctiva/sclera: Conjunctivae normal.      Pupils: Pupils are equal, round, and reactive to light.   Cardiovascular:      Rate and Rhythm: Normal rate.   Pulmonary:     "  Effort: Pulmonary effort is normal. No respiratory distress.   Abdominal:      Palpations: Abdomen is soft.      Tenderness: There is no guarding.   Musculoskeletal:         General: Normal range of motion.      Cervical back: Normal range of motion and neck supple. No rigidity.      Thoracic back: Tenderness present.      Lumbar back: Tenderness present.   Skin:     General: Skin is warm and dry.      Coloration: Skin is not jaundiced or pale.   Neurological:      General: No focal deficit present.      Mental Status: He is alert and oriented to person, place, and time. Mental status is at baseline.      Cranial Nerves: No cranial nerve deficit (II-XII).   Psychiatric:         Mood and Affect: Mood normal.         Behavior: Behavior normal. Behavior is cooperative.         Thought Content: Thought content normal.         X-Ray Chest PA And Lateral  Narrative: EXAMINATION:  XR CHEST PA AND LATERAL    CLINICAL HISTORY:  Cough, unspecified    COMPARISON:  12/02/2013, 10/27/2016, 11/21/2017, and 08/25/2021    FINDINGS:  PA and lateral chest:    There is mild cardiomegaly but no suggestion of failure.  No acute infiltrates or pleural effusions are seen.  Mild degenerative changes are present in the thoracic spine.  Impression: Chronic changes but no evidence of acute disease.    [facility] of [facility]    Electronically signed by: Carrol Carrasco  Date:    02/02/2023  Time:    12:01       Office Visit on 02/13/2023   Component Date Value Ref Range Status    POC Amphetamines 02/13/2023 Negative  Negative, Inconclusive Final    POC Barbiturates 02/13/2023 Negative  Negative, Inconclusive Final    POC Benzodiazepines 02/13/2023 Negative  Negative, Inconclusive Final    POC Cocaine 02/13/2023 Negative  Negative, Inconclusive Final    POC THC 02/13/2023 Negative  Negative, Inconclusive Final    POC Methadone 02/13/2023 Negative  Negative, Inconclusive Final    POC Methamphetamine 02/13/2023 Negative   Negative, Inconclusive Final    POC Opiates 02/13/2023 Presumptive Positive (A)  Negative, Inconclusive Final    POC Oxycodone 02/13/2023 Negative  Negative, Inconclusive Final    POC Phencyclidine 02/13/2023 Negative  Negative, Inconclusive Final    POC Methylenedioxymethamphetamine * 02/13/2023 Negative  Negative, Inconclusive Final    POC Tricyclic Antidepressants 02/13/2023 Negative  Negative, Inconclusive Final    POC Buprenorphine 02/13/2023 Negative   Final     Acceptable 02/13/2023 Yes   Final    POC Temperature (Urine) 02/13/2023 92   Final   Lab Requisition on 02/01/2023   Component Date Value Ref Range Status    Hemoglobin A1C 02/01/2023 8.2 (H)  4.5 - 6.6 % Final    Estimated Average Glucose 02/01/2023 187  mg/dL Final   Infusion on 01/25/2023   Component Date Value Ref Range Status    Sodium 01/25/2023 140  136 - 145 mmol/L Final    Potassium 01/25/2023 3.9  3.5 - 5.1 mmol/L Final    Chloride 01/25/2023 102  98 - 107 mmol/L Final    CO2 01/25/2023 32  21 - 32 mmol/L Final    Anion Gap 01/25/2023 10  7 - 16 mmol/L Final    Glucose 01/25/2023 148 (H)  74 - 106 mg/dL Final    BUN 01/25/2023 12  7 - 18 mg/dL Final    Creatinine 01/25/2023 1.14  0.70 - 1.30 mg/dL Final    BUN/Creatinine Ratio 01/25/2023 11  6 - 20 Final    Calcium 01/25/2023 8.8  8.5 - 10.1 mg/dL Final    Total Protein 01/25/2023 7.2  6.4 - 8.2 g/dL Final    Albumin 01/25/2023 3.0 (L)  3.5 - 5.0 g/dL Final    Globulin 01/25/2023 4.2 (H)  2.0 - 4.0 g/dL Final    A/G Ratio 01/25/2023 0.7   Final    Bilirubin, Total 01/25/2023 0.4  >0.0 - 1.2 mg/dL Final    Alk Phos 01/25/2023 108  45 - 115 U/L Final    ALT 01/25/2023 11 (L)  16 - 61 U/L Final    AST 01/25/2023 11 (L)  15 - 37 U/L Final    eGFR 01/25/2023 69  >=60 mL/min/1.73m² Final    ESR Westergren 01/25/2023 2  0 - 20 mm/Hr Final    WBC 01/25/2023 7.04  4.50 - 11.00 K/uL Final    RBC 01/25/2023 5.20  4.60 - 6.20 M/uL Final    Hemoglobin 01/25/2023 16.7  13.5 - 18.0 g/dL Final     Hematocrit 01/25/2023 52.8  40.0 - 54.0 % Final    MCV 01/25/2023 101.5 (H)  80.0 - 96.0 fL Final    MCH 01/25/2023 32.1 (H)  27.0 - 31.0 pg Final    MCHC 01/25/2023 31.6 (L)  32.0 - 36.0 g/dL Final    RDW 01/25/2023 14.3  11.5 - 14.5 % Final    Platelet Count 01/25/2023 209  150 - 400 K/uL Final    MPV 01/25/2023 9.9  9.4 - 12.4 fL Final    Neutrophils % 01/25/2023 61.1  53.0 - 65.0 % Final    Lymphocytes % 01/25/2023 27.4  27.0 - 41.0 % Final    Neutrophils, Abs 01/25/2023 4.30  1.80 - 7.70 K/uL Final    Lymphocytes, Absolute 01/25/2023 1.93  1.00 - 4.80 K/uL Final    Diff Type 01/25/2023 Auto   Final    Monocytes % 01/25/2023 9.4 (H)  2.0 - 6.0 % Final    Eosinophils % 01/25/2023 1.8  1.0 - 4.0 % Final    Basophils % 01/25/2023 0.3  0.0 - 1.0 % Final    Monocytes, Absolute 01/25/2023 0.66  0.00 - 0.80 K/uL Final    Eosinophils, Absolute 01/25/2023 0.13  0.00 - 0.50 K/uL Final    Basophils, Absolute 01/25/2023 0.02  0.00 - 0.20 K/uL Final   Lab Requisition on 01/12/2023   Component Date Value Ref Range Status    Sodium 01/12/2023 140  136 - 145 mmol/L Final    Potassium 01/12/2023 4.3  3.5 - 5.1 mmol/L Final    Chloride 01/12/2023 99  98 - 107 mmol/L Final    CO2 01/12/2023 32  21 - 32 mmol/L Final    Anion Gap 01/12/2023 13  7 - 16 mmol/L Final    Glucose 01/12/2023 190 (H)  74 - 106 mg/dL Final    BUN 01/12/2023 12  7 - 18 mg/dL Final    Creatinine 01/12/2023 1.02  0.70 - 1.30 mg/dL Final    BUN/Creatinine Ratio 01/12/2023 12  6 - 20 Final    Calcium 01/12/2023 9.2  8.5 - 10.1 mg/dL Final    Total Protein 01/12/2023 7.4  6.4 - 8.2 g/dL Final    Albumin 01/12/2023 3.2 (L)  3.5 - 5.0 g/dL Final    Globulin 01/12/2023 4.2 (H)  2.0 - 4.0 g/dL Final    A/G Ratio 01/12/2023 0.8   Final    Bilirubin, Total 01/12/2023 0.8  >0.0 - 1.2 mg/dL Final    Alk Phos 01/12/2023 117 (H)  45 - 115 U/L Final    ALT 01/12/2023 16  16 - 61 U/L Final    AST 01/12/2023 13 (L)  15 - 37 U/L Final    eGFR 01/12/2023 79  >=60  mL/min/1.73m² Final    ESR Westergren 01/12/2023 18  0 - 20 mm/Hr Final    WBC 01/12/2023 8.41  4.50 - 11.00 K/uL Final    RBC 01/12/2023 5.21  4.60 - 6.20 M/uL Final    Hemoglobin 01/12/2023 16.7  13.5 - 18.0 g/dL Final    Hematocrit 01/12/2023 51.6  40.0 - 54.0 % Final    MCV 01/12/2023 99.0 (H)  80.0 - 96.0 fL Final    MCH 01/12/2023 32.1 (H)  27.0 - 31.0 pg Final    MCHC 01/12/2023 32.4  32.0 - 36.0 g/dL Final    RDW 01/12/2023 14.1  11.5 - 14.5 % Final    Platelet Count 01/12/2023 199  150 - 400 K/uL Final    MPV 01/12/2023 10.2  9.4 - 12.4 fL Final    Neutrophils % 01/12/2023 64.8  53.0 - 65.0 % Final    Lymphocytes % 01/12/2023 25.8 (L)  27.0 - 41.0 % Final    Neutrophils, Abs 01/12/2023 5.45  1.80 - 7.70 K/uL Final    Lymphocytes, Absolute 01/12/2023 2.17  1.00 - 4.80 K/uL Final    Diff Type 01/12/2023 Auto   Final    Monocytes % 01/12/2023 8.2 (H)  2.0 - 6.0 % Final    Eosinophils % 01/12/2023 1.0  1.0 - 4.0 % Final    Basophils % 01/12/2023 0.2  0.0 - 1.0 % Final    Monocytes, Absolute 01/12/2023 0.69  0.00 - 0.80 K/uL Final    Eosinophils, Absolute 01/12/2023 0.08  0.00 - 0.50 K/uL Final    Basophils, Absolute 01/12/2023 0.02  0.00 - 0.20 K/uL Final   Clinical Support on 12/30/2022   Component Date Value Ref Range Status    Culture, Tissue 12/30/2022 Heavy Growth Methicillin resistant Staphylococcus aureus (A)   Final    Culture, Tissue 12/30/2022 Heavy Growth Enterococcus faecalis (A)   Final   Clinical Support on 11/17/2022   Component Date Value Ref Range Status    Culture, Tissue 11/17/2022 Moderate Growth Staphylococcus aureus (A)   Final   Office Visit on 11/16/2022   Component Date Value Ref Range Status    POC Amphetamines 11/16/2022 Negative  Negative, Inconclusive Final    POC Barbiturates 11/16/2022 Negative  Negative, Inconclusive Final    POC Benzodiazepines 11/16/2022 Negative  Negative, Inconclusive Final    POC Cocaine 11/16/2022 Negative  Negative, Inconclusive Final    POC THC 11/16/2022  Negative  Negative, Inconclusive Final    POC Methadone 11/16/2022 Negative  Negative, Inconclusive Final    POC Methamphetamine 11/16/2022 Negative  Negative, Inconclusive Final    POC Opiates 11/16/2022 Negative  Negative, Inconclusive Final    POC Oxycodone 11/16/2022 Negative  Negative, Inconclusive Final    POC Phencyclidine 11/16/2022 Negative  Negative, Inconclusive Final    POC Methylenedioxymethamphetamine * 11/16/2022 Negative  Negative, Inconclusive Final    POC Tricyclic Antidepressants 11/16/2022 Negative  Negative, Inconclusive Final    POC Buprenorphine 11/16/2022 Negative   Final     Acceptable 11/16/2022 Yes   Final    POC Temperature (Urine) 11/16/2022 92   Final         Orders Placed This Encounter   Procedures    POCT Urine Drug Screen Presump     Interpretive Information:     Negative:  No drug detected at the cut off level.   Positive:  This result represents presumptive positive for the   tested drug, other substances may yield a positive response other   than the analyte of interest. This result should be utilized for   diagnostic purpose only. Confirmation testing will be performed upon physician request only.          Requested Prescriptions     Signed Prescriptions Disp Refills    HYDROcodone-acetaminophen (NORCO)  mg per tablet 90 tablet 0     Sig: Take 1 tablet by mouth every 8 (eight) hours as needed for Pain (pain).    HYDROcodone-acetaminophen (NORCO)  mg per tablet 90 tablet 0     Sig: Take 1 tablet by mouth every 8 (eight) hours as needed for Pain (pain).    HYDROcodone-acetaminophen (NORCO)  mg per tablet 90 tablet 0     Sig: Take 1 tablet by mouth every 8 (eight) hours as needed for Pain (pain).       Assessment:     1. Lumbosacral spondylosis without myelopathy    2. Diabetic polyneuropathy associated with type 2 diabetes mellitus    3. Encounter for long-term (current) use of other medications         A's of Opioid Risk  Assessment  Activity:Patient can perform ADL.   Analgesia:Patients pain is partially controlled by current medication. Patient has tried OTC medications such as Tylenol and Ibuprofen with out relief.   Adverse Effects: Patient denies constipation or sedation.  Aberrant Behavior:  reviewed with no aberrant drug seeking/taking behavior.  Overdose reversal drug naloxone discussed    Drug screen reviewed      Plan:    Narcan February 2023    Pharmacy closed on weekends    Continues following wound management chronic left lower extremity diabetic ulcers    He would like to continue conservative management no new complaints    She states current medications helping control his discomfort    Continue home exercise program as directed    Follow-up 3 months      Dr. Asif, November 2023    Bring original prescription medication bottles/container/box with labels to each visit

## 2023-05-11 NOTE — PROGRESS NOTES
Subjective     Patient ID: René Moscoso is a 72 y.o. male.    Chief Complaint: No chief complaint on file.    Location: #12- L metatarsal head  Duration: onset #12- 8/26/22  Timing: no pain reported  Context: diabetic  Modifying Factors: edema  Associated Signs and Symptoms: callus  Pt returns for wound to [facility]. Wound macerated, has reopened. There is mild erythema to dorsal surface of lateral foot, no pain reported. Home health assisting with wound management. Will obtain C&S today as well as X-ray due to regression of wound    Review of Systems (ROS)  This information was obtained from the patient    Complaints and Symptoms  Patient complains of:  Cardiovascular (Central/Peripheral): Edema  Integumentary (Hair/Skin/Nails): Calluses/Corns  Psychiatric: Depression    Patient denies complaints or symptoms related to:  Allergic/Immunologic: Hay Fever  Cardiovascular (Central/Peripheral): Lower extremity (leg) resting pain  Constitutional Symptoms (General Health): Chills, Fatigue, Fever, Loss of Appetite  Ear/Nose/Mouth/Throat: Hearing Loss / Aid  Endocrine: Cold Intolerance, Heat Intolerance  Eyes: Double Vision / Spots / Flashing Lights, Partial/Complete Blindness, Vision Changes  Gastrointestinal (GI): Nausea / Vomiting / Diarrhea (N/V/D)  Integumentary (Hair/Skin/Nails): Open Sore  Musculoskeletal: Muscle Weakness  Neurological: Abnormal Gait, Headaches, Weakness  Psychiatric: Anxiety  Respiratory     Objective     Physical Exam  Constitutional  Pulse rate and rhythm regular. Afebrile. Height within normal limits. overweight. Well developed, well nourished, and in no acute distress. Alert and oriented x3.    Eyes:  Conjunctiva without icterus.    Ears, Nose, Mouth, and Throat:  Symmetric hearing.    Respiratory:  Even respirations without use of accessory muscles. No intercoastal retractions noted. Even and non labored respiration.    Cardiovascular:  See lower extremity assessment when applicable. edema  .    Musculoskeletal:  Normal gait.    Integumentary (Hair, Skin)  adipose exposed.    Psychiatric:  Judgement and insight: Normal affect with normal thought pattern. Orientation to time, place and person: Normal affect with normal thought pattern. Recent and remote memory: Normal affect with normal thought pattern. Mood and affect: Normal affect with normal thought pattern.         Assessment and Plan     Problem List Items Addressed This Visit          Endocrine    Type 2 diabetes mellitus with left diabetic foot ulcer - Primary    Relevant Orders    X-Ray Foot Complete 3 view Left (Completed)     Other Visit Diagnoses       Diabetic ulcer of left foot associated with diabetes mellitus of other type, with fat layer exposed, unspecified part of foot        Relevant Orders    Culture, Wound          Wound Orders:  Wound #14 Right, Plantar Toe - Fifth    Anesthetic  2% Viscous Lidocaine to wound bed prior to procedure. - for clinic proceedures    Hand Hygiene/Smoking Cessation  Wash hands before and after wound care. Call the Wound Center at [phone] if you have signs or symptoms of infection, increased drainage, increasing odor, or unusual redness. After Wound Care hours, please notify your PCP or go to the ER.    Cleanser  Cleanse Wound with Normal Saline  Other order: - use Vashe or Anasept antimicrobial cleanser    Dressings  Apply dressing. - offloading adhesive felt, silvercel, gauze, ABD pad, conform and tape. daily    Compression/Edema Control  Elevate leg(s) as much as possible. Avoid standing in one position for more than 10 minutes. Avoid settings with legs down. Do not cross legs when sitting.    Off-Loading  Use/Wear when Walking: - offloading post op shoe, adhesive felt around foot wound    Follow-Up Appointments  Complete all lab work and ordered tests before your next visit. - xray left foot today    Antibiotic Therapy  Take oral antibiotics as prescribed - rx to [facility] pharmacy

## 2023-05-18 NOTE — PROGRESS NOTES
Subjective     Patient ID: René Moscoso is a 72 y.o. male.    Chief Complaint: Wound Care    HPI  This information was obtained from the patient  Location: #12- L metatarsal head  Duration: onset #12- 8/26/22  Timing: no pain reported  Context: diabetic  Modifying Factors: edema  Associated Signs and Symptoms: callus  Pt returns for wound to [facility]. Wound macerated, now has satellite area of skin breakdown. X-ray done last week did not suggest evidence of osteomyelitis. Wound culture indicated infection so will treat with oral antibiotics, Bactrim and Clindamycin    Review of Systems (ROS)  This information was obtained from the patient    Complaints and Symptoms  Patient complains of:  Cardiovascular (Central/Peripheral): Edema  Integumentary (Hair/Skin/Nails): Open Sore, Calluses/Corns  Psychiatric: Depression    Patient denies complaints or symptoms related to:  Allergic/Immunologic: Hay Fever  Cardiovascular (Central/Peripheral): Lower extremity (leg) resting pain  Constitutional Symptoms (General Health): Chills, Fatigue, Fever, Loss of Appetite  Ear/Nose/Mouth/Throat: Hearing Loss / Aid  Endocrine: Cold Intolerance, Heat Intolerance  Eyes: Double Vision / Spots / Flashing Lights, Partial/Complete Blindness, Vision Changes  Gastrointestinal (GI): Nausea / Vomiting / Diarrhea (N/V/D)  Musculoskeletal: Muscle Weakness  Neurological: Abnormal Gait, Headaches, Weakness  Psychiatric: Anxiety  Respiratory       Objective     Physical Exam  Constitutional  Afebrile. Height within normal limits. overweight. Well developed, well nourished, and in no acute distress. Alert and oriented x3.    Eyes:  Conjunctiva without icterus.    Ears, Nose, Mouth, and Throat:  Symmetric hearing.    Respiratory:  Even respirations without use of accessory muscles. No intercoastal retractions noted. Even and non labored respiration.    Cardiovascular:  See lower extremity assessment when applicable. edema .    Musculoskeletal:  Normal  gait.    Integumentary (Hair, Skin)  adipose exposed.    Psychiatric:  Judgement and insight: Normal affect with normal thought pattern. Orientation to time, place and person: Normal affect with normal thought pattern. Recent and remote memory: Normal affect with normal thought pattern. Mood and affect: Normal affect with normal thought pattern.       Assessment and Plan     Problem List Items Addressed This Visit    None  Visit Diagnoses       Ulcer of left foot, with fat layer exposed [L97.522 (ICD-10-CM)]    -  Primary            Plan    Wound Orders:  Wound #14 Right, Plantar Toe - Fifth    Anesthetic  2% Viscous Lidocaine to wound bed prior to procedure. - for clinic proceedures    Hand Hygiene/Smoking Cessation  Wash hands before and after wound care. Call the Wound Center at [phone] if you have signs or symptoms of infection, increased drainage, increasing odor, or unusual redness. After Wound Care hours, please notify your PCP or go to the ER.    Cleanser  Cleanse Wound with Normal Saline - wash foot with mild soap and water with dressing changes and pat dry  Other order: - use Vashe or Anasept antimicrobial cleanser    Dressings  Apply dressing. - offloading adhesive felt, silvercel, gauze, ABD pad, conform and tape. daily    Compression/Edema Control  Elevate leg(s) as much as possible. Avoid standing in one position for more than 10 minutes. Avoid settings with legs down. Do not cross legs when sitting.    Off-Loading  Use/Wear when Walking: - offloading post op shoe, adhesive felt around foot wound    Dietary  Supplement with daily multivitamin.  Follow Diabetic diet.  Vitamin C 500 mg. twice a day.  Other orders: - Probiotic 1 po qd. called to [facility]

## 2023-05-22 NOTE — PROGRESS NOTES
Health maintenance record review for population health care gaps    Closing care gaps for [employer] insurance.  Unable to retrieve any records or reports to close any gaps for the insurance health maintenance at this time.

## 2023-06-01 NOTE — PROGRESS NOTES
Subjective     Patient ID: René Moscoso is a 72 y.o. male.    Chief Complaint: No chief complaint on file.    HPI  This information was obtained from the patient  Location: #12- L metatarsal head  Duration: onset #12- 8/26/22  Timing: no pain reported  Context: diabetic  Modifying Factors: edema  Associated Signs and Symptoms: callus  Pt returns for wound to [facility]. Pt has completed Bactrim and Clindamycin for wound infection. Wound measures smaller, still macerated    Review of Systems (ROS)  This information was obtained from the patient    Complaints and Symptoms  Patient complains of:  Cardiovascular (Central/Peripheral): Edema  Integumentary (Hair/Skin/Nails): Open Sore, Calluses/Corns  Psychiatric: Depression    Patient denies complaints or symptoms related to:  Allergic/Immunologic: Hay Fever  Cardiovascular (Central/Peripheral): Lower extremity (leg) resting pain  Constitutional Symptoms (General Health): Chills, Fatigue, Fever, Loss of Appetite  Ear/Nose/Mouth/Throat: Hearing Loss / Aid  Endocrine: Cold Intolerance, Heat Intolerance  Eyes: Double Vision / Spots / Flashing Lights, Partial/Complete Blindness, Vision Changes  Gastrointestinal (GI): Nausea / Vomiting / Diarrhea (N/V/D)  Musculoskeletal: Muscle Weakness  Neurological: Abnormal Gait, Headaches, Weakness  Psychiatric: Anxiety  Respiratory       Objective            Assessment and Plan     1. Type 2 diabetes mellitus with left diabetic foot ulcer    2. Ulcer of left foot, with fat layer exposed [L97.522 (ICD-10-CM)]        Physical Exam  Constitutional  Pulse rate and rhythm regular. Afebrile. Height within normal limits. overweight. Well developed, well nourished, and in no acute distress. Alert and oriented x3.    Eyes:  Conjunctiva without icterus.    Ears, Nose, Mouth, and Throat:  Symmetric hearing.    Respiratory:  Even respirations without use of accessory muscles. No intercoastal retractions noted. Even and non labored  respiration.    Cardiovascular:  See lower extremity assessment when applicable. no edema.    Musculoskeletal:  Normal gait.    Integumentary (Hair, Skin)  adipose exposed.    Psychiatric:  Judgement and insight: Normal affect with normal thought pattern. Orientation to time, place and person: Normal affect with normal thought pattern. Recent and remote memory: Normal affect with normal thought pattern. Mood and affect: Normal affect with normal thought pattern.           No follow-ups on file.

## 2023-06-08 NOTE — PROGRESS NOTES
Subjective     Patient ID: René Moscoso is a 72 y.o. male.    Chief Complaint: No chief complaint on file.    HPI  This information was obtained from the patient  Location: #12- L metatarsal head  Duration: onset #12- 8/26/22  Timing: no pain reported  Context: diabetic  Modifying Factors: edema  Associated Signs and Symptoms: callus  Pt returns for wound to [facility]. Pt has completed Bactrim and Clindamycin for wound infection. Wound measures smaller, still macerated    Review of Systems (ROS)  This information was obtained from the patient    Complaints and Symptoms  Patient complains of:  Cardiovascular (Central/Peripheral): Edema  Integumentary (Hair/Skin/Nails): Open Sore, Calluses/Corns  Psychiatric: Depression    Patient denies complaints or symptoms related to:  Allergic/Immunologic: Hay Fever  Cardiovascular (Central/Peripheral): Lower extremity (leg) resting pain  Constitutional Symptoms (General Health): Chills, Fatigue, Fever, Loss of Appetite  Ear/Nose/Mouth/Throat: Hearing Loss / Aid  Endocrine: Cold Intolerance, Heat Intolerance  Eyes: Double Vision / Spots / Flashing Lights, Partial/Complete Blindness, Vision Changes  Gastrointestinal (GI): Nausea / Vomiting / Diarrhea (N/V/D)  Musculoskeletal: Muscle Weakness  Neurological: Abnormal Gait, Headaches, Weakness  Psychiatric: Anxiety  Respiratory       Objective     Physical Exam  Constitutional  Pulse rate and rhythm regular. Afebrile. Height within normal limits. overweight. Well developed, well nourished, and in no acute distress. Alert and oriented x3.    Eyes:  Conjunctiva without icterus.    Ears, Nose, Mouth, and Throat:  Symmetric hearing.    Respiratory:  Even respirations without use of accessory muscles. No intercoastal retractions noted. Even and non labored respiration.    Cardiovascular:  See lower extremity assessment when applicable. no edema.    Musculoskeletal:  Normal gait.    Integumentary (Hair, Skin)  adipose  exposed.    Psychiatric:  Judgement and insight: Normal affect with normal thought pattern. Orientation to time, place and person: Normal affect with normal thought pattern. Recent and remote memory: Normal affect with normal thought pattern. Mood and affect: Normal affect with normal thought pattern.       Assessment and Plan     1. Diabetic ulcer of left foot associated with diabetes mellitus of other type, with fat layer exposed, unspecified part of foot    2. Idiopathic chronic venous hypertension of left lower extremity with ulcer and inflammation      Wound Orders:  Wound #14 Right, Plantar Toe - Fifth    Anesthetic  2% Viscous Lidocaine to wound bed prior to procedure. - for clinic proceedures    Hand Hygiene/Smoking Cessation  Wash hands before and after wound care. Call the Wound Center at [phone] if you have signs or symptoms of infection, increased drainage, increasing odor, or unusual redness. After Wound Care hours, please notify your PCP or go to the ER.    Cleanser  Cleanse Wound with Normal Saline - wash foot with mild soap and water with dressing changes and pat dry  Other order: - use Vashe or Anasept antimicrobial cleanser    Dressings  Apply dressing. - offloading adhesive felt, silvercel, gauze, ABD pad, conform and tape. daily    Compression/Edema Control  Elevate leg(s) as much as possible. Avoid standing in one position for more than 10 minutes. Avoid settings with legs down. Do not cross legs when sitting.    Off-Loading  Use/Wear when Walking: - offloading post op shoe, adhesive felt around foot wound    Home Health  Home Health Care: If you have any questions or concerns please contact the wound center.  1. Home Health-Skilled Nurse for dressing change______x on clinic wk_______X on Non-Clinic Wk. - Home Health-Skilled Nurse for dressing change___2___x on clinic wk___3____X on Non-Clinic Wk.    3. If caregiver willing and able to perform-may teach dressing change procedure.  4. May  add 2-3 additional skilled nurse visits prn for wound care.  6. If dressings not in stock - May use product equivalent until obtained.           No follow-ups on file.

## 2023-06-14 PROBLEM — U07.1 COVID: Status: RESOLVED | Noted: 2022-07-20 | Resolved: 2023-01-01

## 2023-06-14 PROBLEM — R05.9 COUGH: Status: RESOLVED | Noted: 2022-07-20 | Resolved: 2023-01-01

## 2023-06-14 PROBLEM — J01.01 ACUTE RECURRENT MAXILLARY SINUSITIS: Status: RESOLVED | Noted: 2023-01-01 | Resolved: 2023-01-01

## 2023-06-14 NOTE — ASSESSMENT & PLAN NOTE
meds as ordered, stressed to eat low carb diet, return to clinic as needed and as scheduled, exercise daily, lose wt. Cont matt with wound care   Self

## 2023-06-14 NOTE — PROGRESS NOTES
Olga Monterroso NP   [facility]  [address] [address]  [facility]     PATIENT NAME: René Moscoso  : 1950  DATE: 23  MRN: [id number]      Billing Provider: Olga Monterroso NP  Level of Service: CT OFFICE/OUTPT VISIT, EST, LEVL IV, 30-39 MIN  Patient PCP Information       Provider PCP Type    Olga Monterroso NP General            Reason for Visit / Chief Complaint: Hypertension and Diabetes (3 month follow up. Non-fasting.)       Update PCP  Update Chief Complaint         History of Present Illness / Problem Focused Workflow     René Moscoso presents to the clinic   Here for eval of htn and dm, staed his blood sugar was 160 this am, stated it all depends on what he eats, also stated that he is going to wound clinic again as his foot busted open again, has all dressed and will not remove dressing today since followed by wound care    Review of Systems     Review of Systems   Constitutional:  Negative for chills, fatigue and fever.   HENT:  Negative for nasal congestion, ear pain, facial swelling, hearing loss, mouth dryness, mouth sores, postnasal drip, rhinorrhea, sinus pressure/congestion and goiter.    Eyes:  Negative for discharge and itching.   Respiratory:  Negative for cough, shortness of breath and wheezing.    Cardiovascular:  Negative for chest pain and leg swelling.   Gastrointestinal:  Negative for abdominal pain, change in bowel habit and change in bowel habit.   Genitourinary:  Negative for difficulty urinating, dysuria, enuresis, frequency, hematuria and urgency.   Neurological:  Negative for dizziness, vertigo, syncope, weakness and headaches.   Psychiatric/Behavioral:  Negative for decreased concentration.       Medical / Social / Family History     Past Medical History:   Diagnosis Date    Arthritis     Chronic pain 2021    COPD (chronic obstructive pulmonary disease)     Diabetes type 2, controlled     Hyperlipidemia     Neuropathy        Past Surgical History:   Procedure  Laterality Date    CARDIAC CATHETERIZATION      ESOPHAGOGASTRODUODENOSCOPY  10/22/2014    FLEXIBLE SIGMOIDOSCOPY  10/23/2014    INJECTION OF FACET JOINT Bilateral 07/01/2014    Bilateral L3-S1 Facet Injection - 7/1/14 and 3/18/14    SINUS SURGERY         Social History    reports that he quit smoking about 12 years ago. His smoking use included cigarettes. He started smoking about 32 years ago. He has a 40.00 pack-year smoking history. His smokeless tobacco use includes snuff. He reports that he does not currently use alcohol. He reports current drug use. Drug: Hydrocodone.    Family History  's family history includes Arthritis in his mother; COPD in his brother; Cancer in his father, mother, and sister; Diabetes in his mother; Heart disease in his mother; Hypertension in his mother.    Medications and Allergies     Medications  Outpatient Medications Marked as Taking for the 6/14/23 encounter (Office Visit) with Olga Monterroso NP   Medication Sig Dispense Refill    albuterol (ACCUNEB) 0.63 mg/3 mL Nebu Take 0.63 mg by nebulization every 6 (six) hours as needed. Rescue      aspirin 81 MG Chew Take 81 mg by mouth once daily.      blood-glucose meter Misc USE TO CHECK BLOOD SUGAR      fluticasone propionate (FLONASE) 50 mcg/actuation nasal spray 1 spray (50 mcg total) by Each Nostril route once daily. 15.8 mL 0    HYDROcodone-acetaminophen (NORCO)  mg per tablet Take 1 tablet by mouth every 8 (eight) hours as needed for Pain (pain). 90 tablet 0    [START ON 7/13/2023] HYDROcodone-acetaminophen (NORCO)  mg per tablet Take 1 tablet by mouth every 8 (eight) hours as needed for Pain (pain). 90 tablet 0    ipratropium (ATROVENT) 42 mcg (0.06 %) nasal spray 2 sprays by Nasal route 3 (three) times daily. 15 mL 2    ipratropium/albuterol sulfate (COMBIVENT INHL) Inhale into the lungs.      LEVEMIR FLEXTOUCH U-100 INSULN 100 unit/mL (3 mL) InPn pen INJECT 92 units SUBCUTANEOUSLY AT BEDTIME 30 mL 5     naloxone (NARCAN) 4 mg/actuation Spry if used, call 911,instill one SPRAYS alternate nostril, MAY REPEAT EVERY 2-3 minutes AS NEEDED.      TRUE METRIX GLUCOSE TEST STRIP Strp USE TO USE TO CHECK BLOOD SUGAR BLOOD GLUCOSE TWICE DAILY 50 each 11    VENTOLIN HFA 90 mcg/actuation inhaler INHALE TWO PUFFS BY MOUTH TWICE DAILY AS NEEDED 18 g 5    [DISCONTINUED] gabapentin (NEURONTIN) 600 MG tablet TAKE ONE TABLET BY MOUTH TWICE DAILY 180 tablet 1    [DISCONTINUED] gemfibroziL (LOPID) 600 MG tablet TAKE ONE TABLET BY MOUTH TWICE DAILY 180 tablet 1    [DISCONTINUED] hydroCHLOROthiazide (MICROZIDE) 12.5 mg capsule TAKE ONE CAPSULE BY MOUTH EVERY DAY 90 capsule 1    [DISCONTINUED] isosorbide mononitrate (IMDUR) 30 MG 24 hr tablet TAKE ONE TABLET BY MOUTH ONCE DAILY 90 tablet 1    [DISCONTINUED] linaGLIPtin (TRADJENTA) 5 mg Tab tablet Take 5 mg by mouth once daily.      [DISCONTINUED] linaGLIPtin (TRADJENTA) 5 mg Tab tablet Take 5 mg by mouth once daily.      [DISCONTINUED] lisinopriL (PRINIVIL,ZESTRIL) 5 MG tablet TAKE ONE TABLET BY MOUTH DAILY 90 tablet 1    [DISCONTINUED] memantine (NAMENDA) 10 MG Tab TAKE ONE TABLET BY MOUTH TWICE DAILY 180 tablet 1    [DISCONTINUED] nitroGLYCERIN (NITROSTAT) 0.4 MG SL tablet DISSOLVE 1 TABLET UNDER THE TONGUE EVERY 5 MINUTES AS NEEDED FOR CHEST PAIN. DO NOT EXCEED A TOTAL OF 3 DOSES IN 15 MINUTES. 30 tablet 5    [DISCONTINUED] NOVOLOG MIX 70-30FLEXPEN U-100 100 unit/mL (70-30) InPn pen INJECT 94 UNITS SUBCUTANEOUSLY EVERY MORNING AND 90 UNITS EVERY EVENING 90 mL 5    [DISCONTINUED] omeprazole (PRILOSEC) 20 MG capsule TAKE ONE CAPSULE BY MOUTH EVERY DAY 28 capsule 1    [DISCONTINUED] RYBELSUS 14 mg tablet TAKE ONE TABLET BY MOUTH ONCE DAILY 90 tablet 1    [DISCONTINUED] simvastatin (ZOCOR) 40 MG tablet TAKE ONE TABLET BY MOUTH AT BEDTIME 90 tablet 1    [DISCONTINUED] SYNJARDY XR 12.5-1,000 mg TBph TAKE TWO TABLETS BY MOUTH EVERY MORNING 180 tablet 1    [DISCONTINUED] tiZANidine (ZANAFLEX)  "4 MG tablet TAKE 1 OR 2 TABLETS BY MOUTH AT BEDTIME AS NEEDED 56 tablet 1       Allergies  Review of patient's allergies indicates:  No Known Allergies    Physical Examination     Vitals:    06/14/23 1104   BP: 127/73   BP Location: Left arm   Patient Position: Sitting   Pulse: 71   Resp: (!) 22   Temp: 98.3 °F (36.8 °C)   SpO2: (!) 91%   Weight: 134.7 kg (297 lb)   Height: 6' 1" (1.854 m)      Physical Exam  Constitutional:       Appearance: Normal appearance.   HENT:      Head: Normocephalic.      Right Ear: Tympanic membrane, ear canal and external ear normal.      Left Ear: Tympanic membrane, ear canal and external ear normal.      Nose: Nose normal.      Mouth/Throat:      Mouth: Mucous membranes are moist.      Pharynx: Oropharynx is clear.   Eyes:      Extraocular Movements: Extraocular movements intact.      Conjunctiva/sclera: Conjunctivae normal.      Pupils: Pupils are equal, round, and reactive to light.   Cardiovascular:      Rate and Rhythm: Normal rate and regular rhythm.      Pulses: Normal pulses.      Heart sounds: Normal heart sounds.   Pulmonary:      Effort: Pulmonary effort is normal.      Breath sounds: Normal breath sounds.   Abdominal:      General: Bowel sounds are normal.      Palpations: Abdomen is soft.   Musculoskeletal:         General: Normal range of motion.   Skin:     General: Skin is warm and dry.      Capillary Refill: Capillary refill takes less than 2 seconds.      Comments: Left foot with dressing on it due to foot ulcer, did not redress due to sees wound care at [facility]   Neurological:      General: No focal deficit present.      Mental Status: He is alert and oriented to person, place, and time.   Psychiatric:         Mood and Affect: Mood normal.         Behavior: Behavior normal.        Assessment and Plan (including Health Maintenance)      Problem List  Smart Sets  Document Outside HM   :    Plan:     Type 2 diabetes mellitus with left diabetic foot ulcer  -     " Hemoglobin A1C; Future; Expected date: 06/14/2023    Essential hypertension, benign  -     CBC Auto Differential; Future; Expected date: 06/14/2023  -     Comprehensive Metabolic Panel; Future; Expected date: 06/14/2023  -     Basic Metabolic Panel; Future; Expected date: 06/14/2023  -     Lipid Panel; Future; Expected date: 06/14/2023  -     Microalbumin/Creatinine Ratio, Urine; Future; Expected date: 06/14/2023    Severe obesity (BMI 35.0-39.9) with comorbidity    Other orders  -     gabapentin (NEURONTIN) 600 MG tablet; Take 1 tablet (600 mg total) by mouth 2 (two) times daily.  Dispense: 180 tablet; Refill: 1  -     gemfibroziL (LOPID) 600 MG tablet; Take 1 tablet (600 mg total) by mouth 2 (two) times daily.  Dispense: 180 tablet; Refill: 1  -     hydroCHLOROthiazide (MICROZIDE) 12.5 mg capsule; Take 1 capsule (12.5 mg total) by mouth once daily.  Dispense: 90 capsule; Refill: 1  -     isosorbide mononitrate (IMDUR) 30 MG 24 hr tablet; Take 1 tablet (30 mg total) by mouth once daily.  Dispense: 90 tablet; Refill: 1  -     linaGLIPtin (TRADJENTA) 5 mg Tab tablet; Take 1 tablet (5 mg total) by mouth once daily.  Dispense: 90 tablet; Refill: 1  -     lisinopriL (PRINIVIL,ZESTRIL) 5 MG tablet; Take 1 tablet (5 mg total) by mouth once daily.  Dispense: 90 tablet; Refill: 1  -     memantine (NAMENDA) 10 MG Tab; Take 1 tablet (10 mg total) by mouth 2 (two) times daily.  Dispense: 180 tablet; Refill: 1  -     nitroGLYCERIN (NITROSTAT) 0.4 MG SL tablet; DISSOLVE 1 TABLET UNDER THE TONGUE EVERY 5 MINUTES AS NEEDED FOR CHEST PAIN. DO NOT EXCEED A TOTAL OF 3 DOSES IN 15 MINUTES.  Dispense: 30 tablet; Refill: 5  -     insulin aspart protamine-insulin aspart (NOVOLOG MIX 70-30FLEXPEN U-100) 100 unit/mL (70-30) InPn pen; INJECT 94 UNITS SUBCUTANEOUSLY EVERY MORNING AND 90 UNITS EVERY EVENING  Dispense: 90 mL; Refill: 5  -     omeprazole (PRILOSEC) 20 MG capsule; Take 1 capsule (20 mg total) by mouth once daily.  Dispense: 90  capsule; Refill: 1  -     semaglutide (RYBELSUS) 14 mg tablet; Take 1 tablet (14 mg total) by mouth once daily.  Dispense: 90 tablet; Refill: 1  -     simvastatin (ZOCOR) 40 MG tablet; Take 1 tablet (40 mg total) by mouth every evening.  Dispense: 90 tablet; Refill: 1  -     empagliflozin-metformin (SYNJARDY XR) 12.5-1,000 mg TBph; Take 2 tablets by mouth every morning.  Dispense: 180 tablet; Refill: 1  -     tiZANidine (ZANAFLEX) 4 MG tablet; TAKE 1 OR 2 TABLETS BY MOUTH AT BEDTIME AS NEEDED  Dispense: 56 tablet; Refill: 1            Health Maintenance Due   Topic Date Due    Hepatitis C Screening  Never done    Sign Pain Contract  Never done    LDCT Lung Screen  Never done    Shingles Vaccine (1 of 2) Never done    Colorectal Cancer Screening  10/03/2017    Abdominal Aortic Aneurysm Screening  Never done    Pneumococcal Vaccines (Age 65+) (3 - PPSV23 if available, else PCV20) 10/20/2020    COVID-19 Vaccine (3 - Moderna series) 04/07/2021    Eye Exam  12/29/2022    Diabetes Urine Screening  01/20/2023    Lipid Panel  01/20/2023    Hemoglobin A1c  05/01/2023       Problem List Items Addressed This Visit          Cardiac/Vascular    Essential hypertension, benign    Current Assessment & Plan     The current medical regimen is effective;  continue present plan and medications.           Relevant Orders    CBC Auto Differential    Comprehensive Metabolic Panel    Basic Metabolic Panel    Lipid Panel    Microalbumin/Creatinine Ratio, Urine       Endocrine    Type 2 diabetes mellitus with left diabetic foot ulcer - Primary    Current Assessment & Plan     meds as ordered, stressed to eat low carb diet, return to clinic as needed and as scheduled, exercise daily, lose wt. Cont matt with wound care         Relevant Medications    linaGLIPtin (TRADJENTA) 5 mg Tab tablet    insulin aspart protamine-insulin aspart (NOVOLOG MIX 70-30FLEXPEN U-100) 100 unit/mL (70-30) InPn pen    semaglutide (RYBELSUS) 14 mg tablet    Other  Relevant Orders    Hemoglobin A1C    Severe obesity (BMI 35.0-39.9) with comorbidity    Current Assessment & Plan     Watch low carb diet, exercise, lose wt              Health Maintenance Topics with due status: Not Due       Topic Last Completion Date    TETANUS VACCINE 05/27/2021    Influenza Vaccine 10/04/2021    Low Dose Statin 03/08/2023    Foot Exam 03/08/2023       Procedures     Future Appointments   Date Time Provider Department Center   6/15/2023  8:30 AM WOUND CARE, [facility] [place]   8/2/2023  8:30 AM AWV NURSE, [facility]   8/7/2023  9:30 AM EVARISTO Roberts [facility]   9/19/2023  8:45 AM Olga Monterroso NP [facility]        Follow up in about 3 months (around 9/14/2023).       Signature:  Olga Monterroso NP    Date of encounter: 6/14/23

## 2023-06-16 NOTE — PROGRESS NOTES
Subjective     Patient ID: René Moscoso is a 72 y.o. male.    Chief Complaint: Wound Care    HPI  This information was obtained from the patient  Location: #15- L metatarsal head, 5th  Duration: onset #15- 8/26/22  Timing: no pain reported  Context: diabetic  Modifying Factors: edema  Associated Signs and Symptoms: callus  Pt returns for wound to [facility]. Wound continues to improve, granulating. Compliance with POC seems to be acceptable. No S/S infection at this time    Review of Systems (ROS)  This information was obtained from the patient    Complaints and Symptoms  Patient complains of:  Cardiovascular (Central/Peripheral): Edema  Integumentary (Hair/Skin/Nails): Open Sore, Calluses/Corns  Psychiatric: Depression    Patient denies complaints or symptoms related to:  Allergic/Immunologic: Hay Fever  Cardiovascular (Central/Peripheral): Lower extremity (leg) resting pain  Constitutional Symptoms (General Health): Chills, Fatigue, Fever, Loss of Appetite  Ear/Nose/Mouth/Throat: Hearing Loss / Aid  Endocrine: Cold Intolerance, Heat Intolerance  Eyes: Double Vision / Spots / Flashing Lights, Partial/Complete Blindness, Vision Changes  Gastrointestinal (GI): Nausea / Vomiting / Diarrhea (N/V/D)  Musculoskeletal: Muscle Weakness  Neurological: Abnormal Gait, Headaches, Weakness  Psychiatric: Anxiety  Respiratory       Objective     Physical Exam  Constitutional  Pulse rate and rhythm regular. Afebrile. Height within normal limits. overweight. Well developed, well nourished, and in no acute distress. Alert and oriented x3.    Eyes:  Conjunctiva without icterus.    Ears, Nose, Mouth, and Throat:  Symmetric hearing.    Respiratory:  Even respirations without use of accessory muscles. No intercoastal retractions noted. Even and non labored respiration.    Cardiovascular:  See lower extremity assessment when applicable. edema .    Musculoskeletal:  Normal gait.    Integumentary (Hair, Skin)  adipose  exposed.    Psychiatric:  Judgement and insight: Normal affect with normal thought pattern. Orientation to time, place and person: Normal affect with normal thought pattern. Recent and remote memory: Normal affect with normal thought pattern. Mood and affect: Normal affect with normal thought pattern.       Assessment and Plan     1. Ulcer of left foot, with fat layer exposed [L97.522 (ICD-10-CM)]        Wound Orders:  Wound #15 Left, Plantar Metatarsal head fifth    Anesthetic  2% Viscous Lidocaine to wound bed prior to procedure. - for debridement    Cleanser  Cleanse Wound with Normal Saline    Moisture Barrier  Skin prep to periwound skin - and under adhesive felt    Dressings  Apply dressing. - silvercel, border gauze over adhesive felt. change daily    Compression/Edema Control  Elevate leg(s) as much as possible. Avoid standing in one position for more than 10 minutes. Avoid settings with legs down. Do not cross legs when sitting.  Apply Knee-high gradient compression stockings. - sppandagrip size F    Off-Loading  Use/Wear when Walking: - adhesive felt around wound         No follow-ups on file.

## 2023-06-22 NOTE — PROGRESS NOTES
Subjective     Patient ID: René Moscoso is a 72 y.o. male.    Chief Complaint: Wound Care    HPI  This information was obtained from the patient  Location: #15- L metatarsal head, 5th  Duration: onset #15- 8/26/22  Timing: no pain reported  Context: diabetic  Modifying Factors: edema  Associated Signs and Symptoms: callus  Pt returns for wound to [facility]. Wound  improved in length and width. He is wearing Darco shoe. Blood glucose 160 this am. No S/S infection at this time    Review of Systems (ROS)  This information was obtained from the patient    Complaints and Symptoms  Patient complains of:  Cardiovascular (Central/Peripheral): Edema  Integumentary (Hair/Skin/Nails): Open Sore, Calluses/Corns  Psychiatric: Depression    Patient denies complaints or symptoms related to:  Allergic/Immunologic: Hay Fever  Cardiovascular (Central/Peripheral): Lower extremity (leg) resting pain  Constitutional Symptoms (General Health): Chills, Fatigue, Fever, Loss of Appetite  Ear/Nose/Mouth/Throat: Hearing Loss / Aid  Endocrine: Cold Intolerance, Heat Intolerance  Eyes: Double Vision / Spots / Flashing Lights, Partial/Complete Blindness, Vision Changes  Gastrointestinal (GI): Nausea / Vomiting / Diarrhea (N/V/D)  Musculoskeletal: Muscle Weakness  Neurological: Abnormal Gait, Headaches, Weakness  Psychiatric: Anxiety  Respiratory       Objective     Physical Exam  Constitutional  Pulse rate and rhythm regular. Afebrile. Height within normal limits. Weight WNL. Well developed, well nourished, and in no acute distress. Alert and oriented x3.    Eyes:  Conjunctiva without icterus.    Ears, Nose, Mouth, and Throat:  Symmetric hearing.    Respiratory:  Even respirations without use of accessory muscles. No intercoastal retractions noted. Even and non labored respiration.    Cardiovascular:  See lower extremity assessment when applicable. edema .    Musculoskeletal:  Normal gait.    Integumentary (Hair, Skin)  adipose  exposed.    Psychiatric:  Judgement and insight: Normal affect with normal thought pattern. Orientation to time, place and person: Normal affect with normal thought pattern. Recent and remote memory: Normal affect with normal thought pattern. Mood and affect: Normal affect with normal thought pattern.         Assessment and Plan     1. Diabetic ulcer of left foot associated with diabetes mellitus of other type, with fat layer exposed, unspecified part of foot          [id number] Header Image    Progress Note Details  Patient Name: René Moscoso  Patient Number: [id number]  Patient YOB: 1950  Patient Account Number: [id number]  Date: 06/22/2023  Clinician: David Osborn RN, CWON  Physician / Extender: Gladis Gates  Visit Type: Wound Care Follow-Up Visit  Subjective    Allergies  No Known Allergies    HPI  This information was obtained from the patient  Location: #15- L metatarsal head, 5th  Duration: onset #15- 8/26/22  Timing: no pain reported  Context: diabetic  Modifying Factors: edema  Associated Signs and Symptoms: callus  Pt returns for wound to [facility]. Wound  improved in length and width. He is wearing Darco shoe. Blood glucose 160 this am. No S/S infection at this time    Review of Systems (ROS)  This information was obtained from the patient    Complaints and Symptoms  Patient complains of:  Cardiovascular (Central/Peripheral): Edema  Integumentary (Hair/Skin/Nails): Open Sore, Calluses/Corns  Psychiatric: Depression    Patient denies complaints or symptoms related to:  Allergic/Immunologic: Hay Fever  Cardiovascular (Central/Peripheral): Lower extremity (leg) resting pain  Constitutional Symptoms (General Health): Chills, Fatigue, Fever, Loss of Appetite  Ear/Nose/Mouth/Throat: Hearing Loss / Aid  Endocrine: Cold Intolerance, Heat Intolerance  Eyes: Double Vision / Spots / Flashing Lights, Partial/Complete Blindness, Vision Changes  Gastrointestinal (GI): Nausea / Vomiting / Diarrhea  (N/V/D)  Musculoskeletal: Muscle Weakness  Neurological: Abnormal Gait, Headaches, Weakness  Psychiatric: Anxiety  Respiratory        Objective    Wound Assessment(s)  Wound #15 Left, Plantar Metatarsal head fifth is a chronic Lema Grade 1 Diabetic Ulcer and has received a status of Not Healed. Initial wound encounter measurements are 0.4cm length x 0.4cm width x 0.2cm depth, with an area of 0.16 sq cm and a volume of 0.032 cubic cm. Adipose is exposed. No tunneling has been noted. No sinus tract has been noted. No undermining has been noted. There is a moderate amount of serosanguineous drainage noted which has no odor. The patient reports a wound pain of level 0/10. The wound margin is thickened. Wound bed has Yes epithelialization, No eschar, Yes slough, Yes bright red, pink, firm granulation.  The periwound skin exhibited: Edema, Callus, Dry/Scaly, Hemosiderosis. The periwound skin did not exhibit: Brawny Induration, Excoriation, Induration, Crepitus, Fluctuance, Friable, Rash, Denuded, Moist, Maceration, Atrophie [place], Cyanosis, Ecchymosis, Erythema, Pallor, Rubor. The temperature of the periwound skin is WNL. Periwound skin does not exhibit signs or symptoms of infection. Local Pulse is Normal.    Physical Exam  Constitutional  Pulse rate and rhythm regular. Afebrile. Height within normal limits. Weight WNL. Well developed, well nourished, and in no acute distress. Alert and oriented x3.    Eyes:  Conjunctiva without icterus.    Ears, Nose, Mouth, and Throat:  Symmetric hearing.    Respiratory:  Even respirations without use of accessory muscles. No intercoastal retractions noted. Even and non labored respiration.    Cardiovascular:  See lower extremity assessment when applicable. edema .    Musculoskeletal:  Normal gait.    Integumentary (Hair, Skin)  adipose exposed.    Psychiatric:  Judgement and insight: Normal affect with normal thought pattern. Orientation to time, place and person: Normal affect  with normal thought pattern. Recent and remote memory: Normal affect with normal thought pattern. Mood and affect: Normal affect with normal thought pattern.        Assessment    Active Problems    ICD-10  (Encounter Diagnosis) Z22.322 - Carrier or suspected carrier of Methicillin resistant Staphylococcus aureus  (Encounter Diagnosis) I10 - Essential (primary) hypertension  (Encounter Diagnosis) G62.9 - Polyneuropathy, unspecified  M47.817 - Spondylosis without myelopathy or radiculopathy, lumbosacral region  J44.9 - Chronic obstructive pulmonary disease, unspecified  (Encounter Diagnosis) I87.332 - Chronic venous hypertension (idiopathic) with ulcer and inflammation of left lower extremity  (Encounter Diagnosis) L03.116 - Cellulitis of left lower limb  (Encounter Diagnosis) R60.9 - Edema, unspecified  (Encounter Diagnosis) L97.322 - Non-pressure chronic ulcer of left 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 A skin/subcutaneous tissue level excisional/surgical debridement with a total area debrided of 0.25 sq cm was performed by Gladis Gates FNP-C. Dermis, Epidermis, and Subcutaneous were removed along with devitalized tissue: callus and slough. The following instrument(s) were used: blade. Pain control was achieved using LIDOCAINE VISC 2%. A time out was conducted prior to the start of the procedure. A minimal amount of bleeding was controlled with pressure. The procedure was tolerated well with a pain level of 0 throughout and a pain level of 0 following the procedure. Post Debridement Measurements: 0.5cm length x 0.5cm width x 0.3cm depth; with an area of 0.25 sq cm and a volume of 0.075 cubic cm;        Plan    Wound Orders:  Wound #15 Left, Plantar Metatarsal head fifth      Cleanser  Cleanse Wound  with Normal Saline    Moisture Barrier  Skin prep to periwound skin - and under adhesive felt    Dressings  Apply dressing. - silvercel, border gauze over adhesive felt. change daily    Compression/Edema Control  Elevate leg(s) as much as possible. Avoid standing in one position for more than 10 minutes. Avoid settings with legs down. Do not cross legs when sitting.  Apply Knee-high gradient compression stockings. - sppandagrip size F    Off-Loading  Use/Wear when Walking: - adhesive felt around wound    Dietary  Supplement with daily multivitamin.  Follow Diabetic diet.  Vitamin C 500 mg. twice a day.    Follow-Up Appointments  Return Appointment 1 week - for wound care    Home Health  1. Home Health-Skilled Nurse for dressing change______x on clinic wk_______X on Non-Clinic Wk. - Home Health-Skilled Nurse for dressing change______x on clinic wk_______X on Non-Clinic Wk.    3. If caregiver willing and able to perform-may teach dressing change procedure.  4. May add 2-3 additional skilled nurse visits prn for wound care.  6. If dressings not in stock - May use product equivalent until obtained.         No follow-ups on file.

## 2023-06-30 NOTE — TELEPHONE ENCOUNTER
Pt forgot about appt today, we were going to do a PEP talk and review on DM and goal for tighter control. Pt has DM ulcer of left foot,revisit with wound care was June 22.  I called to check on Pt, asking about all the DM meds listed on chart. Pt tells me taking Synjardy XR 2 daily,Rybelsus 14mg early morning without food, injects 70/30 insulin at b'fast time and supper, 94units/90units.  Pt injects 92units Levemir at night and takes Tradjenta 1pill daily.   Pt tells me attempting to eat better, more water and only SF soft drinks and attempts to take meds as ordered in chart.  I praised Pt for better Hgba1c 8% but reminded the tighter BS is the better wound healing may occur, also with a wound the BS may be elevated. I tried to encourage Pt on the steady work of taking care of DM, it is a job for everyday diligence. Keep on!

## 2023-07-06 NOTE — PROGRESS NOTES
Subjective     Patient ID: René Moscoso is a 72 y.o. male.    Chief Complaint: No chief complaint on file.    Location: #15- L metatarsal head, 5th  Duration: onset #15- 8/26/22  Timing: no pain reported  Context: diabetic  Modifying Factors: edema  Associated Signs and Symptoms: callus  Pt returns for wound to L [facility]. Measurements of wound are larger today, though no S/S infection are evident. Pt missed appt last week. He is wearing Darco shoe today  Review of Systems (ROS)  This information was obtained from the Patient.    Complaints and Symptoms  Patient complains of:  Cardiovascular (Central/Peripheral): Edema    Integumentary (Hair/Skin/Nails): Open Sore, Calluses/Corns    Psychiatric: Depression    Patient denies complaints or symptoms related to:  Allergic/Immunologic: Hay Fever    Cardiovascular (Central/Peripheral): Lower extremity (leg) resting pain    Constitutional Symptoms (General Health): Chills, Fatigue, Fever, Loss of Appetite    Ear/Nose/Mouth/Throat: Hearing Loss / Aid    Endocrine: Cold Intolerance, Heat Intolerance    Eyes: Double Vision / Spots / Flashing Lights, Partial/Complete Blindness, Vision Changes    Gastrointestinal (GI): Nausea / Vomiting / Diarrhea (N/V/D)    Musculoskeletal: Muscle Weakness, Assistive Devices (walking cane)    Neurological: Abnormal Gait, Headaches, Weakness    Psychiatric: Anxiety    Respiratory       Objective     Physical Exam  Constitutional:  Blood pressure normal. Pulse rate and rhythm regular. Afebrile. Height within normal limits. overweight. Well developed, well nourished, and in no acute distress. Alert and oriented x3.  Eyes:  Conjunctiva without icterus.  Ears, Nose, Mouth, and Throat:  Symmetric hearing.  Respiratory:  Even respirations without use of accessory muscles. No intercoastal retractions noted. Even and non labored respiration.  Cardiovascular:  See lower extremity assessment when applicable. edema .  Musculoskeletal:  Normal  gait.  Integumentary (Hair, Skin):  adipose exposed.  Psychiatric:  Judgement and insight: Normal affect with normal thought pattern. Orientation to time, place and person: Normal affect with normal thought pattern. Recent and remote memory: Normal affect with normal thought pattern. Mood and affect: Normal affect with normal thought pattern.       Assessment and Plan     1. Diabetic ulcer of left foot associated with diabetes mellitus of other type, with fat layer exposed, unspecified part of foot      Plan  Wound Orders:  Wound #15 Left, Plantar Metatarsal head fifth  Anesthetic  2% Viscous Lidocaine to wound bed prior to procedure. - for debridement  Cleanser  Cleanse Wound with Normal Saline  Moisture Barrier  Skin prep to periwound skin - and under adhesive felt  Dressings  Apply dressing. - silvercel, border gauze over adhesive felt. change daily  Compression/Edema Control  Elevate leg(s) as much as possible. Avoid standing in one position for more than 10 minutes. Avoid settings with legs down. Do not cross legs when sitting.  Apply Knee-high gradient compression stockings. - sppandagrip size F  Off-Loading  Use/Wear when Walking: - adhesive felt around wound  Dietary  Supplement with daily multivitamin.  Follow Diabetic diet.  Vitamin C 500 mg. twice a day.  Follow-Up Appointments  Return Appointment 1 week - for wound care  Home Health  1. Home Health-Skilled Nurse for dressing change______x on clinic wk_______X on Non-Clinic Wk. - Home Health-Skilled Nurse for dressing change______x on clinic wk_______X on Non-Clinic Wk.  3. If caregiver willing and able to perform-may teach dressing change procedure.  4. May add 2-3 additional skilled nurse visits prn for wound care.  6. If dressings not in stock - May use product equivalent until obtained.           No follow-ups on file.

## 2023-07-22 NOTE — PROGRESS NOTES
HPI  This information was obtained from the Patient.    Location: #15- L metatarsal head, 5th  Duration: onset #15- 8/26/22  Timing: no pain reported  Context: diabetic  Modifying Factors: edema  Associated Signs and Symptoms: callus  Pt returns for wound to L [facility]. Wound appears improved, measures smaller since last visit. Slight erythema noted to periwound. Will continue Silvercel and re-eval next week for S/S infection and obtain C&S if necessary. Pt presents wearing Darco shoe but is likely ambulating too much    Objective  Physical Exam  Constitutional:  Pulse rate and rhythm regular. Afebrile. Height within normal limits. overweight. Well developed, well nourished, and in no acute distress. Alert and oriented x3.  Eyes:  Conjunctiva without icterus.  Ears, Nose, Mouth, and Throat:  Symmetric hearing.  Respiratory:  Even respirations without use of accessory muscles. No intercoastal retractions noted. Even and non labored respiration.  Cardiovascular:  See lower extremity assessment when applicable. edema .  Musculoskeletal:  Normal gait.  Integumentary (Hair, Skin):  adipose exposed.  Psychiatric:  Judgement and insight: Normal affect with normal thought pattern. Orientation to time, place and person: Normal affect with normal thought pattern. Recent and remote memory: Normal affect with normal thought pattern. Mood and affect: Normal affect with normal thought pattern.  Wound Assessment(s)  Wound #15 Left, Plantar Metatarsal head fifth is a chronic Lema Grade 1 Diabetic Ulcer and has received a status of Not Healed. Initial wound encounter measurements are 0.4cm length x 0.7cm width x 0.1 cm depth, with an area of 0.28 sq cm and a volume of 0.028 cubic cm. Adipose is exposed. No tunneling has been noted. No sinus tract has been noted. No undermining has been noted. There is a Moderate amount of serosanguineous drainage noted which has no odor. The patient reports a wound pain of level 0/10. The wound  margin is thickened. Wound bed has Yes, bright red, pink, firm, granulation, Yes slough, No eschar, Yes epithelialization.    The periwound skin exhibited edema, callus, erythema and hemosiderosis. The periwound skin did not exhibit brawny induration, excoriation, induration, crepitus, fluctuance, rash, maceration, atrophie nick, cyanosis, ecchymosis, pallor and rubor. The periwound skin was dry/scaly. The periwound skin was not friable, denuded and moist. The temperature of the periwound skin is WNL. Periwound skin does not exhibit signs or symptoms of infection. Local Pulse is Normal.    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, lumbosacral region    J44.9 - Chronic obstructive pulmonary disease, unspecified    (Encounter Diagnosis) I87.332 - Chronic venous hypertension (idiopathic) with ulcer and inflammation of left lower extremity    (Encounter Diagnosis) L03.116 - Cellulitis of left lower limb    (Encounter Diagnosis) R60.9 - Edema, unspecified    (Encounter Diagnosis) L97.322 - Non-pressure chronic ulcer of left ankle with fat layer exposed    (Encounter Diagnosis) L97.522 - Non-pressure chronic ulcer of other part of left foot with fat layer exposed        Procedures  Wound #15  Wound #15 (Diabetic Ulcer) is located on the left, plantar metatarsal head fifth. A skin/subcutaneous tissue level excisional/surgical debridement with a total area debrided of 0.45 sq cm. was performed by Gladis Gates FNP-C. Subcutaneous was removed to remove devitalized tissue: callus and slough. The following instrument(s) were used: blade.  Pain control was achieved using LIDOCAINE VISC 2%. A time out was conducted prior to the start of the procedure. A minimal amount of bleeding was controlled with pressure. The procedure was tolerated well with a pain level of 0 throughout and a pain level of 0 following the procedure. Post Debridement Measurements: 0.5cm length x 0.9cm width x 0.2cm depth; with an area of 0.45 sq cm and a volume of 0.09 cubic cm.          Plan  Wound Orders:  Wound #15 Left, Plantar Metatarsal head fifth  Anesthetic  2% Viscous Lidocaine to wound bed prior to procedure. - for debridement  Cleanser  Cleanse Wound with Normal Saline  Moisture Barrier  Skin prep to periwound skin - and under adhesive felt  Dressings  Apply dressing. - silvercel, border gauze over adhesive felt. change daily  Compression/Edema Control  Elevate leg(s) as much as possible. Avoid standing in one position for more than 10 minutes. Avoid settings with legs down. Do not cross legs when sitting.  Apply Knee-high gradient compression stockings. - sppandagrip size F  Off-Loading  Use/Wear when Walking: - adhesive felt around wound  Dietary  Supplement with daily multivitamin.  Follow Diabetic diet.  Vitamin C 500 mg. twice a day.  Follow-Up Appointments  Return Appointment 1 week - for wound care

## 2023-07-27 NOTE — PROGRESS NOTES
Subjective     Patient ID: René Moscoso is a 72 y.o. male.    Chief Complaint: No chief complaint on file.  HPI  This information was obtained from the Patient.    Location: #15- L metatarsal head, 5th  Duration: onset #15- 8/26/22  Timing: no pain reported  Context: diabetic  Modifying Factors: edema  Associated Signs and Symptoms: callus  Pt returns for wound to [facility]. Wound appears improved, measures smaller. No erythema or purulent drainage noted. Compliance with POC also appears good, wearing offloading shoe  Review of Systems (ROS)  This information was obtained from the Patient.    Complaints and Symptoms  Patient complains of:  Cardiovascular (Central/Peripheral): Edema    Integumentary (Hair/Skin/Nails): Open Sore, Calluses/Corns    Psychiatric: Depression    Patient denies complaints or symptoms related to:  Allergic/Immunologic: Hay Fever    Cardiovascular (Central/Peripheral): Lower extremity (leg) resting pain    Constitutional Symptoms (General Health): Chills, Fatigue, Fever, Loss of Appetite    Ear/Nose/Mouth/Throat: Hearing Loss / Aid    Endocrine: Cold Intolerance, Heat Intolerance    Eyes: Double Vision / Spots / Flashing Lights, Partial/Complete Blindness, Vision Changes    Gastrointestinal (GI): Nausea / Vomiting / Diarrhea (N/V/D)    Neurological: Abnormal Gait, Headaches, Weakness    Psychiatric: Anxiety    Respiratory     Objective     Physical Exam  Constitutional:  Blood pressure normal. Pulse rate and rhythm regular. Afebrile. Height within normal limits. overweight. Well developed, well nourished, and in no acute distress. Alert and oriented x3.  Ears, Nose, Mouth, and Throat:  Symmetric hearing.  Respiratory:  Even respirations without use of accessory muscles. No intercoastal retractions noted. Even and non labored respiration.  Cardiovascular:  See lower extremity assessment when applicable. edema .  Musculoskeletal:  Normal gait.  Integumentary (Hair, Skin):  adipose  exposed.  Psychiatric:  Judgement and insight: Normal affect with normal thought pattern. Orientation to time, place and person: Normal affect with normal thought pattern. Recent and remote memory: Normal affect with normal thought pattern. Mood and affect: Normal affect with normal thought pattern.       Assessment and Plan     1. Type 2 diabetes mellitus with left diabetic foot ulcer [E11.621, L97.529 (ICD-10-CM)]    Active Problems  ICD-10      (Encounter Diagnosis) I10 - Essential (primary) hypertension    (Encounter Diagnosis) G62.9 - Polyneuropathy, unspecified    (Encounter Diagnosis) L97.522 - Non-pressure chronic ulcer of other part of left foot with fat layer exposed      Plan  Wound Orders:  Wound #15 Left, Plantar Metatarsal head fifth  Anesthetic  2% Viscous Lidocaine to wound bed prior to procedure. - for debridement  Cleanser  Cleanse Wound with Normal Saline  Moisture Barrier  Skin prep to periwound skin - and under adhesive felt  Dressings  Apply dressing. - silvercel, border gauze over adhesive felt. change daily  Compression/Edema Control  Elevate leg(s) as much as possible. Avoid standing in one position for more than 10 minutes. Avoid settings with legs down. Do not cross legs when sitting.  Apply Knee-high gradient compression stockings. - sppandagrip size F  Off-Loading  Use/Wear when Walking: - adhesive felt around wound  Dietary  Supplement with daily multivitamin.  Follow Diabetic diet.  Vitamin C 500 mg. twice a day.  Follow-Up Appointments  Return Appointment 1 week - for wound care  Home Health  1. Home Health-Skilled Nurse for dressing change______x on clinic wk_______X on Non-Clinic Wk. - Home Health-Skilled Nurse for dressing change______x on clinic wk_______X on Non-Clinic Wk.  3. If caregiver willing and able to perform-may teach dressing change procedure.  4. May add 2-3 additional skilled nurse visits prn for wound care.  6. If dressings not in stock - May use product equivalent  until obtained.         No follow-ups on file.

## 2023-08-03 NOTE — PROGRESS NOTES
Subjective:         Patient ID: René Moscoso is a 72 y.o. male.    Chief Complaint: Low-back Pain and Leg Pain      Pain  This is a chronic problem. The current episode started more than 1 year ago. The problem occurs daily. The problem has been waxing and waning. Associated symptoms include arthralgias. Pertinent negatives include no anorexia, chest pain, chills, coughing, diaphoresis, fever, neck pain, sore throat, vertigo or vomiting.     Review of Systems   Constitutional:  Negative for activity change, appetite change, chills, diaphoresis and fever.   HENT:  Negative for drooling, ear discharge, ear pain, facial swelling, nosebleeds, sore throat, trouble swallowing, voice change and goiter.    Eyes:  Negative for photophobia, pain, discharge, redness and visual disturbance.   Respiratory:  Negative for apnea, cough, choking, chest tightness, shortness of breath, wheezing and stridor.    Cardiovascular:  Negative for chest pain, palpitations and leg swelling.   Gastrointestinal:  Negative for abdominal distention, anorexia, diarrhea, rectal pain, vomiting and fecal incontinence.   Endocrine: Negative for cold intolerance, heat intolerance, polydipsia, polyphagia and polyuria.   Genitourinary:  Negative for flank pain and frequency.   Musculoskeletal:  Positive for arthralgias and back pain. Negative for neck pain and neck stiffness.   Integumentary:  Negative for color change and pallor.   Neurological:  Negative for dizziness, vertigo, seizures, syncope, facial asymmetry, speech difficulty, light-headedness, coordination difficulties, memory loss and coordination difficulties.   Hematological:  Negative for adenopathy. Does not bruise/bleed easily.   Psychiatric/Behavioral:  Negative for agitation, behavioral problems, confusion, decreased concentration, dysphoric mood, hallucinations, self-injury and suicidal ideas. The patient is not nervous/anxious and is not hyperactive.            Past Medical History:  "  Diagnosis Date    Arthritis     Chronic pain 2021    COPD (chronic obstructive pulmonary disease)     Diabetes type 2, controlled     Hyperlipidemia     Neuropathy      Past Surgical History:   Procedure Laterality Date    CARDIAC CATHETERIZATION      ESOPHAGOGASTRODUODENOSCOPY  10/22/2014    FLEXIBLE SIGMOIDOSCOPY  10/23/2014    INJECTION OF FACET JOINT Bilateral 2014    Bilateral L3-S1 Facet Injection - 14 and 3/18/14    SINUS SURGERY       Social History     Socioeconomic History    Marital status: Single    Number of children: 3   Tobacco Use    Smoking status: Former     Current packs/day: 0.00     Average packs/day: 2.0 packs/day for 20.1 years (40.1 ttl pk-yrs)     Types: Cigarettes     Start date:      Quit date: 2011     Years since quittin.5    Smokeless tobacco: Current     Types: Snuff   Substance and Sexual Activity    Alcohol use: Not Currently    Drug use: Yes     Types: Hydrocodone    Sexual activity: Not Currently     Family History   Problem Relation Age of Onset    Diabetes Mother     Heart disease Mother     Hypertension Mother     Arthritis Mother     Cancer Mother     Cancer Father     Cancer Sister     COPD Brother      Review of patient's allergies indicates:  No Known Allergies     Objective:  Vitals:    23 0818 23 0819   BP: (!) 147/77    Pulse: 78    Resp: 20    Weight: 130.6 kg (288 lb)    Height: 6' 4" (1.93 m)    PainSc:   5   5         Physical Exam  Vitals and nursing note reviewed. Exam conducted with a chaperone present.   Constitutional:       General: He is awake. He is not in acute distress.     Appearance: Normal appearance. He is not ill-appearing, toxic-appearing or diaphoretic.   HENT:      Head: Normocephalic and atraumatic.      Nose: Nose normal.      Mouth/Throat:      Mouth: Mucous membranes are moist.      Pharynx: Oropharynx is clear.   Eyes:      Conjunctiva/sclera: Conjunctivae normal.      Pupils: Pupils are equal, round, " and reactive to light.   Cardiovascular:      Rate and Rhythm: Normal rate.   Pulmonary:      Effort: Pulmonary effort is normal. No respiratory distress.   Abdominal:      Palpations: Abdomen is soft.      Tenderness: There is no guarding.   Musculoskeletal:         General: Normal range of motion.      Cervical back: Normal range of motion and neck supple. No rigidity.      Thoracic back: Tenderness present.      Lumbar back: Tenderness present.   Skin:     General: Skin is warm and dry.      Coloration: Skin is not jaundiced or pale.   Neurological:      General: No focal deficit present.      Mental Status: He is alert and oriented to person, place, and time. Mental status is at baseline.      Cranial Nerves: No cranial nerve deficit (II-XII).   Psychiatric:         Mood and Affect: Mood normal.         Behavior: Behavior normal. Behavior is cooperative.         Thought Content: Thought content normal.           X-Ray Foot Complete 3 view Left  Narrative: EXAMINATION:  XR FOOT COMPLETE 3 VIEW LEFT    CLINICAL HISTORY:  Type 2 diabetes mellitus with foot ulcer    COMPARISON:  Left foot x-ray January 12, 2023    TECHNIQUE:  Frontal, lateral, and oblique views of the left foot.    FINDINGS:  Soft tissue swelling.  Plantar and posterior calcaneal spurring as well as scattered degenerative change of the foot.  No acute fracture or dislocation.  No convincing osseous erosion to suggest acute osteomyelitis.  Impression: As above.    Point of Service: [facility]    Electronically signed by: Thierry Jones  Date:    05/11/2023  Time:    10:12       Lab Visit on 06/19/2023   Component Date Value Ref Range Status    Sodium 06/19/2023 139  136 - 145 mmol/L Final    Potassium 06/19/2023 4.8  3.5 - 5.1 mmol/L Final    Chloride 06/19/2023 106  98 - 107 mmol/L Final    CO2 06/19/2023 31  21 - 32 mmol/L Final    Anion Gap 06/19/2023 7  7 - 16 mmol/L Final    Glucose 06/19/2023 126 (H)  74 - 106 mg/dL Final    BUN  06/19/2023 11  7 - 18 mg/dL Final    Creatinine 06/19/2023 1.00  0.70 - 1.30 mg/dL Final    BUN/Creatinine Ratio 06/19/2023 11  6 - 20 Final    Calcium 06/19/2023 9.1  8.5 - 10.1 mg/dL Final    Total Protein 06/19/2023 7.4  6.4 - 8.2 g/dL Final    Albumin 06/19/2023 3.3 (L)  3.5 - 5.0 g/dL Final    Globulin 06/19/2023 4.1 (H)  2.0 - 4.0 g/dL Final    A/G Ratio 06/19/2023 0.8   Final    Bilirubin, Total 06/19/2023 0.7  >0.0 - 1.2 mg/dL Final    Alk Phos 06/19/2023 88  45 - 115 U/L Final    ALT 06/19/2023 22  16 - 61 U/L Final    AST 06/19/2023 15  15 - 37 U/L Final    eGFR 06/19/2023 81  >=60 mL/min/1.73m2 Final    Triglycerides 06/19/2023 158 (H)  35 - 150 mg/dL Final    Cholesterol 06/19/2023 130  0 - 200 mg/dL Final    HDL Cholesterol 06/19/2023 34 (L)  40 - 60 mg/dL Final    Cholesterol/HDL Ratio (Risk Factor) 06/19/2023 3.8   Final    Non-HDL 06/19/2023 96  mg/dL Final    LDL Calculated 06/19/2023 64  mg/dL Final    LDL/HDL 06/19/2023 1.9   Final    VLDL 06/19/2023 32  mg/dL Final    Creatinine, Urine 06/19/2023 89  39 - 259 mg/dL Final    Microalbumin 06/19/2023 5.1 (H)  0.0 - 2.8 mg/dL Final    Microalbumin/Creatinine Ratio 06/19/2023 57.3 (H)  0.0 - 30.0 mg/g Final    Hemoglobin A1C 06/19/2023 8.0 (H)  4.5 - 6.6 % Final    Estimated Average Glucose 06/19/2023 180  mg/dL Final    WBC 06/19/2023 5.49  4.50 - 11.00 K/uL Final    RBC 06/19/2023 5.80  4.60 - 6.20 M/uL Final    Hemoglobin 06/19/2023 17.5  13.5 - 18.0 g/dL Final    Hematocrit 06/19/2023 56.7 (H)  40.0 - 54.0 % Final    MCV 06/19/2023 97.8 (H)  80.0 - 96.0 fL Final    MCH 06/19/2023 30.2  27.0 - 31.0 pg Final    MCHC 06/19/2023 30.9 (L)  32.0 - 36.0 g/dL Final    RDW 06/19/2023 15.8 (H)  11.5 - 14.5 % Final    Platelet Count 06/19/2023 207  150 - 400 K/uL Final    MPV 06/19/2023 10.7  9.4 - 12.4 fL Final    Neutrophils % 06/19/2023 55.8  53.0 - 65.0 % Final    Lymphocytes % 06/19/2023 33.7  27.0 - 41.0 % Final    Monocytes % 06/19/2023 7.8 (H)  2.0 -  6.0 % Final    Eosinophils % 06/19/2023 2.0  1.0 - 4.0 % Final    Basophils % 06/19/2023 0.5  0.0 - 1.0 % Final    Immature Granulocytes % 06/19/2023 0.2  0.0 - 0.4 % Final    nRBC, Auto 06/19/2023 0.0  <=0.0 % Final    Neutrophils, Abs 06/19/2023 3.06  1.80 - 7.70 K/uL Final    Lymphocytes, Absolute 06/19/2023 1.85  1.00 - 4.80 K/uL Final    Monocytes, Absolute 06/19/2023 0.43  0.00 - 0.80 K/uL Final    Eosinophils, Absolute 06/19/2023 0.11  0.00 - 0.50 K/uL Final    Basophils, Absolute 06/19/2023 0.03  0.00 - 0.20 K/uL Final    Immature Granulocytes, Absolute 06/19/2023 0.01  0.00 - 0.04 K/uL Final    nRBC, Absolute 06/19/2023 0.00  <=0.00 x10e3/uL Final    Diff Type 06/19/2023 Auto   Final   Clinical Support on 05/11/2023   Component Date Value Ref Range Status    Culture, Wound/Abscess 05/11/2023 Heavy Growth Enterobacter cloacae (A)   Final    Culture, Wound/Abscess 05/11/2023 Heavy Growth Klebsiella oxytoca (A)   Final    Culture, Wound/Abscess 05/11/2023 Light Growth Methicillin resistant Staphylococcus aureus (A)   Final   Office Visit on 05/08/2023   Component Date Value Ref Range Status    POC Amphetamines 05/08/2023 Negative  Negative, Inconclusive Final    POC Barbiturates 05/08/2023 Negative  Negative, Inconclusive Final    POC Benzodiazepines 05/08/2023 Negative  Negative, Inconclusive Final    POC Cocaine 05/08/2023 Negative  Negative, Inconclusive Final    POC THC 05/08/2023 Negative  Negative, Inconclusive Final    POC Methadone 05/08/2023 Negative  Negative, Inconclusive Final    POC Methamphetamine 05/08/2023 Negative  Negative, Inconclusive Final    POC Opiates 05/08/2023 Presumptive Positive (A)  Negative, Inconclusive Final    POC Oxycodone 05/08/2023 Negative  Negative, Inconclusive Final    POC Phencyclidine 05/08/2023 Negative  Negative, Inconclusive Final    POC Methylenedioxymethamphetamine * 05/08/2023 Negative  Negative, Inconclusive Final    POC Tricyclic Antidepressants 05/08/2023  Negative  Negative, Inconclusive Final    POC Buprenorphine 05/08/2023 Negative   Final     Acceptable 05/08/2023 Yes   Final    POC Temperature (Urine) 05/08/2023 92   Final   Office Visit on 02/13/2023   Component Date Value Ref Range Status    POC Amphetamines 02/13/2023 Negative  Negative, Inconclusive Final    POC Barbiturates 02/13/2023 Negative  Negative, Inconclusive Final    POC Benzodiazepines 02/13/2023 Negative  Negative, Inconclusive Final    POC Cocaine 02/13/2023 Negative  Negative, Inconclusive Final    POC THC 02/13/2023 Negative  Negative, Inconclusive Final    POC Methadone 02/13/2023 Negative  Negative, Inconclusive Final    POC Methamphetamine 02/13/2023 Negative  Negative, Inconclusive Final    POC Opiates 02/13/2023 Presumptive Positive (A)  Negative, Inconclusive Final    POC Oxycodone 02/13/2023 Negative  Negative, Inconclusive Final    POC Phencyclidine 02/13/2023 Negative  Negative, Inconclusive Final    POC Methylenedioxymethamphetamine * 02/13/2023 Negative  Negative, Inconclusive Final    POC Tricyclic Antidepressants 02/13/2023 Negative  Negative, Inconclusive Final    POC Buprenorphine 02/13/2023 Negative   Final     Acceptable 02/13/2023 Yes   Final    POC Temperature (Urine) 02/13/2023 92   Final         Orders Placed This Encounter   Procedures    Drug Screen Definitive 14, Urine     Standing Status:   Future     Standing Expiration Date:   10/5/2024     Order Specific Question:   Specimen Source     Answer:   Urine    POCT Urine Drug Screen Presump     Interpretive Information:     Negative:  No drug detected at the cut off level.   Positive:  This result represents presumptive positive for the   tested drug, other substances may yield a positive response other   than the analyte of interest. This result should be utilized for   diagnostic purpose only. Confirmation testing will be performed upon physician request only.          Requested  Prescriptions     Pending Prescriptions Disp Refills    HYDROcodone-acetaminophen (NORCO)  mg per tablet 90 tablet 0     Sig: Take 1 tablet by mouth every 8 (eight) hours as needed for Pain (pain).    HYDROcodone-acetaminophen (NORCO)  mg per tablet 90 tablet 0     Sig: Take 1 tablet by mouth every 8 (eight) hours as needed for Pain (pain).       Assessment:     1. Lumbosacral spondylosis without myelopathy    2. Diabetic polyneuropathy associated with type 2 diabetes mellitus    3. Encounter for long-term (current) use of other medications         A's of Opioid Risk Assessment  Activity:Patient can perform ADL.   Analgesia:Patients pain is partially controlled by current medication. Patient has tried OTC medications such as Tylenol and Ibuprofen with out relief.   Adverse Effects: Patient denies constipation or sedation.  Aberrant Behavior:  reviewed with no aberrant drug seeking/taking behavior.  Overdose reversal drug naloxone discussed    Drug screen reviewed      Plan:    Narcan February 2023    Pharmacy closed on weekends    Following wound management chronic left lower extremity diabetic ulcers    Presumptive drug screen negative today August 7, 2023     Patient states he is taking his medication    He states he does not take his medications 3 times a day every day    Pill count patient has 3 day supply today August 7, 2023     Will add 3 days to refill date refill date August 15, 2023    he states current medications helping control his discomfort    Continue home exercise program as directed    Follow-up 3 months      Dr. Asif, November 2023    Bring original prescription medication bottles/container/box with labels to each visit

## 2023-08-10 NOTE — PROGRESS NOTES
Subjective     Patient ID: René Moscoso is a 72 y.o. male.    Chief Complaint: No chief complaint on file.    HPI  This information was obtained from the Patient.    Location: #15- L metatarsal head, 5th  Duration: onset #15- 8/26/22  Timing: no pain reported  Context: diabetic  Modifying Factors: edema  Associated Signs and Symptoms: callus  Pt returns for wound to [facility]. Calloused periwound which is macerated. he has been on his feet more than he should. No S/S infection apparent  Review of Systems (ROS)  This information was obtained from the Patient.    Complaints and Symptoms  Patient complains of:  Cardiovascular (Central/Peripheral): Edema    Integumentary (Hair/Skin/Nails): Open Sore, Calluses/Corns    Psychiatric: Depression    Patient denies complaints or symptoms related to:  Allergic/Immunologic: Hay Fever    Cardiovascular (Central/Peripheral): Lower extremity (leg) resting pain    Constitutional Symptoms (General Health): Chills, Fatigue, Fever, Loss of Appetite    Ear/Nose/Mouth/Throat: Hearing Loss / Aid    Endocrine: Cold Intolerance, Heat Intolerance    Eyes: Double Vision / Spots / Flashing Lights, Partial/Complete Blindness, Vision Changes    Gastrointestinal (GI): Nausea / Vomiting / Diarrhea (N/V/D)    Neurological: Abnormal Gait, Headaches, Weakness    Psychiatric: Anxiety    Respiratory       Objective     Physical Exam  Constitutional:  Pulse rate and rhythm regular. Afebrile. Height within normal limits. overweight. Well developed, well nourished, and in no acute distress. Alert and oriented x3.  Eyes:  Conjunctiva without icterus.  Ears, Nose, Mouth, and Throat:  Symmetric hearing.  Respiratory:  Even respirations without use of accessory muscles. No intercoastal retractions noted. Even and non labored respiration.  Cardiovascular:  See lower extremity assessment when applicable. edema .  Musculoskeletal:  Normal gait.  Integumentary (Hair, Skin):  adipose  exposed.  Psychiatric:  Judgement and insight: Normal affect with normal thought pattern. Orientation to time, place and person: Normal affect with normal thought pattern. Recent and remote memory: Normal affect with normal thought pattern. Mood and affect: Normal affect with normal thought pattern.       Assessment and Plan     1. Type 2 diabetes mellitus with left diabetic foot ulcer [E11.621, L97.529 (ICD-10-CM)]    2. Ulcer of left foot, with fat layer exposed [L97.522]        Plan  Wound Orders:  Wound #15 Left, Plantar Metatarsal head fifth  Anesthetic  2% Viscous Lidocaine to wound bed prior to procedure. - for debridement  Cleanser  Cleanse Wound with Normal Saline  Moisture Barrier  Skin prep to periwound skin - and under adhesive felt  Dressings  Apply dressing. - silvercel, border gauze over adhesive felt. change daily  Compression/Edema Control  Elevate leg(s) as much as possible. Avoid standing in one position for more than 10 minutes. Avoid settings with legs down. Do not cross legs when sitting.  Apply Knee-high gradient compression stockings. - sppandagrip size F  Off-Loading  Use/Wear when Walking: - adhesive felt around wound         No follow-ups on file.

## 2023-08-10 NOTE — PROGRESS NOTES
Subjective     Patient ID: René Moscoso is a 72 y.o. male.    Chief Complaint: Wound Care    HPI  This information was obtained from the Patient.    Location: #15- L metatarsal head, 5th  Duration: onset #15- 8/26/22  Timing: no pain reported  Context: diabetic  Modifying Factors: edema  Associated Signs and Symptoms: callus  Pt returns for wound to [facility]. Calloused periwound which is macerated. he has been on his feet more than he should. No S/S infection apparent  Review of Systems (ROS)  This information was obtained from the Patient.    Complaints and Symptoms  Patient complains of:  Cardiovascular (Central/Peripheral): Edema    Integumentary (Hair/Skin/Nails): Open Sore, Calluses/Corns    Psychiatric: Depression    Patient denies complaints or symptoms related to:  Allergic/Immunologic: Hay Fever    Cardiovascular (Central/Peripheral): Lower extremity (leg) resting pain    Constitutional Symptoms (General Health): Chills, Fatigue, Fever, Loss of Appetite    Ear/Nose/Mouth/Throat: Hearing Loss / Aid    Endocrine: Cold Intolerance, Heat Intolerance    Eyes: Double Vision / Spots / Flashing Lights, Partial/Complete Blindness, Vision Changes    Gastrointestinal (GI): Nausea / Vomiting / Diarrhea (N/V/D)    Neurological: Abnormal Gait, Headaches, Weakness    Psychiatric: Anxiety    Respiratory       Objective     Physical Exam  Constitutional:  Pulse rate and rhythm regular. Afebrile. Height within normal limits. overweight. Well developed, well nourished, and in no acute distress. Alert and oriented x3.  Eyes:  Conjunctiva without icterus.  Ears, Nose, Mouth, and Throat:  Symmetric hearing.  Respiratory:  Even respirations without use of accessory muscles. No intercoastal retractions noted. Even and non labored respiration.  Cardiovascular:  See lower extremity assessment when applicable. edema .  Musculoskeletal:  Normal gait.  Integumentary (Hair, Skin):  adipose exposed.  Psychiatric:  Judgement and insight:  Normal affect with normal thought pattern. Orientation to time, place and person: Normal affect with normal thought pattern. Recent and remote memory: Normal affect with normal thought pattern. Mood and affect: Normal affect with normal thought pattern.       Assessment and Plan     1. Ulcer of left foot, with fat layer exposed [L97.522]        Wound Orders:  Wound #15 Left, Plantar Metatarsal head fifth  Anesthetic  2% Viscous Lidocaine to wound bed prior to procedure. - for debridement  Cleanser  Cleanse Wound with Normal Saline  Moisture Barrier  Skin prep to periwound skin - and under adhesive felt  Dressings  Apply dressing. - silvercel, border gauze over adhesive felt. change daily  Compression/Edema Control  Elevate leg(s) as much as possible. Avoid standing in one position for more than 10 minutes. Avoid settings with legs down. Do not cross legs when sitting.  Apply Knee-high gradient compression stockings. - sppandagrip size F  Off-Loading  Use/Wear when Walking: - adhesive felt around wound  Dietary  Supplement with daily multivitamin.  Follow Diabetic diet.  Vitamin C 500 mg. twice a day.         No follow-ups on file.

## 2023-08-17 NOTE — PROGRESS NOTES
Subjective     Patient ID: René Moscoso is a 72 y.o. male.    Chief Complaint: No chief complaint on file.    HPI  This information was obtained from the Patient.    Location: #15- L metatarsal head, 5th  Duration: onset #15- 8/26/22  Timing: no pain reported  Context: diabetic  Modifying Factors: edema  Associated Signs and Symptoms: callus  Pt returns for wound to L [facility]. Wound continues to heal without complications. Very little callus present today. No S/S infection apparent    Objective  Physical Exam  Constitutional:  Pulse rate and rhythm regular. Afebrile. Height within normal limits. overweight. Well developed, well nourished, and in no acute distress. Alert and oriented x3.  Eyes:  erythemic.  Ears, Nose, Mouth, and Throat:  Symmetric hearing.  Respiratory:  Even respirations without use of accessory muscles. No intercoastal retractions noted. Even and non labored respiration.  Cardiovascular:  See lower extremity assessment when applicable. edema .  Musculoskeletal:  Normal gait.  Integumentary (Hair, Skin):  adipose exposed.  Psychiatric:  Judgement and insight: Normal affect with normal thought pattern. Orientation to time, place and person: Normal affect with normal thought pattern. Recent and remote memory: Normal affect with normal thought pattern. Mood and affect: Normal affect with normal thought pattern.       Assessment and Plan     1. Type 2 diabetes mellitus with left diabetic foot ulcer [E11.621, L97.529 (ICD-10-CM)]    2. Ulcer of left foot, with fat layer exposed [L97.522]        Plan  Wound Orders:  Wound #15 Left, Plantar Metatarsal head fifth  Anesthetic  2% Viscous Lidocaine to wound bed prior to procedure. - for debridement  Cleanser  Cleanse Wound with Normal Saline  Moisture Barrier  Skin prep to periwound skin - and under adhesive felt  Dressings  Apply dressing. - silvercel, border gauze over adhesive felt. change daily  Compression/Edema Control  Elevate leg(s) as much as possible.  Avoid standing in one position for more than 10 minutes. Avoid settings with legs down. Do not cross legs when sitting.  Apply Knee-high gradient compression stockings. - sppandagrip size F  Off-Loading  Use/Wear when Walking: - adhesive felt around wound  Dietary  Supplement with daily multivitamin.  Follow Diabetic diet.  Vitamin C 500 mg. twice a day.         No follow-ups on file.

## 2023-08-24 NOTE — PROGRESS NOTES
Subjective     Patient ID: René Moscoso is a 72 y.o. male.    Chief Complaint: No chief complaint on file.    HPI  This information was obtained from the Patient.    Location: #15- L metatarsal head, 5th  Duration: onset #15- 8/26/22  Timing: no pain reported  Context: diabetic  Modifying Factors: edema  Associated Signs and Symptoms: callus  Pt returns for wound to [facility]. Slight erythema to dorsal foot, lateral aspect. Wound bed does not appear to have infection present but will treat the possible cellulitis with oral Cipro  Review of Systems (ROS)  This information was obtained from the Patient.    Complaints and Symptoms  Patient complains of:  Cardiovascular (Central/Peripheral): Edema    Integumentary (Hair/Skin/Nails): Open Sore, Calluses/Corns    Psychiatric: Depression    Patient denies complaints or symptoms related to:  Allergic/Immunologic: Hay Fever    Cardiovascular (Central/Peripheral): Lower extremity (leg) resting pain    Constitutional Symptoms (General Health): Chills, Fatigue, Fever, Loss of Appetite    Ear/Nose/Mouth/Throat: Hearing Loss / Aid    Endocrine: Cold Intolerance, Heat Intolerance    Eyes: Double Vision / Spots / Flashing Lights, Partial/Complete Blindness, Vision Changes    Gastrointestinal (GI): Nausea / Vomiting / Diarrhea (N/V/D)    Neurological: Abnormal Gait, Headaches, Weakness    Psychiatric: Anxiety    Respiratory     Objective    Physical Exam  Constitutional:  Pulse rate and rhythm regular. Afebrile. Height within normal limits. Weight WNL. Well developed, well nourished, and in no acute distress. Alert and oriented x3.  Eyes:  Conjunctiva without icterus.  Ears, Nose, Mouth, and Throat:  Symmetric hearing.  Respiratory:  Even respirations without use of accessory muscles. No intercoastal retractions noted. Even and non labored respiration.  Cardiovascular:  See lower extremity assessment when applicable. edema .  Musculoskeletal:  Normal gait.  Integumentary (Hair,  Skin):  adipose exposed.  Psychiatric:  Judgement and insight: Normal affect with normal thought pattern. Orientation to time, place and person: Normal affect with normal thought pattern. Recent and remote memory: Normal affect with normal thought pattern. Mood and affect: Normal affect with normal thought pattern.  Assessment and Plan     1. Type 2 diabetes mellitus with left diabetic foot ulcer [E11.621, L97.529 (ICD-10-CM)]    2. Ulcer of left foot, with fat layer exposed [L97.522]      Plan  Wound Orders:  Wound #15 Left, Plantar Metatarsal head fifth  Anesthetic  2% Viscous Lidocaine to wound bed prior to procedure. - for debridement  Cleanser  Cleanse Wound with Normal Saline  Moisture Barrier  Skin prep to periwound skin - and under adhesive felt  Dressings  Apply dressing. - silvercel, border gauze over adhesive felt. change daily  Compression/Edema Control  Elevate leg(s) as much as possible. Avoid standing in one position for more than 10 minutes. Avoid settings with legs down. Do not cross legs when sitting.  Apply Knee-high gradient compression stockings. - sppandagrip size F  Off-Loading  Use/Wear when Walking: - adhesive felt around wound  Dietary  Supplement with daily multivitamin.  Follow Diabetic diet.  Vitamin C 500 mg. twice a day.    Medications Prescribed:  ciprofloxacin HCl - oral 750 mg 1 tablet twice daily for 10 days for wound starting 08/24/2023               No follow-ups on file.

## 2023-08-30 PROBLEM — F03.90 DEMENTIA, UNSPECIFIED DEMENTIA SEVERITY, UNSPECIFIED DEMENTIA TYPE, UNSPECIFIED WHETHER BEHAVIORAL, PSYCHOTIC, OR MOOD DISTURBANCE OR ANXIETY: Status: ACTIVE | Noted: 2023-01-01

## 2023-08-30 NOTE — Clinical Note
LDCT Lung Screen  TBD Pneumococcal Vaccines (Age 65+)(3 - PPSV23 or PCV20) Decline COVID-19 Vaccine(3 - Moderna series) Decline Eye Exam TBD Hemoglobin A1c TBD

## 2023-08-30 NOTE — ASSESSMENT & PLAN NOTE
Take amoxicillin and flonase that he has at home, med as ordered, avoid irritants, return to [facility] asx needed and as scheudled

## 2023-08-30 NOTE — PROGRESS NOTES
Olga Monterroso NP   [facility]  [address] [address]  [place]     PATIENT NAME: René Moscoso  : 1950  DATE: 23  MRN: [id number]      Billing Provider: Olga Monterroso NP  Level of Service: NM OFFICE/OUTPT VISIT, EST, LEVL IV, 30-39 MIN  Patient PCP Information       Provider PCP Type    MORGAN DELONG NP General            Reason for Visit / Chief Complaint: Hypertension, Diabetes (Follow up, medication, blood work), and Health Maintenance (Hepatitis C Screening Decline/Sign Pain Contract Decline/Shingles Vaccine(1 of 2) Decline/LDCT Lung Screen  TBD/Pneumococcal Vaccines (Age 65+)(3 - PPSV23 or PCV20) Decline/COVID-19 Vaccine(3 - Moderna series) Decline/Eye Exam TBD/Hemoglobin A1c TBD)       Update PCP  Update Chief Complaint         History of Present Illness / Problem Focused Workflow     René Moscoso presents to the clinic for eval of htn, diabetes, sinus infection      Review of Systems     Review of Systems   Constitutional:  Negative for chills, fatigue and fever.   HENT:  Positive for sinus pressure/congestion. Negative for nasal congestion, ear pain, facial swelling, hearing loss, mouth dryness, mouth sores, postnasal drip, rhinorrhea and goiter.    Eyes:  Negative for discharge and itching.   Respiratory:  Negative for cough, shortness of breath and wheezing.    Cardiovascular:  Negative for chest pain and leg swelling.   Gastrointestinal:  Negative for abdominal pain, change in bowel habit and change in bowel habit.   Genitourinary:  Negative for difficulty urinating, dysuria, enuresis, frequency, hematuria and urgency.   Neurological:  Negative for dizziness, vertigo, syncope, weakness and headaches.   Psychiatric/Behavioral:  Negative for decreased concentration.         Medical / Social / Family History     Past Medical History:   Diagnosis Date    Arthritis     Chronic pain 2021    COPD (chronic obstructive pulmonary disease)     Diabetes type 2, controlled     Hyperlipidemia      Neuropathy        Past Surgical History:   Procedure Laterality Date    CARDIAC CATHETERIZATION      ESOPHAGOGASTRODUODENOSCOPY  10/22/2014    FLEXIBLE SIGMOIDOSCOPY  10/23/2014    INJECTION OF FACET JOINT Bilateral 07/01/2014    Bilateral L3-S1 Facet Injection - 7/1/14 and 3/18/14    SINUS SURGERY         Social History    reports that he quit smoking about 12 years ago. His smoking use included cigarettes. He started smoking about 32 years ago. He has a 40.1 pack-year smoking history. His smokeless tobacco use includes snuff. He reports that he does not currently use alcohol. He reports current drug use. Drug: Hydrocodone.    Family History  's family history includes Arthritis in his mother; COPD in his brother; Cancer in his father, mother, and sister; Diabetes in his mother; Heart disease in his mother; Hypertension in his mother.    Medications and Allergies     Medications  Outpatient Medications Marked as Taking for the 8/30/23 encounter (Office Visit) with Olga Monterroso NP   Medication Sig Dispense Refill    albuterol (ACCUNEB) 0.63 mg/3 mL Nebu Take 0.63 mg by nebulization every 6 (six) hours as needed. Rescue      aspirin 81 MG Chew Take 81 mg by mouth once daily.      blood-glucose meter Misc USE TO CHECK BLOOD SUGAR      fluticasone propionate (FLONASE) 50 mcg/actuation nasal spray 1 spray (50 mcg total) by Each Nostril route once daily. 15.8 mL 0    HYDROcodone-acetaminophen (NORCO)  mg per tablet Take 1 tablet by mouth every 8 (eight) hours as needed for Pain (pain). 90 tablet 0    insulin aspart protamine-insulin aspart (NOVOLOG MIX 70-30FLEXPEN U-100) 100 unit/mL (70-30) InPn pen INJECT 94 UNITS SUBCUTANEOUSLY EVERY MORNING AND 90 UNITS EVERY EVENING 90 mL 5    ipratropium (ATROVENT) 42 mcg (0.06 %) nasal spray 2 sprays by Nasal route 3 (three) times daily. 15 mL 2    ipratropium/albuterol sulfate (COMBIVENT INHL) Inhale into the lungs.      isosorbide mononitrate (IMDUR) 30 MG 24  hr tablet Take 1 tablet (30 mg total) by mouth once daily. 90 tablet 1    LEVEMIR FLEXTOUCH U-100 INSULN 100 unit/mL (3 mL) InPn pen INJECT 92 units SUBCUTANEOUSLY AT BEDTIME 30 mL 5    naloxone (NARCAN) 4 mg/actuation Spry if used, call 911,instill one SPRAYS alternate nostril, MAY REPEAT EVERY 2-3 minutes AS NEEDED.      nitroGLYCERIN (NITROSTAT) 0.4 MG SL tablet DISSOLVE 1 TABLET UNDER THE TONGUE EVERY 5 MINUTES AS NEEDED FOR CHEST PAIN. DO NOT EXCEED A TOTAL OF 3 DOSES IN 15 MINUTES. 30 tablet 5    prednisoLONE acetate (PRED FORTE) 1 % DrpS INSTILL ONE DROP IN EACH EYE FOUR TIMES DAILY AS DIRECTED (shake WELL)      semaglutide (RYBELSUS) 14 mg tablet Take 1 tablet (14 mg total) by mouth once daily. 90 tablet 1    tobramycin sulfate 0.3% (TOBREX) 0.3 % ophthalmic solution instill ONE drop IN EACH EYE FOUR TIMES DAILY AS DIRECTED      TRUE METRIX GLUCOSE TEST STRIP Strp USE TO USE TO CHECK BLOOD SUGAR BLOOD GLUCOSE TWICE DAILY 50 each 11    VENTOLIN HFA 90 mcg/actuation inhaler INHALE TWO PUFFS BY MOUTH TWICE DAILY AS NEEDED 18 g 5    [DISCONTINUED] empagliflozin-metformin (SYNJARDY XR) 12.5-1,000 mg TBph Take 2 tablets by mouth every morning. 180 tablet 1    [DISCONTINUED] gabapentin (NEURONTIN) 600 MG tablet Take 1 tablet (600 mg total) by mouth 2 (two) times daily. 180 tablet 1    [DISCONTINUED] gemfibroziL (LOPID) 600 MG tablet Take 1 tablet (600 mg total) by mouth 2 (two) times daily. 180 tablet 1    [DISCONTINUED] hydroCHLOROthiazide (MICROZIDE) 12.5 mg capsule Take 1 capsule (12.5 mg total) by mouth once daily. 90 capsule 1    [DISCONTINUED] linaGLIPtin (TRADJENTA) 5 mg Tab tablet Take 1 tablet (5 mg total) by mouth once daily. 90 tablet 1    [DISCONTINUED] lisinopriL (PRINIVIL,ZESTRIL) 5 MG tablet Take 1 tablet (5 mg total) by mouth once daily. 90 tablet 1    [DISCONTINUED] memantine (NAMENDA) 10 MG Tab Take 1 tablet (10 mg total) by mouth 2 (two) times daily. 180 tablet 1    [DISCONTINUED] omeprazole  "(PRILOSEC) 20 MG capsule Take 1 capsule (20 mg total) by mouth once daily. 90 capsule 1    [DISCONTINUED] simvastatin (ZOCOR) 40 MG tablet Take 1 tablet (40 mg total) by mouth every evening. 90 tablet 1    [DISCONTINUED] tiZANidine (ZANAFLEX) 4 MG tablet TAKE 1 TO 2 TABLETS BY MOUTH AT BEDTIME AS NEEDED 56 tablet 1     Current Facility-Administered Medications for the 8/30/23 encounter (Office Visit) with Olga Monterroso NP   Medication Dose Route Frequency Provider Last Rate Last Admin    [COMPLETED] cefTRIAXone injection 1 g  1 g Intramuscular 1 time in Clinic/HOD Olga Monterroso NP   1 g at 08/30/23 1628       Allergies  Review of patient's allergies indicates:  No Known Allergies    Physical Examination     Vitals:    08/30/23 1551   BP: 129/72   BP Location: Right arm   Patient Position: Sitting   Pulse: 72   Resp: 18   Temp: 98.5 °F (36.9 °C)   TempSrc: Oral   SpO2: 95%   Weight: 131.1 kg (289 lb)   Height: 6' 4" (1.93 m)      Physical Exam  Vitals and nursing note reviewed.   Constitutional:       Appearance: Normal appearance.   HENT:      Head: Normocephalic.      Right Ear: Tympanic membrane, ear canal and external ear normal.      Left Ear: Tympanic membrane, ear canal and external ear normal.      Nose: Nose normal.      Comments: Mod amt thick yellow nasal secretion, pressure over max region     Mouth/Throat:      Mouth: Mucous membranes are moist.      Pharynx: Oropharynx is clear.   Eyes:      Extraocular Movements: Extraocular movements intact.      Conjunctiva/sclera: Conjunctivae normal.      Pupils: Pupils are equal, round, and reactive to light.   Neck:      Comments: Leland ant cervical nodes  Cardiovascular:      Rate and Rhythm: Normal rate and regular rhythm.      Pulses: Normal pulses.      Heart sounds: Normal heart sounds.   Pulmonary:      Effort: Pulmonary effort is normal.      Breath sounds: Normal breath sounds.   Abdominal:      General: Bowel sounds are normal.      Palpations: " Abdomen is soft.   Musculoskeletal:         General: Normal range of motion.      Cervical back: Normal range of motion and neck supple.   Lymphadenopathy:      Cervical: Cervical adenopathy present.   Skin:     General: Skin is warm and dry.      Capillary Refill: Capillary refill takes less than 2 seconds.      Comments: Diabetic ulcer with dressing right foot, goes to wound care for tx   Neurological:      General: No focal deficit present.      Mental Status: He is alert and oriented to person, place, and time.   Psychiatric:         Mood and Affect: Mood normal.         Behavior: Behavior normal.          Assessment and Plan (including Health Maintenance)      Problem List  Smart Sets  Document Outside HM   :    Plan:     Type 2 diabetes mellitus with left diabetic foot ulcer  -     Hemoglobin A1C; Future; Expected date: 08/30/2023    Essential hypertension, benign  -     Microalbumin/Creatinine Ratio, Urine; Future; Expected date: 08/30/2023  -     Lipid Panel; Future; Expected date: 08/30/2023  -     Comprehensive Metabolic Panel; Future; Expected date: 08/30/2023  -     CBC Auto Differential; Future; Expected date: 08/30/2023    Acute non-recurrent maxillary sinusitis  -     chlorpheniramine-phenylephrine (ED A-HIST) 4-10 mg per tablet; Take 1 tablet by mouth every 6 (six) hours as needed for Congestion.  Dispense: 30 tablet; Refill: 0    Other orders  -     gabapentin (NEURONTIN) 600 MG tablet; Take 1 tablet (600 mg total) by mouth 2 (two) times daily.  Dispense: 180 tablet; Refill: 1  -     gemfibroziL (LOPID) 600 MG tablet; Take 1 tablet (600 mg total) by mouth 2 (two) times daily.  Dispense: 180 tablet; Refill: 1  -     hydroCHLOROthiazide (MICROZIDE) 12.5 mg capsule; Take 1 capsule (12.5 mg total) by mouth once daily.  Dispense: 90 capsule; Refill: 1  -     empagliflozin-metformin (SYNJARDY XR) 12.5-1,000 mg TBph; Take 2 tablets by mouth every morning.  Dispense: 180 tablet; Refill: 1  -     lisinopriL  (PRINIVIL,ZESTRIL) 5 MG tablet; Take 1 tablet (5 mg total) by mouth once daily.  Dispense: 90 tablet; Refill: 1  -     linaGLIPtin (TRADJENTA) 5 mg Tab tablet; Take 1 tablet (5 mg total) by mouth once daily.  Dispense: 90 tablet; Refill: 1  -     memantine (NAMENDA) 10 MG Tab; Take 1 tablet (10 mg total) by mouth 2 (two) times daily.  Dispense: 180 tablet; Refill: 1  -     omeprazole (PRILOSEC) 20 MG capsule; Take 1 capsule (20 mg total) by mouth once daily.  Dispense: 90 capsule; Refill: 1  -     simvastatin (ZOCOR) 40 MG tablet; Take 1 tablet (40 mg total) by mouth every evening.  Dispense: 90 tablet; Refill: 1  -     tiZANidine (ZANAFLEX) 4 MG tablet; Take 1-2 tablets po hs prn muscle spasm  Dispense: 56 tablet; Refill: 1  -     cefTRIAXone injection 1 g            Health Maintenance Due   Topic Date Due    Hepatitis C Screening  Never done    Sign Pain Contract  Never done    Shingles Vaccine (1 of 2) Never done    LDCT Lung Screen  03/27/2014    Pneumococcal Vaccines (Age 65+) (3 - PPSV23 or PCV20) 10/20/2020    COVID-19 Vaccine (3 - Moderna series) 04/07/2021    Eye Exam  12/29/2022    Hemoglobin A1c  09/19/2023       Problem List Items Addressed This Visit          ENT    Acute non-recurrent maxillary sinusitis    Current Assessment & Plan     Take amoxicillin and flonase that he has at home, med as ordered, avoid irritants, return to [facility] asx needed and as scheudled         Relevant Medications    chlorpheniramine-phenylephrine (ED A-HIST) 4-10 mg per tablet       Cardiac/Vascular    Essential hypertension, benign    Current Assessment & Plan     The current medical regimen is effective;  continue present plan and medications.           Relevant Orders    Microalbumin/Creatinine Ratio, Urine    Lipid Panel    Comprehensive Metabolic Panel    CBC Auto Differential       Endocrine    Type 2 diabetes mellitus with left diabetic foot ulcer - Primary    Current Assessment & Plan     meds as ordered, low carb  diet, exercise, lose wt, keep all scheudled matt         Relevant Medications    linaGLIPtin (TRADJENTA) 5 mg Tab tablet    Other Relevant Orders    Hemoglobin A1C         Health Maintenance Topics with due status: Not Due       Topic Last Completion Date    Colorectal Cancer Screening 10/03/2014    TETANUS VACCINE 05/27/2021    Influenza Vaccine 10/04/2021    Foot Exam 03/08/2023    Diabetes Urine Screening 06/19/2023    Lipid Panel 06/19/2023    Low Dose Statin 08/30/2023       Procedures     Future Appointments   Date Time Provider Department Center   8/31/2023  8:30 AM [facility]    9/12/2023  9:30 AM Shows, [facility]        Follow up in about 3 months (around 11/30/2023) for fuad.       Signature:  Olga Monterroso NP    Date of encounter: 8/30/23

## 2023-08-31 NOTE — PROGRESS NOTES
[facility] Chart Audit for the following: Functional Status Assessment    No documentation found for Functional Status Assessment at this time.

## 2023-08-31 NOTE — PROGRESS NOTES
Subjective     Patient ID: René Moscoso is a 72 y.o. male.    Chief Complaint: Wound Care    HPI  This information was obtained from the Patient.    Location: #21- L foot; #22- R 3rd toe; #23- R 4th toe; #24- R great toe; #25- L great toe  Duration: onset 8/13/23  Timing: no pain reported  Context: #21/#22/#23- diabetic; #24/#25- blister  Modifying Factors: pressure due to limited mobility  Associated Signs and Symptoms: exposed adipose  70 y/o WM seen today for wounds to bilateral feet. Wounds appear improved, without any S/S infection noted. He does have some lower extremity edema, mild-moderate. He voices no concerns today  Review of Systems (ROS)  This information was obtained from the Patient.    Complaints and Symptoms  Patient complains of:  Cardiovascular (Central/Peripheral): Edema    Integumentary (Hair/Skin/Nails): Open Sore    Musculoskeletal: Assistive Devices (wheelchair)    Patient denies complaints or symptoms related to:  Constitutional Symptoms (General Health)    Eyes: Blurred Vision, Partial/Complete Blindness    Gastrointestinal (GI): Nausea / Vomiting / Diarrhea (N/V/D)    Integumentary (Hair/Skin/Nails): Hemosiderin Staining, Itching, Rash    Neurological: Abnormal Gait (wheelchair)    Respiratory        Objective  Physical Exam  Constitutional:  Pulse rate and rhythm regular. Afebrile. Height within normal limits. overweight. Well developed, well nourished, and in no acute distress. Alert and oriented x3.  Eyes:  Conjunctiva without icterus.  Ears, Nose, Mouth, and Throat:  Symmetric hearing.  Respiratory:  Even respirations without use of accessory muscles. No intercoastal retractions noted. Even and non labored respiration.  Cardiovascular:  See lower extremity assessment when applicable. edema .  Musculoskeletal:  wheelchair.  Integumentary (Hair, Skin):  adipose exposed.  Psychiatric:  Judgement and insight: Normal affect with normal thought pattern. Orientation to time, place and  person: Normal affect with normal thought pattern. Recent and remote memory: Normal affect with normal thought pattern. Mood and affect: Normal affect with normal thought pattern.       Assessment and Plan     1. Ulcer of left foot, with fat layer exposed [L97.522]    2. Idiopathic chronic venous hypertension of left lower extremity with ulcer and inflammation        Plan  Wound Orders:    Wound #21 Left Foot  Cleanser  Cleanse Wound with Normal Saline  Dressings  Apply dressing. - apply hydrofera blue to wound, (moisten with saline and squeeze out excess) cover with border gauze. May use conform gauze to secure if needed. Change dressing 3x times weekly and or prn as needed for soilage.    Wound #22 Right Toe - Third  Cleanser  Cleanse Wound with Normal Saline  Dressings  Apply dressing. - apply hydrofera blue to wound, (moisten with saline and squeeze out excess) cover with gauze, stockinette, and secure with tape. May use conform gauze to secure if needed. Change dressing 3x times weekly and or prn as needed for soilage.    Wound #23 Right Toe - Fourth  Cleanser  Cleanse Wound with Normal Saline  Dressings  Apply dressing. - apply hydrofera blue to wound, (moisten with saline and squeeze out excess) cover with gauze, stockinette, and secure with tape. May use conform gauze to secure if needed. Change dressing 3x times weekly and or prn as needed for soilage.    Wound #24 Right Toe - great  Cleanser  Cleanse Wound with Normal Saline  Dressings  Apply dressing. - apply hydrofera blue to wound, (moisten with saline and squeeze out excess) cover with gauze, stockinette, and secure with tape. May use conform gauze to secure if needed. Change dressing 3x times weekly and or prn as needed for soilage.    Wound #25 Left Toe - great  Cleanser  Cleanse Wound with Normal Saline  Dressings  Apply dressing. - apply hydrofera blue to wound, (moisten with saline and squeeze out excess) cover with gauze, stockinette, and secure  with tape. May use conform gauze to secure if needed. Change dressing 3x times weekly and or prn as needed for soilage.  Additional Orders:    Compression/Edema Control  Elevate leg(s) as much as possible. Avoid standing in one position for more than 10 minutes. Avoid settings with legs down. Do not cross legs when sitting.  Off-Loading  Keep weight off: - stay off feet as much as possible         No follow-ups on file.

## 2023-09-05 NOTE — PROGRESS NOTES
A1c is sl improved, needs to watch diet and exercise, needs to see rita baird, mattwith Eduarda in 1 month, lose wt, stress to take med and watch diet. Lipids sl elevated, watch low chol low fat diet, repeat all lab in 6 months

## 2023-09-07 NOTE — PROGRESS NOTES
Subjective     Patient ID: René Moscoso is a 72 y.o. male.    Chief Complaint: Wound Care  HPI  This information was obtained from the Patient.    Location: #15- L metatarsal head, 5th  Duration: onset #15- 8/26/22  Timing: no pain reported  Context: diabetic  Modifying Factors: edema  Associated Signs and Symptoms: callus  Pt returns for wound to [facility]. Pt recently completed Cipro for wound infection. Wound looks very good, no S/S infection are evident. Compliance with POC remains good  Review of Systems (ROS)  This information was obtained from the Patient.    Complaints and Symptoms  Patient complains of:  Cardiovascular (Central/Peripheral): Edema    Integumentary (Hair/Skin/Nails): Open Sore, Calluses/Corns    Psychiatric: Depression    Patient denies complaints or symptoms related to:  Allergic/Immunologic: Hay Fever    Cardiovascular (Central/Peripheral): Lower extremity (leg) resting pain    Constitutional Symptoms (General Health): Chills, Fatigue, Fever, Loss of Appetite    Ear/Nose/Mouth/Throat: Hearing Loss / Aid    Endocrine: Cold Intolerance, Heat Intolerance    Eyes: Double Vision / Spots / Flashing Lights, Partial/Complete Blindness, Vision Changes    Gastrointestinal (GI): Nausea / Vomiting / Diarrhea (N/V/D)    Neurological: Abnormal Gait, Headaches, Weakness    Psychiatric: Anxiety    Respiratory        Objective  Physical Exam  Constitutional:  Blood pressure normal. Pulse rate and rhythm regular. Afebrile. Height within normal limits. overweight. Well developed, well nourished, and in no acute distress. Alert and oriented x3.  Eyes:  Conjunctiva without icterus.  Ears, Nose, Mouth, and Throat:  Symmetric hearing.  Respiratory:  Even respirations without use of accessory muscles. No intercoastal retractions noted. Even and non labored respiration.  Cardiovascular:  See lower extremity assessment when applicable. edema .  Musculoskeletal:  Normal gait.  Integumentary (Hair, Skin):  adipose  exposed.  Psychiatric:  Judgement and insight: Normal affect with normal thought pattern. Orientation to time, place and person: Normal affect with normal thought pattern. Recent and remote memory: Normal affect with normal thought pattern. Mood and affect: Normal affect with normal thought pattern.       Assessment and Plan     1. Type 2 diabetes mellitus with left diabetic foot ulcer [E11.621, L97.529 (ICD-10-CM)]    2. Idiopathic chronic venous hypertension of left lower extremity with ulcer and inflammation      Plan  Wound Orders:  Wound #15 Left, Plantar Metatarsal head fifth  Anesthetic  2% Viscous Lidocaine to wound bed prior to procedure. - for debridement  Cleanser  Cleanse Wound with Normal Saline  Moisture Barrier  Skin prep to periwound skin - and under adhesive felt  Dressings  Apply dressing. - silvercel, border gauze over adhesive felt. change daily  Compression/Edema Control  Elevate leg(s) as much as possible. Avoid standing in one position for more than 10 minutes. Avoid settings with legs down. Do not cross legs when sitting.  Apply Knee-high gradient compression stockings. - sppandagrip size F  Off-Loading  Use/Wear when Walking: - adhesive felt around wound  Dietary  Supplement with daily multivitamin.  Follow Diabetic diet.  Vitamin C 500 mg. twice a day.           No follow-ups on file.

## 2023-09-11 NOTE — PROGRESS NOTES
Subjective:         Patient ID: René Moscoso is a 72 y.o. male.    Chief Complaint: Low-back Pain      Pain  This is a chronic problem. The current episode started more than 1 year ago. The problem occurs daily. The problem has been unchanged. Associated symptoms include arthralgias. Pertinent negatives include no anorexia, chest pain, chills, coughing, diaphoresis, fever, neck pain, sore throat, vertigo or vomiting.     Review of Systems   Constitutional:  Negative for activity change, appetite change, chills, diaphoresis and fever.   HENT:  Negative for drooling, ear discharge, ear pain, facial swelling, nosebleeds, sore throat, trouble swallowing, voice change and goiter.    Eyes:  Negative for photophobia, pain, discharge, redness and visual disturbance.   Respiratory:  Negative for apnea, cough, choking, chest tightness, shortness of breath, wheezing and stridor.    Cardiovascular:  Negative for chest pain, palpitations and leg swelling.   Gastrointestinal:  Negative for abdominal distention, anorexia, diarrhea, rectal pain, vomiting and fecal incontinence.   Endocrine: Negative for cold intolerance, heat intolerance, polydipsia, polyphagia and polyuria.   Genitourinary:  Negative for flank pain and frequency.   Musculoskeletal:  Positive for arthralgias and back pain. Negative for neck pain and neck stiffness.   Integumentary:  Negative for color change and pallor.   Neurological:  Negative for dizziness, vertigo, seizures, syncope, facial asymmetry, speech difficulty, light-headedness, coordination difficulties, memory loss and coordination difficulties.   Hematological:  Negative for adenopathy. Does not bruise/bleed easily.   Psychiatric/Behavioral:  Negative for agitation, behavioral problems, confusion, decreased concentration, dysphoric mood, hallucinations, self-injury and suicidal ideas. The patient is not nervous/anxious and is not hyperactive.            Past Medical History:   Diagnosis Date     "Arthritis     Chronic pain 2021    COPD (chronic obstructive pulmonary disease)     Diabetes type 2, controlled     Hyperlipidemia     Neuropathy      Past Surgical History:   Procedure Laterality Date    CARDIAC CATHETERIZATION      ESOPHAGOGASTRODUODENOSCOPY  10/22/2014    FLEXIBLE SIGMOIDOSCOPY  10/23/2014    INJECTION OF FACET JOINT Bilateral 2014    Bilateral L3-S1 Facet Injection - 14 and 3/18/14    SINUS SURGERY       Social History     Socioeconomic History    Marital status: Single    Number of children: 3   Tobacco Use    Smoking status: Former     Current packs/day: 0.00     Average packs/day: 2.0 packs/day for 20.1 years (40.1 ttl pk-yrs)     Types: Cigarettes     Start date:      Quit date: 2011     Years since quittin.6    Smokeless tobacco: Current     Types: Snuff   Substance and Sexual Activity    Alcohol use: Not Currently    Drug use: Yes     Types: Hydrocodone    Sexual activity: Not Currently     Family History   Problem Relation Age of Onset    Diabetes Mother     Heart disease Mother     Hypertension Mother     Arthritis Mother     Cancer Mother     Cancer Father     Cancer Sister     COPD Brother      Review of patient's allergies indicates:  No Known Allergies     Objective:  Vitals:    23 0837   BP: 118/68   Pulse: 91   Resp: 20   Weight: 130.6 kg (288 lb)   Height: 6' 3" (1.905 m)   PainSc:   4         Physical Exam  Vitals and nursing note reviewed. Exam conducted with a chaperone present.   Constitutional:       General: He is awake. He is not in acute distress.     Appearance: Normal appearance. He is not ill-appearing, toxic-appearing or diaphoretic.   HENT:      Head: Normocephalic and atraumatic.      Nose: Nose normal.      Mouth/Throat:      Mouth: Mucous membranes are moist.      Pharynx: Oropharynx is clear.   Eyes:      Conjunctiva/sclera: Conjunctivae normal.      Pupils: Pupils are equal, round, and reactive to light.   Cardiovascular:      " Rate and Rhythm: Normal rate.   Pulmonary:      Effort: Pulmonary effort is normal. No respiratory distress.   Abdominal:      Palpations: Abdomen is soft.      Tenderness: There is no guarding.   Musculoskeletal:         General: Normal range of motion.      Cervical back: Normal range of motion and neck supple. No rigidity.      Thoracic back: Tenderness present.      Lumbar back: Tenderness present.   Skin:     General: Skin is warm and dry.      Coloration: Skin is not jaundiced or pale.   Neurological:      General: No focal deficit present.      Mental Status: He is alert and oriented to person, place, and time. Mental status is at baseline.      Cranial Nerves: No cranial nerve deficit (II-XII).   Psychiatric:         Mood and Affect: Mood normal.         Behavior: Behavior normal. Behavior is cooperative.         Thought Content: Thought content normal.           X-Ray Foot Complete 3 view Left  Narrative: EXAMINATION:  XR FOOT COMPLETE 3 VIEW LEFT    CLINICAL HISTORY:  Type 2 diabetes mellitus with foot ulcer    COMPARISON:  Left foot x-ray January 12, 2023    TECHNIQUE:  Frontal, lateral, and oblique views of the left foot.    FINDINGS:  Soft tissue swelling.  Plantar and posterior calcaneal spurring as well as scattered degenerative change of the foot.  No acute fracture or dislocation.  No convincing osseous erosion to suggest acute osteomyelitis.  Impression: As above.    Point of Service: [facility]    Electronically signed by: Thierry Jones  Date:    05/11/2023  Time:    10:12       Office Visit on 08/30/2023   Component Date Value Ref Range Status    Hemoglobin A1C 08/30/2023 7.7 (H)  4.5 - 6.6 % Final    Estimated Average Glucose 08/30/2023 170  mg/dL Final    Creatinine, Urine 08/30/2023 65  39 - 259 mg/dL Final    Microalbumin 08/30/2023 2.8  0.0 - 2.8 mg/dL Final    Microalbumin/Creatinine Ratio 08/30/2023 43.1 (H)  0.0 - 30.0 mg/g Final    Triglycerides 08/30/2023 216 (H)  35 - 150  mg/dL Final    Cholesterol 08/30/2023 113  0 - 200 mg/dL Final    HDL Cholesterol 08/30/2023 36 (L)  40 - 60 mg/dL Final    Cholesterol/HDL Ratio (Risk Factor) 08/30/2023 3.1   Final    Non-HDL 08/30/2023 77  mg/dL Final    LDL Calculated 08/30/2023 34  mg/dL Final    VLDL 08/30/2023 43  mg/dL Final    Sodium 08/30/2023 138  136 - 145 mmol/L Final    Potassium 08/30/2023 4.3  3.5 - 5.1 mmol/L Final    Chloride 08/30/2023 103  98 - 107 mmol/L Final    CO2 08/30/2023 27  21 - 32 mmol/L Final    Anion Gap 08/30/2023 12  7 - 16 mmol/L Final    Glucose 08/30/2023 121 (H)  74 - 106 mg/dL Final    BUN 08/30/2023 12  7 - 18 mg/dL Final    Creatinine 08/30/2023 1.11  0.70 - 1.30 mg/dL Final    BUN/Creatinine Ratio 08/30/2023 11  6 - 20 Final    Calcium 08/30/2023 9.4  8.5 - 10.1 mg/dL Final    Total Protein 08/30/2023 7.7  6.4 - 8.2 g/dL Final    Albumin 08/30/2023 3.4 (L)  3.5 - 5.0 g/dL Final    Globulin 08/30/2023 4.3 (H)  2.0 - 4.0 g/dL Final    A/G Ratio 08/30/2023 0.8   Final    Bilirubin, Total 08/30/2023 0.5  >0.0 - 1.2 mg/dL Final    Alk Phos 08/30/2023 88  45 - 115 U/L Final    ALT 08/30/2023 25  16 - 61 U/L Final    AST 08/30/2023 14 (L)  15 - 37 U/L Final    eGFR 08/30/2023 71  >=60 mL/min/1.73m2 Final    WBC 08/30/2023 6.94  4.50 - 11.00 K/uL Final    RBC 08/30/2023 5.52  4.60 - 6.20 M/uL Final    Hemoglobin 08/30/2023 17.5  13.5 - 18.0 g/dL Final    Hematocrit 08/30/2023 54.3 (H)  40.0 - 54.0 % Final    MCV 08/30/2023 98.4 (H)  80.0 - 96.0 fL Final    MCH 08/30/2023 31.7 (H)  27.0 - 31.0 pg Final    MCHC 08/30/2023 32.2  32.0 - 36.0 g/dL Final    RDW 08/30/2023 13.9  11.5 - 14.5 % Final    Platelet Count 08/30/2023 185  150 - 400 K/uL Final    MPV 08/30/2023 10.9  9.4 - 12.4 fL Final    Neutrophils % 08/30/2023 61.1  53.0 - 65.0 % Final    Lymphocytes % 08/30/2023 30.0  27.0 - 41.0 % Final    Monocytes % 08/30/2023 6.8 (H)  2.0 - 6.0 % Final    Eosinophils % 08/30/2023 1.3  1.0 - 4.0 % Final    Basophils %  08/30/2023 0.4  0.0 - 1.0 % Final    Immature Granulocytes % 08/30/2023 0.4  0.0 - 0.4 % Final    nRBC, Auto 08/30/2023 0.0  <=0.0 % Final    Neutrophils, Abs 08/30/2023 4.24  1.80 - 7.70 K/uL Final    Lymphocytes, Absolute 08/30/2023 2.08  1.00 - 4.80 K/uL Final    Monocytes, Absolute 08/30/2023 0.47  0.00 - 0.80 K/uL Final    Eosinophils, Absolute 08/30/2023 0.09  0.00 - 0.50 K/uL Final    Basophils, Absolute 08/30/2023 0.03  0.00 - 0.20 K/uL Final    Immature Granulocytes, Absolute 08/30/2023 0.03  0.00 - 0.04 K/uL Final    nRBC, Absolute 08/30/2023 0.00  <=0.00 x10e3/uL Final    Diff Type 08/30/2023 Auto   Final   Office Visit on 08/07/2023   Component Date Value Ref Range Status    POC Amphetamines 08/07/2023 Negative  Negative, Inconclusive Final    POC Barbiturates 08/07/2023 Negative  Negative, Inconclusive Final    POC Benzodiazepines 08/07/2023 Negative  Negative, Inconclusive Final    POC Cocaine 08/07/2023 Negative  Negative, Inconclusive Final    POC THC 08/07/2023 Negative  Negative, Inconclusive Final    POC Methadone 08/07/2023 Negative  Negative, Inconclusive Final    POC Methamphetamine 08/07/2023 Negative  Negative, Inconclusive Final    POC Opiates 08/07/2023 Negative  Negative, Inconclusive Final    POC Oxycodone 08/07/2023 Negative  Negative, Inconclusive Final    POC Phencyclidine 08/07/2023 Negative  Negative, Inconclusive Final    POC Methylenedioxymethamphetamine * 08/07/2023 Negative  Negative, Inconclusive Final    POC Tricyclic Antidepressants 08/07/2023 Negative  Negative, Inconclusive Final    POC Buprenorphine 08/07/2023 Negative   Final     Acceptable 08/07/2023 Yes   Final    POC Temperature (Urine) 08/07/2023 92   Final    pH, UA 08/07/2023 6.5  5.0 to 8.0 pH Units Final    Creatinine, Urine 08/07/2023 56  39 - 259 mg/dL Final    6-Acetylmorphine 08/07/2023    Final    7-Aminoclonazepam 08/07/2023 Negative  Negative 25 ng/mL Final    a-Hydroxyalprazolam 08/07/2023  Negative  Negative 25 ng/mL Final    Acetyl Fentanyl 08/07/2023 Negative  Negative 2.5 ng/mL Final    Acetyl Norfentanyl Oxalate 08/07/2023 Negative  Negative 5 ng/mL Final    Benzoylecgonine 08/07/2023 Negative  Negative 100 ng/mL Final    Buprenorphine 08/07/2023 Negative  25 ng/mL Final    Codeine 08/07/2023 Negative  Negative 25 ng/mL Final    EDDP 08/07/2023 Negative  Negative 25 ng/mL Final    Fentanyl 08/07/2023 Negative  Negative 2.5 ng/mL Final    Hydrocodone 08/07/2023 299.3 (H)  <25.0 25 ng/mL Final    Hydromorphone 08/07/2023 50.5 (H)  <25.0 25 ng/mL Final    Lorazepam 08/07/2023 Negative  Negative 25 ng/mL Final    Morphine 08/07/2023 Negative  Negative 25 ng/mL Final    Norbuprenorphine 08/07/2023 Negative  Negative 25 ng/mL Final    Nordiazepam 08/07/2023 Negative  Negative 25 ng/mL Final    Norfentanyl Oxalate 08/07/2023 Negative  Negative 5 ng/mL Final    Norhydrocodone 08/07/2023 442.1 (H)  <50.0 50 ng/mL Final    Noroxycodone HCL 08/07/2023 Negative  Negative 50 ng/mL Final    Oxazepam 08/07/2023 Negative  Negative 25 ng/mL Final    Oxymorphone 08/07/2023 Negative  Negative 25 ng/mL Final    Tapentadol 08/07/2023 Negative  Negative 25 ng/mL Final    Temazepam 08/07/2023 Negative  Negative 25 ng/mL Final    THC-COOH 08/07/2023 Negative  Negative 25 ng/mL Final    Tramadol 08/07/2023 Negative  Negative 100 ng/mL Final    Amphetamine, Urine 08/07/2023 Negative  Negative Final    Methamphetamines, Urine 08/07/2023 Negative  Negative Final    Methadone, Urine 08/07/2023 Negative  Negative 25 ng/mL Final    Oxycodone, Urine 08/07/2023 Negative  Negative 25 ng/mL Final    Specific Gravity, UA 08/07/2023 1.029  <=1.030 Final   Lab Visit on 06/19/2023   Component Date Value Ref Range Status    Sodium 06/19/2023 139  136 - 145 mmol/L Final    Potassium 06/19/2023 4.8  3.5 - 5.1 mmol/L Final    Chloride 06/19/2023 106  98 - 107 mmol/L Final    CO2 06/19/2023 31  21 - 32 mmol/L Final    Anion Gap 06/19/2023 7   7 - 16 mmol/L Final    Glucose 06/19/2023 126 (H)  74 - 106 mg/dL Final    BUN 06/19/2023 11  7 - 18 mg/dL Final    Creatinine 06/19/2023 1.00  0.70 - 1.30 mg/dL Final    BUN/Creatinine Ratio 06/19/2023 11  6 - 20 Final    Calcium 06/19/2023 9.1  8.5 - 10.1 mg/dL Final    Total Protein 06/19/2023 7.4  6.4 - 8.2 g/dL Final    Albumin 06/19/2023 3.3 (L)  3.5 - 5.0 g/dL Final    Globulin 06/19/2023 4.1 (H)  2.0 - 4.0 g/dL Final    A/G Ratio 06/19/2023 0.8   Final    Bilirubin, Total 06/19/2023 0.7  >0.0 - 1.2 mg/dL Final    Alk Phos 06/19/2023 88  45 - 115 U/L Final    ALT 06/19/2023 22  16 - 61 U/L Final    AST 06/19/2023 15  15 - 37 U/L Final    eGFR 06/19/2023 81  >=60 mL/min/1.73m2 Final    Triglycerides 06/19/2023 158 (H)  35 - 150 mg/dL Final    Cholesterol 06/19/2023 130  0 - 200 mg/dL Final    HDL Cholesterol 06/19/2023 34 (L)  40 - 60 mg/dL Final    Cholesterol/HDL Ratio (Risk Factor) 06/19/2023 3.8   Final    Non-HDL 06/19/2023 96  mg/dL Final    LDL Calculated 06/19/2023 64  mg/dL Final    LDL/HDL 06/19/2023 1.9   Final    VLDL 06/19/2023 32  mg/dL Final    Creatinine, Urine 06/19/2023 89  39 - 259 mg/dL Final    Microalbumin 06/19/2023 5.1 (H)  0.0 - 2.8 mg/dL Final    Microalbumin/Creatinine Ratio 06/19/2023 57.3 (H)  0.0 - 30.0 mg/g Final    Hemoglobin A1C 06/19/2023 8.0 (H)  4.5 - 6.6 % Final    Estimated Average Glucose 06/19/2023 180  mg/dL Final    WBC 06/19/2023 5.49  4.50 - 11.00 K/uL Final    RBC 06/19/2023 5.80  4.60 - 6.20 M/uL Final    Hemoglobin 06/19/2023 17.5  13.5 - 18.0 g/dL Final    Hematocrit 06/19/2023 56.7 (H)  40.0 - 54.0 % Final    MCV 06/19/2023 97.8 (H)  80.0 - 96.0 fL Final    MCH 06/19/2023 30.2  27.0 - 31.0 pg Final    MCHC 06/19/2023 30.9 (L)  32.0 - 36.0 g/dL Final    RDW 06/19/2023 15.8 (H)  11.5 - 14.5 % Final    Platelet Count 06/19/2023 207  150 - 400 K/uL Final    MPV 06/19/2023 10.7  9.4 - 12.4 fL Final    Neutrophils % 06/19/2023 55.8  53.0 - 65.0 % Final    Lymphocytes  % 06/19/2023 33.7  27.0 - 41.0 % Final    Monocytes % 06/19/2023 7.8 (H)  2.0 - 6.0 % Final    Eosinophils % 06/19/2023 2.0  1.0 - 4.0 % Final    Basophils % 06/19/2023 0.5  0.0 - 1.0 % Final    Immature Granulocytes % 06/19/2023 0.2  0.0 - 0.4 % Final    nRBC, Auto 06/19/2023 0.0  <=0.0 % Final    Neutrophils, Abs 06/19/2023 3.06  1.80 - 7.70 K/uL Final    Lymphocytes, Absolute 06/19/2023 1.85  1.00 - 4.80 K/uL Final    Monocytes, Absolute 06/19/2023 0.43  0.00 - 0.80 K/uL Final    Eosinophils, Absolute 06/19/2023 0.11  0.00 - 0.50 K/uL Final    Basophils, Absolute 06/19/2023 0.03  0.00 - 0.20 K/uL Final    Immature Granulocytes, Absolute 06/19/2023 0.01  0.00 - 0.04 K/uL Final    nRBC, Absolute 06/19/2023 0.00  <=0.00 x10e3/uL Final    Diff Type 06/19/2023 Auto   Final   Clinical Support on 05/11/2023   Component Date Value Ref Range Status    Culture, Wound/Abscess 05/11/2023 Heavy Growth Enterobacter cloacae (A)   Final    Culture, Wound/Abscess 05/11/2023 Heavy Growth Klebsiella oxytoca (A)   Final    Culture, Wound/Abscess 05/11/2023 Light Growth Methicillin resistant Staphylococcus aureus (A)   Final   Office Visit on 05/08/2023   Component Date Value Ref Range Status    POC Amphetamines 05/08/2023 Negative  Negative, Inconclusive Final    POC Barbiturates 05/08/2023 Negative  Negative, Inconclusive Final    POC Benzodiazepines 05/08/2023 Negative  Negative, Inconclusive Final    POC Cocaine 05/08/2023 Negative  Negative, Inconclusive Final    POC THC 05/08/2023 Negative  Negative, Inconclusive Final    POC Methadone 05/08/2023 Negative  Negative, Inconclusive Final    POC Methamphetamine 05/08/2023 Negative  Negative, Inconclusive Final    POC Opiates 05/08/2023 Presumptive Positive (A)  Negative, Inconclusive Final    POC Oxycodone 05/08/2023 Negative  Negative, Inconclusive Final    POC Phencyclidine 05/08/2023 Negative  Negative, Inconclusive Final    POC Methylenedioxymethamphetamine * 05/08/2023  Negative  Negative, Inconclusive Final    POC Tricyclic Antidepressants 05/08/2023 Negative  Negative, Inconclusive Final    POC Buprenorphine 05/08/2023 Negative   Final     Acceptable 05/08/2023 Yes   Final    POC Temperature (Urine) 05/08/2023 92   Final         No orders of the defined types were placed in this encounter.      Requested Prescriptions     Signed Prescriptions Disp Refills    HYDROcodone-acetaminophen (NORCO)  mg per tablet 90 tablet 0     Sig: Take 1 tablet by mouth every 8 (eight) hours as needed for Pain (pain).    HYDROcodone-acetaminophen (NORCO)  mg per tablet 90 tablet 0     Sig: Take 1 tablet by mouth every 8 (eight) hours as needed for Pain (pain).    HYDROcodone-acetaminophen (NORCO)  mg per tablet 90 tablet 0     Sig: Take 1 tablet by mouth every 8 (eight) hours as needed for Pain (pain).       Assessment:     1. Lumbosacral spondylosis without myelopathy    2. Diabetic polyneuropathy associated with type 2 diabetes mellitus         A's of Opioid Risk Assessment  Activity:Patient can perform ADL.   Analgesia:Patients pain is partially controlled by current medication. Patient has tried OTC medications such as Tylenol and Ibuprofen with out relief.   Adverse Effects: Patient denies constipation or sedation.  Aberrant Behavior:  reviewed with no aberrant drug seeking/taking behavior.  Overdose reversal drug naloxone discussed    Drug screen reviewed      Plan:    Narcan February 2023    Pharmacy closed on weekends    Following wound management chronic left lower extremity diabetic ulcers    Definitive drug screen August 7, 2023 consistent with hydrocodone metabolites    Continue home exercise program as directed    Follow-up 3 months      Dr. Asif, November 2023    Bring original prescription medication bottles/container/box with labels to each visit

## 2023-09-14 NOTE — PROGRESS NOTES
Subjective   Patient ID: René Moscoso is a 72 y.o. male.     Chief Complaint: Wound check     HPI  This information was obtained from the Patient.     Location: #15- L metatarsal head, 5th  Duration: onset #15- 8/26/22  Timing: no pain reported  Context: diabetic  Modifying Factors: edema  Associated Signs and Symptoms: callus  Pt returns for wound to [facility]. Wound continues to improve, measures smaller in length and width.  Compliance with POC remains good  Review of Systems (ROS)  This information was obtained from the Patient.     Complaints and Symptoms  Patient complains of:  Cardiovascular (Central/Peripheral): Edema     Integumentary (Hair/Skin/Nails): Open Sore, Calluses/Corns     Psychiatric: Depression     Patient denies complaints or symptoms related to:  Allergic/Immunologic: Hay Fever     Cardiovascular (Central/Peripheral): Lower extremity (leg) resting pain     Constitutional Symptoms (General Health): Chills, Fatigue, Fever, Loss of Appetite     Ear/Nose/Mouth/Throat: Hearing Loss / Aid     Endocrine: Cold Intolerance, Heat Intolerance     Eyes: Double Vision / Spots / Flashing Lights, Partial/Complete Blindness, Vision Changes     Gastrointestinal (GI): Nausea / Vomiting / Diarrhea (N/V/D)     Neurological: Abnormal Gait, Headaches, Weakness     Psychiatric: Anxiety     Respiratory           Objective  Physical Exam  Constitutional:  Pulse rate and rhythm regular. Afebrile. Height within normal limits. Weight WNL. Well developed, well nourished, and in no acute distress. Alert and oriented x3.  Eyes:  Conjunctiva without icterus.  Ears, Nose, Mouth, and Throat:  Symmetric hearing.  Respiratory:  Even respirations without use of accessory muscles. No intercoastal retractions noted. Even and non labored respiration.  Cardiovascular:  See lower extremity assessment when applicable. edema .  Musculoskeletal:  Normal gait.  Integumentary (Hair, Skin):  adipose exposed.  Psychiatric:  Judgement and  insight: Normal affect with normal thought pattern. Orientation to time, place and person: Normal affect with normal thought pattern. Recent and remote memory: Normal affect with normal thought pattern. Mood and affect: Normal affect with normal thought pattern.              Assessment and Plan   1. Type 2 diabetes mellitus with left diabetic foot ulcer [E11.621, L97.529 (ICD-10-CM)]     2. Diabetic ulcer of left foot associated with diabetes mellitus of other type, with fat layer exposed, unspecified part of foot        Plan  Wound Orders:  Wound #15 Left, Plantar Metatarsal head fifth  Anesthetic  2% Viscous Lidocaine to wound bed prior to procedure. - for debridement  Cleanser  Cleanse Wound with Normal Saline  Moisture Barrier  Skin prep to periwound skin - and under adhesive felt  Dressings  Apply dressing. - silvercel, border gauze over adhesive felt. change daily  Compression/Edema Control  Elevate leg(s) as much as possible. Avoid standing in one position for more than 10 minutes. Avoid settings with legs down. Do not cross legs when sitting.  Apply Knee-high gradient compression stockings. - sppandagrip size F  Off-Loading  Use/Wear when Walking: - adhesive felt around wound  Dietary  Supplement with daily multivitamin.  Follow Diabetic diet.  Vitamin C 500 mg. twice a day.

## 2023-09-14 NOTE — PROGRESS NOTES
Subjective     Patient ID: René Moscoso is a 72 y.o. male.    Chief Complaint: No chief complaint on file.    HPI  This information was obtained from the Patient.    Location: #15- L metatarsal head, 5th  Duration: onset #15- 8/26/22  Timing: no pain reported  Context: diabetic  Modifying Factors: edema  Associated Signs and Symptoms: callus  Pt returns for wound to [facility]. Wound continues to improve, measures smaller in length and width.  Compliance with POC remains good  Review of Systems (ROS)  This information was obtained from the Patient.    Complaints and Symptoms  Patient complains of:  Cardiovascular (Central/Peripheral): Edema    Integumentary (Hair/Skin/Nails): Open Sore, Calluses/Corns    Psychiatric: Depression    Patient denies complaints or symptoms related to:  Allergic/Immunologic: Hay Fever    Cardiovascular (Central/Peripheral): Lower extremity (leg) resting pain    Constitutional Symptoms (General Health): Chills, Fatigue, Fever, Loss of Appetite    Ear/Nose/Mouth/Throat: Hearing Loss / Aid    Endocrine: Cold Intolerance, Heat Intolerance    Eyes: Double Vision / Spots / Flashing Lights, Partial/Complete Blindness, Vision Changes    Gastrointestinal (GI): Nausea / Vomiting / Diarrhea (N/V/D)    Neurological: Abnormal Gait, Headaches, Weakness    Psychiatric: Anxiety    Respiratory        Objective  Physical Exam  Constitutional:  Pulse rate and rhythm regular. Afebrile. Height within normal limits. Weight WNL. Well developed, well nourished, and in no acute distress. Alert and oriented x3.  Eyes:  Conjunctiva without icterus.  Ears, Nose, Mouth, and Throat:  Symmetric hearing.  Respiratory:  Even respirations without use of accessory muscles. No intercoastal retractions noted. Even and non labored respiration.  Cardiovascular:  See lower extremity assessment when applicable. edema .  Musculoskeletal:  Normal gait.  Integumentary (Hair, Skin):  adipose exposed.  Psychiatric:  Judgement and  insight: Normal affect with normal thought pattern. Orientation to time, place and person: Normal affect with normal thought pattern. Recent and remote memory: Normal affect with normal thought pattern. Mood and affect: Normal affect with normal thought pattern.       Assessment and Plan     1. Type 2 diabetes mellitus with left diabetic foot ulcer [E11.621, L97.529 (ICD-10-CM)]    2. Diabetic ulcer of left foot associated with diabetes mellitus of other type, with fat layer exposed, unspecified part of foot      Plan  Wound Orders:  Wound #15 Left, Plantar Metatarsal head fifth  Anesthetic  2% Viscous Lidocaine to wound bed prior to procedure. - for debridement  Cleanser  Cleanse Wound with Normal Saline  Moisture Barrier  Skin prep to periwound skin - and under adhesive felt  Dressings  Apply dressing. - silvercel, border gauze over adhesive felt. change daily  Compression/Edema Control  Elevate leg(s) as much as possible. Avoid standing in one position for more than 10 minutes. Avoid settings with legs down. Do not cross legs when sitting.  Apply Knee-high gradient compression stockings. - sppandagrip size F  Off-Loading  Use/Wear when Walking: - adhesive felt around wound  Dietary  Supplement with daily multivitamin.  Follow Diabetic diet.  Vitamin C 500 mg. twice a day.           No follow-ups on file.

## 2023-09-25 NOTE — PROGRESS NOTES
Subjective     Patient ID: René Moscoso is a 72 y.o. male.    Chief Complaint: Wound Care    HPI  This information was obtained from the Patient.    Location: #15- L metatarsal head, 5th  Duration: onset #15- 8/26/22  Timing: no pain reported  Context: diabetic  Modifying Factors: edema  Associated Signs and Symptoms: callus  Pt returns for wound to [facility]. Wound continues to improve, still has some measurable depth. No S/S infection are evident. Compliance with POC remains good    Objective  Physical Exam  Constitutional:  Blood pressure normal. Pulse rate and rhythm regular. Afebrile. Height within normal limits. overweight. Well developed, well nourished, and in no acute distress. Alert and oriented x3.  Eyes:  Conjunctiva without icterus.  Ears, Nose, Mouth, and Throat:  Symmetric hearing.  Respiratory:  Even respirations without use of accessory muscles. No intercoastal retractions noted. Even and non labored respiration.  Cardiovascular:  See lower extremity assessment when applicable. edema .  Musculoskeletal:  Normal gait.  Integumentary (Hair, Skin):  adipose exposed.  Psychiatric:  Judgement and insight: Normal affect with normal thought pattern. Orientation to time, place and person: Normal affect with normal thought pattern. Recent and remote memory: Normal affect with normal thought pattern. Mood and affect: Normal affect with normal thought pattern.       Assessment and Plan     1. Type 2 diabetes mellitus with left diabetic foot ulcer [E11.621, L97.529 (ICD-10-CM)]    2. Idiopathic chronic venous hypertension of left lower extremity with ulcer and inflammation      Plan  Wound Orders:  Wound #15 Left, Plantar Metatarsal head fifth  Anesthetic  2% Viscous Lidocaine to wound bed prior to procedure. - for debridement  Cleanser  Cleanse Wound with Normal Saline  Moisture Barrier  Skin prep to periwound skin - and under adhesive felt  Dressings  Apply dressing. - silvercel, border gauze over adhesive felt.  change daily  Compression/Edema Control  Elevate leg(s) as much as possible. Avoid standing in one position for more than 10 minutes. Avoid settings with legs down. Do not cross legs when sitting.  Apply Knee-high gradient compression stockings. - sppandagrip size F  Off-Loading  Use/Wear when Walking: - adhesive felt around wound  Dietary  Supplement with daily multivitamin.  Follow Diabetic diet.  Vitamin C 500 mg. twice a day.           No follow-ups on file.

## 2023-09-28 NOTE — PROGRESS NOTES
Subjective     Patient ID: René Moscoso is a 72 y.o. male.    Chief Complaint: Wound Care    HPI  This information was obtained from the Patient.    Location: #15- L metatarsal head, 5th  Duration: onset #15- 8/26/22  Timing: no pain reported  Context: diabetic  Modifying Factors: edema  Associated Signs and Symptoms: callus  Pt returns for wound to [facility]. Calloused periwound. No S/S infection are evident. Compliance with POC remains good  Review of Systems (ROS)  This information was obtained from the Patient.    Complaints and Symptoms  Patient complains of:  Cardiovascular (Central/Peripheral): Edema    Integumentary (Hair/Skin/Nails): Open Sore, Calluses/Corns    Psychiatric: Depression    Patient denies complaints or symptoms related to:  Allergic/Immunologic: Hay Fever    Cardiovascular (Central/Peripheral): Lower extremity (leg) resting pain    Constitutional Symptoms (General Health): Chills, Fatigue, Fever, Loss of Appetite    Ear/Nose/Mouth/Throat: Hearing Loss / Aid    Endocrine: Cold Intolerance, Heat Intolerance    Eyes: Double Vision / Spots / Flashing Lights, Partial/Complete Blindness, Vision Changes    Gastrointestinal (GI): Nausea / Vomiting / Diarrhea (N/V/D)    Neurological: Abnormal Gait, Headaches, Weakness    Psychiatric: Anxiety    Respiratory        Objective  Physical Exam  Constitutional:  Blood pressure normal. Pulse rate and rhythm regular. Afebrile. Height within normal limits. overweight. Well developed, well nourished, and in no acute distress. Alert and oriented x3.  Eyes:  Conjunctiva without icterus.  Ears, Nose, Mouth, and Throat:  Symmetric hearing.  Respiratory:  Even respirations without use of accessory muscles. No intercoastal retractions noted. Even and non labored respiration.  Cardiovascular:  See lower extremity assessment when applicable. no edema.  Musculoskeletal:  Normal gait.  Integumentary (Hair, Skin):  See wound description.  Psychiatric:  Judgement and insight:  Normal affect with normal thought pattern. Orientation to time, place and person: Normal affect with normal thought pattern. Recent and remote memory: Normal affect with normal thought pattern. Mood and affect: Normal affect with normal thought pattern.       Assessment and Plan     1. Diabetic ulcer of left foot associated with diabetes mellitus of other type, with fat layer exposed, unspecified part of foot  -     US Lower Extrem Arteries Left with RYAN (xpd); Future; Expected date: 09/28/2023        Plan  Wound Orders:  Wound #15 Left, Plantar Metatarsal head fifth  Anesthetic  2% Viscous Lidocaine to wound bed prior to procedure. - for debridement  Cleanser  Cleanse Wound with Normal Saline  Moisture Barrier  Skin prep to periwound skin - and under adhesive felt  Dressings  Apply dressing. - silvercel, border gauze over adhesive felt. change daily  Compression/Edema Control  Elevate leg(s) as much as possible. Avoid standing in one position for more than 10 minutes. Avoid settings with legs down. Do not cross legs when sitting.  Apply Knee-high gradient compression stockings. - sppandagrip size F  Off-Loading  Use/Wear when Walking: - adhesive felt around wound  Dietary  Supplement with daily multivitamin.  Follow Diabetic diet.  Vitamin C 500 mg. twice a day.         No follow-ups on file.

## 2023-10-05 NOTE — PROGRESS NOTES
Subjective     Patient ID: René Moscoso is a 73 y.o. male.    Chief Complaint: Wound Care    HPI  This information was obtained from the Patient.    Location: #15- L metatarsal head, 5th  Duration: onset #15- 8/26/22  Timing: no pain reported  Context: diabetic  Modifying Factors: edema  Associated Signs and Symptoms: callus  Pt returns for wound to [facility]. Dorsal foot erythemic, slightly warm to touch. Will obtain C&S today and begin oral abx in the meantime  Review of Systems (ROS)  This information was obtained from the Patient.    Complaints and Symptoms  Patient complains of:  Cardiovascular (Central/Peripheral): Edema    Integumentary (Hair/Skin/Nails): Change: Hair, Nails, Skin (erythemic right dorsal foot), Open Sore, Calluses/Corns    Psychiatric: Depression    Patient denies complaints or symptoms related to:  Allergic/Immunologic: Hay Fever    Cardiovascular (Central/Peripheral): Lower extremity (leg) resting pain    Constitutional Symptoms (General Health): Chills, Fatigue, Fever, Loss of Appetite    Ear/Nose/Mouth/Throat: Hearing Loss / Aid    Endocrine: Cold Intolerance, Heat Intolerance    Eyes: Double Vision / Spots / Flashing Lights, Partial/Complete Blindness, Vision Changes    Gastrointestinal (GI): Nausea / Vomiting / Diarrhea (N/V/D)    Neurological: Abnormal Gait, Headaches, Weakness    Psychiatric: Anxiety    Respiratory        Objective  Physical Exam  Constitutional:  Afebrile. Height within normal limits. overweight. Well developed, well nourished, and in no acute distress. Alert and oriented x3.  Eyes:  Conjunctiva without icterus.  Ears, Nose, Mouth, and Throat:  Symmetric hearing.  Respiratory:  Even respirations without use of accessory muscles. No intercoastal retractions noted. Even and non labored respiration.  Cardiovascular:  See lower extremity assessment when applicable. edema, erythema to R dorsal foot .  Musculoskeletal:  Normal gait.  Integumentary (Hair, Skin):  adipose  exposed.  Psychiatric:  Judgement and insight: Normal affect with normal thought pattern. Orientation to time, place and person: Normal affect with normal thought pattern. Recent and remote memory: Normal affect with normal thought pattern. Mood and affect: Normal affect with normal thought pattern.  Wound Assessment(s)  Wound #15 Left, Plantar Metatarsal head fifth is a chronic Lema Grade 1 Diabetic Ulcer and has received a status of Not Healed. Initial wound encounter measurements are 0.2cm length x 0.3cm width x 0.5 cm depth, with an area of 0.06 sq cm and a volume of 0.03 cubic cm. Adipose is exposed. No tunneling has been noted. No sinus tract has been noted. No undermining has been noted. There is a Moderate amount of serosanguineous drainage noted which has no odor. The patient reports a wound pain of level 0/10. The wound margin is well defined Wound bed has Yes, bright red, pink, firm, granulation, Yes slough, No eschar, Yes epithelialization.    The periwound skin exhibited edema, callus, erythema and hemosiderosis. The periwound skin did not exhibit brawny induration, excoriation, induration, crepitus, fluctuance, rash, maceration, atrophie nick, cyanosis, ecchymosis, pallor and rubor. The periwound skin was dry/scaly. The periwound skin was not friable, denuded and moist. The temperature of the periwound skin is WNL. Periwound skin presents with s/s of infection. Confirmation Description & Treatment Plan is: Signs & Symptoms Present, C&S Pending, Systemic Antibiotics Prescribed. Local Pulse is Normal.       Assessment and Plan     1. Diabetic ulcer of left foot associated with diabetes mellitus of other type, with fat layer exposed, unspecified part of foot  -     Culture, Tissue        Plan  Wound Orders:  Wound #15 Left, Plantar Metatarsal head fifth  Anesthetic  2% Viscous Lidocaine to wound bed prior to procedure. - for debridement  Cleanser  Cleanse Wound with Normal Saline  Moisture  Barrier  Skin prep to periwound skin - and under adhesive felt  Dressings  Apply dressing. - silvercel, border gauze over adhesive felt. change daily  Compression/Edema Control  Elevate leg(s) as much as possible. Avoid standing in one position for more than 10 minutes. Avoid settings with legs down. Do not cross legs when sitting.  Apply Knee-high gradient compression stockings. - sppandagrip size F  Off-Loading  Use/Wear when Walking: - adhesive felt around wound  Dietary  Supplement with daily multivitamin.  Follow Diabetic diet.  Vitamin C 500 mg. twice a day.         No follow-ups on file.

## 2023-10-12 NOTE — PROGRESS NOTES
Subjective     Patient ID: René Moscoso is a 73 y.o. male.    Chief Complaint: Wound Care    HPI  This information was obtained from the Patient.    Location: #15- L metatarsal head, 5th  Duration: onset #15- 8/26/22  Timing: no pain reported  Context: diabetic  Modifying Factors: edema  Associated Signs and Symptoms: callus  Pt returns for wound to [facility]. Overall appearance of foot is improved, erythema to dorsal foot is resolved. Pt was started on oral Cipro last week and C&S obtained. MRSA noted, susceptible to Cipro. He has 3 days remaining but I am going to treat with Clindamycin for another 7 days until he returns    Objective  Physical Exam  Constitutional:  Pulse rate and rhythm regular. Afebrile. Height within normal limits. Weight WNL. Well developed, well nourished, and in no acute distress. Alert and oriented x3.  Eyes:  Conjunctiva without icterus.  Ears, Nose, Mouth, and Throat:  Symmetric hearing.  Respiratory:  Even respirations without use of accessory muscles. No intercoastal retractions noted. Even and non labored respiration.  Cardiovascular:  See lower extremity assessment when applicable. edema .  Musculoskeletal:  Normal gait.  Integumentary (Hair, Skin):  adipose exposed.  Psychiatric:  Judgement and insight: Normal affect with normal thought pattern. Orientation to time, place and person: Normal affect with normal thought pattern. Recent and remote memory: Normal affect with normal thought pattern. Mood and affect: Normal affect with normal thought pattern.  Wound Assessment(s)  Wound #15 Left, Plantar Metatarsal head fifth is a chronic Lema Grade 1 Diabetic Ulcer and has received a status of Not Healed. Initial wound encounter measurements are 0.4cm length x 0.3cm width x 0.5 cm depth, with an area of 0.12 sq cm and a volume of 0.06 cubic cm. Adipose is exposed. No tunneling has been noted. No sinus tract has been noted. No undermining has been noted. There is a Moderate amount of  serosanguineous drainage noted which has no odor. The patient reports a wound pain of level 0/10. The wound margin is well defined Wound bed has Yes, bright red, pink, firm, granulation, Yes slough, No eschar, Yes epithelialization.    The periwound skin exhibited edema, callus, erythema and hemosiderosis. The periwound skin did not exhibit brawny induration, excoriation, induration, crepitus, fluctuance, rash, maceration, atrophie nick, cyanosis, ecchymosis, pallor and rubor. The periwound skin was dry/scaly. The periwound skin was not friable, denuded and moist. The temperature of the periwound skin is WNL. Periwound skin presents with s/s of infection. Confirmation Description & Treatment Plan is: Signs & Symptoms Present, C&S Pending, Systemic Antibiotics Prescribed. Local Pulse is Normal.       Assessment and Plan     1. Diabetic ulcer of left foot associated with diabetes mellitus of other type, with fat layer exposed, unspecified part of foot    2. MRSA (methicillin resistant staph aureus) culture positive        Plan  Wound Orders:  Wound #15 Left, Plantar Metatarsal head fifth  Anesthetic  2% Viscous Lidocaine to wound bed prior to procedure. - for wound care  Cleanser  Cleanse Wound with Normal Saline  Moisture Barrier  Skin prep to periwound skin - and under adhesive felt  Dressings  Apply dressing. - silvercel, border gauze, conform roll gauze and tape. daily.  Off-Loading  Keep weight off: - adhesive felt around wound. change 1 x week at clinic  Dietary  Supplement with daily multivitamin.  Follow Diabetic diet.  Vitamin C 500 mg. twice a day.  Follow-Up Appointments  Return Appointment 1 week - for wound care  Home Health  Home Health Care: If you have any questions or concerns please contact the wound center.  1. Home Health-Skilled Nurse for dressing change______x on clinic wk_______X on Non-Clinic Wk. - Home Health-Skilled Nurse for dressing change___1___x on clinic wk___2____X on Non-Clinic  wk  3. If caregiver willing and able to perform-may teach dressing change procedure.  6. If dressings not in stock - May use product equivalent until obtained.  Antibiotic Therapy  Take oral antibiotics as prescribed - Clindamycin 300 TID x 7 days         No follow-ups on file.

## 2023-10-19 NOTE — PROGRESS NOTES
Subjective     Patient ID: René Moscoso is a 73 y.o. male.    Chief Complaint: Wound Care    HPI  This information was obtained from the Patient.    Location: #15- L metatarsal head, 5th  Duration: onset #15- 8/26/22  Timing: no pain reported  Context: diabetic  Modifying Factors: edema  Associated Signs and Symptoms: callus  Pt returns for wound to [facility]. There is mild erythema noted to dorsal foot. He is still taking oral abx, completed Cipro and now taking Clindamycin.    Objective  Physical Exam  Constitutional:  Pulse rate and rhythm regular. Afebrile. Height within normal limits. Weight WNL. Well developed, well nourished, and in no acute distress. Alert and oriented x3.  Eyes:  Conjunctiva without icterus.  Ears, Nose, Mouth, and Throat:  Symmetric hearing.  Respiratory:  Even respirations without use of accessory muscles. No intercoastal retractions noted. Even and non labored respiration.  Cardiovascular:  See lower extremity assessment when applicable. edema .  Musculoskeletal:  Normal gait.  Integumentary (Hair, Skin):  adipose exposed.  Psychiatric:  Judgement and insight: Normal affect with normal thought pattern. Orientation to time, place and person: Normal affect with normal thought pattern. Recent and remote memory: Normal affect with normal thought pattern. Mood and affect: Normal affect with normal thought pattern.  Wound Assessment(s)  Wound #15 Left, Plantar Metatarsal head fifth is a chronic Lema Grade 1 Diabetic Ulcer and has received a status of Not Healed. Initial wound encounter measurements are 0.3cm length x 0.3cm width x 0.1 cm depth, with an area of 0.09 sq cm and a volume of 0.009 cubic cm. Adipose is exposed. No tunneling has been noted. No sinus tract has been noted. No undermining has been noted. There is a Moderate amount of serosanguineous drainage noted which has no odor. The patient reports a wound pain of level 0/10. The wound margin is thickened. Wound bed has Yes, bright  red, pink, firm, granulation, Yes slough, No eschar, Yes epithelialization.    The periwound skin exhibited edema, callus, erythema and hemosiderosis. The periwound skin did not exhibit brawny induration, excoriation, induration, crepitus, fluctuance, rash, maceration, atrophie nick, cyanosis, ecchymosis, pallor and rubor. The periwound skin was dry/scaly. The periwound skin was not friable, denuded and moist. The temperature of the periwound skin is WNL. Periwound skin presents with s/s of infection. Confirmation Description & Treatment Plan is: Signs & Symptoms Present, C&S Pending, Systemic Antibiotics Prescribed. Local Pulse is Normal.       Assessment and Plan     1. Diabetic ulcer of left foot associated with diabetes mellitus of other type, with fat layer exposed, unspecified part of foot    2. Idiopathic chronic venous hypertension of left lower extremity with ulcer and inflammation        Plan  Wound Orders:  Wound #15 Left, Plantar Metatarsal head fifth  Anesthetic  2% Viscous Lidocaine to wound bed prior to procedure. - for wound care  Cleanser  Cleanse Wound with Normal Saline  Moisture Barrier  Skin prep to periwound skin - and under adhesive felt  Dressings  Apply dressing. - silvercel, border gauze, conform roll gauze and tape. daily.  Off-Loading  Keep weight off: - adhesive felt around wound. change 1 x week at clinic  Dietary  Supplement with daily multivitamin.  Follow Diabetic diet.  Vitamin C 500 mg. twice a day.  Follow-Up Appointments  Return Appointment 1 week - for wound care  Home Health  Home Health Care: If you have any questions or concerns please contact the wound center.  1. Home Health-Skilled Nurse for dressing change______x on clinic wk_______X on Non-Clinic Wk. - Home Health-Skilled Nurse for dressing change___1___x on clinic wk___2____X on Non-Clinic wk  3. If caregiver willing and able to perform-may teach dressing change procedure.  6. If dressings not in stock - May use  product equivalent until obtained.  Antibiotic Therapy  Take oral antibiotics as prescribed - Clindamycin 300 TID x 7 days         No follow-ups on file.

## 2023-10-26 NOTE — PROGRESS NOTES
Subjective     Patient ID: René Moscoso is a 73 y.o. male.    Chief Complaint: Wound Care    HPI  This information was obtained from the Patient.    Location: #15- L metatarsal head, 5th  Duration: onset #15- 8/26/22  Timing: no pain reported  Context: diabetic  Modifying Factors: edema  Associated Signs and Symptoms: callus  Pt returns for wound to [facility]. Pt has completed oral abx for wound infection. No S/S of infection remain evident today. No acute issues or concerns addressed today  Review of Systems (ROS)  This information was obtained from the Patient.    Complaints and Symptoms  Patient complains of:  Cardiovascular (Central/Peripheral): Edema    Integumentary (Hair/Skin/Nails): Open Sore, Calluses/Corns    Psychiatric: Depression    Patient denies complaints or symptoms related to:  Allergic/Immunologic: Hay Fever    Cardiovascular (Central/Peripheral): Lower extremity (leg) resting pain    Constitutional Symptoms (General Health): Chills, Fatigue, Fever, Loss of Appetite    Ear/Nose/Mouth/Throat: Hearing Loss / Aid    Endocrine: Cold Intolerance, Heat Intolerance    Eyes: Double Vision / Spots / Flashing Lights, Partial/Complete Blindness, Vision Changes    Gastrointestinal (GI): Nausea / Vomiting / Diarrhea (N/V/D)    Integumentary (Hair/Skin/Nails): Change: Hair, Nails, Skin (erythemic right dorsal foot)    Neurological: Abnormal Gait, Headaches, Weakness    Psychiatric: Anxiety    Respiratory        Objective  Physical Exam  Constitutional:  Pulse rate and rhythm regular. Afebrile. Height within normal limits. overweight. Well developed, well nourished, and in no acute distress. Alert and oriented x3.  Eyes:  Conjunctiva without icterus.  Ears, Nose, Mouth, and Throat:  Symmetric hearing.  Respiratory:  Even respirations without use of accessory muscles. No intercoastal retractions noted. Even and non labored respiration.  Cardiovascular:  See lower extremity assessment when applicable. edema  .  Musculoskeletal:  Normal gait.  Integumentary (Hair, Skin):  adipose exposed.  Psychiatric:  Judgement and insight: Normal affect with normal thought pattern. Orientation to time, place and person: Normal affect with normal thought pattern. Recent and remote memory: Normal affect with normal thought pattern. Mood and affect: Normal affect with normal thought pattern.  Wound Assessment(s)  Wound #15 Left, Plantar Metatarsal head fifth is a chronic Lema Grade 1 Diabetic Ulcer and has received a status of Not Healed. Initial wound encounter measurements are 0.3cm length x 0.3cm width x 0.3 cm depth, with an area of 0.09 sq cm and a volume of 0.027 cubic cm. Adipose is exposed. No tunneling has been noted. No sinus tract has been noted. No undermining has been noted. There is a Moderate amount of serosanguineous drainage noted which has no odor. The patient reports a wound pain of level 0/10. The wound margin is thickened. Wound bed has Yes, bright red, pink, firm, granulation, Yes slough, No eschar, Yes epithelialization.    The periwound skin exhibited edema, callus, erythema and hemosiderosis. The periwound skin did not exhibit brawny induration, excoriation, induration, crepitus, fluctuance, rash, maceration, atrophie nick, cyanosis, ecchymosis, pallor and rubor. The periwound skin was dry/scaly. The periwound skin was not friable, denuded and moist. The temperature of the periwound skin is WNL. Periwound skin does not exhibit signs or symptoms of infection. Local Pulse is Normal.       Assessment and Plan     1. Diabetic ulcer of left foot associated with diabetes mellitus of other type, with fat layer exposed, unspecified part of foot    2. Idiopathic chronic venous hypertension of left lower extremity with ulcer and inflammation        Plan  Wound Orders:  Wound #15 Left, Plantar Metatarsal head fifth  Anesthetic  2% Viscous Lidocaine to wound bed prior to procedure. - for wound care  Cleanser  Cleanse  Wound with Normal Saline  Moisture Barrier  Skin prep to periwound skin - and under adhesive felt  Dressings  Apply dressing. - silvercel, border gauze, conform roll gauze and tape. daily.  Off-Loading  Keep weight off: - adhesive felt around wound. change 1 x week at clinic  Dietary  Supplement with daily multivitamin.  Follow Diabetic diet.  Vitamin C 500 mg. twice a day.  Follow-Up Appointments  Return Appointment 1 week - for wound care  Home Health  Home Health Care: If you have any questions or concerns please contact the wound center.  1. Home Health-Skilled Nurse for dressing change______x on clinic wk_______X on Non-Clinic Wk. - Home Health-Skilled Nurse for dressing change___1___x on clinic wk___2____X on Non-Clinic wk  3. If caregiver willing and able to perform-may teach dressing change procedure.  6. If dressings not in stock - May use product equivalent until obtained.         No follow-ups on file.

## 2023-11-02 NOTE — PROGRESS NOTES
Subjective     Patient ID: René Moscoso is a 73 y.o. male.    Chief Complaint: Wound Care    HPI  This information was obtained from the Patient.    Location: #15- L metatarsal head, 5th  Duration: onset #15- 8/26/22  Timing: no pain reported  Context: diabetic  Modifying Factors: edema  Associated Signs and Symptoms: callus  Pt returns for wound to [facility]. Wound continues to improve, is nearly resolved. Pt has been reminded to bring his normal wearing shoe so that we can modify it for offloading  Review of Systems (ROS)  This information was obtained from the Patient.    Complaints and Symptoms  Patient complains of:  Cardiovascular (Central/Peripheral): Edema    Integumentary (Hair/Skin/Nails): Open Sore, Calluses/Corns    Psychiatric: Depression    Patient denies complaints or symptoms related to:  Allergic/Immunologic: Hay Fever    Cardiovascular (Central/Peripheral): Lower extremity (leg) resting pain    Constitutional Symptoms (General Health): Chills, Fatigue, Fever, Loss of Appetite    Ear/Nose/Mouth/Throat: Hearing Loss / Aid    Endocrine: Cold Intolerance, Heat Intolerance    Eyes: Double Vision / Spots / Flashing Lights, Partial/Complete Blindness, Vision Changes    Gastrointestinal (GI): Nausea / Vomiting / Diarrhea (N/V/D)    Integumentary (Hair/Skin/Nails): Change: Hair, Nails, Skin (erythemic right dorsal foot)    Neurological: Abnormal Gait, Headaches, Weakness    Psychiatric: Anxiety    Respiratory        Objective  Physical Exam  Constitutional:  Pulse rate and rhythm regular. Afebrile. Height within normal limits. overweight. Well developed, well nourished, and in no acute distress. Alert and oriented x3.  Eyes:  Conjunctiva without icterus.  Ears, Nose, Mouth, and Throat:  Symmetric hearing.  Respiratory:  Even respirations without use of accessory muscles. No intercoastal retractions noted. Even and non labored respiration.  Cardiovascular:  See lower extremity assessment when applicable. edema  .  Musculoskeletal:  Normal gait.  Integumentary (Hair, Skin):  adipose exposed.  Psychiatric:  Judgement and insight: Normal affect with normal thought pattern. Orientation to time, place and person: Normal affect with normal thought pattern. Recent and remote memory: Normal affect with normal thought pattern. Mood and affect: Normal affect with normal thought pattern.  Wound Assessment(s)  Wound #15 Left, Plantar Metatarsal head fifth is a chronic Lema Grade 1 Diabetic Ulcer and has received a status of Not Healed. Initial wound encounter measurements are 0.2cm length x 0.3cm width x 0.1 cm depth, with an area of 0.06 sq cm and a volume of 0.006 cubic cm. Adipose is exposed. No tunneling has been noted. No sinus tract has been noted. No undermining has been noted. There is a Moderate amount of serosanguineous drainage noted which has no odor. The patient reports a wound pain of level 0/10. The wound margin is thickened. Wound bed has Yes, bright red, pink, firm, granulation, Yes slough, No eschar, Yes epithelialization.    The periwound skin exhibited edema, callus, erythema and hemosiderosis. The periwound skin did not exhibit brawny induration, excoriation, induration, crepitus, fluctuance, rash, maceration, atrophie nick, cyanosis, ecchymosis, pallor and rubor. The periwound skin was dry/scaly. The periwound skin was not friable, denuded and moist. The temperature of the periwound skin is WNL. Periwound skin does not exhibit signs or symptoms of infection. Local Pulse is Normal.           Assessment and Plan     1. Ulcer of left foot, with fat layer exposed [L97.522]        Plan  Wound Orders:  Wound #15 Left, Plantar Metatarsal head fifth  Anesthetic  2% Viscous Lidocaine to wound bed prior to procedure. - for wound care  Cleanser  Cleanse Wound with Normal Saline  Moisture Barrier  Skin prep to periwound skin - and under adhesive felt  Dressings  Apply dressing. - silvercel, border gauze, conform roll  gauze and tape. daily.  Off-Loading  Keep weight off: - adhesive felt around wound. change 1 x week at clinic  Dietary  Supplement with daily multivitamin.  Follow Diabetic diet.  Vitamin C 500 mg. twice a day.         No follow-ups on file.

## 2023-11-09 NOTE — PROGRESS NOTES
Subjective     Patient ID: René Moscoso is a 73 y.o. male.    Chief Complaint: Wound Care  HPI  This information was obtained from the Patient.    Location: #15- L metatarsal head, 5th  Duration: onset #15- 8/26/22  Timing: no pain reported  Context: diabetic  Modifying Factors: edema  Associated Signs and Symptoms: callus  Pt returns for wound to [facility]. Wound continues to improve. Pt brought his normal daily wearing shoes that he just purchased but the shoe is too narrow, not proper fitting. No S/S infection evident    Objective  Physical Exam  Constitutional:  Pulse rate and rhythm regular. Afebrile. Height within normal limits. overweight. Well developed, well nourished, and in no acute distress. Alert and oriented x3.  Eyes:  Conjunctiva without icterus.  Ears, Nose, Mouth, and Throat:  Symmetric hearing.  Respiratory:  Even respirations without use of accessory muscles. No intercoastal retractions noted. Even and non labored respiration.  Cardiovascular:  See lower extremity assessment when applicable. edema .  Musculoskeletal:  Normal gait.  Integumentary (Hair, Skin):  adipose exposed.  Psychiatric:  Judgement and insight: Normal affect with normal thought pattern. Orientation to time, place and person: Normal affect with normal thought pattern. Recent and remote memory: Normal affect with normal thought pattern. Mood and affect: Normal affect with normal thought pattern.  Wound Assessment(s)  Wound #15 Left, Plantar Metatarsal head fifth is a chronic Lema Grade 1 Diabetic Ulcer and has received a status of Not Healed. Initial wound encounter measurements are 0.2cm length x 0.2cm width x 0.1 cm depth, with an area of 0.04 sq cm and a volume of 0.004 cubic cm. Adipose is exposed. No tunneling has been noted. No sinus tract has been noted. No undermining has been noted. There is a Moderate amount of serosanguineous drainage noted which has no odor. The patient reports a wound pain of level 0/10. The wound  margin is thickened. Wound bed has Yes, bright red, pink, firm, granulation, Yes slough, No eschar, Yes epithelialization.    The periwound skin exhibited edema, callus, erythema and hemosiderosis. The periwound skin did not exhibit brawny induration, excoriation, induration, crepitus, fluctuance, rash, maceration, atrophie nick, cyanosis, ecchymosis, pallor and rubor. The periwound skin was dry/scaly and moist. The periwound skin was not friable and denuded. The temperature of the periwound skin is WNL. Periwound skin does not exhibit signs or symptoms of infection. Local Pulse is Normal.       Assessment and Plan     1. Ulcer of left foot, with fat layer exposed [L97.522]    2. Diabetic ulcer of left foot associated with diabetes mellitus of other type, with fat layer exposed, unspecified part of foot        Plan  Wound Orders:  Wound #15 Left, Plantar Metatarsal head fifth  Anesthetic  2% Viscous Lidocaine to wound bed prior to procedure. - for wound care  Cleanser  Cleanse Wound with Normal Saline  Moisture Barrier  Skin prep to periwound skin - and under adhesive felt  Dressings  Apply dressing. - silvercel, border gauze, conform roll gauze and tape. daily.  Off-Loading  Keep weight off: - adhesive felt around wound. change 1 x week at clinic  Dietary  Supplement with daily multivitamin.  Follow Diabetic diet.     Home Health  Home Health Care: If you have any questions or concerns please contact the wound center.  1. Home Health-Skilled Nurse for dressing change______x on clinic wk_______X on Non-Clinic Wk. - Home Health-Skilled Nurse for dressing change___1___x on clinic wk___2____X on Non-Clinic wk  3. If caregiver willing and able to perform-may teach dressing change procedure.  6. If dressings not in stock - May use product equivalent until obtained.    No follow-ups on file.

## 2023-11-15 PROBLEM — M79.604 PAIN OF RIGHT LOWER EXTREMITY DUE TO INJURY: Status: ACTIVE | Noted: 2023-01-01
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
## 2023-11-16 NOTE — PROGRESS NOTES
Subjective     Patient ID: René Moscoso is a 73 y.o. male.    Chief Complaint: Wound Care    HPI  This information was obtained from the Patient.    Location: #15- L metatarsal head, 5th  Duration: onset #15- 8/26/22  Timing: no pain reported  Context: diabetic  Modifying Factors: edema  Associated Signs and Symptoms: callus  Pt returns for wound to [facility]. Periwound does exhibit some maceration. Wound appears nearly resolved, main factor affecting healing is him ambulating. No S/S infection evident  Review of Systems (ROS)  This information was obtained from the Patient.    Complaints and Symptoms  Patient complains of:  Cardiovascular (Central/Peripheral): Edema    Integumentary (Hair/Skin/Nails): Open Sore, Calluses/Corns    Psychiatric: Depression    Patient denies complaints or symptoms related to:  Allergic/Immunologic: Hay Fever    Cardiovascular (Central/Peripheral): Lower extremity (leg) resting pain    Constitutional Symptoms (General Health): Chills, Fatigue, Fever, Loss of Appetite    Ear/Nose/Mouth/Throat: Hearing Loss / Aid    Endocrine: Cold Intolerance, Heat Intolerance    Eyes: Double Vision / Spots / Flashing Lights, Partial/Complete Blindness, Vision Changes    Gastrointestinal (GI): Nausea / Vomiting / Diarrhea (N/V/D)    Integumentary (Hair/Skin/Nails): Change: Hair, Nails, Skin (erythemic right dorsal foot)    Neurological: Abnormal Gait, Headaches, Weakness    Psychiatric: Anxiety    Respiratory       Objective     Physical Exam  Constitutional:  Pulse rate and rhythm regular. Afebrile. Height within normal limits. Weight WNL. Well developed, well nourished, and in no acute distress. Alert and oriented x3.  Eyes:  Conjunctiva without icterus.  Ears, Nose, Mouth, and Throat:  Symmetric hearing.  Respiratory:  Even respirations without use of accessory muscles. No intercoastal retractions noted. Even and non labored respiration.  Cardiovascular:  See lower extremity assessment when applicable.  edema .  Musculoskeletal:  Normal gait.  Integumentary (Hair, Skin):  adipose exposed.  Psychiatric:  Judgement and insight: Normal affect with normal thought pattern. Orientation to time, place and person: Normal affect with normal thought pattern. Recent and remote memory: Normal affect with normal thought pattern. Mood and affect: Normal affect with normal thought pattern.  Wound Assessment(s)  Wound #15 Left, Plantar Metatarsal head fifth is a chronic Lema Grade 1 Diabetic Ulcer and has received a status of Not Healed. Initial wound encounter measurements are 0.1cm length x 0.3cm width x 0.01 cm depth, with an area of 0.03 sq cm and a volume of 0 cubic cm. Adipose is exposed. No tunneling has been noted. No sinus tract has been noted. No undermining has been noted. There is a Moderate amount of serosanguineous drainage noted which has no odor. The patient reports a wound pain of level 0/10. The wound margin is flat and intact. Wound bed has Yes, bright red, pink, firm, granulation, Yes slough, No eschar, Yes epithelialization.    The periwound skin exhibited edema, callus and hemosiderosis. The periwound skin did not exhibit brawny induration, excoriation, induration, crepitus, fluctuance, rash, maceration, atrophie nick, cyanosis, ecchymosis, erythema, pallor and rubor. The periwound skin was not friable, denuded, dry/scaly and moist. The temperature of the periwound skin is WNL. Periwound skin does not exhibit signs or symptoms of infection. Local Pulse is Normal.           Assessment and Plan     1. Ulcer of left foot, with fat layer exposed [L97.522]    2. Diabetic ulcer of left foot associated with diabetes mellitus of other type, with fat layer exposed, unspecified part of foot        Plan  Wound Orders:  Wound #15 Left, Plantar Metatarsal head fifth  Anesthetic  2% Viscous Lidocaine to wound bed prior to procedure. - for wound care  Cleanser  Cleanse Wound with Normal Saline  Moisture Barrier  Skin  prep to periwound skin - and under adhesive felt  Dressings  Apply dressing. - silvercel, border gauze, conform roll gauze and tape. daily.  Off-Loading  Keep weight off: - adhesive felt around wound. change 1 x week at clinic  Dietary  Supplement with daily multivitamin.  Follow Diabetic diet.  Vitamin C 500 mg. twice a day.  Follow-Up Appointments  Return Appointment 2 weeks - for wound care  Home Health  Home Health Care: If you have any questions or concerns please contact the wound center. - Please visit patient 2 times next week   Home Health-Skilled Nurse for dressing change___1___x on clinic wk___2____X on Non-Clinic wk  3. If caregiver willing and able to perform-may teach dressing change procedure.  6. If dressings not in stock - May use product equivalent until obtained.         No follow-ups on file.

## 2023-11-29 PROBLEM — B35.3 TINEA PEDIS OF BOTH FEET: Status: ACTIVE | Noted: 2023-01-01

## 2023-11-29 NOTE — PROGRESS NOTES
George White DO   [facility]  [address] [facility] [address] [address]  [place]  [address]      PATIENT NAME: René Moscoso  : 1950  DATE: 23  MRN: [id number]      Billing Provider: George White DO  Level of Service:   Patient PCP Information       Provider PCP Type    George White DO General            Reason for Visit / Chief Complaint: Follow-up (Right leg injury )       Update PCP  Update Chief Complaint         History of Present Illness / Problem Focused Workflow     René Moscoso presents to the clinic with Follow-up (Right leg injury )     Patient being seen in wound care and has a persistent ulcer on his left foot.  He is to follow-up with him in the a.m..  He has decreased sensation in his feet bilaterally.  Recently had an injury to his right leg but that is much improved.  Patient states blood sugars are controlled and he checks some twice a day but the highs have been in the 150-170 range.  No chest pain shortness breast palpitations PND orthopnea.  Patient does have neuropathy and takes gabapentin for that.        Review of Systems     Review of Systems   Constitutional:  Negative for activity change, appetite change, chills, fatigue and fever.   HENT:  Negative for nasal congestion, ear discharge, ear pain, mouth dryness, mouth sores, postnasal drip, sinus pressure/congestion, sore throat and voice change.    Eyes:  Negative for pain, discharge, redness, itching and visual disturbance.   Respiratory:  Negative for apnea, cough, chest tightness, shortness of breath and wheezing.    Cardiovascular:  Positive for leg swelling. Negative for chest pain and palpitations.   Gastrointestinal:  Negative for abdominal distention, abdominal pain, anal bleeding, blood in stool, change in bowel habit, constipation, diarrhea, nausea, vomiting and reflux.   Endocrine: Negative for cold intolerance, heat intolerance, polydipsia, polyphagia and polyuria.   Genitourinary:  Negative for difficulty  urinating, enuresis, erectile dysfunction, frequency, genital sores, hematuria and urgency.   Musculoskeletal:  Positive for back pain. Negative for arthralgias, gait problem, leg pain, myalgias and neck pain.   Integumentary:  Negative for rash, mole/lesion, breast mass and breast discharge.   Allergic/Immunologic: Negative for environmental allergies and food allergies.   Neurological:  Positive for weakness and numbness. Negative for dizziness, vertigo, tremors, seizures, syncope, facial asymmetry, speech difficulty, light-headedness, headaches, memory loss and coordination difficulties.   Hematological:  Negative for adenopathy. Does not bruise/bleed easily.   Psychiatric/Behavioral:  Negative for agitation, behavioral problems, confusion, decreased concentration, dysphoric mood, hallucinations, self-injury, sleep disturbance and suicidal ideas. The patient is not nervous/anxious and is not hyperactive.    Breast: Negative for mass      Medical / Social / Family History     Past Medical History:   Diagnosis Date    Arthritis     Chronic pain 02/25/2021    COPD (chronic obstructive pulmonary disease)     Diabetes type 2, controlled     Hyperlipidemia     Neuropathy        Past Surgical History:   Procedure Laterality Date    CARDIAC CATHETERIZATION      ESOPHAGOGASTRODUODENOSCOPY  10/22/2014    FLEXIBLE SIGMOIDOSCOPY  10/23/2014    INJECTION OF FACET JOINT Bilateral 07/01/2014    Bilateral L3-S1 Facet Injection - 7/1/14 and 3/18/14    SINUS SURGERY         Social History    reports that he quit smoking about 12 years ago. His smoking use included cigarettes. He started smoking about 32 years ago. He has a 40.1 pack-year smoking history. His smokeless tobacco use includes snuff. He reports that he does not currently use alcohol. He reports current drug use. Drug: Hydrocodone.    Family History  's family history includes Arthritis in his mother; COPD in his brother; Cancer in his father, mother, and sister;  Diabetes in his mother; Heart disease in his mother; Hypertension in his mother.    Medications and Allergies     Medications  Outpatient Medications Marked as Taking for the 11/29/23 encounter (Office Visit) with George White, DO   Medication Sig Dispense Refill    albuterol (ACCUNEB) 0.63 mg/3 mL Nebu Take 0.63 mg by nebulization every 6 (six) hours as needed. Rescue      aspirin 81 MG Chew Take 81 mg by mouth once daily.      blood-glucose meter Misc USE TO CHECK BLOOD SUGAR      diclofenac (VOLTAREN) 75 MG EC tablet Take 1 tablet (75 mg total) by mouth 2 (two) times daily. for 15 days 30 tablet 0    empagliflozin-metformin (SYNJARDY XR) 12.5-1,000 mg TBph Take 2 tablets by mouth every morning. 180 tablet 1    fluticasone propionate (FLONASE) 50 mcg/actuation nasal spray 1 spray (50 mcg total) by Each Nostril route once daily. 15.8 mL 0    gabapentin (NEURONTIN) 600 MG tablet Take 1 tablet (600 mg total) by mouth 2 (two) times daily. 180 tablet 1    gemfibroziL (LOPID) 600 MG tablet Take 1 tablet (600 mg total) by mouth 2 (two) times daily. 180 tablet 1    hydroCHLOROthiazide (MICROZIDE) 12.5 mg capsule Take 1 capsule (12.5 mg total) by mouth once daily. 90 capsule 1    HYDROcodone-acetaminophen (NORCO)  mg per tablet Take 1 tablet by mouth every 8 (eight) hours as needed for Pain (pain). 90 tablet 0    insulin aspart protamine-insulin aspart (NOVOLOG MIX 70-30FLEXPEN U-100) 100 unit/mL (70-30) InPn pen INJECT 94 UNITS SUBCUTANEOUSLY EVERY MORNING AND 90 UNITS EVERY EVENING 90 mL 5    ipratropium (ATROVENT) 42 mcg (0.06 %) nasal spray 2 sprays by Nasal route 3 (three) times daily. 15 mL 2    ipratropium/albuterol sulfate (COMBIVENT INHL) Inhale into the lungs.      isosorbide mononitrate (IMDUR) 30 MG 24 hr tablet Take 1 tablet (30 mg total) by mouth once daily. 90 tablet 1    LEVEMIR FLEXTOUCH U-100 INSULN 100 unit/mL (3 mL) InPn pen INJECT 92 units SUBCUTANEOUSLY AT BEDTIME 30 mL 5    linaGLIPtin  (TRADJENTA) 5 mg Tab tablet Take 1 tablet (5 mg total) by mouth once daily. 90 tablet 1    lisinopriL (PRINIVIL,ZESTRIL) 5 MG tablet Take 1 tablet (5 mg total) by mouth once daily. 90 tablet 1    memantine (NAMENDA) 10 MG Tab Take 1 tablet (10 mg total) by mouth 2 (two) times daily. 180 tablet 1    naloxone (NARCAN) 4 mg/actuation Spry if used, call 911,instill one SPRAYS alternate nostril, MAY REPEAT EVERY 2-3 minutes AS NEEDED.      nitroGLYCERIN (NITROSTAT) 0.4 MG SL tablet DISSOLVE 1 TABLET UNDER THE TONGUE EVERY 5 MINUTES AS NEEDED FOR CHEST PAIN. DO NOT EXCEED A TOTAL OF 3 DOSES IN 15 MINUTES. 30 tablet 5    omeprazole (PRILOSEC) 20 MG capsule Take 1 capsule (20 mg total) by mouth once daily. 90 capsule 1    prednisoLONE acetate (PRED FORTE) 1 % DrpS INSTILL ONE DROP IN EACH EYE FOUR TIMES DAILY AS DIRECTED (shake WELL)      semaglutide (RYBELSUS) 14 mg tablet Take 1 tablet (14 mg total) by mouth once daily. 90 tablet 1    simvastatin (ZOCOR) 40 MG tablet Take 1 tablet (40 mg total) by mouth every evening. 90 tablet 1    tiZANidine (ZANAFLEX) 4 MG tablet Take 1-2 tablets po hs prn muscle spasm 56 tablet 1    tobramycin sulfate 0.3% (TOBREX) 0.3 % ophthalmic solution instill ONE drop IN EACH EYE FOUR TIMES DAILY AS DIRECTED      TRUE METRIX GLUCOSE TEST STRIP Strp USE TO USE TO CHECK BLOOD SUGAR BLOOD GLUCOSE TWICE DAILY 50 each 11    VENTOLIN HFA 90 mcg/actuation inhaler INHALE TWO PUFFS BY MOUTH TWICE DAILY AS NEEDED 18 g 5       Allergies  Review of patient's allergies indicates:  No Known Allergies    Physical Examination     Vitals:    11/29/23 1546   BP: (!) 165/84   Pulse: 75   Resp: 20   Temp: 98.3 °F (36.8 °C)     Physical Exam  Constitutional:       General: He is not in acute distress.     Appearance: Normal appearance. He is obese.   HENT:      Head: Normocephalic.      Nose: Nose normal.      Mouth/Throat:      Mouth: Mucous membranes are moist.      Pharynx: Oropharynx is clear. No oropharyngeal  exudate or posterior oropharyngeal erythema.   Eyes:      General: No scleral icterus.        Right eye: No discharge.         Left eye: No discharge.      Conjunctiva/sclera: Conjunctivae normal.      Pupils: Pupils are equal, round, and reactive to light.   Neck:      Vascular: No carotid bruit.   Cardiovascular:      Rate and Rhythm: Normal rate and regular rhythm.      Pulses:           Dorsalis pedis pulses are 0 on the right side and 0 on the left side.        Posterior tibial pulses are 0 on the right side and 0 on the left side.      Heart sounds: Normal heart sounds. No murmur heard.  Pulmonary:      Effort: Pulmonary effort is normal.      Breath sounds: Normal breath sounds. No wheezing.   Abdominal:      General: Abdomen is flat. Bowel sounds are normal.      Tenderness: There is no abdominal tenderness.   Musculoskeletal:         General: Tenderness present. Normal range of motion.      Cervical back: Normal range of motion.      Right lower leg: Edema present.      Left lower leg: Edema present.   Feet:      Right foot:      Protective Sensation: 10 sites tested.  5 sites sensed.      Skin integrity: Callus and dry skin present.      Toenail Condition: Right toenails are abnormally thick.      Left foot:      Protective Sensation: 10 sites tested.  5 sites sensed.      Skin integrity: Callus and dry skin present.      Toenail Condition: Left toenails are abnormally thick.   Skin:     General: Skin is warm.      Capillary Refill: Capillary refill takes less than 2 seconds.      Findings: Lesion and rash present.      Comments: 3 cm ulcer left foot lateral.  Superficial skin down to the epidermal layer   Neurological:      General: No focal deficit present.      Mental Status: He is alert and oriented to person, place, and time. Mental status is at baseline.      Cranial Nerves: No cranial nerve deficit.      Sensory: Sensory deficit present.      Motor: Weakness present.      Coordination: Coordination  normal.      Gait: Gait normal.      Deep Tendon Reflexes: Reflexes normal.   Psychiatric:         Mood and Affect: Mood normal.         Behavior: Behavior normal.         Thought Content: Thought content normal.         Judgment: Judgment normal.               Lab Results   Component Value Date    WBC 6.94 08/30/2023    HGB 17.5 08/30/2023    HCT 54.3 (H) 08/30/2023    MCV 98.4 (H) 08/30/2023     08/30/2023          Sodium   Date Value Ref Range Status   08/30/2023 138 136 - 145 mmol/L Final     Potassium   Date Value Ref Range Status   08/30/2023 4.3 3.5 - 5.1 mmol/L Final     Chloride   Date Value Ref Range Status   08/30/2023 103 98 - 107 mmol/L Final     CO2   Date Value Ref Range Status   08/30/2023 27 21 - 32 mmol/L Final     Glucose   Date Value Ref Range Status   08/30/2023 121 (H) 74 - 106 mg/dL Final     BUN   Date Value Ref Range Status   08/30/2023 12 7 - 18 mg/dL Final     Creatinine   Date Value Ref Range Status   08/30/2023 1.11 0.70 - 1.30 mg/dL Final     Calcium   Date Value Ref Range Status   08/30/2023 9.4 8.5 - 10.1 mg/dL Final     Total Protein   Date Value Ref Range Status   08/30/2023 7.7 6.4 - 8.2 g/dL Final     Albumin   Date Value Ref Range Status   08/30/2023 3.4 (L) 3.5 - 5.0 g/dL Final     Bilirubin, Total   Date Value Ref Range Status   08/30/2023 0.5 >0.0 - 1.2 mg/dL Final     Alk Phos   Date Value Ref Range Status   08/30/2023 88 45 - 115 U/L Final     AST   Date Value Ref Range Status   08/30/2023 14 (L) 15 - 37 U/L Final     ALT   Date Value Ref Range Status   08/30/2023 25 16 - 61 U/L Final     Anion Gap   Date Value Ref Range Status   08/30/2023 12 7 - 16 mmol/L Final     eGFR   Date Value Ref Range Status   01/20/2022 64 >=60 mL/min/1.73m² Final      X-Ray Tibia Fibula 2 View Right  Narrative: EXAMINATION:  XR TIBIA FIBULA 2 VIEW RIGHT    CLINICAL HISTORY:  .  Unspecified injury of right lower leg, initial encounter    COMPARISON:  No previous similar    TECHNIQUE:  AP  "and lateral views right tibia and fibula    FINDINGS:  There is no fracture or dislocation.  There is no focal destructive osseous abnormality.  Impression: No acute focal bony abnormality    Electronically signed by: Sebastian Azul  Date:    11/15/2023  Time:    15:14  X-Ray Knee 1 or 2 View Right  Narrative: EXAMINATION:  XR KNEE 1 OR 2 VIEW RIGHT    CLINICAL HISTORY:  .  Unspecified injury of right lower leg, initial encounter    COMPARISON:  No previous similar    TECHNIQUE:  Right knee AP and lateral    FINDINGS:  There is no acute fracture or dislocation.  There is mild osteophyte formation in the anterior and lateral joint compartments.  There is mild joint space narrowing in the medial joint compartment  Impression: Mild osteoarthritis right knee    Electronically signed by: Sebastian Azul  Date:    11/15/2023  Time:    15:13     Procedures   Lab Results   Component Value Date    CHOL 113 08/30/2023    CHOL 130 06/19/2023    CHOL 157 01/20/2022     Lab Results   Component Value Date    HDL 36 (L) 08/30/2023    HDL 34 (L) 06/19/2023    HDL 35 (L) 01/20/2022     Lab Results   Component Value Date    LDLCALC 34 08/30/2023    LDLCALC 64 06/19/2023    LDLCALC 50 01/20/2022     Lab Results   Component Value Date    TRIG 216 (H) 08/30/2023    TRIG 158 (H) 06/19/2023    TRIG 362 (H) 01/20/2022     Lab Results   Component Value Date    CHOLHDL 3.1 08/30/2023    CHOLHDL 3.8 06/19/2023    CHOLHDL 4.5 01/20/2022      Lab Results   Component Value Date    LABA1C 6.9 03/02/2021    HGBA1C 7.7 (H) 08/30/2023      No results found for: "TSH", "I6DYZCK", "N3CAVYD", "THYROIDAB", "FREET4"   Assessment and Plan (including Health Maintenance)      Problem List  Smart Sets  Document Outside HM   :    Plan:         Health Maintenance Due   Topic Date Due    Hepatitis C Screening  Never done    Sign Pain Contract  Never done    Shingles Vaccine (1 of 2) Never done    RSV Vaccine (Age 60+ and Pregnant patients) (1 - 1-dose 60+ " series) Never done    LDCT Lung Screen  03/27/2014    Eye Exam  12/29/2022    Influenza Vaccine (1) 09/01/2023    COVID-19 Vaccine (3 - 2023-24 season) 09/01/2023    Hemoglobin A1c  11/30/2023       Problem List Items Addressed This Visit          Neuro    Diabetic polyneuropathy associated with type 2 diabetes mellitus - Primary (Chronic)    Current Assessment & Plan     Polyneuropathy contributing to his ulcerations.  Control his diabetes.  Gabapentin 600 3 times daily.  Continue his other diabetic medicines.  Norco p.r.n. for pain.  Syngardy to help with his diabetes control.         Relevant Orders    DIABETIC SHOES FOR HOME USE       Derm    Tinea pedis of both feet    Current Assessment & Plan     Bilateral tenia pedis.  Will place him on Nizoral cream twice daily.  Follow-up every 3 months.         Relevant Medications    ketoconazole (NIZORAL) 2 % cream       Cardiac/Vascular    Essential hypertension, benign    Relevant Orders    Comprehensive Metabolic Panel    CBC Auto Differential       Endocrine    Type 2 diabetes mellitus with left diabetic foot ulcer    Current Assessment & Plan     Diabetic ulcer with decreased sensation in his feet.  Will need diabetic shoes.  Foot exam was done.  Shoes ordered.  Lab Results   Component Value Date    LABA1C 6.9 03/02/2021    HGBA1C 7.7 (H) 08/30/2023    Last A1c was 7.7 with fair control.  Will monitor.  A1c lipid panel and urine for albumin creatinine ratio and BMP today.         Relevant Orders    Lipid Panel    Hemoglobin A1C    Microalbumin/Creatinine Ratio, Urine    Severe obesity (BMI 35.0-39.9) with comorbidity    Current Assessment & Plan     Discussed a 1500 calorie diet.  Recommend diabetic and nutrition counseling.  Follow-up monthly.            Orthopedic    Ulcer of left foot, limited to breakdown of skin    Current Assessment & Plan     Left foot ulcer being treated by wound care.  Superficial ulceration.  Continue his current treatment.  Will need to  control his diabetes better.  ABIs are 0.99 bilaterally with only minimal disease.  Will continue to be aggressive with the treatment of his obesity as well as his diabetes.  Follow-up on a monthly basis until his wounds heal.  Diabetic teaching.         Relevant Orders    DIABETIC SHOES FOR HOME USE    Comprehensive Metabolic Panel    CBC Auto Differential    Pain of right lower extremity due to injury    Current Assessment & Plan     Right leg injury resolved.  No residual.  Treatment at this time          Other Visit Diagnoses       Encounter for immunization        Relevant Orders    (In Office Administered) Pneumococcal Conjugate Vaccine (20 Valent) (IM) (Preferred) (Completed)            Health Maintenance Topics with due status: Not Due       Topic Last Completion Date    Colorectal Cancer Screening 10/03/2014    TETANUS VACCINE 05/27/2021    Diabetes Urine Screening 08/30/2023    Lipid Panel 08/30/2023    Low Dose Statin 11/29/2023    Foot Exam 11/29/2023       Future Appointments   Date Time Provider Department Center   11/30/2023  8:30 AM [facility]    12/11/2023 10:15 AM [facility]   1/9/2024  3:00 PM AWV NURSE, [facility] [place]   1/19/2024  9:20 AM [facility]        Follow up in about 4 weeks (around 12/27/2023).     Signature:  DO [facility]  [address] [address] [place]  [address]    Date of encounter: 11/29/23

## 2023-11-29 NOTE — ASSESSMENT & PLAN NOTE
Diabetic ulcer with decreased sensation in his feet.  Will need diabetic shoes.  Foot exam was done.  Shoes ordered.  Lab Results   Component Value Date    LABA1C 6.9 03/02/2021    HGBA1C 7.7 (H) 08/30/2023    Last A1c was 7.7 with fair control.  Will monitor.  A1c lipid panel and urine for albumin creatinine ratio and BMP today.

## 2023-11-29 NOTE — ASSESSMENT & PLAN NOTE
Polyneuropathy contributing to his ulcerations.  Control his diabetes.  Gabapentin 600 3 times daily.  Continue his other diabetic medicines.  Norco p.r.n. for pain.  Syngardy to help with his diabetes control.

## 2023-11-29 NOTE — ASSESSMENT & PLAN NOTE
Left foot ulcer being treated by wound care.  Superficial ulceration.  Continue his current treatment.  Will need to control his diabetes better.  ABIs are 0.99 bilaterally with only minimal disease.  Will continue to be aggressive with the treatment of his obesity as well as his diabetes.  Follow-up on a monthly basis until his wounds heal.  Diabetic teaching.

## 2023-11-30 NOTE — PROGRESS NOTES
Subjective     Patient ID: René Moscoso is a 73 y.o. male.    Chief Complaint: Wound Care    HPI  This information was obtained from the Patient.    Location: #15- L metatarsal head, 5th  Duration: onset #15- 8/26/22  Timing: no pain reported  Context: diabetic  Modifying Factors: edema  Associated Signs and Symptoms: callus  Pt presents today for wound to LLE. Callus periwound. He saw Dr White yesterday to establish care for chronic conditions. No S/S infection evident  Review of Systems (ROS)  This information was obtained from the Patient.    Complaints and Symptoms  Patient complains of:  Cardiovascular (Central/Peripheral): Edema    Integumentary (Hair/Skin/Nails): Open Sore, Calluses/Corns    Psychiatric: Depression    Patient denies complaints or symptoms related to:  Allergic/Immunologic: Hay Fever    Cardiovascular (Central/Peripheral): Lower extremity (leg) resting pain    Constitutional Symptoms (General Health): Chills, Fatigue, Fever, Loss of Appetite    Ear/Nose/Mouth/Throat: Hearing Loss / Aid    Endocrine: Cold Intolerance, Heat Intolerance    Eyes: Double Vision / Spots / Flashing Lights, Partial/Complete Blindness, Vision Changes    Gastrointestinal (GI): Nausea / Vomiting / Diarrhea (N/V/D)    Integumentary (Hair/Skin/Nails): Change: Hair, Nails, Skin (erythemic right dorsal foot)    Neurological: Abnormal Gait, Headaches, Weakness    Psychiatric: Anxiety    Respiratory        Objective  Physical Exam  Constitutional:  Pulse rate and rhythm regular. Afebrile. Height within normal limits. Weight WNL. Well developed, well nourished, and in no acute distress. Alert and oriented x3.  Eyes:  Conjunctiva without icterus.  Ears, Nose, Mouth, and Throat:  Symmetric hearing.  Respiratory:  Even respirations without use of accessory muscles. No intercoastal retractions noted. Even and non labored respiration.  Cardiovascular:  See lower extremity assessment when applicable. edema  .  Musculoskeletal:  Normal gait.  Integumentary (Hair, Skin):  adipose exposed.  Psychiatric:  Judgement and insight: Normal affect with normal thought pattern. Orientation to time, place and person: Normal affect with normal thought pattern. Recent and remote memory: Normal affect with normal thought pattern. Mood and affect: Normal affect with normal thought pattern.  Wound Assessment(s)  Wound #15 Left, Plantar Metatarsal head fifth is a chronic Lema Grade 1 Diabetic Ulcer and has received a status of Not Healed. Initial wound encounter measurements are 0.2cm length x 0.2cm width x 0.2 cm depth, with an area of 0.04 sq cm and a volume of 0.008 cubic cm. Adipose is exposed. No tunneling has been noted. No sinus tract has been noted. No undermining has been noted. There is a Moderate amount of serosanguineous drainage noted which has no odor. The patient reports a wound pain of level 0/10. The wound margin is thickened. Wound bed has Yes, pink, firm, granulation, Yes slough, No eschar, Yes epithelialization.    The periwound skin exhibited edema, callus and hemosiderosis. The periwound skin did not exhibit brawny induration, excoriation, induration, crepitus, fluctuance, rash, maceration, atrophie nick, cyanosis, ecchymosis, erythema, pallor and rubor. The periwound skin was moist. The periwound skin was not friable, denuded and dry/scaly. The temperature of the periwound skin is WNL. Periwound skin does not exhibit signs or symptoms of infection. Local Pulse is Normal.             Assessment and Plan     1. Ulcer of left foot, with fat layer exposed [L97.522]        Plan    Wound Orders:    Wound #15 Left, Plantar Metatarsal head fifth  Anesthetic  2% Viscous Lidocaine to wound bed prior to procedure. - for wound care  Cleanser  Cleanse Wound with Normal Saline  Moisture Barrier  Skin prep to periwound skin - and under adhesive felt  Dressings  Apply dressing. - silvercel, border gauze, conform roll gauze  and tape. daily.  Off-Loading  Keep weight off: - adhesive felt around wound. change 1 x week at clinic  Dietary  Supplement with daily multivitamin.  Follow Diabetic diet.  Vitamin C 500 mg. twice a day.      Home Health Care: If you have any questions or concerns please contact the wound center.  1. Home Health-Skilled Nurse for dressing change___1___x on clinic wk___2____X on Non-Clinic wk  3. If caregiver willing and able to perform-may teach dressing change procedure.  6. If dressings not in stock - May use product equivalent until obtained.         No follow-ups on file.

## 2023-12-01 NOTE — TELEPHONE ENCOUNTER
Pt was given a call about BS control, needing labs drawn for last visit 11/29/23, asked Pt to be fasting due to a lipid order. Pt is battling wound again, going to wound care for ulcer left foot and told to keep weight off as much as possible. The need to continue with tighter BS control for best healing explained.  Pt reports taking all meds as listed on chart: [id number], synjardy Novolog 70/30, Levemir at  and Tradjenta.   I spoke to Dr White about Levemir being discontinued, no longer to be made. May here a need to change Pt to Tresiba or basal insulin insurance will cover in near future.  I reminded Pt the need to come Monday for fasting lab, early morning appearance since lab orders in chart.  8/30/2023 Hgba1c 7.7%, encouraged Pt that was where needed to be.

## 2023-12-04 PROBLEM — J01.00 ACUTE NON-RECURRENT MAXILLARY SINUSITIS: Status: RESOLVED | Noted: 2021-08-16 | Resolved: 2023-01-01

## 2023-12-07 NOTE — PROGRESS NOTES
Subjective     Patient ID: René Moscoso is a 73 y.o. male.    Chief Complaint: Wound Care    HPI  This information was obtained from the Patient.    Location: #15- L metatarsal head, 5th  Duration: onset #15- 8/26/22  Timing: no pain reported  Context: diabetic  Modifying Factors: edema  Associated Signs and Symptoms: callus  Pt presents today for wound to LLE.  Granulating wound bed, improving. No S/S infection evident    Objective    Physical Exam    Constitutional:  Pulse rate and rhythm regular. Afebrile. Height within normal limits. Weight WNL. Well developed, well nourished, and in no acute distress. Alert and oriented x3.  Ears, Nose, Mouth, and Throat:  Symmetric hearing.  Respiratory:  Even respirations without use of accessory muscles. No intercoastal retractions noted. Even and non labored respiration.  Cardiovascular:  See lower extremity assessment when applicable. edema .  Musculoskeletal:  Normal gait.  Integumentary (Hair, Skin):  adipose exposed.  Psychiatric:  Judgement and insight: Normal affect with normal thought pattern. Orientation to time, place and person: Normal affect with normal thought pattern. Recent and remote memory: Normal affect with normal thought pattern. Mood and affect: Normal affect with normal thought pattern.    Wound Assessment(s)    Wound #15 Left, Plantar Metatarsal head fifth is a chronic Lema Grade 1 Diabetic Ulcer and has received a status of Not Healed. Initial wound encounter measurements are 0.3cm length x 0.2cm width x 0.1 cm depth, with an area of 0.06 sq cm and a volume of 0.006 cubic cm. Adipose is exposed. No tunneling has been noted. No sinus tract has been noted. No undermining has been noted. There is a Moderate amount of serosanguineous drainage noted which has no odor. The patient reports a wound pain of level 0/10. The wound margin is flat and intact. Wound bed has Yes, pink, firm, granulation, Yes slough, No eschar, Yes epithelialization.    The  periwound skin exhibited edema, callus, erythema and hemosiderosis. The periwound skin did not exhibit brawny induration, excoriation, induration, crepitus, fluctuance, rash, maceration, atrophie nick, cyanosis, ecchymosis, pallor and rubor. The periwound skin was dry/scaly. The periwound skin was not friable, denuded and moist. The temperature of the periwound skin is WNL. Periwound skin does not exhibit signs or symptoms of infection. Local Pulse is Normal.    General Notes    dry scaly skin on foot and toes       Assessment and Plan     1. Diabetic ulcer of left foot associated with diabetes mellitus of other type, with fat layer exposed, unspecified part of foot        Plan    Wound Orders:    Wound #15 Left, Plantar Metatarsal head fifth  Anesthetic  2% Viscous Lidocaine to wound bed prior to procedure. - for wound care  Cleanser  Cleanse Wound with Normal Saline  Moisture Barrier  Skin prep to periwound skin - and under adhesive felt  Dressings  Apply dressing. - silvercel, border gauze, conform roll gauze and tape. daily.  Off-Loading  Keep weight off: - adhesive felt around wound. change 1 x week at clinic  Dietary  Supplement with daily multivitamin.  Follow Diabetic diet.  Vitamin C 500 mg. twice a day.  Follow-Up Appointments    Home Health  Home Health Care: If you have any questions or concerns please contact the wound center.  1.  Home Health-Skilled Nurse for dressing change___1___x on clinic wk___2____X on Non-Clinic wk  3. If caregiver willing and able to perform-may teach dressing change procedure.  6. If dressings not in stock - May use product equivalent until obtained.         No follow-ups on file.

## 2023-12-11 NOTE — PROGRESS NOTES
She Disclaimer: This note has been generated using voice-recognition software. There may be typographical errors that have been missed during proof-reading        Patient ID: René Moscoso is a 73 y.o. male.      Chief Complaint: Low-back Pain      73-year-old male returns for re-evaluation of chronic lower back and  lower extremity pain.  The lower back pain remains unchanged.  He remains in wound care for a  left lower extremity ulceration but no longer requires antibiotics.  The pain remains chronic and  he returns today for medication refill.  X-ray of the lumbar spine revealed multilevel degenerative changes, stenosis L5-S1 anterolisthesis.  He is not a candidate for nerve block injections due to chronic infection.              Pain Assessment  Pain Assessment: 0-10  Pain Score:   4  Pain Location: Back  Pain Descriptors: Aching  Pain Frequency: Intermittent  Pain Onset: Gradual  Clinical Progression: Not changed  Aggravating Factors: Standing  Pain Intervention(s): Medication (See eMAR), Home medication      A's of Opioid Risk Assessment  Activity:Patient is unable to  perform ADL.   Analgesia:Patients pain is  controlled by current medication.   Adverse Effects: Patient denies constipation or sedation.  Aberrant Behavior:  reviewed with no aberrant drug seeking/taking behavior.      Patient denies any suicidal or homicidal ideations    Physical Therapy/Home Exercise: not applicable      X-Ray Tibia Fibula 2 View Right  Narrative: EXAMINATION:  XR TIBIA FIBULA 2 VIEW RIGHT    CLINICAL HISTORY:  .  Unspecified injury of right lower leg, initial encounter    COMPARISON:  No previous similar    TECHNIQUE:  AP and lateral views right tibia and fibula    FINDINGS:  There is no fracture or dislocation.  There is no focal destructive osseous abnormality.  Impression: No acute focal bony abnormality    Electronically signed by: Sebastian Azul  Date:    11/15/2023  Time:    15:14  X-Ray Knee 1 or 2 View  Right  Narrative: EXAMINATION:  XR KNEE 1 OR 2 VIEW RIGHT    CLINICAL HISTORY:  .  Unspecified injury of right lower leg, initial encounter    COMPARISON:  No previous similar    TECHNIQUE:  Right knee AP and lateral    FINDINGS:  There is no acute fracture or dislocation.  There is mild osteophyte formation in the anterior and lateral joint compartments.  There is mild joint space narrowing in the medial joint compartment  Impression: Mild osteoarthritis right knee    Electronically signed by: Sebastian Azul  Date:    11/15/2023  Time:    15:13      Review of Systems   Constitutional: Negative.    HENT: Negative.     Eyes: Negative.    Respiratory: Negative.     Cardiovascular: Negative.    Gastrointestinal: Negative.    Endocrine: Negative.    Genitourinary: Negative.    Musculoskeletal:  Positive for arthralgias, back pain and leg pain.   Integumentary:  Negative.   Allergic/Immunologic: Negative.    Neurological:  Positive for weakness and numbness.   Hematological: Negative.    Psychiatric/Behavioral: Negative.               Past Medical History:   Diagnosis Date    Arthritis     Chronic pain 2021    COPD (chronic obstructive pulmonary disease)     Diabetes type 2, controlled     Hyperlipidemia     Neuropathy      Past Surgical History:   Procedure Laterality Date    CARDIAC CATHETERIZATION      ESOPHAGOGASTRODUODENOSCOPY  10/22/2014    FLEXIBLE SIGMOIDOSCOPY  10/23/2014    INJECTION OF FACET JOINT Bilateral 2014    Bilateral L3-S1 Facet Injection - 14 and 3/18/14    SINUS SURGERY       Social History     Socioeconomic History    Marital status: Single    Number of children: 3   Tobacco Use    Smoking status: Former     Current packs/day: 0.00     Average packs/day: 2.0 packs/day for 20.1 years (40.1 ttl pk-yrs)     Types: Cigarettes     Start date:      Quit date: 2011     Years since quittin.9    Smokeless tobacco: Current     Types: Snuff   Substance and Sexual Activity     Alcohol use: Not Currently    Drug use: Yes     Types: Hydrocodone    Sexual activity: Not Currently     Family History   Problem Relation Age of Onset    Diabetes Mother     Heart disease Mother     Hypertension Mother     Arthritis Mother     Cancer Mother     Cancer Father     Cancer Sister     COPD Brother      Review of patient's allergies indicates:  No Known Allergies  has a current medication list which includes the following prescription(s): albuterol, aspirin, blood-glucose meter, synjardy xr, fluticasone propionate, gabapentin, gemfibrozil, hydrochlorothiazide, hydrocodone-acetaminophen, insulin aspart protamine-insulin aspart, ipratropium, ipratropium/albuterol sulfate, isosorbide mononitrate, ketoconazole, levemir flextouch u100 insulin, linagliptin, lisinopril, memantine, naloxone, nitroglycerin, omeprazole, prednisolone acetate, rybelsus, simvastatin, tizanidine, tobramycin sulfate 0.3%, true metrix glucose test strip, ventolin hfa, diclofenac, [START ON 12/13/2023] hydrocodone-acetaminophen, [START ON 1/12/2024] hydrocodone-acetaminophen, and [START ON 2/9/2024] hydrocodone-acetaminophen.      Objective:  Vitals:    12/11/23 1025   BP: 119/61   Pulse: 105   Resp: 18          Physical Exam  Vitals and nursing note reviewed.   Constitutional:       General: He is not in acute distress.     Appearance: Normal appearance. He is obese. He is not ill-appearing, toxic-appearing or diaphoretic.   HENT:      Head: Normocephalic and atraumatic.      Nose: Nose normal.      Mouth/Throat:      Mouth: Mucous membranes are moist.   Eyes:      Extraocular Movements: Extraocular movements intact.      Pupils: Pupils are equal, round, and reactive to light.   Cardiovascular:      Rate and Rhythm: Normal rate and regular rhythm.      Heart sounds: Normal heart sounds.   Pulmonary:      Effort: Pulmonary effort is normal. No respiratory distress.      Breath sounds: Normal breath sounds. No stridor. No wheezing or  rhonchi.   Abdominal:      General: Bowel sounds are normal.      Palpations: Abdomen is soft.   Musculoskeletal:         General: No swelling, tenderness or deformity.      Cervical back: Normal and normal range of motion. No spasms or tenderness. No pain with movement. Normal range of motion.      Thoracic back: Normal.      Lumbar back: Spasms and bony tenderness present. Decreased range of motion. Negative right straight leg raise test and negative left straight leg raise test. No scoliosis.      Right lower leg: No edema.      Left lower leg: No edema.   Skin:     General: Skin is warm.      Findings: Wound (bilateral feet) present.   Neurological:      General: No focal deficit present.      Mental Status: He is alert and oriented to person, place, and time. Mental status is at baseline.      Cranial Nerves: No cranial nerve deficit.      Sensory: Sensation is intact. No sensory deficit.      Motor: No weakness.      Coordination: Coordination normal.      Gait: Gait abnormal.      Deep Tendon Reflexes: Reflexes are normal and symmetric.   Psychiatric:         Mood and Affect: Mood normal.         Behavior: Behavior normal.           Assessment:      1. Lumbosacral spondylosis without myelopathy    2. Diabetic polyneuropathy associated with type 2 diabetes mellitus    3. Neck pain    4. Neuropathy    5. Encounter for long-term (current) use of other medications            Plan:  1. reviewed  2.Addiction, Dependency, Tolerance, Opioid abuse-misuse, Death, Diversion Discussed. Overdose reversal drug Naloxone discussed.Patient is prescribed opiates for chronic nonmalignant pain pathology.  Patient is receiving opiates which require greater than a 72 hour supply of therapy.  Patient was educated on potential dependency associated with long-term opioid use as well as decreasing efficacy with prolonged use.  Patient was advised of risks, benefits and side effects and how to utilize each medication.  Patient was  also informed that any deviation from therapy protocol will  lead to discontinuation of opiates.  It is reasonable to prescribe opioid analgesics for patient based on positive response to opioid medications, lack of side effects and  limited aberrant behavior.    3.Refill/Continue medications for pain control and function       Requested Prescriptions     Signed Prescriptions Disp Refills    HYDROcodone-acetaminophen (NORCO)  mg per tablet 90 tablet 0     Sig: Take 1 tablet by mouth every 8 (eight) hours as needed for Pain (pain).    HYDROcodone-acetaminophen (NORCO)  mg per tablet 90 tablet 0     Sig: Take 1 tablet by mouth every 8 (eight) hours as needed for Pain (pain).    HYDROcodone-acetaminophen (NORCO)  mg per tablet 90 tablet 0     Sig: Take 1 tablet by mouth every 8 (eight) hours as needed for Pain (pain).     4.Urine drug screen point of care obtained and consistent with prescribed medications and medication refill date    Orders Placed This Encounter   Procedures    POCT Urine Drug Screen ([facility])     Interpretive Information:     Negative:  No drug detected at the cut off level.   Positive:  This result represents presumptive positive for the   tested drug, other substances may yield a positive response other   than the analyte of interest. This result should be utilized for   diagnostic purpose only. Confirmation testing will be performed upon physician request only.         5.Patient defers nerve block injections, physical therapy or surgical consultation  6. Continue wound care  as scheduled  7.Follow with EVARISTO Wheat in 3 months for re-evaluation and medication refill       report:  Reviewed and consistent with medication use as prescribed.      The total time spent for evaluation and management on 12/12/2023 including reviewing separately obtained history, performing a medically appropriate exam and evaluation, documenting clinical information in the health record,  independently interpreting results and communicating them to the patient/family/caregiver, and ordering medications/tests/procedures was between 15-29 minutes.    The above plan and management options were discussed at length with patient. Patient is in agreement with the above and verbalized understanding. It will be communicated with the referring physician via electronic record, fax, or mail.

## 2023-12-18 ENCOUNTER — TELEPHONE (OUTPATIENT)
Dept: FAMILY MEDICINE | Facility: CLINIC | Age: 73
End: 2023-12-18
Payer: MEDICARE